# Patient Record
Sex: FEMALE | Race: WHITE | NOT HISPANIC OR LATINO | Employment: OTHER | URBAN - NONMETROPOLITAN AREA
[De-identification: names, ages, dates, MRNs, and addresses within clinical notes are randomized per-mention and may not be internally consistent; named-entity substitution may affect disease eponyms.]

---

## 2017-02-07 ENCOUNTER — TRANSCRIBE ORDERS (OUTPATIENT)
Dept: NEPHROLOGY | Facility: HOSPITAL | Age: 62
End: 2017-02-07

## 2017-02-07 ENCOUNTER — LAB (OUTPATIENT)
Dept: LAB | Facility: HOSPITAL | Age: 62
End: 2017-02-07
Attending: INTERNAL MEDICINE

## 2017-02-07 DIAGNOSIS — N18.30 CHRONIC KIDNEY DISEASE, STAGE 3 (MODERATE): Primary | ICD-10-CM

## 2017-02-07 DIAGNOSIS — N18.30 CHRONIC KIDNEY DISEASE, STAGE 3 (MODERATE): ICD-10-CM

## 2017-02-07 LAB
25(OH)D3 SERPL-MCNC: 35.6 NG/ML
ALBUMIN SERPL-MCNC: 4.5 G/DL (ref 3.5–5)
ANION GAP SERPL CALCULATED.3IONS-SCNC: 16 MMOL/L
BASOPHILS # BLD AUTO: 0.09 10*3/MM3 (ref 0–0.2)
BASOPHILS NFR BLD AUTO: 0.8 % (ref 0–2.5)
BUN BLD-MCNC: 28 MG/DL (ref 7–20)
BUN/CREAT SERPL: 17.5 (ref 7.1–23.5)
CALCIUM SPEC-SCNC: 9.7 MG/DL (ref 8.4–10.2)
CHLORIDE SERPL-SCNC: 101 MMOL/L (ref 98–107)
CO2 SERPL-SCNC: 30 MMOL/L (ref 26–30)
CREAT BLD-MCNC: 1.6 MG/DL (ref 0.6–1.3)
CREAT UR-MCNC: 160.8 MG/DL
DEPRECATED RDW RBC AUTO: 43.4 FL (ref 37–54)
EOSINOPHIL # BLD AUTO: 0.22 10*3/MM3 (ref 0–0.7)
EOSINOPHIL NFR BLD AUTO: 1.9 % (ref 0–7)
ERYTHROCYTE [DISTWIDTH] IN BLOOD BY AUTOMATED COUNT: 12.4 % (ref 11.5–14.5)
GFR SERPL CREATININE-BSD FRML MDRD: 33 ML/MIN/1.73
GLUCOSE BLD-MCNC: 178 MG/DL (ref 74–98)
HCT VFR BLD AUTO: 38.5 % (ref 37–47)
HGB BLD-MCNC: 12.1 G/DL (ref 12–16)
IMM GRANULOCYTES # BLD: 0.03 10*3/MM3 (ref 0–0.06)
IMM GRANULOCYTES NFR BLD: 0.3 % (ref 0–0.6)
LYMPHOCYTES # BLD AUTO: 5.04 10*3/MM3 (ref 0.6–3.4)
LYMPHOCYTES NFR BLD AUTO: 44.2 % (ref 10–50)
MCH RBC QN AUTO: 30.2 PG (ref 27–31)
MCHC RBC AUTO-ENTMCNC: 31.4 G/DL (ref 30–37)
MCV RBC AUTO: 96 FL (ref 81–99)
MONOCYTES # BLD AUTO: 0.59 10*3/MM3 (ref 0–0.9)
MONOCYTES NFR BLD AUTO: 5.2 % (ref 0–12)
NEUTROPHILS # BLD AUTO: 5.44 10*3/MM3 (ref 2–6.9)
NEUTROPHILS NFR BLD AUTO: 47.6 % (ref 37–80)
NRBC BLD MANUAL-RTO: 0 /100 WBC (ref 0–0)
PHOSPHATE SERPL-MCNC: 3.2 MG/DL (ref 2.5–4.5)
PLATELET # BLD AUTO: 215 10*3/MM3 (ref 130–400)
PMV BLD AUTO: 10.8 FL (ref 6–12)
POTASSIUM BLD-SCNC: 5 MMOL/L (ref 3.5–5.1)
RBC # BLD AUTO: 4.01 10*6/MM3 (ref 4.2–5.4)
SODIUM BLD-SCNC: 142 MMOL/L (ref 137–145)
URATE SERPL-MCNC: 7.3 MG/DL (ref 2.5–8.5)
WBC NRBC COR # BLD: 11.41 10*3/MM3 (ref 4.8–10.8)

## 2017-02-07 PROCEDURE — 83970 ASSAY OF PARATHORMONE: CPT

## 2017-02-07 PROCEDURE — 84156 ASSAY OF PROTEIN URINE: CPT

## 2017-02-07 PROCEDURE — 36415 COLL VENOUS BLD VENIPUNCTURE: CPT

## 2017-02-07 PROCEDURE — 82570 ASSAY OF URINE CREATININE: CPT

## 2017-02-07 PROCEDURE — 82306 VITAMIN D 25 HYDROXY: CPT

## 2017-02-07 PROCEDURE — 84550 ASSAY OF BLOOD/URIC ACID: CPT

## 2017-02-07 PROCEDURE — 80069 RENAL FUNCTION PANEL: CPT

## 2017-02-07 PROCEDURE — 85025 COMPLETE CBC W/AUTO DIFF WBC: CPT

## 2017-02-09 LAB
PROT UR-MCNC: 19.6 MG/DL
PTH-INTACT SERPL-MCNC: 88 PG/ML (ref 15–65)

## 2019-06-17 RX ORDER — OXYMETAZOLINE HYDROCHLORIDE 0.05 G/100ML
2 SPRAY NASAL 2 TIMES DAILY PRN
COMMUNITY
End: 2023-02-18 | Stop reason: HOSPADM

## 2019-06-17 RX ORDER — GLIPIZIDE 10 MG/1
20 TABLET ORAL EVERY MORNING
COMMUNITY

## 2019-06-17 RX ORDER — ASPIRIN 325 MG
325 TABLET, DELAYED RELEASE (ENTERIC COATED) ORAL DAILY
COMMUNITY

## 2019-06-17 RX ORDER — CHOLECALCIFEROL (VITAMIN D3) 25 MCG
1000 CAPSULE ORAL DAILY
COMMUNITY

## 2019-06-17 RX ORDER — PANTOPRAZOLE SODIUM 40 MG/1
40 TABLET, DELAYED RELEASE ORAL DAILY
COMMUNITY
End: 2023-02-18 | Stop reason: HOSPADM

## 2019-06-17 RX ORDER — LOSARTAN POTASSIUM 50 MG/1
50 TABLET ORAL DAILY
COMMUNITY
End: 2019-12-29

## 2019-06-17 RX ORDER — LORATADINE 10 MG/1
10 CAPSULE, LIQUID FILLED ORAL DAILY
COMMUNITY
End: 2023-02-18 | Stop reason: HOSPADM

## 2019-06-17 RX ORDER — METOPROLOL TARTRATE 50 MG/1
50 TABLET, FILM COATED ORAL DAILY
COMMUNITY
End: 2023-02-18 | Stop reason: HOSPADM

## 2019-06-17 RX ORDER — GABAPENTIN 600 MG/1
600 TABLET ORAL 3 TIMES DAILY
COMMUNITY

## 2019-06-17 RX ORDER — METFORMIN HYDROCHLORIDE 750 MG/1
750 TABLET, EXTENDED RELEASE ORAL
COMMUNITY

## 2019-06-17 RX ORDER — ATORVASTATIN CALCIUM 80 MG/1
80 TABLET, FILM COATED ORAL DAILY
COMMUNITY
End: 2023-02-18 | Stop reason: HOSPADM

## 2019-06-21 ENCOUNTER — HOSPITAL ENCOUNTER (OUTPATIENT)
Facility: HOSPITAL | Age: 64
Setting detail: HOSPITAL OUTPATIENT SURGERY
Discharge: HOME | End: 2019-06-21
Attending: OPHTHALMOLOGY | Admitting: OPHTHALMOLOGY

## 2019-06-21 ENCOUNTER — ANESTHESIA EVENT (OUTPATIENT)
Dept: PERIOP | Facility: HOSPITAL | Age: 64
End: 2019-06-21

## 2019-06-21 ENCOUNTER — ANESTHESIA (OUTPATIENT)
Dept: PERIOP | Facility: HOSPITAL | Age: 64
End: 2019-06-21

## 2019-06-21 VITALS
WEIGHT: 160 LBS | DIASTOLIC BLOOD PRESSURE: 88 MMHG | SYSTOLIC BLOOD PRESSURE: 163 MMHG | HEIGHT: 60 IN | TEMPERATURE: 97.3 F | BODY MASS INDEX: 31.41 KG/M2 | OXYGEN SATURATION: 99 % | HEART RATE: 75 BPM | RESPIRATION RATE: 16 BRPM

## 2019-06-21 PROBLEM — H25.11 AGE-RELATED NUCLEAR CATARACT OF RIGHT EYE: Status: ACTIVE | Noted: 2019-06-21

## 2019-06-21 LAB — GLUCOSE BLDC GLUCOMTR-MCNC: 126 MG/DL (ref 70–130)

## 2019-06-21 PROCEDURE — 25010000002 PROPOFOL 10 MG/ML EMULSION: Performed by: NURSE ANESTHETIST, CERTIFIED REGISTERED

## 2019-06-21 PROCEDURE — V2632 POST CHMBR INTRAOCULAR LENS: HCPCS | Performed by: OPHTHALMOLOGY

## 2019-06-21 PROCEDURE — 82962 GLUCOSE BLOOD TEST: CPT

## 2019-06-21 DEVICE — LENS ACRYSOF IQ SA60WF W/ULTRASERT 6X13MM ACU0T0 20: Type: IMPLANTABLE DEVICE | Site: POSTERIOR CHAMBER | Status: FUNCTIONAL

## 2019-06-21 RX ORDER — PREDNISOLONE ACETATE 10 MG/ML
1 SUSPENSION/ DROPS OPHTHALMIC SEE ADMIN INSTRUCTIONS
Status: DISCONTINUED | OUTPATIENT
Start: 2019-06-21 | End: 2019-06-21 | Stop reason: HOSPADM

## 2019-06-21 RX ORDER — BALANCED SALT SOLUTION 6.4; .75; .48; .3; 3.9; 1.7 MG/ML; MG/ML; MG/ML; MG/ML; MG/ML; MG/ML
SOLUTION OPHTHALMIC AS NEEDED
Status: DISCONTINUED | OUTPATIENT
Start: 2019-06-21 | End: 2019-06-21 | Stop reason: HOSPADM

## 2019-06-21 RX ORDER — PHENYLEPHRINE HYDROCHLORIDE 100 MG/ML
1 SOLUTION/ DROPS OPHTHALMIC
Status: COMPLETED | OUTPATIENT
Start: 2019-06-21 | End: 2019-06-21

## 2019-06-21 RX ORDER — PROPOFOL 10 MG/ML
VIAL (ML) INTRAVENOUS AS NEEDED
Status: DISCONTINUED | OUTPATIENT
Start: 2019-06-21 | End: 2019-06-21 | Stop reason: SURG

## 2019-06-21 RX ORDER — TETRACAINE HYDROCHLORIDE 5 MG/ML
SOLUTION OPHTHALMIC AS NEEDED
Status: DISCONTINUED | OUTPATIENT
Start: 2019-06-21 | End: 2019-06-21 | Stop reason: HOSPADM

## 2019-06-21 RX ORDER — SODIUM CHLORIDE 0.9 % (FLUSH) 0.9 %
1-10 SYRINGE (ML) INJECTION AS NEEDED
Status: DISCONTINUED | OUTPATIENT
Start: 2019-06-21 | End: 2019-06-21 | Stop reason: HOSPADM

## 2019-06-21 RX ORDER — TETRACAINE HYDROCHLORIDE 5 MG/ML
1 SOLUTION OPHTHALMIC SEE ADMIN INSTRUCTIONS
Status: COMPLETED | OUTPATIENT
Start: 2019-06-21 | End: 2019-06-21

## 2019-06-21 RX ORDER — LIDOCAINE HYDROCHLORIDE 20 MG/ML
INJECTION, SOLUTION INFILTRATION; PERINEURAL AS NEEDED
Status: DISCONTINUED | OUTPATIENT
Start: 2019-06-21 | End: 2019-06-21 | Stop reason: SURG

## 2019-06-21 RX ORDER — PREDNISOLONE ACETATE 10 MG/ML
SUSPENSION/ DROPS OPHTHALMIC AS NEEDED
Status: DISCONTINUED | OUTPATIENT
Start: 2019-06-21 | End: 2019-06-21 | Stop reason: HOSPADM

## 2019-06-21 RX ORDER — ACETAZOLAMIDE 500 MG/1
500 CAPSULE, EXTENDED RELEASE ORAL ONCE
Status: COMPLETED | OUTPATIENT
Start: 2019-06-21 | End: 2019-06-21

## 2019-06-21 RX ORDER — ONDANSETRON 2 MG/ML
4 INJECTION INTRAMUSCULAR; INTRAVENOUS ONCE AS NEEDED
Status: CANCELLED | OUTPATIENT
Start: 2019-06-21 | End: 2019-06-21

## 2019-06-21 RX ORDER — CYCLOPENTOLATE HYDROCHLORIDE 20 MG/ML
1 SOLUTION/ DROPS OPHTHALMIC
Status: COMPLETED | OUTPATIENT
Start: 2019-06-21 | End: 2019-06-21

## 2019-06-21 RX ORDER — SODIUM CHLORIDE 0.9 % (FLUSH) 0.9 %
3 SYRINGE (ML) INJECTION EVERY 12 HOURS SCHEDULED
Status: DISCONTINUED | OUTPATIENT
Start: 2019-06-21 | End: 2019-06-21 | Stop reason: HOSPADM

## 2019-06-21 RX ORDER — SODIUM CHLORIDE, SODIUM LACTATE, POTASSIUM CHLORIDE, CALCIUM CHLORIDE 600; 310; 30; 20 MG/100ML; MG/100ML; MG/100ML; MG/100ML
1000 INJECTION, SOLUTION INTRAVENOUS CONTINUOUS
Status: DISCONTINUED | OUTPATIENT
Start: 2019-06-21 | End: 2019-06-21 | Stop reason: HOSPADM

## 2019-06-21 RX ORDER — SODIUM CHLORIDE 0.9 % (FLUSH) 0.9 %
3 SYRINGE (ML) INJECTION AS NEEDED
Status: DISCONTINUED | OUTPATIENT
Start: 2019-06-21 | End: 2019-06-21 | Stop reason: HOSPADM

## 2019-06-21 RX ORDER — PREDNISOLONE ACETATE 10 MG/ML
SUSPENSION/ DROPS OPHTHALMIC
Qty: 2 ML | Refills: 0
Start: 2019-06-21 | End: 2023-02-18 | Stop reason: HOSPADM

## 2019-06-21 RX ORDER — LIDOCAINE HYDROCHLORIDE 40 MG/ML
INJECTION, SOLUTION RETROBULBAR; TOPICAL AS NEEDED
Status: DISCONTINUED | OUTPATIENT
Start: 2019-06-21 | End: 2019-06-21 | Stop reason: HOSPADM

## 2019-06-21 RX ORDER — KETAMINE HYDROCHLORIDE 50 MG/ML
INJECTION, SOLUTION, CONCENTRATE INTRAMUSCULAR; INTRAVENOUS AS NEEDED
Status: DISCONTINUED | OUTPATIENT
Start: 2019-06-21 | End: 2019-06-21 | Stop reason: SURG

## 2019-06-21 RX ORDER — NEOMYCIN SULFATE, POLYMYXIN B SULFATE, AND DEXAMETHASONE 3.5; 10000; 1 MG/G; [USP'U]/G; MG/G
OINTMENT OPHTHALMIC AS NEEDED
Status: DISCONTINUED | OUTPATIENT
Start: 2019-06-21 | End: 2019-06-21 | Stop reason: HOSPADM

## 2019-06-21 RX ADMIN — CYCLOPENTOLATE HYDROCHLORIDE 1 DROP: 20 SOLUTION/ DROPS OPHTHALMIC at 10:33

## 2019-06-21 RX ADMIN — ACETAZOLAMIDE 500 MG: 500 CAPSULE, EXTENDED RELEASE ORAL at 11:46

## 2019-06-21 RX ADMIN — CYCLOPENTOLATE HYDROCHLORIDE 1 DROP: 20 SOLUTION/ DROPS OPHTHALMIC at 10:43

## 2019-06-21 RX ADMIN — PHENYLEPHRINE HYDROCHLORIDE 1 DROP: 100 SOLUTION/ DROPS OPHTHALMIC at 10:38

## 2019-06-21 RX ADMIN — KETAMINE HYDROCHLORIDE 15 MG: 50 INJECTION, SOLUTION INTRAMUSCULAR; INTRAVENOUS at 11:10

## 2019-06-21 RX ADMIN — CYCLOPENTOLATE HYDROCHLORIDE 1 DROP: 20 SOLUTION/ DROPS OPHTHALMIC at 10:38

## 2019-06-21 RX ADMIN — PHENYLEPHRINE HYDROCHLORIDE 1 DROP: 100 SOLUTION/ DROPS OPHTHALMIC at 10:43

## 2019-06-21 RX ADMIN — PHENYLEPHRINE HYDROCHLORIDE 1 DROP: 100 SOLUTION/ DROPS OPHTHALMIC at 10:33

## 2019-06-21 RX ADMIN — SODIUM CHLORIDE, POTASSIUM CHLORIDE, SODIUM LACTATE AND CALCIUM CHLORIDE 1000 ML: 600; 310; 30; 20 INJECTION, SOLUTION INTRAVENOUS at 10:35

## 2019-06-21 RX ADMIN — TETRACAINE HYDROCHLORIDE 1 DROP: 5 SOLUTION OPHTHALMIC at 10:32

## 2019-06-21 RX ADMIN — LIDOCAINE HYDROCHLORIDE 60 MG: 20 INJECTION, SOLUTION INFILTRATION; PERINEURAL at 11:10

## 2019-06-21 RX ADMIN — PROPOFOL 70 MG: 10 INJECTION, EMULSION INTRAVENOUS at 11:19

## 2019-06-21 RX ADMIN — TETRACAINE HYDROCHLORIDE 1 DROP: 5 SOLUTION OPHTHALMIC at 10:31

## 2019-06-21 NOTE — H&P
"      [facility]         History and Physical    Patient Name: Viola Barlow  MRN: [id number]  : 1955  Gender: female     HPI: Patient complaint of PPLOV Right eye diagnosed with cataract. Patient requests PHACO PCIOL for Increase of VA/ADL.    History:    Past Medical History:   Diagnosis Date   • Arthritis    • Cataract    • Depression    • Diabetes mellitus (CMS/HCC)    • Dysphagia     Patient reported that intermittently food feels like it gets lodged    • Elevated cholesterol    • Full dentures     Instructed no adhesives the DOS   • GERD (gastroesophageal reflux disease)    • Headache    • History of nuclear stress test     Patient reported this was done with Dr. Douglas around  and that she was started on Metoprolol after testing.    • Hypertension    • Kidney disease     Patient reported \"I have chronic kidney disease and they say my kidney function is at 33%\" and that she has routine care with Dr. Calzada    • Migraines    • PVD (peripheral vascular disease) (CMS/[facility])    • Seasonal allergies    • Spinal headache    • Wears glasses        Past Surgical History:   Procedure Laterality Date   • ABDOMINAL SURGERY  1984    Patient reported after complete hysterectomy, she had another procedure to remove tumors in the abdominal area   • APPENDECTOMY      Reported removed when hysterectomy was done   • COLONOSCOPY     • ELBOW EPICONDYLECTOMY Left 1990   • ENDOSCOPY     • HYSTERECTOMY      Partial   • HYSTERECTOMY      Complete    • MOUTH SURGERY      Full mouth extraction   • VASCULAR SURGERY Bilateral     For PAD - Reported the right leg was done first around  and the left was done around        Social History     Socioeconomic History   • Marital status: Single     Spouse name: Not on file   • Number of children: Not on file   • Years of education: Not on file   • Highest education level: Not on file   Tobacco Use   • Smoking status: Former Smoker     " Packs/day: 2.00     Years: 32.00     Pack years: 64.00     Types: Cigarettes     Last attempt to quit: 2008     Years since quittin.4   • Smokeless tobacco: Never Used   Substance and Sexual Activity   • Alcohol use: No     Frequency: Never   • Drug use: No   • Sexual activity: Defer       History reviewed. No pertinent family history.    Prior to Admission Medications:  Medications Prior to Admission   Medication Sig Dispense Refill Last Dose   • aspirin  MG tablet Take 325 mg by mouth Daily.   2019 at 0800   • atorvastatin (LIPITOR) 80 MG tablet Take 80 mg by mouth Daily.   2019 at 0900   • Cholecalciferol (VITAMIN D-3) 1000 units capsule Take 1,000 Units by mouth Daily.   2019 at 0900   • gabapentin (NEURONTIN) 600 MG tablet Take 600 mg by mouth 3 (Three) Times a Day.   2019 at 220   • glipiZIDE (GLUCOTROL) 10 MG tablet Take 20 mg by mouth Every Morning.   2019 at 0900   • insulin NPH (humuLIN N,novoLIN N) 100 UNIT/ML injection Inject 60 Units under the skin into the appropriate area as directed 2 (Two) Times a Day Before Meals.   2019 at 2100   • Loratadine 10 MG capsule Take 10 mg by mouth Daily.   2019 at 0900   • losartan (COZAAR) 50 MG tablet Take 50 mg by mouth Daily.   2019 at 0900   • metFORMIN ER (GLUCOPHAGE-XR) 750 MG 24 hr tablet Take 750 mg by mouth Daily With Breakfast.   2019 at 0900   • metoprolol tartrate (LOPRESSOR) 50 MG tablet Take 50 mg by mouth Daily.   2019 at 0900   • oxymetazoline (AFRIN) 0.05 % nasal spray 2 sprays into the nostril(s) as directed by provider 2 (Two) Times a Day As Needed for Congestion.   2019 at 0900   • pantoprazole (PROTONIX) 40 MG EC tablet Take 40 mg by mouth Daily.   2019 at 0900   • sertraline (ZOLOFT) 50 MG tablet Take 50 mg by mouth Daily.   2019 at 0900       Allergies:  No Known Allergies     Vitals: Temp:  [97.2 °F (36.2 °C)] 97.2 °F (36.2 °C)  Heart Rate:  [75] 75  Resp:  [16] 16  BP:  (153)/(73) 153/73    Review of Systems:   Within Normal Limits Abnormal   HEENT [x]    []     Cardiovascular [x]   []     Gastrointestinal [x]   []     Gentiourinary [x]   []     Neurologic [x]   []     Pulmonary [x]   []       Physical Exam:   Within Normal Limits Abnormal   HEENT [x]    []     Heart [x]   []     Lungs [x]   []     Abdomen [x]   []     Extremities [x]   []       Impression: Right nuclear sclerotic cataract.     Plan: CATARACT PHACO EXTRACTION WITH INTRAOCULAR LENS IMPLANT RIGHT (Right)     Tee Enrique MD  6/21/2019

## 2019-06-21 NOTE — ANESTHESIA POSTPROCEDURE EVALUATION
Patient: Viola Barlow    Procedure Summary     Date:  06/21/19 Room / Location:  [facility] OR 1 /  [facility]    Anesthesia Start:  1107 Anesthesia Stop:  1120    Procedure:  CATARACT PHACO EXTRACTION WITH INTRAOCULAR LENS IMPLANT RIGHT (Right Eye) Diagnosis:       Age-related nuclear cataract of right eye      (Age-related nuclear cataract of right eye [H25.11])    Surgeon:  Tee Enrique MD Provider:  Tee Du CRNA    Anesthesia Type:  MAC ASA Status:  3          Anesthesia Type: MAC  Last vitals  BP   163/88 (06/21/19 1152)   Temp   97.3 °F (36.3 °C) (06/21/19 1152)   Pulse   75 (06/21/19 1152)   Resp   16 (06/21/19 1152)     SpO2   99 % (06/21/19 1152)     Post Anesthesia Care and Evaluation    Patient location during evaluation: PHASE II  Patient participation: complete - patient participated  Level of consciousness: awake  Pain score: 1  Pain management: adequate  Airway patency: patent  Anesthetic complications: No anesthetic complications  PONV Status: controlled  Cardiovascular status: acceptable and stable  Respiratory status: acceptable  Hydration status: acceptable

## 2019-06-21 NOTE — ANESTHESIA PREPROCEDURE EVALUATION
Anesthesia Evaluation     Patient summary reviewed and Nursing notes reviewed   history of anesthetic complications: PONV  NPO Solid Status: > 8 hours  NPO Liquid Status: > 8 hours           Airway   Mallampati: II  TM distance: >3 FB  Neck ROM: full  no difficulty expected  Dental - normal exam     Pulmonary - normal exam   (+) a smoker Former,   Cardiovascular - normal exam    PT is on anticoagulation therapy  Rhythm: regular  Rate: normal    (+) hypertension 2 medications or greater, PVD, hyperlipidemia,       Neuro/Psych  (+) headaches, psychiatric history Anxiety and Depression,     GI/Hepatic/Renal/Endo    (+)  GERD,  diabetes mellitus using insulin,     Musculoskeletal     Abdominal    Substance History - negative use     OB/GYN negative ob/gyn ROS         Other   (+) arthritis                     Anesthesia Plan    ASA 3     MAC   (Pt told that intravenous sedation will be used as the primary anesthetic along with local anesthesia if necessary. Every effort will be made to make sure the patient is comfortable.     The patient was told they may or may not have recall for the procedure. It was further explained that if the MAC was not adequate that a general anesthetic with either an LMA or endotracheal tube would be required.     Will proceed with the plan of care.)  intravenous induction   Anesthetic plan, all risks, benefits, and alternatives have been provided, discussed and informed consent has been obtained with: patient.

## 2019-06-21 NOTE — OP NOTE
OPERATIVE NOTE    Date of Procedure: 6/21/2019  Patient Name: Viola Barlow  Patient MRN: [id number]  YOB: 1955     Preoperative Diagnosis: Right nuclear sclerotic cataract.     Postoperative Diagnosis: Right nuclear sclerotic cataract.     Procedure Performed: Phacoemulsification with implantation of a  foldable posterior chamber intraocular lens, Right eye.     Surgeon: Tee Enrique MD     Anesthesia:  Monitored Anesthesia Care (MAC)      Brief History and Indication: The patient presents with a history of past progressive loss of vision.  Patient was diagnosed with cataract and requests removal for increased ability to read and see.     Operation Description: The patient was taken to the OR and prepped and draped in the usual sterile ophthalmic fashion. A lid speculum was placed in the eye.  A #75 blade was then used to make a stab incision two o’clock hours from the intended temporal clear cornea groove. The anterior chamber was then inflated with a Viscoelastic. A metal microkeratome blade was then used to enter the anterior chamber at the temporal clear cornea site. A three level tunnel incision was made. A curvilinear capsulorrhexis was then performed with a bent cystotome needle and capsulorrhexis forceps.  BSS on a 30 gauge bent cannula was used to hydro-dissect, and hydro-delineate the lens. Good fluid waves and hydro-delineation were noted. Phacoemulsification was then used to remove nuclear material without complications. The residual cortical and lenticular material was then removed with irrigation and aspiration. Viscoelastics were then used to inflate the bag in a soft shell technique. A PCIOL was injected into the bag. Post-implantation, there were no rents or tears in the bag and the lens was noted to be stable and centered. The residual Viscoelastic was then removed with irrigation and aspiration.  The wound was checked and found to be without leaks. Therefore a Polydex  ointment and one drop of a Prednisilone eye drop was placed in the eye.     Implant Information:   Implant Name Type Inv. Item Serial No.  Lot No. LRB No. Used   LENS ACRYSOF IQ SA60WF W/ULTRASERT 6X13MM ACU0T0 20 - [id number] - [id number] Implant LENS ACRYSOF IQ SA60WF W/ULTRASERT 6X13MM ACU0T0 20 [id number] TERESSA  Right 1       Complications: None    Discharge and Condition  The patient was transported to same day surgery in excellent condition and scheduled for follow-up tomorrow morning. The patient was given instructions on use of eye drops for the operative eye and was specifically instructed to call Dr. Enrique at his office or home for any nausea, vomiting, headache, decreased visual acuity, or pain, or if the patient had any general concerns.    Tee Enrique MD  6/21/2019

## 2019-06-21 NOTE — DISCHARGE INSTRUCTIONS
Please follow all post op instructions and follow up appointment time from your physician's office included in your discharge packet.  .   To assist you in voiding:  Drink plenty of fluids  Listen to running water while attempting to void.    If you are unable to urinate and you have an uncomfortable urge to void or it has been   6 hours since you were discharged, return to the Emergency Room    No pushing, pulling, tugging,  heavy lifting, or strenuous activity.  No major decision making, driving, or drinking alcoholic beverages for 24 hours. ( due to the medications you have  received)  Always use good hand hygiene/washing techniques.  NO driving while taking pain medications.    Post Operative Cataract Instructions     Right Eye    1.  Wear eye shield at bedtime for 3 nights.  You may wear glasses or sunglasses during the day.  You must keep eye protected at all times.  2.  Try not to sleep on the side in which the eye was operated (1-2 weeks).  3.  No heavy lifting (at least 3 days).  No bending below the waist.  Keep your head above your heart.  4.  Try not to cough or sneeze excessively.  5.  Bring eye drops and instructions with you to post-op appointment.  6.  If eyes become stuck together after surgery you may soak with warm cloth.  7. Start Prednisilone (Pred-Forte) today as soon as you get home.   *Apply 1 drop to operative eye four times a day for 7 days and then twice daily until all medication is gone.    Complications from cataract surgery usually occur within the first couple of weeks.  Complications could include excessive redness, pain, decreased vision, or changes in vision.  If problems are treated early, there is a better chance of resolution.  Please contact Dr. Enrique at one of the numbers listed below if you are experiencing any problems.  If a holiday, evenings, or weekends, do not hesitate to call if you are having a problem.      Dr. Alexis Oliveira  [place]    [phone]   [phone] CELL  [phone] CELL    [phone] CELL             [phone] CELL        If you are unable to reach any of the above doctors, please call [facility] at [phone]    IMPORTANT INFORMATION  If you have any questions or need any refills on your eye drops, please call the office at [phone].

## 2019-07-05 ENCOUNTER — ANESTHESIA (OUTPATIENT)
Dept: PERIOP | Facility: HOSPITAL | Age: 64
End: 2019-07-05

## 2019-07-05 ENCOUNTER — ANESTHESIA EVENT (OUTPATIENT)
Dept: PERIOP | Facility: HOSPITAL | Age: 64
End: 2019-07-05

## 2019-07-05 ENCOUNTER — HOSPITAL ENCOUNTER (OUTPATIENT)
Facility: HOSPITAL | Age: 64
Setting detail: HOSPITAL OUTPATIENT SURGERY
Discharge: HOME | End: 2019-07-05
Attending: OPHTHALMOLOGY | Admitting: OPHTHALMOLOGY

## 2019-07-05 VITALS
HEART RATE: 74 BPM | HEIGHT: 60 IN | WEIGHT: 160 LBS | RESPIRATION RATE: 16 BRPM | TEMPERATURE: 97 F | SYSTOLIC BLOOD PRESSURE: 117 MMHG | DIASTOLIC BLOOD PRESSURE: 76 MMHG | OXYGEN SATURATION: 96 % | BODY MASS INDEX: 31.41 KG/M2

## 2019-07-05 LAB
GLUCOSE BLDC GLUCOMTR-MCNC: 103 MG/DL (ref 70–130)
GLUCOSE BLDC GLUCOMTR-MCNC: 58 MG/DL (ref 70–130)
GLUCOSE BLDC GLUCOMTR-MCNC: 70 MG/DL (ref 70–130)

## 2019-07-05 PROCEDURE — 25010000002 MIDAZOLAM PER 1 MG: Performed by: NURSE ANESTHETIST, CERTIFIED REGISTERED

## 2019-07-05 PROCEDURE — 82962 GLUCOSE BLOOD TEST: CPT

## 2019-07-05 PROCEDURE — 25010000002 PROPOFOL 10 MG/ML EMULSION: Performed by: NURSE ANESTHETIST, CERTIFIED REGISTERED

## 2019-07-05 PROCEDURE — V2632 POST CHMBR INTRAOCULAR LENS: HCPCS | Performed by: OPHTHALMOLOGY

## 2019-07-05 DEVICE — LENS ACRYSOF IQ SA60WF W/ULTRASERT 6X13MM ACU0T0 20.5: Type: IMPLANTABLE DEVICE | Site: POSTERIOR CHAMBER | Status: FUNCTIONAL

## 2019-07-05 RX ORDER — SODIUM CHLORIDE, SODIUM LACTATE, POTASSIUM CHLORIDE, CALCIUM CHLORIDE 600; 310; 30; 20 MG/100ML; MG/100ML; MG/100ML; MG/100ML
1000 INJECTION, SOLUTION INTRAVENOUS CONTINUOUS
Status: DISCONTINUED | OUTPATIENT
Start: 2019-07-05 | End: 2019-07-05

## 2019-07-05 RX ORDER — DEXTROSE, SODIUM CHLORIDE, SODIUM LACTATE, POTASSIUM CHLORIDE, AND CALCIUM CHLORIDE 5; .6; .31; .03; .02 G/100ML; G/100ML; G/100ML; G/100ML; G/100ML
20 INJECTION, SOLUTION INTRAVENOUS CONTINUOUS
Status: DISCONTINUED | OUTPATIENT
Start: 2019-07-05 | End: 2019-07-05 | Stop reason: HOSPADM

## 2019-07-05 RX ORDER — KETAMINE HYDROCHLORIDE 50 MG/ML
INJECTION, SOLUTION, CONCENTRATE INTRAMUSCULAR; INTRAVENOUS AS NEEDED
Status: DISCONTINUED | OUTPATIENT
Start: 2019-07-05 | End: 2019-07-05 | Stop reason: SURG

## 2019-07-05 RX ORDER — TETRACAINE HYDROCHLORIDE 5 MG/ML
1 SOLUTION OPHTHALMIC SEE ADMIN INSTRUCTIONS
Status: COMPLETED | OUTPATIENT
Start: 2019-07-05 | End: 2019-07-05

## 2019-07-05 RX ORDER — PHENYLEPHRINE HYDROCHLORIDE 100 MG/ML
1 SOLUTION/ DROPS OPHTHALMIC
Status: COMPLETED | OUTPATIENT
Start: 2019-07-05 | End: 2019-07-05

## 2019-07-05 RX ORDER — TETRACAINE HYDROCHLORIDE 5 MG/ML
SOLUTION OPHTHALMIC AS NEEDED
Status: DISCONTINUED | OUTPATIENT
Start: 2019-07-05 | End: 2019-07-05 | Stop reason: HOSPADM

## 2019-07-05 RX ORDER — BALANCED SALT SOLUTION 6.4; .75; .48; .3; 3.9; 1.7 MG/ML; MG/ML; MG/ML; MG/ML; MG/ML; MG/ML
SOLUTION OPHTHALMIC AS NEEDED
Status: DISCONTINUED | OUTPATIENT
Start: 2019-07-05 | End: 2019-07-05 | Stop reason: HOSPADM

## 2019-07-05 RX ORDER — SODIUM CHLORIDE 0.9 % (FLUSH) 0.9 %
1-10 SYRINGE (ML) INJECTION AS NEEDED
Status: DISCONTINUED | OUTPATIENT
Start: 2019-07-05 | End: 2019-07-05 | Stop reason: HOSPADM

## 2019-07-05 RX ORDER — PROPOFOL 10 MG/ML
VIAL (ML) INTRAVENOUS AS NEEDED
Status: DISCONTINUED | OUTPATIENT
Start: 2019-07-05 | End: 2019-07-05 | Stop reason: SURG

## 2019-07-05 RX ORDER — PREDNISOLONE ACETATE 10 MG/ML
1 SUSPENSION/ DROPS OPHTHALMIC SEE ADMIN INSTRUCTIONS
Status: DISCONTINUED | OUTPATIENT
Start: 2019-07-05 | End: 2019-07-05 | Stop reason: HOSPADM

## 2019-07-05 RX ORDER — CYCLOPENTOLATE HYDROCHLORIDE 20 MG/ML
1 SOLUTION/ DROPS OPHTHALMIC
Status: COMPLETED | OUTPATIENT
Start: 2019-07-05 | End: 2019-07-05

## 2019-07-05 RX ORDER — NEOMYCIN SULFATE, POLYMYXIN B SULFATE, AND DEXAMETHASONE 3.5; 10000; 1 MG/G; [USP'U]/G; MG/G
OINTMENT OPHTHALMIC AS NEEDED
Status: DISCONTINUED | OUTPATIENT
Start: 2019-07-05 | End: 2019-07-05 | Stop reason: HOSPADM

## 2019-07-05 RX ORDER — LIDOCAINE HYDROCHLORIDE 20 MG/ML
INJECTION, SOLUTION INTRAVENOUS AS NEEDED
Status: DISCONTINUED | OUTPATIENT
Start: 2019-07-05 | End: 2019-07-05 | Stop reason: SURG

## 2019-07-05 RX ORDER — PREDNISOLONE ACETATE 10 MG/ML
SUSPENSION/ DROPS OPHTHALMIC AS NEEDED
Status: DISCONTINUED | OUTPATIENT
Start: 2019-07-05 | End: 2019-07-05 | Stop reason: HOSPADM

## 2019-07-05 RX ORDER — MIDAZOLAM HYDROCHLORIDE 1 MG/ML
INJECTION INTRAMUSCULAR; INTRAVENOUS AS NEEDED
Status: DISCONTINUED | OUTPATIENT
Start: 2019-07-05 | End: 2019-07-05 | Stop reason: SURG

## 2019-07-05 RX ORDER — LIDOCAINE HYDROCHLORIDE 40 MG/ML
INJECTION, SOLUTION RETROBULBAR; TOPICAL AS NEEDED
Status: DISCONTINUED | OUTPATIENT
Start: 2019-07-05 | End: 2019-07-05 | Stop reason: HOSPADM

## 2019-07-05 RX ORDER — SODIUM CHLORIDE 0.9 % (FLUSH) 0.9 %
3 SYRINGE (ML) INJECTION EVERY 12 HOURS SCHEDULED
Status: DISCONTINUED | OUTPATIENT
Start: 2019-07-05 | End: 2019-07-05 | Stop reason: HOSPADM

## 2019-07-05 RX ORDER — ACETAZOLAMIDE 500 MG/1
500 CAPSULE, EXTENDED RELEASE ORAL ONCE
Status: COMPLETED | OUTPATIENT
Start: 2019-07-05 | End: 2019-07-05

## 2019-07-05 RX ADMIN — SODIUM CHLORIDE, SODIUM LACTATE, POTASSIUM CHLORIDE, CALCIUM CHLORIDE AND DEXTROSE MONOHYDRATE 20 ML/HR: 5; 600; 310; 30; 20 INJECTION, SOLUTION INTRAVENOUS at 08:26

## 2019-07-05 RX ADMIN — MIDAZOLAM HYDROCHLORIDE 2 MG: 1 INJECTION, SOLUTION INTRAMUSCULAR; INTRAVENOUS at 09:25

## 2019-07-05 RX ADMIN — PHENYLEPHRINE HYDROCHLORIDE 1 DROP: 100 SOLUTION/ DROPS OPHTHALMIC at 08:22

## 2019-07-05 RX ADMIN — CYCLOPENTOLATE HYDROCHLORIDE 1 DROP: 20 SOLUTION/ DROPS OPHTHALMIC at 08:27

## 2019-07-05 RX ADMIN — PROPOFOL 20 MG: 10 INJECTION, EMULSION INTRAVENOUS at 09:54

## 2019-07-05 RX ADMIN — TETRACAINE HYDROCHLORIDE 1 DROP: 5 SOLUTION OPHTHALMIC at 08:16

## 2019-07-05 RX ADMIN — KETAMINE HYDROCHLORIDE 10 MG: 50 INJECTION, SOLUTION INTRAMUSCULAR; INTRAVENOUS at 09:53

## 2019-07-05 RX ADMIN — PHENYLEPHRINE HYDROCHLORIDE 1 DROP: 100 SOLUTION/ DROPS OPHTHALMIC at 08:17

## 2019-07-05 RX ADMIN — CYCLOPENTOLATE HYDROCHLORIDE 1 DROP: 20 SOLUTION/ DROPS OPHTHALMIC at 08:22

## 2019-07-05 RX ADMIN — TETRACAINE HYDROCHLORIDE 1 DROP: 5 SOLUTION OPHTHALMIC at 08:15

## 2019-07-05 RX ADMIN — ACETAZOLAMIDE 500 MG: 500 CAPSULE, EXTENDED RELEASE ORAL at 10:12

## 2019-07-05 RX ADMIN — KETAMINE HYDROCHLORIDE 10 MG: 50 INJECTION, SOLUTION INTRAMUSCULAR; INTRAVENOUS at 09:29

## 2019-07-05 RX ADMIN — LIDOCAINE HYDROCHLORIDE 20 MG: 20 INJECTION, SOLUTION INTRAVENOUS at 09:34

## 2019-07-05 RX ADMIN — CYCLOPENTOLATE HYDROCHLORIDE 1 DROP: 20 SOLUTION/ DROPS OPHTHALMIC at 08:17

## 2019-07-05 RX ADMIN — PHENYLEPHRINE HYDROCHLORIDE 1 DROP: 100 SOLUTION/ DROPS OPHTHALMIC at 08:27

## 2019-07-05 NOTE — OP NOTE
OPERATIVE NOTE    Date of Procedure: 7/5/2019  Patient Name: Viola Barlow  Patient MRN: [id number]  YOB: 1955     Preoperative Diagnosis: Left nuclear sclerotic cataract.     Postoperative Diagnosis: Left nuclear sclerotic cataract.     Procedure Performed: Phacoemulsification with implantation of a  foldable posterior chamber intraocular lens, Left eye.     Surgeon: Tee Enrique MD     Anesthesia:  Monitored Anesthesia Care (MAC)      Brief History and Indication: The patient presents with a history of past progressive loss of vision.  Patient was diagnosed with cataract and requests removal for increased ability to read and see.     Operation Description: The patient was taken to the OR and prepped and draped in the usual sterile ophthalmic fashion. A lid speculum was placed in the eye.  A #75 blade was then used to make a stab incision two o’clock hours from the intended temporal clear cornea groove. The anterior chamber was then inflated with a Viscoelastic. A metal microkeratome blade was then used to enter the anterior chamber at the temporal clear cornea site. A three level tunnel incision was made. A curvilinear capsulorrhexis was then performed with a bent cystotome needle and capsulorrhexis forceps.  BSS on a 30 gauge bent cannula was used to hydro-dissect, and hydro-delineate the lens. Good fluid waves and hydro-delineation were noted. Phacoemulsification was then used to remove nuclear material without complications. The residual cortical and lenticular material was then removed with irrigation and aspiration. Viscoelastics were then used to inflate the bag in a soft shell technique. A PCIOL was injected into the bag. Post-implantation, there were no rents or tears in the bag and the lens was noted to be stable and centered. The residual Viscoelastic was then removed with irrigation and aspiration.  The wound was checked and found to be without leaks. Therefore a Polydex  ointment and one drop of a Prednisilone eye drop was placed in the eye.     Implant Information:   Implant Name Type Inv. Item Serial No.  Lot No. LRB No. Used   LENS ACRYSOF IQ SA60WF W/ULTRASERT 6X13MM ACU0T0 20.5 - [id number] 065 - [id number] Implant LENS ACRYSOF IQ SA60WF W/ULTRASERT 6X13MM ACU0T0 20.5 [id number] TERESSA N/A Left 1       Complications: None    Discharge and Condition  The patient was transported to same day surgery in excellent condition and scheduled for follow-up tomorrow morning. The patient was given instructions on use of eye drops for the operative eye and was specifically instructed to call Dr. Enrique at his office or home for any nausea, vomiting, headache, decreased visual acuity, or pain, or if the patient had any general concerns.    Tee Enrique MD  7/5/2019

## 2019-07-05 NOTE — ANESTHESIA POSTPROCEDURE EVALUATION
Patient: Viola Barlow    Procedure Summary     Date:  07/05/19 Room / Location:  [facility] OR  / [facility] OR    Anesthesia Start:  0925 Anesthesia Stop:      Procedure:  CATARACT PHACO EXTRACTION WITH INTRAOCULAR LENS IMPLANT LEFT (Left Eye) Diagnosis:       Age-related nuclear cataract of left eye      (Age-related nuclear cataract of left eye [H25.12])    Surgeon:  Tee Enrique MD Provider:  Federico Rascon CRNA    Anesthesia Type:  MAC ASA Status:  3          Anesthesia Type: MAC  Last vitals  BP   125/78 (07/05/19 0816)   Temp   97.7 °F (36.5 °C) (07/05/19 0816)   Pulse   75 (07/05/19 0816)   Resp   18 (07/05/19 0816)     SpO2   94 % (07/05/19 0816)     Post Anesthesia Care and Evaluation    Patient location during evaluation: PHASE II  Patient participation: complete - patient participated  Level of consciousness: awake  Pain score: 0  Pain management: adequate  Airway patency: patent  Anesthetic complications: No anesthetic complications  PONV Status: none  Cardiovascular status: acceptable  Respiratory status: acceptable  Hydration status: acceptable    Comments: vsss resp spont, reflexes intact, responsive, report given to pacu nurse

## 2019-07-05 NOTE — NURSING NOTE
Notified Tee HOSKINS r/t pt BG 58, pt asymptomatic and did take her am meds. Ordered for D5LR fluids and recheck

## 2019-07-05 NOTE — H&P
"      [facility]         History and Physical    Patient Name: Viola Barlow  MRN: [id number]  : 1955  Gender: female     HPI: Patient complaint of PPLOV Left eye diagnosed with cataract. Patient requests PHACO PCIOL for Increase of VA/ADL.    History:    Past Medical History:   Diagnosis Date   • Arthritis    • Cataract    • Depression    • Diabetes mellitus (CMS/HCC)    • Dysphagia     Patient reported that intermittently food feels like it gets lodged    • Elevated cholesterol    • Full dentures     Instructed no adhesives the DOS   • GERD (gastroesophageal reflux disease)    • Headache    • History of nuclear stress test     Patient reported this was done with Dr. Douglas around  and that she was started on Metoprolol after testing.    • Hypertension    • Kidney disease     Patient reported \"I have chronic kidney disease and they say my kidney function is at 33%\" and that she has routine care with Dr. Calzada    • Migraines    • PVD (peripheral vascular disease) (CMS/HCC)    • Seasonal allergies    • Spinal headache    • Wears glasses        Past Surgical History:   Procedure Laterality Date   • ABDOMINAL SURGERY  1984    Patient reported after complete hysterectomy, she had another procedure to remove tumors in the abdominal area   • APPENDECTOMY      Reported removed when hysterectomy was done   • CATARACT EXTRACTION W/ INTRAOCULAR LENS IMPLANT Right 2019    Procedure: CATARACT PHACO EXTRACTION WITH INTRAOCULAR LENS IMPLANT RIGHT;  Surgeon: Tee Enrique MD;  Location: [facility];  Service: Ophthalmology   • COLONOSCOPY     • ELBOW EPICONDYLECTOMY Left 1990   • ENDOSCOPY     • HYSTERECTOMY      Partial   • HYSTERECTOMY      Complete    • MOUTH SURGERY      Full mouth extraction   • VASCULAR SURGERY Bilateral     For PAD - Reported the right leg was done first around  and the left was done around        Social History     Socioeconomic " History   • Marital status: Single     Spouse name: Not on file   • Number of children: Not on file   • Years of education: Not on file   • Highest education level: Not on file   Tobacco Use   • Smoking status: Former Smoker     Packs/day: 2.00     Years: 32.00     Pack years: 64.00     Types: Cigarettes     Last attempt to quit:      Years since quittin.5   • Smokeless tobacco: Never Used   Substance and Sexual Activity   • Alcohol use: No     Frequency: Never   • Drug use: No   • Sexual activity: Defer       History reviewed. No pertinent family history.    Prior to Admission Medications:  No medications prior to admission.       Allergies:  No Known Allergies     Vitals: Temp:  [97 °F (36.1 °C)-97.7 °F (36.5 °C)] 97 °F (36.1 °C)  Heart Rate:  [74-75] 74  Resp:  [16-18] 16  BP: (116-125)/(68-78) 117/76    Review of Systems:   Within Normal Limits Abnormal   HEENT [x]    []     Cardiovascular [x]   []     Gastrointestinal [x]   []     Gentiourinary [x]   []     Neurologic [x]   []     Pulmonary [x]   []       Physical Exam:   Within Normal Limits Abnormal   HEENT [x]    []     Heart [x]   []     Lungs [x]   []     Abdomen [x]   []     Extremities [x]   []       Impression: Left nuclear sclerotic cataract.     Plan: CATARACT PHACO EXTRACTION WITH INTRAOCULAR LENS IMPLANT LEFT (Left)     Tee Enrique MD  2019

## 2019-07-05 NOTE — OP NOTE
OPERATIVE NOTE    Date of Procedure: 7/5/2019  Patient Name: Viola Barlow  Patient MRN: [id number]  YOB: 1955     Preoperative Diagnosis: Left nuclear sclerotic cataract.     Postoperative Diagnosis: Left nuclear sclerotic cataract.     Procedure Performed: Phacoemulsification with implantation of a  foldable posterior chamber intraocular lens, Left eye.     Surgeon: Tee Enrique MD     Anesthesia:  Monitored Anesthesia Care (MAC)      Brief History and Indication: The patient presents with a history of past progressive loss of vision.  Patient was diagnosed with cataract and requests removal for increased ability to read and see.     Operation Description: The patient was taken to the OR and prepped and draped in the usual sterile ophthalmic fashion. A lid speculum was placed in the eye.  A #75 blade was then used to make a stab incision two o’clock hours from the intended temporal clear cornea groove. The anterior chamber was then inflated with a Viscoelastic. A metal microkeratome blade was then used to enter the anterior chamber at the temporal clear cornea site. A three level tunnel incision was made. A curvilinear capsulorrhexis was then performed with a bent cystotome needle and capsulorrhexis forceps.  BSS on a 30 gauge bent cannula was used to hydro-dissect, and hydro-delineate the lens. Good fluid waves and hydro-delineation were noted. Phacoemulsification was then used to remove nuclear material without complications. The residual cortical and lenticular material was then removed with irrigation and aspiration. Viscoelastics were then used to inflate the bag in a soft shell technique. A PCIOL was injected into the bag. Post-implantation, there were no rents or tears in the bag and the lens was noted to be stable and centered. The residual Viscoelastic was then removed with irrigation and aspiration.  The wound was checked and found to be without leaks. Therefore a Polydex  ointment and one drop of a Prednisilone eye drop was placed in the eye.     Implant Information:   Implant Name Type Inv. Item Serial No.  Lot No. LRB No. Used   LENS ACRYSOF IQ SA60WF W/ULTRASERT 6X13MM ACU0T0 20.5 - [id number] - [id number] Implant LENS ACRYSOF IQ SA60WF W/ULTRASERT 6X13MM ACU0T0 20.5 [id number] TERESSA N/A Left 1       Complications: None    Discharge and Condition  The patient was transported to same day surgery in excellent condition and scheduled for follow-up tomorrow morning. The patient was given instructions on use of eye drops for the operative eye and was specifically instructed to call Dr. Enrique at his office or home for any nausea, vomiting, headache, decreased visual acuity, or pain, or if the patient had any general concerns.    Tee Enrique MD  7/5/2019

## 2019-07-05 NOTE — ANESTHESIA PREPROCEDURE EVALUATION
Anesthesia Evaluation     Patient summary reviewed and Nursing notes reviewed   history of anesthetic complications: PONV  NPO Solid Status: > 8 hours  NPO Liquid Status: > 8 hours           Airway   Mallampati: II  TM distance: >3 FB  Neck ROM: full  no difficulty expected  Dental - normal exam     Pulmonary - normal exam   (+) a smoker Former,   Cardiovascular - normal exam    PT is on anticoagulation therapy  Rhythm: regular  Rate: normal    (+) hypertension 2 medications or greater, PVD, hyperlipidemia,       Neuro/Psych  (+) headaches, psychiatric history Anxiety and Depression,     GI/Hepatic/Renal/Endo    (+) obesity,  GERD,  diabetes mellitus using insulin,     Musculoskeletal     Abdominal    Substance History - negative use     OB/GYN negative ob/gyn ROS         Other   (+) arthritis     ROS/Med Hx Other: Daily ASA therapy.                  Anesthesia Plan    ASA 3     MAC   (Pt told that intravenous sedation will be used as the primary anesthetic along with local anesthesia if necessary. Every effort will be made to make sure the patient is comfortable.     The patient was told they may or may not have recall for the procedure. It was further explained that if the MAC was not adequate that a general anesthetic with either an LMA or endotracheal tube would be required.     Will proceed with the plan of care.)  intravenous induction   Anesthetic plan, all risks, benefits, and alternatives have been provided, discussed and informed consent has been obtained with: patient.

## 2019-07-05 NOTE — DISCHARGE INSTRUCTIONS
Post Operative Cataract Instructions     Left Eye    1.  Wear eye shield at bedtime for 3 nights.  You may wear glasses or sunglasses during the day.  You must keep eye protected at all times.  2.  Try not to sleep on the side in which the eye was operated (1-2 weeks).  3.  No heavy lifting (at least 3 days).  No bending below the waist.  Keep your head above your heart.  4.  Try not to cough or sneeze excessively.  5.  Bring eye drops and instructions with you to post-op appointment.  6.  If eyes become stuck together after surgery you may soak with warm cloth.  7. Start Prednisilone (Pred-Forte) today as soon as you get home.   *Apply 1 drop to operative eye four times a day for 7 days and then twice daily until all medication is gone.    Complications from cataract surgery usually occur within the first couple of weeks.  Complications could include excessive redness, pain, decreased vision, or changes in vision.  If problems are treated early, there is a better chance of resolution.  Please contact Dr. Enrique at one of the numbers listed below if you are experiencing any problems.  If a holiday, evenings, or weekends, do not hesitate to call if you are having a problem.      Dr. Alexis Enrique    [phone] CELL  [phone] CELL    [phone] CELL             [phone] CELL        If you are unable to reach any of the above doctors, please call [facility] at [phone]    IMPORTANT INFORMATION  If you have any questions or need any refills on your eye drops, please call the office at [phone].Please follow all post op instructions and follow up appointment time from your physician's office included in your discharge packet.  .   No pushing, pulling, tugging,  heavy lifting, or strenuous activity.  No major decision making, driving, or drinking alcoholic beverages for 24 hours. ( due to the medications you have  received)  Always use good hand  hygiene/washing techniques.  NO driving while taking pain medications.    To assist you in voiding:  Drink plenty of fluids  Listen to running water while attempting to void.    If you are unable to urinate and you have an uncomfortable urge to void or it has been   6 hours since you were discharged, return to the Emergency Room

## 2019-09-08 ENCOUNTER — HOSPITAL ENCOUNTER (EMERGENCY)
Facility: HOSPITAL | Age: 64
Discharge: HOME | End: 2019-09-08
Attending: STUDENT IN AN ORGANIZED HEALTH CARE EDUCATION/TRAINING PROGRAM | Admitting: STUDENT IN AN ORGANIZED HEALTH CARE EDUCATION/TRAINING PROGRAM

## 2019-09-08 ENCOUNTER — APPOINTMENT (OUTPATIENT)
Dept: GENERAL RADIOLOGY | Facility: HOSPITAL | Age: 64
End: 2019-09-08

## 2019-09-08 VITALS
SYSTOLIC BLOOD PRESSURE: 97 MMHG | HEIGHT: 60 IN | BODY MASS INDEX: 31.61 KG/M2 | RESPIRATION RATE: 18 BRPM | DIASTOLIC BLOOD PRESSURE: 59 MMHG | OXYGEN SATURATION: 93 % | WEIGHT: 161 LBS | TEMPERATURE: 99.2 F | HEART RATE: 91 BPM

## 2019-09-08 DIAGNOSIS — S93.602A FOOT SPRAIN, LEFT, INITIAL ENCOUNTER: Primary | ICD-10-CM

## 2019-09-08 PROCEDURE — 73630 X-RAY EXAM OF FOOT: CPT

## 2019-09-08 PROCEDURE — 99283 EMERGENCY DEPT VISIT LOW MDM: CPT

## 2019-09-08 RX ORDER — HYDROCODONE BITARTRATE AND ACETAMINOPHEN 5; 325 MG/1; MG/1
1 TABLET ORAL ONCE
Status: COMPLETED | OUTPATIENT
Start: 2019-09-08 | End: 2019-09-08

## 2019-09-08 RX ADMIN — HYDROCODONE BITARTRATE AND ACETAMINOPHEN 1 TABLET: 5; 325 TABLET ORAL at 14:39

## 2019-09-08 NOTE — ED PROVIDER NOTES
"Subjective   64-year-old female that presents to the emergency department with concern for left foot injury.  She reports she is a diabetic and sometimes she has these out of body experiences where she is having sleeping does ridiculous things.  She states that she stepped wrong or twisted her foot somehow but is not really sure.  States pain is severe, constant, aching and worse with walking.  She does have bruising to the dorsum of the left foot            Review of Systems   All other systems reviewed and are negative.      Past Medical History:   Diagnosis Date   • Arthritis    • Cataract    • Depression    • Diabetes mellitus (CMS/HCC)    • Dysphagia     Patient reported that intermittently food feels like it gets lodged    • Elevated cholesterol    • Full dentures     Instructed no adhesives the DOS   • GERD (gastroesophageal reflux disease)    • Headache    • History of nuclear stress test     Patient reported this was done with Dr. Douglas around 2017 and that she was started on Metoprolol after testing.    • Hypertension    • Kidney disease     Patient reported \"I have chronic kidney disease and they say my kidney function is at 33%\" and that she has routine care with Dr. Calzada    • Migraines    • PVD (peripheral vascular disease) (CMS/HCC)    • Seasonal allergies    • Spinal headache    • Wears glasses        No Known Allergies    Past Surgical History:   Procedure Laterality Date   • ABDOMINAL SURGERY  08/1984    Patient reported after complete hysterectomy, she had another procedure to remove tumors in the abdominal area   • APPENDECTOMY      Reported removed when hysterectomy was done   • CATARACT EXTRACTION W/ INTRAOCULAR LENS IMPLANT Right 6/21/2019    Procedure: CATARACT PHACO EXTRACTION WITH INTRAOCULAR LENS IMPLANT RIGHT;  Surgeon: Tee Enrique MD;  Location: [facility];  Service: Ophthalmology   • CATARACT EXTRACTION W/ INTRAOCULAR LENS IMPLANT Left 7/5/2019    Procedure: CATARACT PHACO " EXTRACTION WITH INTRAOCULAR LENS IMPLANT LEFT;  Surgeon: Tee Enrique MD;  Location: [facility];  Service: Ophthalmology   • COLONOSCOPY     • ELBOW EPICONDYLECTOMY Left 1990   • ENDOSCOPY     • HYSTERECTOMY      Partial   • HYSTERECTOMY      Complete    • MOUTH SURGERY      Full mouth extraction   • VASCULAR SURGERY Bilateral     For PAD - Reported the right leg was done first around  and the left was done around        History reviewed. No pertinent family history.    Social History     Socioeconomic History   • Marital status: Single     Spouse name: Not on file   • Number of children: Not on file   • Years of education: Not on file   • Highest education level: Not on file   Tobacco Use   • Smoking status: Former Smoker     Packs/day: 2.00     Years: 32.00     Pack years: 64.00     Types: Cigarettes     Last attempt to quit:      Years since quittin.6   • Smokeless tobacco: Never Used   Substance and Sexual Activity   • Alcohol use: No     Frequency: Never   • Drug use: No   • Sexual activity: Defer           Objective   Physical Exam        Nursing note and vitals reviewed.      GEN: No acute distress  Head: Normocephalic, atraumatic  Eyes: Pupils equal round reactive to light  ENT: Posterior pharynx normal in appearance, oral mucosa is moist  Chest: Nontender to palpation  Cardiovascular: Regular rate  Lungs: Clear to auscultation bilaterally  Abdomen: Soft, nontender, nondistended, no peritoneal signs  Extremities: See graphical documentation  Neuro: GCS 15  Psych: Mood and affect are appropriate        Procedures           ED Course                  MDM  Number of Diagnoses or Management Options  Foot sprain, left, initial encounter:   Diagnosis management comments:    XR Foot 3+ View Left (Final result)   Result time 19 15:05:12   Final result by Baldo Williamson DO (19 15:05:12)             Impression:     No displaced fracture.               This report was  finalized on 9/8/2019 3:05 PM by Baldo Williamson DO.        Narrative:     PROCEDURE: XR FOOT 3+ VW LEFT-     History: trauma     COMPARISON: None.     FINDINGS:  A 3 view exam demonstrates no displaced fracture or  dislocation. The joint spaces are preserved. There is extensive soft  tissue swelling over the forefoot. If warranted consider further  evaluation with MRI.          Treated with Norco p.o. here.  Will not prescribe narcotics for home use.  Given Ace wrap and crutches.       Amount and/or Complexity of Data Reviewed  Tests in the radiology section of CPT®: reviewed  Decide to obtain previous medical records or to obtain history from someone other than the patient: yes  Obtain history from someone other than the patient: yes  Review and summarize past medical records: yes  Independent visualization of images, tracings, or specimens: yes        Final diagnoses:   Foot sprain, left, initial encounter              Aleksandar Carpenter MD  09/08/19 1533

## 2019-11-26 ENCOUNTER — APPOINTMENT (OUTPATIENT)
Dept: CT IMAGING | Facility: HOSPITAL | Age: 64
End: 2019-11-26

## 2019-11-26 ENCOUNTER — HOSPITAL ENCOUNTER (EMERGENCY)
Facility: HOSPITAL | Age: 64
Discharge: HOME | End: 2019-11-26
Attending: EMERGENCY MEDICINE | Admitting: EMERGENCY MEDICINE

## 2019-11-26 ENCOUNTER — APPOINTMENT (OUTPATIENT)
Dept: GENERAL RADIOLOGY | Facility: HOSPITAL | Age: 64
End: 2019-11-26

## 2019-11-26 VITALS
HEART RATE: 77 BPM | WEIGHT: 161 LBS | TEMPERATURE: 97.9 F | OXYGEN SATURATION: 100 % | BODY MASS INDEX: 31.61 KG/M2 | SYSTOLIC BLOOD PRESSURE: 98 MMHG | HEIGHT: 60 IN | RESPIRATION RATE: 16 BRPM | DIASTOLIC BLOOD PRESSURE: 67 MMHG

## 2019-11-26 DIAGNOSIS — N39.0 URINARY TRACT INFECTION WITHOUT HEMATURIA, SITE UNSPECIFIED: ICD-10-CM

## 2019-11-26 DIAGNOSIS — R55 NEAR SYNCOPE: Primary | ICD-10-CM

## 2019-11-26 LAB
ALBUMIN SERPL-MCNC: 4.1 G/DL (ref 3.5–5.2)
ALBUMIN/GLOB SERPL: 1.2 G/DL
ALP SERPL-CCNC: 101 U/L (ref 39–117)
ALT SERPL W P-5'-P-CCNC: 13 U/L (ref 1–33)
ANION GAP SERPL CALCULATED.3IONS-SCNC: 12.4 MMOL/L (ref 5–15)
AST SERPL-CCNC: 19 U/L (ref 1–32)
BACTERIA UR QL AUTO: ABNORMAL /HPF
BASOPHILS # BLD AUTO: 0.04 10*3/MM3 (ref 0–0.2)
BASOPHILS NFR BLD AUTO: 0.5 % (ref 0–1.5)
BILIRUB SERPL-MCNC: 0.2 MG/DL (ref 0.2–1.2)
BILIRUB UR QL STRIP: NEGATIVE
BUN BLD-MCNC: 37 MG/DL (ref 8–23)
BUN/CREAT SERPL: 22.7 (ref 7–25)
CALCIUM SPEC-SCNC: 9.8 MG/DL (ref 8.6–10.5)
CHLORIDE SERPL-SCNC: 98 MMOL/L (ref 98–107)
CLARITY UR: CLEAR
CO2 SERPL-SCNC: 26.6 MMOL/L (ref 22–29)
COLOR UR: YELLOW
CREAT BLD-MCNC: 1.63 MG/DL (ref 0.57–1)
DEPRECATED RDW RBC AUTO: 48.2 FL (ref 37–54)
EOSINOPHIL # BLD AUTO: 0.17 10*3/MM3 (ref 0–0.4)
EOSINOPHIL NFR BLD AUTO: 1.9 % (ref 0.3–6.2)
ERYTHROCYTE [DISTWIDTH] IN BLOOD BY AUTOMATED COUNT: 13.3 % (ref 12.3–15.4)
GFR SERPL CREATININE-BSD FRML MDRD: 32 ML/MIN/1.73
GLOBULIN UR ELPH-MCNC: 3.4 GM/DL
GLUCOSE BLD-MCNC: 257 MG/DL (ref 65–99)
GLUCOSE BLDC GLUCOMTR-MCNC: 260 MG/DL (ref 70–130)
GLUCOSE UR STRIP-MCNC: NEGATIVE MG/DL
HCT VFR BLD AUTO: 41.2 % (ref 34–46.6)
HGB BLD-MCNC: 12.4 G/DL (ref 12–15.9)
HGB UR QL STRIP.AUTO: NEGATIVE
HOLD SPECIMEN: NORMAL
HOLD SPECIMEN: NORMAL
HYALINE CASTS UR QL AUTO: ABNORMAL /LPF
IMM GRANULOCYTES # BLD AUTO: 0.02 10*3/MM3 (ref 0–0.05)
IMM GRANULOCYTES NFR BLD AUTO: 0.2 % (ref 0–0.5)
KETONES UR QL STRIP: NEGATIVE
LEUKOCYTE ESTERASE UR QL STRIP.AUTO: ABNORMAL
LYMPHOCYTES # BLD AUTO: 3.07 10*3/MM3 (ref 0.7–3.1)
LYMPHOCYTES NFR BLD AUTO: 34.6 % (ref 19.6–45.3)
MAGNESIUM SERPL-MCNC: 1.9 MG/DL (ref 1.6–2.4)
MCH RBC QN AUTO: 29.6 PG (ref 26.6–33)
MCHC RBC AUTO-ENTMCNC: 30.1 G/DL (ref 31.5–35.7)
MCV RBC AUTO: 98.3 FL (ref 79–97)
MONOCYTES # BLD AUTO: 0.71 10*3/MM3 (ref 0.1–0.9)
MONOCYTES NFR BLD AUTO: 8 % (ref 5–12)
NEUTROPHILS # BLD AUTO: 4.87 10*3/MM3 (ref 1.7–7)
NEUTROPHILS NFR BLD AUTO: 54.8 % (ref 42.7–76)
NITRITE UR QL STRIP: NEGATIVE
NRBC BLD AUTO-RTO: 0 /100 WBC (ref 0–0.2)
NT-PROBNP SERPL-MCNC: 106.7 PG/ML (ref 5–900)
PH UR STRIP.AUTO: 5.5 [PH] (ref 5–8)
PLATELET # BLD AUTO: 181 10*3/MM3 (ref 140–450)
PMV BLD AUTO: 10.5 FL (ref 6–12)
POTASSIUM BLD-SCNC: 5.2 MMOL/L (ref 3.5–5.2)
PROT SERPL-MCNC: 7.5 G/DL (ref 6–8.5)
PROT UR QL STRIP: NEGATIVE
RBC # BLD AUTO: 4.19 10*6/MM3 (ref 3.77–5.28)
RBC # UR: ABNORMAL /HPF
REF LAB TEST METHOD: ABNORMAL
SODIUM BLD-SCNC: 137 MMOL/L (ref 136–145)
SP GR UR STRIP: 1.02 (ref 1–1.03)
SQUAMOUS #/AREA URNS HPF: ABNORMAL /HPF
TROPONIN T SERPL-MCNC: <0.01 NG/ML (ref 0–0.03)
UROBILINOGEN UR QL STRIP: ABNORMAL
WBC NRBC COR # BLD: 8.88 10*3/MM3 (ref 3.4–10.8)
WBC UR QL AUTO: ABNORMAL /HPF
WHOLE BLOOD HOLD SPECIMEN: NORMAL
WHOLE BLOOD HOLD SPECIMEN: NORMAL

## 2019-11-26 PROCEDURE — 96375 TX/PRO/DX INJ NEW DRUG ADDON: CPT

## 2019-11-26 PROCEDURE — 83880 ASSAY OF NATRIURETIC PEPTIDE: CPT | Performed by: EMERGENCY MEDICINE

## 2019-11-26 PROCEDURE — 71250 CT THORAX DX C-: CPT

## 2019-11-26 PROCEDURE — 71045 X-RAY EXAM CHEST 1 VIEW: CPT

## 2019-11-26 PROCEDURE — 80053 COMPREHEN METABOLIC PANEL: CPT | Performed by: EMERGENCY MEDICINE

## 2019-11-26 PROCEDURE — 96365 THER/PROPH/DIAG IV INF INIT: CPT

## 2019-11-26 PROCEDURE — 82962 GLUCOSE BLOOD TEST: CPT

## 2019-11-26 PROCEDURE — 93005 ELECTROCARDIOGRAM TRACING: CPT | Performed by: EMERGENCY MEDICINE

## 2019-11-26 PROCEDURE — 84484 ASSAY OF TROPONIN QUANT: CPT | Performed by: EMERGENCY MEDICINE

## 2019-11-26 PROCEDURE — 81001 URINALYSIS AUTO W/SCOPE: CPT | Performed by: EMERGENCY MEDICINE

## 2019-11-26 PROCEDURE — 83735 ASSAY OF MAGNESIUM: CPT | Performed by: EMERGENCY MEDICINE

## 2019-11-26 PROCEDURE — 85025 COMPLETE CBC W/AUTO DIFF WBC: CPT | Performed by: EMERGENCY MEDICINE

## 2019-11-26 PROCEDURE — 25010000002 CEFTRIAXONE SODIUM-DEXTROSE 1-3.74 GM-%(50ML) RECONSTITUTED SOLUTION: Performed by: EMERGENCY MEDICINE

## 2019-11-26 PROCEDURE — 25010000002 ONDANSETRON PER 1 MG: Performed by: EMERGENCY MEDICINE

## 2019-11-26 PROCEDURE — 99284 EMERGENCY DEPT VISIT MOD MDM: CPT

## 2019-11-26 PROCEDURE — 70450 CT HEAD/BRAIN W/O DYE: CPT

## 2019-11-26 RX ORDER — CEPHALEXIN 500 MG/1
500 CAPSULE ORAL 2 TIMES DAILY
Qty: 14 CAPSULE | Refills: 0 | Status: SHIPPED | OUTPATIENT
Start: 2019-11-26 | End: 2019-12-03

## 2019-11-26 RX ORDER — CEFTRIAXONE 1 G/50ML
1 INJECTION, SOLUTION INTRAVENOUS ONCE
Status: COMPLETED | OUTPATIENT
Start: 2019-11-26 | End: 2019-11-26

## 2019-11-26 RX ORDER — SODIUM CHLORIDE 0.9 % (FLUSH) 0.9 %
10 SYRINGE (ML) INJECTION AS NEEDED
Status: DISCONTINUED | OUTPATIENT
Start: 2019-11-26 | End: 2019-11-26 | Stop reason: HOSPADM

## 2019-11-26 RX ORDER — ONDANSETRON 2 MG/ML
4 INJECTION INTRAMUSCULAR; INTRAVENOUS ONCE
Status: COMPLETED | OUTPATIENT
Start: 2019-11-26 | End: 2019-11-26

## 2019-11-26 RX ORDER — MECLIZINE HYDROCHLORIDE 25 MG/1
25 TABLET ORAL ONCE
Status: COMPLETED | OUTPATIENT
Start: 2019-11-26 | End: 2019-11-26

## 2019-11-26 RX ADMIN — SODIUM CHLORIDE 1000 ML: 9 INJECTION, SOLUTION INTRAVENOUS at 17:45

## 2019-11-26 RX ADMIN — MECLIZINE HYDROCHLORIDE 25 MG: 25 TABLET ORAL at 17:43

## 2019-11-26 RX ADMIN — SODIUM CHLORIDE 1000 ML: 9 INJECTION, SOLUTION INTRAVENOUS at 19:10

## 2019-11-26 RX ADMIN — CEFTRIAXONE 1 G: 1 INJECTION, SOLUTION INTRAVENOUS at 19:35

## 2019-11-26 RX ADMIN — ONDANSETRON 4 MG: 2 INJECTION INTRAMUSCULAR; INTRAVENOUS at 17:43

## 2019-12-29 ENCOUNTER — HOSPITAL ENCOUNTER (EMERGENCY)
Facility: HOSPITAL | Age: 64
Discharge: HOME | End: 2019-12-29
Attending: EMERGENCY MEDICINE | Admitting: EMERGENCY MEDICINE

## 2019-12-29 ENCOUNTER — APPOINTMENT (OUTPATIENT)
Dept: CT IMAGING | Facility: HOSPITAL | Age: 64
End: 2019-12-29

## 2019-12-29 ENCOUNTER — APPOINTMENT (OUTPATIENT)
Dept: GENERAL RADIOLOGY | Facility: HOSPITAL | Age: 64
End: 2019-12-29

## 2019-12-29 VITALS
HEIGHT: 60 IN | OXYGEN SATURATION: 98 % | RESPIRATION RATE: 17 BRPM | HEART RATE: 75 BPM | TEMPERATURE: 98 F | BODY MASS INDEX: 31.41 KG/M2 | SYSTOLIC BLOOD PRESSURE: 129 MMHG | DIASTOLIC BLOOD PRESSURE: 69 MMHG | WEIGHT: 160 LBS

## 2019-12-29 DIAGNOSIS — R53.1 WEAKNESS: ICD-10-CM

## 2019-12-29 DIAGNOSIS — R55 NEAR SYNCOPE: Primary | ICD-10-CM

## 2019-12-29 DIAGNOSIS — E87.5 HYPERKALEMIA: ICD-10-CM

## 2019-12-29 LAB
ALBUMIN SERPL-MCNC: 4.1 G/DL (ref 3.5–5.2)
ALBUMIN/GLOB SERPL: 1.1 G/DL
ALP SERPL-CCNC: 82 U/L (ref 39–117)
ALT SERPL W P-5'-P-CCNC: 17 U/L (ref 1–33)
ANION GAP SERPL CALCULATED.3IONS-SCNC: 12.7 MMOL/L (ref 5–15)
AST SERPL-CCNC: 28 U/L (ref 1–32)
BACTERIA UR QL AUTO: ABNORMAL /HPF
BASOPHILS # BLD AUTO: 0.07 10*3/MM3 (ref 0–0.2)
BASOPHILS NFR BLD AUTO: 0.9 % (ref 0–1.5)
BILIRUB SERPL-MCNC: 0.2 MG/DL (ref 0.2–1.2)
BILIRUB UR QL STRIP: NEGATIVE
BUN BLD-MCNC: 43 MG/DL (ref 8–23)
BUN/CREAT SERPL: 28.9 (ref 7–25)
CALCIUM SPEC-SCNC: 9.9 MG/DL (ref 8.6–10.5)
CHLORIDE SERPL-SCNC: 99 MMOL/L (ref 98–107)
CLARITY UR: CLEAR
CO2 SERPL-SCNC: 26.3 MMOL/L (ref 22–29)
COLOR UR: YELLOW
CREAT BLD-MCNC: 1.49 MG/DL (ref 0.57–1)
DEPRECATED RDW RBC AUTO: 48.4 FL (ref 37–54)
EOSINOPHIL # BLD AUTO: 0.26 10*3/MM3 (ref 0–0.4)
EOSINOPHIL NFR BLD AUTO: 3.2 % (ref 0.3–6.2)
ERYTHROCYTE [DISTWIDTH] IN BLOOD BY AUTOMATED COUNT: 13.5 % (ref 12.3–15.4)
FLUAV AG NPH QL: NEGATIVE
FLUBV AG NPH QL IA: NEGATIVE
GFR SERPL CREATININE-BSD FRML MDRD: 35 ML/MIN/1.73
GLOBULIN UR ELPH-MCNC: 3.8 GM/DL
GLUCOSE BLD-MCNC: 107 MG/DL (ref 65–99)
GLUCOSE UR STRIP-MCNC: NEGATIVE MG/DL
HCT VFR BLD AUTO: 42.2 % (ref 34–46.6)
HGB BLD-MCNC: 13.1 G/DL (ref 12–15.9)
HGB UR QL STRIP.AUTO: NEGATIVE
HYALINE CASTS UR QL AUTO: ABNORMAL /LPF
IMM GRANULOCYTES # BLD AUTO: 0.03 10*3/MM3 (ref 0–0.05)
IMM GRANULOCYTES NFR BLD AUTO: 0.4 % (ref 0–0.5)
KETONES UR QL STRIP: NEGATIVE
LEUKOCYTE ESTERASE UR QL STRIP.AUTO: ABNORMAL
LYMPHOCYTES # BLD AUTO: 3.17 10*3/MM3 (ref 0.7–3.1)
LYMPHOCYTES NFR BLD AUTO: 39.4 % (ref 19.6–45.3)
MAGNESIUM SERPL-MCNC: 2 MG/DL (ref 1.6–2.4)
MCH RBC QN AUTO: 30.4 PG (ref 26.6–33)
MCHC RBC AUTO-ENTMCNC: 31 G/DL (ref 31.5–35.7)
MCV RBC AUTO: 97.9 FL (ref 79–97)
MONOCYTES # BLD AUTO: 0.6 10*3/MM3 (ref 0.1–0.9)
MONOCYTES NFR BLD AUTO: 7.5 % (ref 5–12)
NEUTROPHILS # BLD AUTO: 3.91 10*3/MM3 (ref 1.7–7)
NEUTROPHILS NFR BLD AUTO: 48.6 % (ref 42.7–76)
NITRITE UR QL STRIP: NEGATIVE
NRBC BLD AUTO-RTO: 0 /100 WBC (ref 0–0.2)
NT-PROBNP SERPL-MCNC: 71 PG/ML (ref 5–900)
PH UR STRIP.AUTO: 7.5 [PH] (ref 5–8)
PLATELET # BLD AUTO: 210 10*3/MM3 (ref 140–450)
PMV BLD AUTO: 10.5 FL (ref 6–12)
POTASSIUM BLD-SCNC: 5.8 MMOL/L (ref 3.5–5.2)
POTASSIUM BLD-SCNC: 6.9 MMOL/L (ref 3.5–5.2)
PROT SERPL-MCNC: 7.9 G/DL (ref 6–8.5)
PROT UR QL STRIP: NEGATIVE
RBC # BLD AUTO: 4.31 10*6/MM3 (ref 3.77–5.28)
RBC # UR: ABNORMAL /HPF
REF LAB TEST METHOD: ABNORMAL
SODIUM BLD-SCNC: 138 MMOL/L (ref 136–145)
SP GR UR STRIP: 1.02 (ref 1–1.03)
SQUAMOUS #/AREA URNS HPF: ABNORMAL /HPF
TROPONIN T SERPL-MCNC: <0.01 NG/ML (ref 0–0.03)
TROPONIN T SERPL-MCNC: <0.01 NG/ML (ref 0–0.03)
UROBILINOGEN UR QL STRIP: ABNORMAL
WBC NRBC COR # BLD: 8.04 10*3/MM3 (ref 3.4–10.8)
WBC UR QL AUTO: ABNORMAL /HPF

## 2019-12-29 PROCEDURE — 83880 ASSAY OF NATRIURETIC PEPTIDE: CPT | Performed by: NURSE PRACTITIONER

## 2019-12-29 PROCEDURE — 81001 URINALYSIS AUTO W/SCOPE: CPT | Performed by: NURSE PRACTITIONER

## 2019-12-29 PROCEDURE — 87804 INFLUENZA ASSAY W/OPTIC: CPT | Performed by: NURSE PRACTITIONER

## 2019-12-29 PROCEDURE — 99285 EMERGENCY DEPT VISIT HI MDM: CPT

## 2019-12-29 PROCEDURE — 83735 ASSAY OF MAGNESIUM: CPT | Performed by: NURSE PRACTITIONER

## 2019-12-29 PROCEDURE — 70450 CT HEAD/BRAIN W/O DYE: CPT

## 2019-12-29 PROCEDURE — 84484 ASSAY OF TROPONIN QUANT: CPT | Performed by: NURSE PRACTITIONER

## 2019-12-29 PROCEDURE — 85025 COMPLETE CBC W/AUTO DIFF WBC: CPT | Performed by: NURSE PRACTITIONER

## 2019-12-29 PROCEDURE — 80053 COMPREHEN METABOLIC PANEL: CPT | Performed by: NURSE PRACTITIONER

## 2019-12-29 PROCEDURE — 93005 ELECTROCARDIOGRAM TRACING: CPT | Performed by: NURSE PRACTITIONER

## 2019-12-29 PROCEDURE — 71046 X-RAY EXAM CHEST 2 VIEWS: CPT

## 2019-12-29 PROCEDURE — 84132 ASSAY OF SERUM POTASSIUM: CPT | Performed by: NURSE PRACTITIONER

## 2019-12-29 RX ORDER — SODIUM CHLORIDE 0.9 % (FLUSH) 0.9 %
10 SYRINGE (ML) INJECTION AS NEEDED
Status: DISCONTINUED | OUTPATIENT
Start: 2019-12-29 | End: 2019-12-29 | Stop reason: HOSPADM

## 2019-12-29 RX ORDER — AMLODIPINE BESYLATE 5 MG/1
5 TABLET ORAL DAILY
Qty: 30 TABLET | Refills: 0 | Status: SHIPPED | OUTPATIENT
Start: 2019-12-29

## 2019-12-29 RX ORDER — SODIUM POLYSTYRENE SULFONATE 15 G/60ML
7.5 SUSPENSION ORAL; RECTAL ONCE
Status: COMPLETED | OUTPATIENT
Start: 2019-12-29 | End: 2019-12-29

## 2019-12-29 RX ADMIN — SODIUM POLYSTYRENE SULFONATE 7.5 G: 15 SUSPENSION ORAL; RECTAL at 14:40

## 2020-06-30 ENCOUNTER — TRANSCRIBE ORDERS (OUTPATIENT)
Dept: ULTRASOUND IMAGING | Facility: HOSPITAL | Age: 65
End: 2020-06-30

## 2020-06-30 DIAGNOSIS — N18.30 CHRONIC KIDNEY DISEASE, STAGE III (MODERATE) (HCC): Primary | ICD-10-CM

## 2020-07-13 ENCOUNTER — HOSPITAL ENCOUNTER (OUTPATIENT)
Dept: ULTRASOUND IMAGING | Facility: HOSPITAL | Age: 65
Discharge: HOME | End: 2020-07-13
Admitting: INTERNAL MEDICINE

## 2020-07-13 DIAGNOSIS — N18.30 CHRONIC KIDNEY DISEASE, STAGE III (MODERATE) (HCC): ICD-10-CM

## 2020-07-13 PROCEDURE — 76775 US EXAM ABDO BACK WALL LIM: CPT

## 2021-04-16 ENCOUNTER — TRANSCRIBE ORDERS (OUTPATIENT)
Dept: LAB | Facility: HOSPITAL | Age: 66
End: 2021-04-16

## 2021-04-16 ENCOUNTER — LAB (OUTPATIENT)
Dept: LAB | Facility: HOSPITAL | Age: 66
End: 2021-04-16

## 2021-04-16 DIAGNOSIS — I13.10 MALIGNANT HYPERTENSIVE HEART AND CHRONIC KIDNEY DISEASE STAGE III (HCC): Primary | ICD-10-CM

## 2021-04-16 DIAGNOSIS — N18.30 MALIGNANT HYPERTENSIVE HEART AND CHRONIC KIDNEY DISEASE STAGE III (HCC): ICD-10-CM

## 2021-04-16 DIAGNOSIS — N18.30 MALIGNANT HYPERTENSIVE HEART AND CHRONIC KIDNEY DISEASE STAGE III (HCC): Primary | ICD-10-CM

## 2021-04-16 DIAGNOSIS — I13.10 MALIGNANT HYPERTENSIVE HEART AND CHRONIC KIDNEY DISEASE STAGE III (HCC): ICD-10-CM

## 2021-04-16 LAB
ALBUMIN SERPL-MCNC: 4.2 G/DL (ref 3.5–5.2)
ANION GAP SERPL CALCULATED.3IONS-SCNC: 7.7 MMOL/L (ref 5–15)
BUN SERPL-MCNC: 24 MG/DL (ref 8–23)
BUN/CREAT SERPL: 15.2 (ref 7–25)
CALCIUM SPEC-SCNC: 9.8 MG/DL (ref 8.6–10.5)
CHLORIDE SERPL-SCNC: 106 MMOL/L (ref 98–107)
CO2 SERPL-SCNC: 29.3 MMOL/L (ref 22–29)
CREAT SERPL-MCNC: 1.58 MG/DL (ref 0.57–1)
DEPRECATED RDW RBC AUTO: 44.7 FL (ref 37–54)
ERYTHROCYTE [DISTWIDTH] IN BLOOD BY AUTOMATED COUNT: 12.6 % (ref 12.3–15.4)
GFR SERPL CREATININE-BSD FRML MDRD: 33 ML/MIN/1.73
GLUCOSE SERPL-MCNC: 71 MG/DL (ref 65–99)
HCT VFR BLD AUTO: 39.1 % (ref 34–46.6)
HGB BLD-MCNC: 12.4 G/DL (ref 12–15.9)
MCH RBC QN AUTO: 30.8 PG (ref 26.6–33)
MCHC RBC AUTO-ENTMCNC: 31.7 G/DL (ref 31.5–35.7)
MCV RBC AUTO: 97 FL (ref 79–97)
PHOSPHATE SERPL-MCNC: 2.9 MG/DL (ref 2.5–4.5)
PLATELET # BLD AUTO: 180 10*3/MM3 (ref 140–450)
PMV BLD AUTO: 10.9 FL (ref 6–12)
POTASSIUM SERPL-SCNC: 4.8 MMOL/L (ref 3.5–5.2)
RBC # BLD AUTO: 4.03 10*6/MM3 (ref 3.77–5.28)
SODIUM SERPL-SCNC: 143 MMOL/L (ref 136–145)
URATE SERPL-MCNC: 8.7 MG/DL (ref 2.4–5.7)
WBC # BLD AUTO: 9.46 10*3/MM3 (ref 3.4–10.8)

## 2021-04-16 PROCEDURE — 84550 ASSAY OF BLOOD/URIC ACID: CPT

## 2021-04-16 PROCEDURE — 36415 COLL VENOUS BLD VENIPUNCTURE: CPT

## 2021-04-16 PROCEDURE — 83970 ASSAY OF PARATHORMONE: CPT

## 2021-04-16 PROCEDURE — 80069 RENAL FUNCTION PANEL: CPT

## 2021-04-16 PROCEDURE — 85027 COMPLETE CBC AUTOMATED: CPT

## 2021-04-17 LAB — PTH-INTACT SERPL-MCNC: 48.5 PG/ML (ref 15–65)

## 2021-06-26 ENCOUNTER — APPOINTMENT (OUTPATIENT)
Dept: CT IMAGING | Facility: HOSPITAL | Age: 66
End: 2021-06-26

## 2021-06-26 ENCOUNTER — APPOINTMENT (OUTPATIENT)
Dept: GENERAL RADIOLOGY | Facility: HOSPITAL | Age: 66
End: 2021-06-26

## 2021-06-26 ENCOUNTER — HOSPITAL ENCOUNTER (EMERGENCY)
Facility: HOSPITAL | Age: 66
Discharge: HOME | End: 2021-06-26
Attending: EMERGENCY MEDICINE | Admitting: EMERGENCY MEDICINE

## 2021-06-26 VITALS
DIASTOLIC BLOOD PRESSURE: 62 MMHG | BODY MASS INDEX: 32.39 KG/M2 | HEIGHT: 60 IN | WEIGHT: 165 LBS | HEART RATE: 86 BPM | SYSTOLIC BLOOD PRESSURE: 94 MMHG | RESPIRATION RATE: 18 BRPM | OXYGEN SATURATION: 94 % | TEMPERATURE: 98.6 F

## 2021-06-26 DIAGNOSIS — R11.2 NON-INTRACTABLE VOMITING WITH NAUSEA, UNSPECIFIED VOMITING TYPE: Primary | ICD-10-CM

## 2021-06-26 LAB
ALBUMIN SERPL-MCNC: 4 G/DL (ref 3.5–5.2)
ALBUMIN/GLOB SERPL: 1.2 G/DL
ALP SERPL-CCNC: 83 U/L (ref 39–117)
ALT SERPL W P-5'-P-CCNC: 21 U/L (ref 1–33)
ANION GAP SERPL CALCULATED.3IONS-SCNC: 12.2 MMOL/L (ref 5–15)
AST SERPL-CCNC: 24 U/L (ref 1–32)
ATMOSPHERIC PRESS: 736 MMHG
BACTERIA UR QL AUTO: ABNORMAL /HPF
BASE EXCESS BLDV CALC-SCNC: -0.4 MMOL/L (ref 0–2)
BASOPHILS # BLD AUTO: 0.05 10*3/MM3 (ref 0–0.2)
BASOPHILS NFR BLD AUTO: 0.3 % (ref 0–1.5)
BDY SITE: ABNORMAL
BILIRUB SERPL-MCNC: 0.5 MG/DL (ref 0–1.2)
BILIRUB UR QL STRIP: NEGATIVE
BUN SERPL-MCNC: 47 MG/DL (ref 8–23)
BUN/CREAT SERPL: 26.4 (ref 7–25)
CALCIUM SPEC-SCNC: 9.1 MG/DL (ref 8.6–10.5)
CHLORIDE SERPL-SCNC: 106 MMOL/L (ref 98–107)
CK SERPL-CCNC: 189 U/L (ref 20–180)
CLARITY UR: CLEAR
CO2 SERPL-SCNC: 21.8 MMOL/L (ref 22–29)
COHGB MFR BLD: 1 % (ref 0–5)
COLOR UR: YELLOW
CREAT SERPL-MCNC: 1.78 MG/DL (ref 0.57–1)
DEPRECATED RDW RBC AUTO: 46.4 FL (ref 37–54)
EOSINOPHIL # BLD AUTO: 0.05 10*3/MM3 (ref 0–0.4)
EOSINOPHIL NFR BLD AUTO: 0.3 % (ref 0.3–6.2)
ERYTHROCYTE [DISTWIDTH] IN BLOOD BY AUTOMATED COUNT: 12.9 % (ref 12.3–15.4)
GFR SERPL CREATININE-BSD FRML MDRD: 29 ML/MIN/1.73
GLOBULIN UR ELPH-MCNC: 3.4 GM/DL
GLUCOSE SERPL-MCNC: 125 MG/DL (ref 65–99)
GLUCOSE UR STRIP-MCNC: NEGATIVE MG/DL
HCO3 BLDV-SCNC: 25.4 MMOL/L (ref 22–28)
HCT VFR BLD AUTO: 48 % (ref 34–46.6)
HGB BLD-MCNC: 15.4 G/DL (ref 12–15.9)
HGB UR QL STRIP.AUTO: NEGATIVE
HYALINE CASTS UR QL AUTO: ABNORMAL /LPF
IMM GRANULOCYTES # BLD AUTO: 0.06 10*3/MM3 (ref 0–0.05)
IMM GRANULOCYTES NFR BLD AUTO: 0.4 % (ref 0–0.5)
KETONES UR QL STRIP: NEGATIVE
LEUKOCYTE ESTERASE UR QL STRIP.AUTO: ABNORMAL
LIPASE SERPL-CCNC: 51 U/L (ref 13–60)
LYMPHOCYTES # BLD AUTO: 3.45 10*3/MM3 (ref 0.7–3.1)
LYMPHOCYTES NFR BLD AUTO: 22.3 % (ref 19.6–45.3)
Lab: ABNORMAL
MAGNESIUM SERPL-MCNC: 1.6 MG/DL (ref 1.6–2.4)
MCH RBC QN AUTO: 31.3 PG (ref 26.6–33)
MCHC RBC AUTO-ENTMCNC: 32.1 G/DL (ref 31.5–35.7)
MCV RBC AUTO: 97.6 FL (ref 79–97)
METHGB BLD QL: 1 % (ref 0–3)
MODALITY: ABNORMAL
MONOCYTES # BLD AUTO: 0.74 10*3/MM3 (ref 0.1–0.9)
MONOCYTES NFR BLD AUTO: 4.8 % (ref 5–12)
MUCOUS THREADS URNS QL MICRO: ABNORMAL /HPF
NEUTROPHILS NFR BLD AUTO: 11.13 10*3/MM3 (ref 1.7–7)
NEUTROPHILS NFR BLD AUTO: 71.9 % (ref 42.7–76)
NITRITE UR QL STRIP: NEGATIVE
NOTE: ABNORMAL
NRBC BLD AUTO-RTO: 0 /100 WBC (ref 0–0.2)
NT-PROBNP SERPL-MCNC: 104.2 PG/ML (ref 0–900)
OXYHGB MFR BLDV: 41.9 % (ref 40–70)
PCO2 BLDV: 45 MM HG (ref 40–50)
PH BLDV: 7.36 PH UNITS (ref 7.32–7.42)
PH UR STRIP.AUTO: <=5 [PH] (ref 5–8)
PLATELET # BLD AUTO: 256 10*3/MM3 (ref 140–450)
PMV BLD AUTO: 10 FL (ref 6–12)
PO2 BLDV: 24.8 MM HG (ref 30–50)
POTASSIUM SERPL-SCNC: 4.9 MMOL/L (ref 3.5–5.2)
PROT SERPL-MCNC: 7.4 G/DL (ref 6–8.5)
PROT UR QL STRIP: ABNORMAL
RBC # BLD AUTO: 4.92 10*6/MM3 (ref 3.77–5.28)
RBC # UR: ABNORMAL /HPF
REF LAB TEST METHOD: ABNORMAL
SAO2 % BLDCOV: 42.8 % (ref 45–75)
SODIUM SERPL-SCNC: 140 MMOL/L (ref 136–145)
SP GR UR STRIP: 1.02 (ref 1–1.03)
SQUAMOUS #/AREA URNS HPF: ABNORMAL /HPF
TROPONIN T SERPL-MCNC: <0.01 NG/ML (ref 0–0.03)
UROBILINOGEN UR QL STRIP: ABNORMAL
VENTILATOR MODE: ABNORMAL
WBC # BLD AUTO: 15.48 10*3/MM3 (ref 3.4–10.8)
WBC UR QL AUTO: ABNORMAL /HPF

## 2021-06-26 PROCEDURE — 36415 COLL VENOUS BLD VENIPUNCTURE: CPT

## 2021-06-26 PROCEDURE — 82550 ASSAY OF CK (CPK): CPT | Performed by: NURSE PRACTITIONER

## 2021-06-26 PROCEDURE — 96374 THER/PROPH/DIAG INJ IV PUSH: CPT

## 2021-06-26 PROCEDURE — 85025 COMPLETE CBC W/AUTO DIFF WBC: CPT | Performed by: NURSE PRACTITIONER

## 2021-06-26 PROCEDURE — 25010000002 ONDANSETRON PER 1 MG: Performed by: NURSE PRACTITIONER

## 2021-06-26 PROCEDURE — 99283 EMERGENCY DEPT VISIT LOW MDM: CPT

## 2021-06-26 PROCEDURE — 93005 ELECTROCARDIOGRAM TRACING: CPT | Performed by: NURSE PRACTITIONER

## 2021-06-26 PROCEDURE — 82820 HEMOGLOBIN-OXYGEN AFFINITY: CPT

## 2021-06-26 PROCEDURE — 83735 ASSAY OF MAGNESIUM: CPT | Performed by: NURSE PRACTITIONER

## 2021-06-26 PROCEDURE — 82805 BLOOD GASES W/O2 SATURATION: CPT

## 2021-06-26 PROCEDURE — 84484 ASSAY OF TROPONIN QUANT: CPT | Performed by: NURSE PRACTITIONER

## 2021-06-26 PROCEDURE — 80053 COMPREHEN METABOLIC PANEL: CPT | Performed by: NURSE PRACTITIONER

## 2021-06-26 PROCEDURE — 83690 ASSAY OF LIPASE: CPT | Performed by: NURSE PRACTITIONER

## 2021-06-26 PROCEDURE — 81001 URINALYSIS AUTO W/SCOPE: CPT | Performed by: NURSE PRACTITIONER

## 2021-06-26 PROCEDURE — 74176 CT ABD & PELVIS W/O CONTRAST: CPT

## 2021-06-26 PROCEDURE — 71045 X-RAY EXAM CHEST 1 VIEW: CPT

## 2021-06-26 PROCEDURE — 83880 ASSAY OF NATRIURETIC PEPTIDE: CPT | Performed by: NURSE PRACTITIONER

## 2021-06-26 RX ORDER — SODIUM CHLORIDE 0.9 % (FLUSH) 0.9 %
10 SYRINGE (ML) INJECTION AS NEEDED
Status: DISCONTINUED | OUTPATIENT
Start: 2021-06-26 | End: 2021-06-26 | Stop reason: HOSPADM

## 2021-06-26 RX ORDER — ONDANSETRON 2 MG/ML
4 INJECTION INTRAMUSCULAR; INTRAVENOUS ONCE
Status: COMPLETED | OUTPATIENT
Start: 2021-06-26 | End: 2021-06-26

## 2021-06-26 RX ORDER — ONDANSETRON 4 MG/1
4 TABLET, ORALLY DISINTEGRATING ORAL EVERY 6 HOURS PRN
Qty: 20 TABLET | Refills: 0 | OUTPATIENT
Start: 2021-06-26 | End: 2023-02-07

## 2021-06-26 RX ADMIN — SODIUM CHLORIDE 1000 ML: 9 INJECTION, SOLUTION INTRAVENOUS at 17:46

## 2021-06-26 RX ADMIN — ONDANSETRON 4 MG: 2 INJECTION INTRAMUSCULAR; INTRAVENOUS at 17:48

## 2021-08-16 ENCOUNTER — TRANSCRIBE ORDERS (OUTPATIENT)
Dept: LAB | Facility: HOSPITAL | Age: 66
End: 2021-08-16

## 2021-08-16 ENCOUNTER — LAB (OUTPATIENT)
Dept: LAB | Facility: HOSPITAL | Age: 66
End: 2021-08-16

## 2021-08-16 DIAGNOSIS — N18.30 CKD STAGE 3 DUE TO TYPE 1 DIABETES MELLITUS (HCC): Primary | ICD-10-CM

## 2021-08-16 DIAGNOSIS — N18.30 CKD STAGE 3 DUE TO TYPE 1 DIABETES MELLITUS (HCC): ICD-10-CM

## 2021-08-16 DIAGNOSIS — E10.22 CKD STAGE 3 DUE TO TYPE 1 DIABETES MELLITUS (HCC): ICD-10-CM

## 2021-08-16 DIAGNOSIS — E10.22 CKD STAGE 3 DUE TO TYPE 1 DIABETES MELLITUS (HCC): Primary | ICD-10-CM

## 2021-08-16 LAB
CREAT UR-MCNC: 86.7 MG/DL
PROT UR-MCNC: 12 MG/DL
PROT/CREAT UR: 138.4 MG/G CREA (ref 0–200)
URATE SERPL-MCNC: 8.2 MG/DL (ref 2.4–5.7)

## 2021-08-16 PROCEDURE — 36415 COLL VENOUS BLD VENIPUNCTURE: CPT

## 2021-08-16 PROCEDURE — 84550 ASSAY OF BLOOD/URIC ACID: CPT

## 2021-08-16 PROCEDURE — 84156 ASSAY OF PROTEIN URINE: CPT

## 2021-08-16 PROCEDURE — 82570 ASSAY OF URINE CREATININE: CPT

## 2021-08-16 PROCEDURE — 80069 RENAL FUNCTION PANEL: CPT

## 2021-08-17 LAB
ALBUMIN SERPL-MCNC: 4.1 G/DL (ref 3.5–5.2)
ANION GAP SERPL CALCULATED.3IONS-SCNC: 10.2 MMOL/L (ref 5–15)
BUN SERPL-MCNC: 22 MG/DL (ref 8–23)
BUN/CREAT SERPL: 15.9 (ref 7–25)
CALCIUM SPEC-SCNC: 9.7 MG/DL (ref 8.6–10.5)
CHLORIDE SERPL-SCNC: 104 MMOL/L (ref 98–107)
CO2 SERPL-SCNC: 28.8 MMOL/L (ref 22–29)
CREAT SERPL-MCNC: 1.38 MG/DL (ref 0.57–1)
GFR SERPL CREATININE-BSD FRML MDRD: 39 ML/MIN/1.73
GLUCOSE SERPL-MCNC: 34 MG/DL (ref 65–99)
PHOSPHATE SERPL-MCNC: 3.4 MG/DL (ref 2.5–4.5)
POTASSIUM SERPL-SCNC: 4.4 MMOL/L (ref 3.5–5.2)
SODIUM SERPL-SCNC: 143 MMOL/L (ref 136–145)

## 2021-11-04 ENCOUNTER — APPOINTMENT (OUTPATIENT)
Dept: GENERAL RADIOLOGY | Facility: HOSPITAL | Age: 66
End: 2021-11-04

## 2021-11-04 ENCOUNTER — HOSPITAL ENCOUNTER (INPATIENT)
Facility: HOSPITAL | Age: 66
LOS: 5 days | Discharge: HOME | End: 2021-11-09
Attending: EMERGENCY MEDICINE | Admitting: EMERGENCY MEDICINE

## 2021-11-04 ENCOUNTER — APPOINTMENT (OUTPATIENT)
Dept: CT IMAGING | Facility: HOSPITAL | Age: 66
End: 2021-11-04

## 2021-11-04 DIAGNOSIS — N28.9 RENAL INSUFFICIENCY: ICD-10-CM

## 2021-11-04 DIAGNOSIS — N18.31 STAGE 3A CHRONIC KIDNEY DISEASE (HCC): ICD-10-CM

## 2021-11-04 DIAGNOSIS — J96.01 ACUTE RESPIRATORY FAILURE WITH HYPOXIA AND HYPERCAPNIA (HCC): ICD-10-CM

## 2021-11-04 DIAGNOSIS — E11.21 TYPE 2 DIABETES MELLITUS WITH NEPHROPATHY (HCC): ICD-10-CM

## 2021-11-04 DIAGNOSIS — G93.41 ACUTE METABOLIC ENCEPHALOPATHY: ICD-10-CM

## 2021-11-04 DIAGNOSIS — R06.89 HYPERCAPNIA: ICD-10-CM

## 2021-11-04 DIAGNOSIS — J96.02 ACUTE RESPIRATORY FAILURE WITH HYPOXIA AND HYPERCAPNIA (HCC): ICD-10-CM

## 2021-11-04 DIAGNOSIS — T68.XXXA HYPOTHERMIA, INITIAL ENCOUNTER: ICD-10-CM

## 2021-11-04 DIAGNOSIS — T30.0 BURN: ICD-10-CM

## 2021-11-04 DIAGNOSIS — E16.2 HYPOGLYCEMIA: Primary | ICD-10-CM

## 2021-11-04 LAB
A-A DO2: 116.2 MMHG
ALBUMIN SERPL-MCNC: 5.1 G/DL (ref 3.5–5.2)
ALBUMIN/GLOB SERPL: 1.4 G/DL
ALP SERPL-CCNC: 101 U/L (ref 39–117)
ALT SERPL W P-5'-P-CCNC: 21 U/L (ref 1–33)
ANION GAP SERPL CALCULATED.3IONS-SCNC: 13.1 MMOL/L (ref 5–15)
ARTERIAL PATENCY WRIST A: POSITIVE
AST SERPL-CCNC: 26 U/L (ref 1–32)
ATMOSPHERIC PRESS: 741 MMHG
BASE EXCESS BLDA CALC-SCNC: -4.3 MMOL/L (ref 0–2)
BASOPHILS # BLD AUTO: 0.08 10*3/MM3 (ref 0–0.2)
BASOPHILS NFR BLD AUTO: 0.6 % (ref 0–1.5)
BDY SITE: ABNORMAL
BILIRUB SERPL-MCNC: 0.4 MG/DL (ref 0–1.2)
BILIRUB UR QL STRIP: NEGATIVE
BUN SERPL-MCNC: 46 MG/DL (ref 8–23)
BUN/CREAT SERPL: 22.4 (ref 7–25)
CALCIUM SPEC-SCNC: 10.5 MG/DL (ref 8.6–10.5)
CHLORIDE SERPL-SCNC: 101 MMOL/L (ref 98–107)
CK SERPL-CCNC: 167 U/L (ref 20–180)
CLARITY UR: CLEAR
CO2 SERPL-SCNC: 25.9 MMOL/L (ref 22–29)
COHGB MFR BLD: <0.7 % (ref 0–2)
COLOR UR: YELLOW
CREAT SERPL-MCNC: 2.05 MG/DL (ref 0.57–1)
D-LACTATE SERPL-SCNC: 2.1 MMOL/L (ref 0.5–2)
D-LACTATE SERPL-SCNC: 2.1 MMOL/L (ref 0.5–2)
DEPRECATED RDW RBC AUTO: 45.8 FL (ref 37–54)
EOSINOPHIL # BLD AUTO: 0.06 10*3/MM3 (ref 0–0.4)
EOSINOPHIL NFR BLD AUTO: 0.4 % (ref 0.3–6.2)
ERYTHROCYTE [DISTWIDTH] IN BLOOD BY AUTOMATED COUNT: 12.4 % (ref 12.3–15.4)
GFR SERPL CREATININE-BSD FRML MDRD: 24 ML/MIN/1.73
GLOBULIN UR ELPH-MCNC: 3.7 GM/DL
GLUCOSE BLDC GLUCOMTR-MCNC: 121 MG/DL (ref 70–130)
GLUCOSE BLDC GLUCOMTR-MCNC: 136 MG/DL (ref 70–130)
GLUCOSE BLDC GLUCOMTR-MCNC: 174 MG/DL (ref 70–130)
GLUCOSE BLDC GLUCOMTR-MCNC: 196 MG/DL (ref 70–130)
GLUCOSE BLDC GLUCOMTR-MCNC: 217 MG/DL (ref 70–130)
GLUCOSE BLDC GLUCOMTR-MCNC: 236 MG/DL (ref 70–130)
GLUCOSE SERPL-MCNC: 22 MG/DL (ref 65–99)
GLUCOSE UR STRIP-MCNC: NEGATIVE MG/DL
HBA1C MFR BLD: 5.3 % (ref 4.8–5.6)
HCO3 BLDA-SCNC: 26.6 MMOL/L (ref 22–28)
HCT VFR BLD AUTO: 46.3 % (ref 34–46.6)
HCT VFR BLD CALC: 44.5 %
HGB BLD-MCNC: 14.4 G/DL (ref 12–15.9)
HGB UR QL STRIP.AUTO: NEGATIVE
HOLD SPECIMEN: NORMAL
HOLD SPECIMEN: NORMAL
IMM GRANULOCYTES # BLD AUTO: 0.05 10*3/MM3 (ref 0–0.05)
IMM GRANULOCYTES NFR BLD AUTO: 0.4 % (ref 0–0.5)
INHALED O2 CONCENTRATION: 80 %
KETONES UR QL STRIP: NEGATIVE
LEUKOCYTE ESTERASE UR QL STRIP.AUTO: NEGATIVE
LYMPHOCYTES # BLD AUTO: 3.28 10*3/MM3 (ref 0.7–3.1)
LYMPHOCYTES NFR BLD AUTO: 24 % (ref 19.6–45.3)
Lab: ABNORMAL
MCH RBC QN AUTO: 30.9 PG (ref 26.6–33)
MCHC RBC AUTO-ENTMCNC: 31.1 G/DL (ref 31.5–35.7)
MCV RBC AUTO: 99.4 FL (ref 79–97)
METHGB BLD QL: 0.6 % (ref 0–1.5)
MODALITY: ABNORMAL
MONOCYTES # BLD AUTO: 1.07 10*3/MM3 (ref 0.1–0.9)
MONOCYTES NFR BLD AUTO: 7.8 % (ref 5–12)
MYOGLOBIN SERPL-MCNC: 177 NG/ML (ref 25–58)
NEUTROPHILS NFR BLD AUTO: 66.8 % (ref 42.7–76)
NEUTROPHILS NFR BLD AUTO: 9.13 10*3/MM3 (ref 1.7–7)
NITRITE UR QL STRIP: NEGATIVE
NOTE: ABNORMAL
NRBC BLD AUTO-RTO: 0 /100 WBC (ref 0–0.2)
OXYHGB MFR BLDV: 98.1 % (ref 94–99)
PCO2 BLDA: 77.1 MM HG (ref 35–45)
PCO2 TEMP ADJ BLD: ABNORMAL MM[HG]
PH BLDA: 7.15 PH UNITS (ref 7.35–7.45)
PH UR STRIP.AUTO: <=5 [PH] (ref 5–8)
PH, TEMP CORRECTED: ABNORMAL
PLATELET # BLD AUTO: 256 10*3/MM3 (ref 140–450)
PMV BLD AUTO: 11.1 FL (ref 6–12)
PO2 BLDA: 359 MM HG (ref 75–100)
PO2 TEMP ADJ BLD: ABNORMAL MM[HG]
POTASSIUM SERPL-SCNC: 5 MMOL/L (ref 3.5–5.2)
PROCALCITONIN SERPL-MCNC: 0.1 NG/ML (ref 0–0.25)
PROT SERPL-MCNC: 8.8 G/DL (ref 6–8.5)
PROT UR QL STRIP: NEGATIVE
RBC # BLD AUTO: 4.66 10*6/MM3 (ref 3.77–5.28)
RBC MORPH BLD: NORMAL
SAO2 % BLDCOA: 98.5 % (ref 94–100)
SMALL PLATELETS BLD QL SMEAR: ADEQUATE
SODIUM SERPL-SCNC: 140 MMOL/L (ref 136–145)
SP GR UR STRIP: 1.02 (ref 1–1.03)
TROPONIN T SERPL-MCNC: <0.01 NG/ML (ref 0–0.03)
TSH SERPL DL<=0.05 MIU/L-ACNC: 1.79 UIU/ML (ref 0.27–4.2)
UROBILINOGEN UR QL STRIP: NORMAL
VENTILATOR MODE: ABNORMAL
WBC # BLD AUTO: 13.67 10*3/MM3 (ref 3.4–10.8)
WBC MORPH BLD: NORMAL
WHOLE BLOOD HOLD SPECIMEN: NORMAL
WHOLE BLOOD HOLD SPECIMEN: NORMAL

## 2021-11-04 PROCEDURE — 70450 CT HEAD/BRAIN W/O DYE: CPT

## 2021-11-04 PROCEDURE — 81003 URINALYSIS AUTO W/O SCOPE: CPT | Performed by: EMERGENCY MEDICINE

## 2021-11-04 PROCEDURE — 99291 CRITICAL CARE FIRST HOUR: CPT

## 2021-11-04 PROCEDURE — 71045 X-RAY EXAM CHEST 1 VIEW: CPT

## 2021-11-04 PROCEDURE — 25010000002 GLUCAGON (HUMAN RECOMBINANT) 1 MG RECONSTITUTED SOLUTION: Performed by: EMERGENCY MEDICINE

## 2021-11-04 PROCEDURE — 83050 HGB METHEMOGLOBIN QUAN: CPT

## 2021-11-04 PROCEDURE — 80050 GENERAL HEALTH PANEL: CPT | Performed by: EMERGENCY MEDICINE

## 2021-11-04 PROCEDURE — 83036 HEMOGLOBIN GLYCOSYLATED A1C: CPT | Performed by: EMERGENCY MEDICINE

## 2021-11-04 PROCEDURE — 93005 ELECTROCARDIOGRAM TRACING: CPT | Performed by: EMERGENCY MEDICINE

## 2021-11-04 PROCEDURE — 94660 CPAP INITIATION&MGMT: CPT

## 2021-11-04 PROCEDURE — 25010000002 ENOXAPARIN PER 10 MG: Performed by: EMERGENCY MEDICINE

## 2021-11-04 PROCEDURE — 94799 UNLISTED PULMONARY SVC/PX: CPT

## 2021-11-04 PROCEDURE — 85007 BL SMEAR W/DIFF WBC COUNT: CPT | Performed by: EMERGENCY MEDICINE

## 2021-11-04 PROCEDURE — 83605 ASSAY OF LACTIC ACID: CPT | Performed by: EMERGENCY MEDICINE

## 2021-11-04 PROCEDURE — 99222 1ST HOSP IP/OBS MODERATE 55: CPT | Performed by: EMERGENCY MEDICINE

## 2021-11-04 PROCEDURE — 83874 ASSAY OF MYOGLOBIN: CPT | Performed by: EMERGENCY MEDICINE

## 2021-11-04 PROCEDURE — 87040 BLOOD CULTURE FOR BACTERIA: CPT | Performed by: EMERGENCY MEDICINE

## 2021-11-04 PROCEDURE — 82962 GLUCOSE BLOOD TEST: CPT

## 2021-11-04 PROCEDURE — 82375 ASSAY CARBOXYHB QUANT: CPT

## 2021-11-04 PROCEDURE — 84484 ASSAY OF TROPONIN QUANT: CPT | Performed by: EMERGENCY MEDICINE

## 2021-11-04 PROCEDURE — 82550 ASSAY OF CK (CPK): CPT | Performed by: EMERGENCY MEDICINE

## 2021-11-04 PROCEDURE — 82805 BLOOD GASES W/O2 SATURATION: CPT

## 2021-11-04 PROCEDURE — 84145 PROCALCITONIN (PCT): CPT | Performed by: EMERGENCY MEDICINE

## 2021-11-04 PROCEDURE — 36600 WITHDRAWAL OF ARTERIAL BLOOD: CPT

## 2021-11-04 PROCEDURE — 25010000002 ONDANSETRON PER 1 MG: Performed by: EMERGENCY MEDICINE

## 2021-11-04 RX ORDER — DEXTROSE MONOHYDRATE 25 G/50ML
50 INJECTION, SOLUTION INTRAVENOUS
Status: DISCONTINUED | OUTPATIENT
Start: 2021-11-04 | End: 2021-11-05

## 2021-11-04 RX ORDER — PANTOPRAZOLE SODIUM 40 MG/1
40 TABLET, DELAYED RELEASE ORAL DAILY
COMMUNITY

## 2021-11-04 RX ORDER — METFORMIN HYDROCHLORIDE 750 MG/1
750 TABLET, EXTENDED RELEASE ORAL
COMMUNITY
End: 2023-02-18 | Stop reason: HOSPADM

## 2021-11-04 RX ORDER — SODIUM CHLORIDE 0.9 % (FLUSH) 0.9 %
10 SYRINGE (ML) INJECTION AS NEEDED
Status: DISCONTINUED | OUTPATIENT
Start: 2021-11-04 | End: 2021-11-09 | Stop reason: HOSPADM

## 2021-11-04 RX ORDER — DEXTROSE AND SODIUM CHLORIDE 5; .45 G/100ML; G/100ML
125 INJECTION, SOLUTION INTRAVENOUS CONTINUOUS
Status: DISCONTINUED | OUTPATIENT
Start: 2021-11-04 | End: 2021-11-04

## 2021-11-04 RX ORDER — LEVOTHYROXINE SODIUM 0.05 MG/1
50 TABLET ORAL DAILY
COMMUNITY

## 2021-11-04 RX ORDER — CEFTRIAXONE 1 G/50ML
1 INJECTION, SOLUTION INTRAVENOUS EVERY 24 HOURS
Status: DISCONTINUED | OUTPATIENT
Start: 2021-11-05 | End: 2021-11-05

## 2021-11-04 RX ORDER — DEXTROSE MONOHYDRATE 100 MG/ML
50 INJECTION, SOLUTION INTRAVENOUS CONTINUOUS
Status: DISCONTINUED | OUTPATIENT
Start: 2021-11-04 | End: 2021-11-05

## 2021-11-04 RX ORDER — ACETAMINOPHEN 325 MG/1
650 TABLET ORAL EVERY 4 HOURS PRN
Status: DISCONTINUED | OUTPATIENT
Start: 2021-11-04 | End: 2021-11-09 | Stop reason: HOSPADM

## 2021-11-04 RX ORDER — DEXTROSE MONOHYDRATE 25 G/50ML
25 INJECTION, SOLUTION INTRAVENOUS
Status: DISCONTINUED | OUTPATIENT
Start: 2021-11-04 | End: 2021-11-05

## 2021-11-04 RX ORDER — BACITRACIN ZINC 500 [USP'U]/G
1 OINTMENT TOPICAL ONCE
Status: COMPLETED | OUTPATIENT
Start: 2021-11-04 | End: 2021-11-04

## 2021-11-04 RX ORDER — ONDANSETRON 2 MG/ML
4 INJECTION INTRAMUSCULAR; INTRAVENOUS ONCE
Status: COMPLETED | OUTPATIENT
Start: 2021-11-04 | End: 2021-11-04

## 2021-11-04 RX ORDER — CLOPIDOGREL BISULFATE 75 MG/1
75 TABLET ORAL DAILY
COMMUNITY

## 2021-11-04 RX ORDER — SODIUM CHLORIDE 0.9 % (FLUSH) 0.9 %
10 SYRINGE (ML) INJECTION EVERY 12 HOURS SCHEDULED
Status: DISCONTINUED | OUTPATIENT
Start: 2021-11-04 | End: 2021-11-09 | Stop reason: HOSPADM

## 2021-11-04 RX ORDER — ATORVASTATIN CALCIUM 40 MG/1
40 TABLET, FILM COATED ORAL DAILY
COMMUNITY

## 2021-11-04 RX ORDER — ACETAMINOPHEN 650 MG/1
650 SUPPOSITORY RECTAL EVERY 4 HOURS PRN
Status: DISCONTINUED | OUTPATIENT
Start: 2021-11-04 | End: 2021-11-09 | Stop reason: HOSPADM

## 2021-11-04 RX ORDER — NICOTINE POLACRILEX 4 MG
1 LOZENGE BUCCAL
Status: DISCONTINUED | OUTPATIENT
Start: 2021-11-04 | End: 2021-11-05

## 2021-11-04 RX ORDER — LOSARTAN POTASSIUM 50 MG/1
50 TABLET ORAL DAILY
COMMUNITY
End: 2021-11-09 | Stop reason: HOSPADM

## 2021-11-04 RX ORDER — ONDANSETRON 2 MG/ML
4 INJECTION INTRAMUSCULAR; INTRAVENOUS EVERY 6 HOURS PRN
Status: DISCONTINUED | OUTPATIENT
Start: 2021-11-04 | End: 2021-11-09 | Stop reason: HOSPADM

## 2021-11-04 RX ORDER — MONTELUKAST SODIUM 10 MG/1
10 TABLET ORAL NIGHTLY
COMMUNITY

## 2021-11-04 RX ORDER — GABAPENTIN 600 MG/1
600 TABLET ORAL 3 TIMES DAILY
COMMUNITY

## 2021-11-04 RX ORDER — ACETAMINOPHEN 160 MG/5ML
650 SOLUTION ORAL EVERY 4 HOURS PRN
Status: DISCONTINUED | OUTPATIENT
Start: 2021-11-04 | End: 2021-11-09 | Stop reason: HOSPADM

## 2021-11-04 RX ADMIN — DEXTROSE MONOHYDRATE 50 ML/HR: 100 INJECTION, SOLUTION INTRAVENOUS at 22:04

## 2021-11-04 RX ADMIN — SODIUM CHLORIDE, PRESERVATIVE FREE 10 ML: 5 INJECTION INTRAVENOUS at 22:06

## 2021-11-04 RX ADMIN — ENOXAPARIN SODIUM 30 MG: 30 INJECTION SUBCUTANEOUS at 22:05

## 2021-11-04 RX ADMIN — DEXTROSE AND SODIUM CHLORIDE 125 ML/HR: 5; 450 INJECTION, SOLUTION INTRAVENOUS at 17:13

## 2021-11-04 RX ADMIN — BACITRACIN 1 APPLICATION: 500 OINTMENT TOPICAL at 17:26

## 2021-11-04 RX ADMIN — SODIUM CHLORIDE, POTASSIUM CHLORIDE, SODIUM LACTATE AND CALCIUM CHLORIDE 1000 ML: 600; 310; 30; 20 INJECTION, SOLUTION INTRAVENOUS at 23:29

## 2021-11-04 RX ADMIN — GLUCAGON HYDROCHLORIDE 1 MG: KIT at 17:24

## 2021-11-04 RX ADMIN — ONDANSETRON 4 MG: 2 INJECTION INTRAMUSCULAR; INTRAVENOUS at 17:52

## 2021-11-04 RX ADMIN — DEXTROSE MONOHYDRATE 50 ML: 25 INJECTION, SOLUTION INTRAVENOUS at 17:00
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
## 2021-11-05 ENCOUNTER — APPOINTMENT (OUTPATIENT)
Dept: CARDIOLOGY | Facility: HOSPITAL | Age: 66
End: 2021-11-05

## 2021-11-05 PROBLEM — N18.30 CHRONIC KIDNEY DISEASE, STAGE III (MODERATE): Status: ACTIVE | Noted: 2021-11-05

## 2021-11-05 PROBLEM — G93.41 ACUTE METABOLIC ENCEPHALOPATHY: Status: ACTIVE | Noted: 2021-11-05

## 2021-11-05 PROBLEM — J96.01 ACUTE RESPIRATORY FAILURE WITH HYPOXIA AND HYPERCAPNIA (HCC): Status: ACTIVE | Noted: 2021-11-05

## 2021-11-05 PROBLEM — I10 ESSENTIAL HYPERTENSION: Status: ACTIVE | Noted: 2021-11-05

## 2021-11-05 PROBLEM — E11.21 TYPE 2 DIABETES MELLITUS WITH NEPHROPATHY (HCC): Status: ACTIVE | Noted: 2021-11-05

## 2021-11-05 PROBLEM — G47.33 OBSTRUCTIVE SLEEP APNEA: Status: ACTIVE | Noted: 2021-11-05

## 2021-11-05 PROBLEM — J96.02 ACUTE RESPIRATORY FAILURE WITH HYPOXIA AND HYPERCAPNIA (HCC): Status: ACTIVE | Noted: 2021-11-05

## 2021-11-05 LAB
ANION GAP SERPL CALCULATED.3IONS-SCNC: 9.2 MMOL/L (ref 5–15)
BACTERIA UR QL AUTO: ABNORMAL /HPF
BASOPHILS # BLD AUTO: 0.08 10*3/MM3 (ref 0–0.2)
BASOPHILS NFR BLD AUTO: 0.6 % (ref 0–1.5)
BH CV ECHO MEAS - AO MAX PG (FULL): 2.9 MMHG
BH CV ECHO MEAS - AO MAX PG: 7 MMHG
BH CV ECHO MEAS - AO MEAN PG (FULL): 2 MMHG
BH CV ECHO MEAS - AO MEAN PG: 4 MMHG
BH CV ECHO MEAS - AO ROOT AREA (BSA CORRECTED): 1.3
BH CV ECHO MEAS - AO ROOT AREA: 4.1 CM^2
BH CV ECHO MEAS - AO ROOT DIAM: 2.3 CM
BH CV ECHO MEAS - AO V2 MAX: 135 CM/SEC
BH CV ECHO MEAS - AO V2 MEAN: 90.6 CM/SEC
BH CV ECHO MEAS - AO V2 VTI: 24.5 CM
BH CV ECHO MEAS - AVA(I,A): 1.8 CM^2
BH CV ECHO MEAS - AVA(I,D): 1.8 CM^2
BH CV ECHO MEAS - AVA(V,A): 1.7 CM^2
BH CV ECHO MEAS - AVA(V,D): 1.7 CM^2
BH CV ECHO MEAS - BSA(HAYCOCK): 1.8 M^2
BH CV ECHO MEAS - BSA: 1.7 M^2
BH CV ECHO MEAS - BZI_BMI: 32.4 KILOGRAMS/M^2
BH CV ECHO MEAS - BZI_METRIC_HEIGHT: 152.4 CM
BH CV ECHO MEAS - BZI_METRIC_WEIGHT: 75.3 KG
BH CV ECHO MEAS - EDV(CUBED): 39.7 ML
BH CV ECHO MEAS - EDV(MOD-SP2): 45.7 ML
BH CV ECHO MEAS - EDV(MOD-SP4): 45.6 ML
BH CV ECHO MEAS - EDV(TEICH): 47.8 ML
BH CV ECHO MEAS - EF(CUBED): 65.6 %
BH CV ECHO MEAS - EF(MOD-SP2): 65.6 %
BH CV ECHO MEAS - EF(MOD-SP4): 58.1 %
BH CV ECHO MEAS - EF(TEICH): 58.2 %
BH CV ECHO MEAS - ESV(CUBED): 13.7 ML
BH CV ECHO MEAS - ESV(MOD-SP2): 15.7 ML
BH CV ECHO MEAS - ESV(MOD-SP4): 19.1 ML
BH CV ECHO MEAS - ESV(TEICH): 20 ML
BH CV ECHO MEAS - FS: 29.9 %
BH CV ECHO MEAS - IVS/LVPW: 1.4
BH CV ECHO MEAS - IVSD: 1.6 CM
BH CV ECHO MEAS - LA DIMENSION: 3 CM
BH CV ECHO MEAS - LA/AO: 1.3
BH CV ECHO MEAS - LAD MAJOR: 4.2 CM
BH CV ECHO MEAS - LAT PEAK E' VEL: 6.1 CM/SEC
BH CV ECHO MEAS - LATERAL E/E' RATIO: 11.9
BH CV ECHO MEAS - LV DIASTOLIC VOL/BSA (35-75): 26.4 ML/M^2
BH CV ECHO MEAS - LV MASS(C)D: 154.6 GRAMS
BH CV ECHO MEAS - LV MASS(C)DI: 89.7 GRAMS/M^2
BH CV ECHO MEAS - LV MAX PG: 4.1 MMHG
BH CV ECHO MEAS - LV MEAN PG: 2 MMHG
BH CV ECHO MEAS - LV SYSTOLIC VOL/BSA (12-30): 11.1 ML/M^2
BH CV ECHO MEAS - LV V1 MAX: 101 CM/SEC
BH CV ECHO MEAS - LV V1 MEAN: 59.3 CM/SEC
BH CV ECHO MEAS - LV V1 VTI: 19.5 CM
BH CV ECHO MEAS - LVIDD: 3.4 CM
BH CV ECHO MEAS - LVIDS: 2.4 CM
BH CV ECHO MEAS - LVLD AP2: 5.9 CM
BH CV ECHO MEAS - LVLD AP4: 7.5 CM
BH CV ECHO MEAS - LVLS AP2: 4.9 CM
BH CV ECHO MEAS - LVLS AP4: 6.3 CM
BH CV ECHO MEAS - LVOT AREA (M): 2.3 CM^2
BH CV ECHO MEAS - LVOT AREA: 2.3 CM^2
BH CV ECHO MEAS - LVOT DIAM: 1.7 CM
BH CV ECHO MEAS - LVPWD: 1.1 CM
BH CV ECHO MEAS - MED PEAK E' VEL: 5.7 CM/SEC
BH CV ECHO MEAS - MEDIAL E/E' RATIO: 12.8
BH CV ECHO MEAS - MV A MAX VEL: 104 CM/SEC
BH CV ECHO MEAS - MV DEC TIME: 0.23 SEC
BH CV ECHO MEAS - MV E MAX VEL: 72.6 CM/SEC
BH CV ECHO MEAS - MV E/A: 0.7
BH CV ECHO MEAS - MV MAX PG: 4.2 MMHG
BH CV ECHO MEAS - MV MEAN PG: 2 MMHG
BH CV ECHO MEAS - MV V2 MAX: 102 CM/SEC
BH CV ECHO MEAS - MV V2 MEAN: 65.3 CM/SEC
BH CV ECHO MEAS - MV V2 VTI: 23.1 CM
BH CV ECHO MEAS - MVA(VTI): 1.9 CM^2
BH CV ECHO MEAS - PA ACC TIME: 0.09 SEC
BH CV ECHO MEAS - PA MAX PG (FULL): 2.4 MMHG
BH CV ECHO MEAS - PA MAX PG: 5.1 MMHG
BH CV ECHO MEAS - PA MEAN PG (FULL): 1 MMHG
BH CV ECHO MEAS - PA MEAN PG: 3 MMHG
BH CV ECHO MEAS - PA PR(ACCEL): 40.8 MMHG
BH CV ECHO MEAS - PA V2 MAX: 113 CM/SEC
BH CV ECHO MEAS - PA V2 MEAN: 76 CM/SEC
BH CV ECHO MEAS - PA V2 VTI: 21.6 CM
BH CV ECHO MEAS - RAP SYSTOLE: 3 MMHG
BH CV ECHO MEAS - RV MAX PG: 2.7 MMHG
BH CV ECHO MEAS - RV MEAN PG: 2 MMHG
BH CV ECHO MEAS - RV V1 MAX: 82.8 CM/SEC
BH CV ECHO MEAS - RV V1 MEAN: 58.5 CM/SEC
BH CV ECHO MEAS - RV V1 VTI: 17.4 CM
BH CV ECHO MEAS - SI(AO): 58 ML/M^2
BH CV ECHO MEAS - SI(CUBED): 15.1 ML/M^2
BH CV ECHO MEAS - SI(LVOT): 26 ML/M^2
BH CV ECHO MEAS - SI(MOD-SP2): 17.4 ML/M^2
BH CV ECHO MEAS - SI(MOD-SP4): 15.4 ML/M^2
BH CV ECHO MEAS - SI(TEICH): 16.1 ML/M^2
BH CV ECHO MEAS - SV(AO): 100 ML
BH CV ECHO MEAS - SV(CUBED): 26 ML
BH CV ECHO MEAS - SV(LVOT): 44.8 ML
BH CV ECHO MEAS - SV(MOD-SP2): 30 ML
BH CV ECHO MEAS - SV(MOD-SP4): 26.5 ML
BH CV ECHO MEAS - SV(TEICH): 27.8 ML
BH CV ECHO MEAS - TAPSE (>1.6): 1.4 CM
BH CV ECHO MEASUREMENTS AVERAGE E/E' RATIO: 12.31
BH CV XLRA - TDI S': 9.5 CM/SEC
BILIRUB UR QL STRIP: NEGATIVE
BUN SERPL-MCNC: 39 MG/DL (ref 8–23)
BUN/CREAT SERPL: 21.7 (ref 7–25)
CALCIUM SPEC-SCNC: 9.3 MG/DL (ref 8.6–10.5)
CHLORIDE SERPL-SCNC: 100 MMOL/L (ref 98–107)
CLARITY UR: CLEAR
CO2 SERPL-SCNC: 24.8 MMOL/L (ref 22–29)
COLOR UR: YELLOW
CREAT SERPL-MCNC: 1.8 MG/DL (ref 0.57–1)
DEPRECATED RDW RBC AUTO: 44.4 FL (ref 37–54)
EOSINOPHIL # BLD AUTO: 0.1 10*3/MM3 (ref 0–0.4)
EOSINOPHIL NFR BLD AUTO: 0.7 % (ref 0.3–6.2)
ERYTHROCYTE [DISTWIDTH] IN BLOOD BY AUTOMATED COUNT: 12.4 % (ref 12.3–15.4)
GFR SERPL CREATININE-BSD FRML MDRD: 28 ML/MIN/1.73
GLUCOSE BLDC GLUCOMTR-MCNC: 127 MG/DL (ref 70–130)
GLUCOSE BLDC GLUCOMTR-MCNC: 305 MG/DL (ref 70–130)
GLUCOSE SERPL-MCNC: 175 MG/DL (ref 65–99)
GLUCOSE UR STRIP-MCNC: NEGATIVE MG/DL
HCT VFR BLD AUTO: 40.4 % (ref 34–46.6)
HGB BLD-MCNC: 12.6 G/DL (ref 12–15.9)
HGB UR QL STRIP.AUTO: ABNORMAL
HYALINE CASTS UR QL AUTO: ABNORMAL /LPF
IMM GRANULOCYTES # BLD AUTO: 0.05 10*3/MM3 (ref 0–0.05)
IMM GRANULOCYTES NFR BLD AUTO: 0.4 % (ref 0–0.5)
KETONES UR QL STRIP: NEGATIVE
LEUKOCYTE ESTERASE UR QL STRIP.AUTO: NEGATIVE
LYMPHOCYTES # BLD AUTO: 5.18 10*3/MM3 (ref 0.7–3.1)
LYMPHOCYTES NFR BLD AUTO: 36.7 % (ref 19.6–45.3)
MAXIMAL PREDICTED HEART RATE: 156 BPM
MCH RBC QN AUTO: 30.7 PG (ref 26.6–33)
MCHC RBC AUTO-ENTMCNC: 31.2 G/DL (ref 31.5–35.7)
MCV RBC AUTO: 98.5 FL (ref 79–97)
MONOCYTES # BLD AUTO: 1 10*3/MM3 (ref 0.1–0.9)
MONOCYTES NFR BLD AUTO: 7.1 % (ref 5–12)
MRSA DNA SPEC QL NAA+PROBE: ABNORMAL
MUCOUS THREADS URNS QL MICRO: ABNORMAL /HPF
NEUTROPHILS NFR BLD AUTO: 54.5 % (ref 42.7–76)
NEUTROPHILS NFR BLD AUTO: 7.71 10*3/MM3 (ref 1.7–7)
NITRITE UR QL STRIP: NEGATIVE
NRBC BLD AUTO-RTO: 0 /100 WBC (ref 0–0.2)
PH UR STRIP.AUTO: <=5 [PH] (ref 5–8)
PLATELET # BLD AUTO: 253 10*3/MM3 (ref 140–450)
PMV BLD AUTO: 10.4 FL (ref 6–12)
POTASSIUM SERPL-SCNC: 5.3 MMOL/L (ref 3.5–5.2)
PROT UR QL STRIP: NEGATIVE
RBC # BLD AUTO: 4.1 10*6/MM3 (ref 3.77–5.28)
RBC # UR: ABNORMAL /HPF
REF LAB TEST METHOD: ABNORMAL
SARS-COV-2 RNA PNL SPEC NAA+PROBE: NOT DETECTED
SODIUM SERPL-SCNC: 134 MMOL/L (ref 136–145)
SP GR UR STRIP: 1.02 (ref 1–1.03)
SQUAMOUS #/AREA URNS HPF: ABNORMAL /HPF
STRESS TARGET HR: 133 BPM
UROBILINOGEN UR QL STRIP: ABNORMAL
VANCOMYCIN SERPL-MCNC: 33.7 MCG/ML (ref 5–40)
WBC # BLD AUTO: 14.12 10*3/MM3 (ref 3.4–10.8)
WBC UR QL AUTO: ABNORMAL /HPF

## 2021-11-05 PROCEDURE — 63710000001 INSULIN DETEMIR PER 5 UNITS: Performed by: INTERNAL MEDICINE

## 2021-11-05 PROCEDURE — 63710000001 INSULIN ASPART PER 5 UNITS: Performed by: INTERNAL MEDICINE

## 2021-11-05 PROCEDURE — 93306 TTE W/DOPPLER COMPLETE: CPT | Performed by: INTERNAL MEDICINE

## 2021-11-05 PROCEDURE — 97161 PT EVAL LOW COMPLEX 20 MIN: CPT

## 2021-11-05 PROCEDURE — 25010000002 ENOXAPARIN PER 10 MG: Performed by: EMERGENCY MEDICINE

## 2021-11-05 PROCEDURE — 85025 COMPLETE CBC W/AUTO DIFF WBC: CPT | Performed by: EMERGENCY MEDICINE

## 2021-11-05 PROCEDURE — 25010000002 CEFEPIME-DEXTROSE 2-5 GM-%(50ML) RECONSTITUTED SOLUTION: Performed by: EMERGENCY MEDICINE

## 2021-11-05 PROCEDURE — 93306 TTE W/DOPPLER COMPLETE: CPT

## 2021-11-05 PROCEDURE — 87641 MR-STAPH DNA AMP PROBE: CPT | Performed by: INTERNAL MEDICINE

## 2021-11-05 PROCEDURE — 87635 SARS-COV-2 COVID-19 AMP PRB: CPT | Performed by: EMERGENCY MEDICINE

## 2021-11-05 PROCEDURE — 25010000002 ONDANSETRON PER 1 MG: Performed by: EMERGENCY MEDICINE

## 2021-11-05 PROCEDURE — 82962 GLUCOSE BLOOD TEST: CPT

## 2021-11-05 PROCEDURE — 99232 SBSQ HOSP IP/OBS MODERATE 35: CPT | Performed by: INTERNAL MEDICINE

## 2021-11-05 PROCEDURE — 25010000002 CEFTRIAXONE SODIUM-DEXTROSE 1-3.74 GM-%(50ML) RECONSTITUTED SOLUTION: Performed by: EMERGENCY MEDICINE

## 2021-11-05 PROCEDURE — 81001 URINALYSIS AUTO W/SCOPE: CPT | Performed by: EMERGENCY MEDICINE

## 2021-11-05 PROCEDURE — 80202 ASSAY OF VANCOMYCIN: CPT | Performed by: EMERGENCY MEDICINE

## 2021-11-05 PROCEDURE — 80048 BASIC METABOLIC PNL TOTAL CA: CPT | Performed by: EMERGENCY MEDICINE

## 2021-11-05 PROCEDURE — 25010000002 VANCOMYCIN 5 G RECONSTITUTED SOLUTION 5,000 MG VIAL: Performed by: EMERGENCY MEDICINE

## 2021-11-05 RX ORDER — CEFEPIME HYDROCHLORIDE 2 G/50ML
2 INJECTION, SOLUTION INTRAVENOUS ONCE
Status: COMPLETED | OUTPATIENT
Start: 2021-11-05 | End: 2021-11-05

## 2021-11-05 RX ORDER — CLOPIDOGREL BISULFATE 75 MG/1
75 TABLET ORAL DAILY
Status: DISCONTINUED | OUTPATIENT
Start: 2021-11-05 | End: 2021-11-09 | Stop reason: HOSPADM

## 2021-11-05 RX ORDER — CEFEPIME HYDROCHLORIDE 1 G/50ML
1 INJECTION, SOLUTION INTRAVENOUS EVERY 12 HOURS
Status: DISCONTINUED | OUTPATIENT
Start: 2021-11-05 | End: 2021-11-05

## 2021-11-05 RX ORDER — NICOTINE POLACRILEX 4 MG
1 LOZENGE BUCCAL
Status: DISCONTINUED | OUTPATIENT
Start: 2021-11-05 | End: 2021-11-09 | Stop reason: HOSPADM

## 2021-11-05 RX ORDER — PANTOPRAZOLE SODIUM 40 MG/1
40 TABLET, DELAYED RELEASE ORAL DAILY
Status: DISCONTINUED | OUTPATIENT
Start: 2021-11-05 | End: 2021-11-09 | Stop reason: HOSPADM

## 2021-11-05 RX ORDER — GABAPENTIN 300 MG/1
600 CAPSULE ORAL EVERY 8 HOURS SCHEDULED
Status: DISCONTINUED | OUTPATIENT
Start: 2021-11-05 | End: 2021-11-09 | Stop reason: HOSPADM

## 2021-11-05 RX ORDER — LEVOTHYROXINE SODIUM 0.05 MG/1
50 TABLET ORAL DAILY
Status: DISCONTINUED | OUTPATIENT
Start: 2021-11-05 | End: 2021-11-09 | Stop reason: HOSPADM

## 2021-11-05 RX ORDER — NOREPINEPHRINE BIT/0.9 % NACL 8 MG/250ML
.02-.3 INFUSION BOTTLE (ML) INTRAVENOUS
Status: DISCONTINUED | OUTPATIENT
Start: 2021-11-05 | End: 2021-11-07

## 2021-11-05 RX ORDER — ATORVASTATIN CALCIUM 40 MG/1
40 TABLET, FILM COATED ORAL DAILY
Status: DISCONTINUED | OUTPATIENT
Start: 2021-11-05 | End: 2021-11-09 | Stop reason: HOSPADM

## 2021-11-05 RX ORDER — CEFEPIME HYDROCHLORIDE 2 G/50ML
2 INJECTION, SOLUTION INTRAVENOUS EVERY 24 HOURS
Status: DISCONTINUED | OUTPATIENT
Start: 2021-11-06 | End: 2021-11-08

## 2021-11-05 RX ORDER — DEXTROSE MONOHYDRATE 25 G/50ML
25 INJECTION, SOLUTION INTRAVENOUS
Status: DISCONTINUED | OUTPATIENT
Start: 2021-11-05 | End: 2021-11-09 | Stop reason: HOSPADM

## 2021-11-05 RX ORDER — DULAGLUTIDE 1.5 MG/.5ML
1 INJECTION, SOLUTION SUBCUTANEOUS WEEKLY
COMMUNITY

## 2021-11-05 RX ORDER — METOPROLOL SUCCINATE 50 MG/1
50 TABLET, EXTENDED RELEASE ORAL DAILY
COMMUNITY

## 2021-11-05 RX ORDER — L.ACID,PARA/B.BIFIDUM/S.THERM 8B CELL
1 CAPSULE ORAL 2 TIMES DAILY
Status: DISCONTINUED | OUTPATIENT
Start: 2021-11-05 | End: 2021-11-09 | Stop reason: HOSPADM

## 2021-11-05 RX ADMIN — LEVOTHYROXINE SODIUM 50 MCG: 50 TABLET ORAL at 09:33

## 2021-11-05 RX ADMIN — Medication 0.02 MCG/KG/MIN: at 01:38

## 2021-11-05 RX ADMIN — CEFEPIME HYDROCHLORIDE 2 G: 2 INJECTION, SOLUTION INTRAVENOUS at 01:25

## 2021-11-05 RX ADMIN — PANTOPRAZOLE SODIUM 40 MG: 40 TABLET, DELAYED RELEASE ORAL at 09:33

## 2021-11-05 RX ADMIN — SODIUM CHLORIDE, PRESERVATIVE FREE 10 ML: 5 INJECTION INTRAVENOUS at 09:34

## 2021-11-05 RX ADMIN — ONDANSETRON 4 MG: 2 INJECTION INTRAMUSCULAR; INTRAVENOUS at 14:10

## 2021-11-05 RX ADMIN — CEFTRIAXONE 1 G: 1 INJECTION, SOLUTION INTRAVENOUS at 01:16

## 2021-11-05 RX ADMIN — MUPIROCIN 1 APPLICATION: 20 OINTMENT TOPICAL at 21:01

## 2021-11-05 RX ADMIN — INSULIN ASPART 10 UNITS: 100 INJECTION, SOLUTION INTRAVENOUS; SUBCUTANEOUS at 17:01

## 2021-11-05 RX ADMIN — SODIUM CHLORIDE, PRESERVATIVE FREE 10 ML: 5 INJECTION INTRAVENOUS at 21:03

## 2021-11-05 RX ADMIN — ATORVASTATIN CALCIUM 40 MG: 40 TABLET, FILM COATED ORAL at 09:34

## 2021-11-05 RX ADMIN — ENOXAPARIN SODIUM 30 MG: 30 INJECTION SUBCUTANEOUS at 21:01

## 2021-11-05 RX ADMIN — VANCOMYCIN HYDROCHLORIDE 2000 MG: 5 INJECTION, POWDER, LYOPHILIZED, FOR SOLUTION INTRAVENOUS at 01:31

## 2021-11-05 RX ADMIN — INSULIN DETEMIR 10 UNITS: 100 INJECTION, SOLUTION SUBCUTANEOUS at 17:01

## 2021-11-05 RX ADMIN — CLOPIDOGREL BISULFATE 75 MG: 75 TABLET ORAL at 09:33

## 2021-11-05 RX ADMIN — GABAPENTIN 600 MG: 300 CAPSULE ORAL at 14:10

## 2021-11-05 RX ADMIN — Medication 1 CAPSULE: at 09:33

## 2021-11-05 RX ADMIN — GABAPENTIN 600 MG: 300 CAPSULE ORAL at 21:01

## 2021-11-05 RX ADMIN — Medication 1 CAPSULE: at 21:01

## 2021-11-05 NOTE — PLAN OF CARE
Goal Outcome Evaluation:  Plan of Care Reviewed With: patient        Progress: no change  Outcome Summary: Pt agreeable to PT evaluation. Pt was able to transfer to EOB MI. Pt's BP in sitting was 100/84. Pt then assisted to BSC. BP did drop to 90/65. Pt then transferred back to bed and ambulated 4ft with no AD and CGA. Pt's BP was 108/83 back in supine. Pt would benefit from skilled PTx during this inpatient stay to address deficits in strength gait and balance.

## 2021-11-05 NOTE — PLAN OF CARE
Problem: Adult Inpatient Plan of Care  Goal: Plan of Care Review  Outcome: Ongoing, Progressing  Flowsheets  Taken 11/5/2021 1717 by Kasey Coe RN  Progress: improving  Taken 11/5/2021 1623 by Donny Freeman PT  Plan of Care Reviewed With: patient  Goal: Patient-Specific Goal (Individualized)  Outcome: Ongoing, Progressing  Goal: Absence of Hospital-Acquired Illness or Injury  Outcome: Ongoing, Progressing  Intervention: Identify and Manage Fall Risk  Recent Flowsheet Documentation  Taken 11/5/2021 1700 by Kasey Coe RN  Safety Promotion/Fall Prevention:   safety round/check completed   room organization consistent   toileting scheduled  Taken 11/5/2021 1600 by Kasey Coe RN  Safety Promotion/Fall Prevention:   toileting scheduled   safety round/check completed   room organization consistent   nonskid shoes/slippers when out of bed   lighting adjusted   fall prevention program maintained   clutter free environment maintained   assistive device/personal items within reach  Taken 11/5/2021 1500 by Kasey Coe RN  Safety Promotion/Fall Prevention:   toileting scheduled   safety round/check completed   room organization consistent   nonskid shoes/slippers when out of bed   muscle strengthening facilitated   lighting adjusted   fall prevention program maintained   clutter free environment maintained   assistive device/personal items within reach  Taken 11/5/2021 1400 by Kasey Coe RN  Safety Promotion/Fall Prevention:   toileting scheduled   safety round/check completed   room organization consistent   lighting adjusted   fall prevention program maintained   assistive device/personal items within reach  Taken 11/5/2021 1300 by Kasey Coe RN  Safety Promotion/Fall Prevention:   clutter free environment maintained   fall prevention program maintained   lighting adjusted   muscle strengthening facilitated   nonskid shoes/slippers when out of bed   room organization consistent   safety  round/check completed   toileting scheduled  Taken 11/5/2021 1200 by Kasey Coe RN  Safety Promotion/Fall Prevention:   toileting scheduled   safety round/check completed   activity supervised  Taken 11/5/2021 1000 by Kasey Coe RN  Safety Promotion/Fall Prevention:   toileting scheduled   safety round/check completed   room organization consistent   nonskid shoes/slippers when out of bed   mobility aid in reach   fall prevention program maintained   clutter free environment maintained   assistive device/personal items within reach  Taken 11/5/2021 0900 by Kasey Coe RN  Safety Promotion/Fall Prevention:   safety round/check completed   room organization consistent  Taken 11/5/2021 0800 by Kasey Coe RN  Safety Promotion/Fall Prevention:   toileting scheduled   safety round/check completed   room organization consistent   nonskid shoes/slippers when out of bed   muscle strengthening facilitated   lighting adjusted   fall prevention program maintained   clutter free environment maintained   assistive device/personal items within reach  Taken 11/5/2021 0700 by Kasey Coe RN  Safety Promotion/Fall Prevention:   toileting scheduled   safety round/check completed  Intervention: Prevent Skin Injury  Recent Flowsheet Documentation  Taken 11/5/2021 1300 by Kasey Coe RN  Body Position: position changed independently  Taken 11/5/2021 1200 by Kasey Coe RN  Body Position: position changed independently  Taken 11/5/2021 0800 by Kasey Coe RN  Body Position: position changed independently  Intervention: Prevent and Manage VTE (venous thromboembolism) Risk  Recent Flowsheet Documentation  Taken 11/5/2021 0800 by Kasey Coe RN  VTE Prevention/Management: dorsiflexion/plantar flexion performed  Intervention: Prevent Infection  Recent Flowsheet Documentation  Taken 11/5/2021 1700 by Kasey Coe RN  Infection Prevention:   environmental surveillance performed   equipment  surfaces disinfected   hand hygiene promoted   personal protective equipment utilized   rest/sleep promoted   single patient room provided  Taken 11/5/2021 1600 by Kasey Coe RN  Infection Prevention:   environmental surveillance performed   equipment surfaces disinfected   hand hygiene promoted   personal protective equipment utilized   rest/sleep promoted   single patient room provided  Taken 11/5/2021 1500 by Kasey Coe RN  Infection Prevention:   environmental surveillance performed   equipment surfaces disinfected   hand hygiene promoted   personal protective equipment utilized   rest/sleep promoted   single patient room provided  Taken 11/5/2021 1400 by Kasey Coe RN  Infection Prevention:   environmental surveillance performed   equipment surfaces disinfected   hand hygiene promoted   personal protective equipment utilized   rest/sleep promoted   single patient room provided   visitors restricted/screened  Taken 11/5/2021 1300 by Kasey Coe RN  Infection Prevention:   environmental surveillance performed   equipment surfaces disinfected   hand hygiene promoted   personal protective equipment utilized   rest/sleep promoted   single patient room provided  Taken 11/5/2021 1200 by Kasey Coe RN  Infection Prevention:   environmental surveillance performed   equipment surfaces disinfected   hand hygiene promoted   personal protective equipment utilized   rest/sleep promoted   single patient room provided  Taken 11/5/2021 1000 by Kasey Coe RN  Infection Prevention:   equipment surfaces disinfected   hand hygiene promoted   personal protective equipment utilized   rest/sleep promoted   single patient room provided  Taken 11/5/2021 0900 by Kasey Coe RN  Infection Prevention:   environmental surveillance performed   hand hygiene promoted   personal protective equipment utilized   rest/sleep promoted  Taken 11/5/2021 0800 by Kasey Coe RN  Infection Prevention:    environmental surveillance performed   equipment surfaces disinfected   hand hygiene promoted   personal protective equipment utilized   rest/sleep promoted   single patient room provided  Goal: Optimal Comfort and Wellbeing  Outcome: Ongoing, Progressing  Intervention: Provide Person-Centered Care  Recent Flowsheet Documentation  Taken 11/5/2021 0800 by Kasey Coe RN  Trust Relationship/Rapport:   care explained   choices provided   emotional support provided   empathic listening provided   questions answered   questions encouraged   reassurance provided   thoughts/feelings acknowledged  Goal: Readiness for Transition of Care  Outcome: Ongoing, Progressing     Problem: Fall Injury Risk  Goal: Absence of Fall and Fall-Related Injury  Outcome: Ongoing, Progressing  Intervention: Identify and Manage Contributors to Fall Injury Risk  Recent Flowsheet Documentation  Taken 11/5/2021 1600 by Kasey Coe RN  Medication Review/Management: medications reviewed  Taken 11/5/2021 1500 by Kasey Coe RN  Medication Review/Management: medications reviewed  Taken 11/5/2021 1400 by Kasey Coe RN  Medication Review/Management: medications reviewed  Taken 11/5/2021 1200 by Kasey Coe RN  Medication Review/Management: medications reviewed  Taken 11/5/2021 1000 by Kasey Coe RN  Medication Review/Management: medications reviewed  Taken 11/5/2021 0900 by Kasey Coe RN  Medication Review/Management: medications reviewed  Taken 11/5/2021 0800 by Kasey Coe RN  Medication Review/Management: medications reviewed  Taken 11/5/2021 0700 by Kasey Coe RN  Medication Review/Management: medications reviewed  Intervention: Promote Injury-Free Environment  Recent Flowsheet Documentation  Taken 11/5/2021 1700 by Kasey Coe RN  Safety Promotion/Fall Prevention:   safety round/check completed   room organization consistent   toileting scheduled  Taken 11/5/2021 1600 by Kasey Coe  RN  Safety Promotion/Fall Prevention:   toileting scheduled   safety round/check completed   room organization consistent   nonskid shoes/slippers when out of bed   lighting adjusted   fall prevention program maintained   clutter free environment maintained   assistive device/personal items within reach  Taken 11/5/2021 1500 by Kasey Coe RN  Safety Promotion/Fall Prevention:   toileting scheduled   safety round/check completed   room organization consistent   nonskid shoes/slippers when out of bed   muscle strengthening facilitated   lighting adjusted   fall prevention program maintained   clutter free environment maintained   assistive device/personal items within reach  Taken 11/5/2021 1400 by Kasey Coe RN  Safety Promotion/Fall Prevention:   toileting scheduled   safety round/check completed   room organization consistent   lighting adjusted   fall prevention program maintained   assistive device/personal items within reach  Taken 11/5/2021 1300 by Kasey Coe RN  Safety Promotion/Fall Prevention:   clutter free environment maintained   fall prevention program maintained   lighting adjusted   muscle strengthening facilitated   nonskid shoes/slippers when out of bed   room organization consistent   safety round/check completed   toileting scheduled  Taken 11/5/2021 1200 by Kasey Coe RN  Safety Promotion/Fall Prevention:   toileting scheduled   safety round/check completed   activity supervised  Taken 11/5/2021 1000 by Kasey Coe RN  Safety Promotion/Fall Prevention:   toileting scheduled   safety round/check completed   room organization consistent   nonskid shoes/slippers when out of bed   mobility aid in reach   fall prevention program maintained   clutter free environment maintained   assistive device/personal items within reach  Taken 11/5/2021 0900 by Kasey Coe RN  Safety Promotion/Fall Prevention:   safety round/check completed   room organization consistent  Taken  11/5/2021 0800 by Kasey Coe RN  Safety Promotion/Fall Prevention:   toileting scheduled   safety round/check completed   room organization consistent   nonskid shoes/slippers when out of bed   muscle strengthening facilitated   lighting adjusted   fall prevention program maintained   clutter free environment maintained   assistive device/personal items within reach  Taken 11/5/2021 0700 by Kasey Coe RN  Safety Promotion/Fall Prevention:   toileting scheduled   safety round/check completed     Problem: Skin Injury Risk Increased  Goal: Skin Health and Integrity  Outcome: Ongoing, Progressing  Intervention: Optimize Skin Protection  Recent Flowsheet Documentation  Taken 11/5/2021 1300 by Kasey Coe RN  Head of Bed (HOB): HOB flat  Taken 11/5/2021 1200 by Kasey Coe RN  Head of Bed (HOB): HOB at 30 degrees  Taken 11/5/2021 0800 by Kasey Coe RN  Pressure Reduction Techniques:   frequent weight shift encouraged   heels elevated off bed  Head of Bed (HOB): HOB at 30 degrees  Pressure Reduction Devices: pressure-redistributing mattress utilized     Problem: Cardiac Output Decreased  Goal: Effective Cardiac Output  Outcome: Ongoing, Progressing  Intervention: Optimize Cardiac Output  Recent Flowsheet Documentation  Taken 11/5/2021 1300 by Kasey Coe RN  Head of Bed (HOB): HOB flat  Taken 11/5/2021 1200 by Kasey Coe RN  Head of Bed (HOB): HOB at 30 degrees  Taken 11/5/2021 0800 by Kasey Coe RN  Head of Bed (HOB): HOB at 30 degrees  Environmental Support:   calm environment promoted   personal routine supported   rest periods encouraged  VTE Prevention/Management: dorsiflexion/plantar flexion performed     Problem: Diabetes Comorbidity  Goal: Blood Glucose Level Within Desired Range  Outcome: Ongoing, Progressing     Problem: Wound  Goal: Optimal Wound Healing  Outcome: Ongoing, Progressing   Goal Outcome Evaluation:           Progress: improving
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
## 2021-11-05 NOTE — NURSING NOTE
Called Dr Peoples and spoke with him regarding pt's blood pressure at 74/40,  Pt alert and oriented x 4 at this point and tolerating oxygen at 2 lpm/nc.  Feeding pt a snack at this point.  New orders given at this time.
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
## 2021-11-05 NOTE — PROGRESS NOTES
Pt developed hypotension soon after arrival to the floor, unclear etx. Echo, empiric antibiotics, IVF, norepinephrine and transfer to ICU ordered. Pt re-evaluated as well. No symptoms or complaints, conversing well.

## 2021-11-05 NOTE — CASE MANAGEMENT/SOCIAL WORK
Discharge Planning Assessment  [facility]     Patient Name: Viola Barlow  MRN: [id number]  Today's Date: 11/5/2021    Admit Date: 11/4/2021     Discharge Needs Assessment     Row Name 11/05/21 1511       Living Environment    Lives With child(melissa), adult    Unique Family Situation Pt currently living with daughter.    Current Living Arrangements home/apartment/condo    Primary Care Provided by self; child(melissa)    Provides Primary Care For no one, unable/limited ability to care for self    Family Caregiver if Needed child(melissa), adult    Family Caregiver Names Lisa Barlow (daughter)    Quality of Family Relationships supportive; helpful    Able to Return to Prior Arrangements yes       Transition Planning    Patient/Family Anticipates Transition to home with family    Patient/Family Anticipated Services at Transition none  Pt declined rehab    Transportation Anticipated family or friend will provide       Discharge Needs Assessment    Readmission Within the Last 30 Days no previous admission in last 30 days    Equipment Currently Used at Home walker, rolling; shower chair  Pt has a shower chair at [facility] in [place] that she needs to .    Concerns to be Addressed no discharge needs identified    Equipment Needed After Discharge none    Discharge Coordination/Progress Pt plans to discharge home with her daughter. Pt declined home health.              Discharge Plan    Row Name 11/05/21 1529   Plan   Plan Comments SW met with pt at bedside to complete discharge planning. Confirmed permanent address as listed in chart. Pt is currently staying with her daughter at [address] [place] [address] in [place], [place]. Pt states that her daughter helps provide care to her. Pt has a walker at home. Pt reports having a shower chair at [facility] that needs to be picked up. Pt denied wanting home health. No report of needed equipment. Pt's daughter to transport at discharge. Confirmed PCP as listed in chart. Pt  states that her sister Awilda is a support but didn't know her phone number right off to add as an emergency contact. No other CM needs identified at this time. CM to continue to follow and assist as needed.        Continued Care and Services - Admitted Since 11/4/2021    Coordination has not been started for this encounter.          Demographic Summary     Row Name 11/05/21 1510       General Information    Admission Type inpatient    Arrived From emergency department    Referral Source admission list    Reason for Consult discharge planning    Preferred Language English     Used During This Interaction no               Functional Status     Row Name 11/05/21 1510       Functional Status, IADL    Medications assistive equipment and person    Meal Preparation assistive equipment and person    Housekeeping assistive equipment and person    Laundry assistive equipment and person    Shopping assistive equipment and person               Psychosocial     Row Name 11/05/21 1511       Coping/Stress    Patient Personal Strengths strong support system; positive attitude    Sources of Support adult child(melissa); sibling(s)       Developmental Stage (Eriksson's)    Developmental Stage Stage 7 (35-65 years/Middle Adulthood) Generativity vs. Stagnation               Abuse/Neglect    No documentation.                Legal    No documentation.                Substance Abuse    No documentation.                Patient Forms    No documentation.                   PAVEL Ybarra

## 2021-11-05 NOTE — PHARMACY RECOMMENDATION
"Pharmacy Consult-Vancomycin Dosing    Pharmacy was consulted to dose vancomycin for  Viola Barlow, a 66 y.o. female  152.4 cm (60\") 75.3 kg (166 lb 0.1 oz)    Indication: sepsis  Goal AUC: 400-600 mg/L*hr.    Labs  Results from last 7 days   Lab Units 11/05/21 0613 11/04/21  1707   WBC 10*3/mm3 14.12* 13.67*   CREATININE mg/dL 1.80* 2.05*      Estimated Creatinine Clearance: 28.6 mL/min (A) (by C-G formula based on SCr of 1.8 mg/dL (H)).  Temp Readings from Last 1 Encounters:   11/05/21 98.4 °F (36.9 °C) (Oral)       Results from last 7 days   Lab Units 11/05/21 0613   VANCOMYCIN RM mcg/mL 33.70       Other Antimicrobials    Cefepime 2 g IV q24h     InsightRX AUC Calculation    Predicted Steady State AUC on Current Dose: (n/a dosing by levels)    Assessment/Plan    Will adjust to dosing by levels. Level this morning is 33, will not schedule a dose today.  Assess clearance by vancomycin level on 11/6 @ 0600.  Pharmacy will continue to monitor renal function, cultures and sensitivities, and clinical status to adjust regimen as necessary.      Thank you,  Jacy Contreras, PharmD, BCPS  11/05/21 09:23 EDT    "
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
## 2021-11-05 NOTE — PLAN OF CARE
Goal Outcome Evaluation:           Progress: improving  Outcome Summary: Pt A&O since arrival tonight.  Levophed initiated for hypotension.  No complaints of pain. AM labs ordered.

## 2021-11-06 LAB
ALBUMIN SERPL-MCNC: 3.5 G/DL (ref 3.5–5.2)
ANION GAP SERPL CALCULATED.3IONS-SCNC: 11.8 MMOL/L (ref 5–15)
ANION GAP SERPL CALCULATED.3IONS-SCNC: 8.2 MMOL/L (ref 5–15)
BUN SERPL-MCNC: 34 MG/DL (ref 8–23)
BUN SERPL-MCNC: 34 MG/DL (ref 8–23)
BUN/CREAT SERPL: 19.7 (ref 7–25)
BUN/CREAT SERPL: 21.4 (ref 7–25)
CALCIUM SPEC-SCNC: 9.7 MG/DL (ref 8.6–10.5)
CALCIUM SPEC-SCNC: 9.7 MG/DL (ref 8.6–10.5)
CHLORIDE SERPL-SCNC: 94 MMOL/L (ref 98–107)
CHLORIDE SERPL-SCNC: 98 MMOL/L (ref 98–107)
CO2 SERPL-SCNC: 23.2 MMOL/L (ref 22–29)
CO2 SERPL-SCNC: 27.8 MMOL/L (ref 22–29)
CREAT SERPL-MCNC: 1.59 MG/DL (ref 0.57–1)
CREAT SERPL-MCNC: 1.73 MG/DL (ref 0.57–1)
DEPRECATED RDW RBC AUTO: 43.1 FL (ref 37–54)
ERYTHROCYTE [DISTWIDTH] IN BLOOD BY AUTOMATED COUNT: 12 % (ref 12.3–15.4)
GFR SERPL CREATININE-BSD FRML MDRD: 30 ML/MIN/1.73
GFR SERPL CREATININE-BSD FRML MDRD: 33 ML/MIN/1.73
GLUCOSE BLDC GLUCOMTR-MCNC: 242 MG/DL (ref 70–130)
GLUCOSE SERPL-MCNC: 364 MG/DL (ref 65–99)
GLUCOSE SERPL-MCNC: 544 MG/DL (ref 65–99)
HCT VFR BLD AUTO: 38.5 % (ref 34–46.6)
HGB BLD-MCNC: 12.3 G/DL (ref 12–15.9)
MCH RBC QN AUTO: 31.1 PG (ref 26.6–33)
MCHC RBC AUTO-ENTMCNC: 31.9 G/DL (ref 31.5–35.7)
MCV RBC AUTO: 97.2 FL (ref 79–97)
PHOSPHATE SERPL-MCNC: 2.7 MG/DL (ref 2.5–4.5)
PLATELET # BLD AUTO: 215 10*3/MM3 (ref 140–450)
PMV BLD AUTO: 10.6 FL (ref 6–12)
POTASSIUM SERPL-SCNC: 5.7 MMOL/L (ref 3.5–5.2)
POTASSIUM SERPL-SCNC: 6.4 MMOL/L (ref 3.5–5.2)
RBC # BLD AUTO: 3.96 10*6/MM3 (ref 3.77–5.28)
SODIUM SERPL-SCNC: 129 MMOL/L (ref 136–145)
SODIUM SERPL-SCNC: 134 MMOL/L (ref 136–145)
VANCOMYCIN SERPL-MCNC: 15.6 MCG/ML (ref 5–40)
WBC # BLD AUTO: 8.81 10*3/MM3 (ref 3.4–10.8)

## 2021-11-06 PROCEDURE — 97116 GAIT TRAINING THERAPY: CPT

## 2021-11-06 PROCEDURE — 80048 BASIC METABOLIC PNL TOTAL CA: CPT | Performed by: INTERNAL MEDICINE

## 2021-11-06 PROCEDURE — 63710000001 INSULIN ASPART PER 5 UNITS: Performed by: INTERNAL MEDICINE

## 2021-11-06 PROCEDURE — 94660 CPAP INITIATION&MGMT: CPT

## 2021-11-06 PROCEDURE — 25010000002 FUROSEMIDE PER 20 MG: Performed by: EMERGENCY MEDICINE

## 2021-11-06 PROCEDURE — 97110 THERAPEUTIC EXERCISES: CPT

## 2021-11-06 PROCEDURE — 63710000001 INSULIN REGULAR HUMAN PER 5 UNITS: Performed by: EMERGENCY MEDICINE

## 2021-11-06 PROCEDURE — 97530 THERAPEUTIC ACTIVITIES: CPT

## 2021-11-06 PROCEDURE — 25010000002 VANCOMYCIN 1 G RECONSTITUTED SOLUTION 1 EACH VIAL: Performed by: INTERNAL MEDICINE

## 2021-11-06 PROCEDURE — 25010000002 ENOXAPARIN PER 10 MG: Performed by: EMERGENCY MEDICINE

## 2021-11-06 PROCEDURE — 85027 COMPLETE CBC AUTOMATED: CPT | Performed by: INTERNAL MEDICINE

## 2021-11-06 PROCEDURE — 94799 UNLISTED PULMONARY SVC/PX: CPT

## 2021-11-06 PROCEDURE — 25010000002 CEFEPIME-DEXTROSE 2-5 GM-%(50ML) RECONSTITUTED SOLUTION: Performed by: EMERGENCY MEDICINE

## 2021-11-06 PROCEDURE — 99232 SBSQ HOSP IP/OBS MODERATE 35: CPT | Performed by: INTERNAL MEDICINE

## 2021-11-06 PROCEDURE — 80069 RENAL FUNCTION PANEL: CPT | Performed by: EMERGENCY MEDICINE

## 2021-11-06 PROCEDURE — 82962 GLUCOSE BLOOD TEST: CPT

## 2021-11-06 PROCEDURE — 63710000001 INSULIN DETEMIR PER 5 UNITS: Performed by: INTERNAL MEDICINE

## 2021-11-06 PROCEDURE — 80202 ASSAY OF VANCOMYCIN: CPT | Performed by: INTERNAL MEDICINE

## 2021-11-06 RX ORDER — FUROSEMIDE 10 MG/ML
20 INJECTION INTRAMUSCULAR; INTRAVENOUS ONCE
Status: COMPLETED | OUTPATIENT
Start: 2021-11-06 | End: 2021-11-06

## 2021-11-06 RX ORDER — FUROSEMIDE 10 MG/ML
40 INJECTION INTRAMUSCULAR; INTRAVENOUS ONCE
Status: COMPLETED | OUTPATIENT
Start: 2021-11-06 | End: 2021-11-06

## 2021-11-06 RX ADMIN — SODIUM CHLORIDE, PRESERVATIVE FREE 10 ML: 5 INJECTION INTRAVENOUS at 21:36

## 2021-11-06 RX ADMIN — MUPIROCIN 1 APPLICATION: 20 OINTMENT TOPICAL at 09:59

## 2021-11-06 RX ADMIN — FUROSEMIDE 20 MG: 10 INJECTION, SOLUTION INTRAMUSCULAR; INTRAVENOUS at 08:46

## 2021-11-06 RX ADMIN — LEVOTHYROXINE SODIUM 50 MCG: 50 TABLET ORAL at 08:46

## 2021-11-06 RX ADMIN — INSULIN ASPART 5 UNITS: 100 INJECTION, SOLUTION INTRAVENOUS; SUBCUTANEOUS at 17:01

## 2021-11-06 RX ADMIN — DEXTROSE MONOHYDRATE 25 G: 25 INJECTION, SOLUTION INTRAVENOUS at 05:59

## 2021-11-06 RX ADMIN — INSULIN DETEMIR 25 UNITS: 100 INJECTION, SOLUTION SUBCUTANEOUS at 08:48

## 2021-11-06 RX ADMIN — VANCOMYCIN HYDROCHLORIDE 1000 MG: 1 INJECTION, POWDER, LYOPHILIZED, FOR SOLUTION INTRAVENOUS at 10:00

## 2021-11-06 RX ADMIN — Medication 1 CAPSULE: at 08:46

## 2021-11-06 RX ADMIN — GABAPENTIN 600 MG: 300 CAPSULE ORAL at 06:00

## 2021-11-06 RX ADMIN — HUMAN INSULIN 5 UNITS: 100 INJECTION, SOLUTION SUBCUTANEOUS at 05:59

## 2021-11-06 RX ADMIN — INSULIN ASPART 14 UNITS: 100 INJECTION, SOLUTION INTRAVENOUS; SUBCUTANEOUS at 08:48

## 2021-11-06 RX ADMIN — MUPIROCIN 1 APPLICATION: 20 OINTMENT TOPICAL at 21:36

## 2021-11-06 RX ADMIN — SODIUM CHLORIDE, PRESERVATIVE FREE 10 ML: 5 INJECTION INTRAVENOUS at 10:00

## 2021-11-06 RX ADMIN — FUROSEMIDE 40 MG: 10 INJECTION INTRAMUSCULAR; INTRAVENOUS at 05:59

## 2021-11-06 RX ADMIN — CLOPIDOGREL BISULFATE 75 MG: 75 TABLET ORAL at 08:46

## 2021-11-06 RX ADMIN — PANTOPRAZOLE SODIUM 40 MG: 40 TABLET, DELAYED RELEASE ORAL at 08:46

## 2021-11-06 RX ADMIN — INSULIN ASPART 20 UNITS: 100 INJECTION, SOLUTION INTRAVENOUS; SUBCUTANEOUS at 11:31

## 2021-11-06 RX ADMIN — ENOXAPARIN SODIUM 30 MG: 30 INJECTION SUBCUTANEOUS at 21:35

## 2021-11-06 RX ADMIN — CEFEPIME HYDROCHLORIDE 2 G: 2 INJECTION, SOLUTION INTRAVENOUS at 01:50

## 2021-11-06 RX ADMIN — GABAPENTIN 600 MG: 300 CAPSULE ORAL at 14:36

## 2021-11-06 RX ADMIN — ATORVASTATIN CALCIUM 40 MG: 40 TABLET, FILM COATED ORAL at 08:46

## 2021-11-06 RX ADMIN — SODIUM CHLORIDE, POTASSIUM CHLORIDE, SODIUM LACTATE AND CALCIUM CHLORIDE 500 ML: 600; 310; 30; 20 INJECTION, SOLUTION INTRAVENOUS at 08:47

## 2021-11-06 RX ADMIN — GABAPENTIN 600 MG: 300 CAPSULE ORAL at 21:35

## 2021-11-06 RX ADMIN — Medication 1 CAPSULE: at 21:35

## 2021-11-06 RX ADMIN — INSULIN DETEMIR 25 UNITS: 100 INJECTION, SOLUTION SUBCUTANEOUS at 21:35
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
## 2021-11-06 NOTE — PLAN OF CARE
Goal Outcome Evaluation:  Plan of Care Reviewed With: patient        Progress: improving  Outcome Summary: Pt performed bed mob with mod I performed amb around bed with holding onto foot board with pt amb 30 feet with SBA/CGA and occ VC for safety during turns. Con't with PT POC and progress as tolerated

## 2021-11-06 NOTE — PLAN OF CARE
Problem: Adult Inpatient Plan of Care  Goal: Plan of Care Review  Outcome: Ongoing, Progressing  Flowsheets  Taken 11/6/2021 1725 by Kasey oCe RN  Progress: improving  Taken 11/6/2021 1332 by Elis Presley PTA  Plan of Care Reviewed With: patient  Goal: Patient-Specific Goal (Individualized)  Outcome: Ongoing, Progressing  Goal: Absence of Hospital-Acquired Illness or Injury  Outcome: Ongoing, Progressing  Intervention: Identify and Manage Fall Risk  Recent Flowsheet Documentation  Taken 11/6/2021 1600 by Kasey Coe RN  Safety Promotion/Fall Prevention:   activity supervised   clutter free environment maintained   fall prevention program maintained   lighting adjusted   muscle strengthening facilitated   nonskid shoes/slippers when out of bed   room organization consistent   safety round/check completed   toileting scheduled  Taken 11/6/2021 1200 by Kasey Coe RN  Safety Promotion/Fall Prevention:   safety round/check completed   toileting scheduled   room organization consistent   nonskid shoes/slippers when out of bed   muscle strengthening facilitated   fall prevention program maintained   clutter free environment maintained   assistive device/personal items within reach  Taken 11/6/2021 0800 by Kasey Coe RN  Safety Promotion/Fall Prevention:   toileting scheduled   safety round/check completed   room organization consistent   nonskid shoes/slippers when out of bed   fall prevention program maintained   clutter free environment maintained   assistive device/personal items within reach  Intervention: Prevent Skin Injury  Recent Flowsheet Documentation  Taken 11/6/2021 1600 by Kasey Coe RN  Body Position: position changed independently  Taken 11/6/2021 0800 by Kasey Coe RN  Body Position: position changed independently  Intervention: Prevent and Manage VTE (venous thromboembolism) Risk  Recent Flowsheet Documentation  Taken 11/6/2021 0800 by Kasey Coe RN  VTE  Prevention/Management: dorsiflexion/plantar flexion performed  Intervention: Prevent Infection  Recent Flowsheet Documentation  Taken 11/6/2021 1600 by Kasey Coe RN  Infection Prevention:   environmental surveillance performed   equipment surfaces disinfected   hand hygiene promoted   personal protective equipment utilized   rest/sleep promoted   single patient room provided  Taken 11/6/2021 1200 by Kasey Coe RN  Infection Prevention:   environmental surveillance performed   equipment surfaces disinfected   hand hygiene promoted   personal protective equipment utilized   rest/sleep promoted   single patient room provided  Taken 11/6/2021 1000 by Kasey Coe RN  Infection Prevention:   single patient room provided   rest/sleep promoted   personal protective equipment utilized   hand hygiene promoted   equipment surfaces disinfected   environmental surveillance performed  Taken 11/6/2021 0800 by Kasey Coe RN  Infection Prevention:   environmental surveillance performed   equipment surfaces disinfected   hand hygiene promoted   personal protective equipment utilized   rest/sleep promoted   single patient room provided   visitors restricted/screened  Goal: Optimal Comfort and Wellbeing  Outcome: Ongoing, Progressing  Intervention: Provide Person-Centered Care  Recent Flowsheet Documentation  Taken 11/6/2021 0800 by Kasey Coe RN  Trust Relationship/Rapport:   care explained   choices provided   emotional support provided   empathic listening provided   questions answered   questions encouraged   reassurance provided   thoughts/feelings acknowledged  Goal: Readiness for Transition of Care  Outcome: Ongoing, Progressing     Problem: Fall Injury Risk  Goal: Absence of Fall and Fall-Related Injury  Outcome: Ongoing, Progressing  Intervention: Identify and Manage Contributors to Fall Injury Risk  Recent Flowsheet Documentation  Taken 11/6/2021 1600 by Kasey Coe RN  Medication  Review/Management: medications reviewed  Taken 11/6/2021 1200 by Kasey Coe RN  Medication Review/Management: medications reviewed  Taken 11/6/2021 1000 by Kasey Coe RN  Medication Review/Management: medications reviewed  Taken 11/6/2021 0800 by Kasey Coe RN  Medication Review/Management: medications reviewed  Intervention: Promote Injury-Free Environment  Recent Flowsheet Documentation  Taken 11/6/2021 1600 by Kasey Coe RN  Safety Promotion/Fall Prevention:   activity supervised   clutter free environment maintained   fall prevention program maintained   lighting adjusted   muscle strengthening facilitated   nonskid shoes/slippers when out of bed   room organization consistent   safety round/check completed   toileting scheduled  Taken 11/6/2021 1200 by Kasey Coe RN  Safety Promotion/Fall Prevention:   safety round/check completed   toileting scheduled   room organization consistent   nonskid shoes/slippers when out of bed   muscle strengthening facilitated   fall prevention program maintained   clutter free environment maintained   assistive device/personal items within reach  Taken 11/6/2021 0800 by Kasey Coe RN  Safety Promotion/Fall Prevention:   toileting scheduled   safety round/check completed   room organization consistent   nonskid shoes/slippers when out of bed   fall prevention program maintained   clutter free environment maintained   assistive device/personal items within reach     Problem: Skin Injury Risk Increased  Goal: Skin Health and Integrity  Outcome: Ongoing, Progressing  Intervention: Optimize Skin Protection  Recent Flowsheet Documentation  Taken 11/6/2021 0800 by Kasey Coe RN  Head of Bed (HOB): HOB at 20 degrees     Problem: Cardiac Output Decreased  Goal: Effective Cardiac Output  Outcome: Ongoing, Progressing  Intervention: Optimize Cardiac Output  Recent Flowsheet Documentation  Taken 11/6/2021 0800 by Kasey Coe RN  Head of Bed  (HOB): HOB at 20 degrees  VTE Prevention/Management: dorsiflexion/plantar flexion performed     Problem: Diabetes Comorbidity  Goal: Blood Glucose Level Within Desired Range  Outcome: Ongoing, Progressing     Problem: Wound  Goal: Optimal Wound Healing  Outcome: Ongoing, Progressing   Goal Outcome Evaluation:           Progress: improving

## 2021-11-06 NOTE — PROGRESS NOTES
"Pharmacy Consult-Vancomycin Dosing  Viola Barlow is a  66 y.o. female receiving vancomycin therapy.     Indication: Sepsis  Consulting Provider: Cece    Goal Trough: 15-20 mcg/mL    Current Antimicrobial Therapy  Anti-Infectives (From admission, onward)      Ordered     Dose/Rate Route Frequency Start Stop    11/05/21 0922  Vancomycin Pharmacy Intermittent Dosing        Ordering Provider: Gerald Zhao MD     Does not apply Every 72 Hours 11/09/21 0900 11/12/21 0859    11/06/21 0812  vancomycin 1000 mg in sodium chloride 0.9% 250 mL IVPB        Ordering Provider: Gerald Zhao MD    1,000 mg  over 60 Minutes Intravenous Once 11/06/21 0900      11/05/21 0947  cefepime (MAXIPIME) IVPB 2 g/50ml D5W (premix)        Ordering Provider: Rama Peoples DO    2 g  over 4 Hours Intravenous Every 24 Hours 11/06/21 0200 11/11/21 0159    11/05/21 0117  cefepime (MAXIPIME) IVPB 2 g/50ml D5W (premix)        Ordering Provider: Rama Peoples DO    2 g Intravenous Once 11/05/21 0215 11/05/21 0125    11/04/21 2344  vancomycin 2000 mg in sodium chloride 0.9% 500 mL IVPB        Ordering Provider: Rama Peoples DO    2,000 mg Intravenous Once 11/05/21 0100 11/05/21 0131    11/04/21 2328  Pharmacy to dose vancomycin        Ordering Provider: Rama Peoples DO     Does not apply Continuous PRN 11/04/21 2327 11/09/21 2326            Allergies  Allergies as of 11/04/2021    (No Known Allergies)       Labs    Results from last 7 days   Lab Units 11/06/21  0412 11/05/21  0613 11/04/21  1707   BUN mg/dL 34* 39* 46*   CREATININE mg/dL 1.59* 1.80* 2.05*       Results from last 7 days   Lab Units 11/06/21  0412 11/05/21  0613 11/04/21  1707   WBC 10*3/mm3 8.81 14.12* 13.67*         Ht - 152.4 cm (60\")  Wt - 73.9 kg (162 lb 14.4 oz)    Estimated Creatinine Clearance: 32.1 mL/min (A) (by C-G formula based on SCr of 1.59 mg/dL (H)).    Intake & Output (last 3 days)         11/03 0701 11/04 0700 11/04 0701 11/05 0700 11/05 0701 11/06 0700 11/06 " 0701  11/07 0700    P.O.  696 720 460    I.V. (mL/kg)  1840 (24.4) 431.7 (5.8)     IV Piggyback  610      Total Intake(mL/kg)  3146 (41.8) 1151.7 (15.6) 460 (6.2)    Urine (mL/kg/hr)  1235 3075 (1.7)     Stool  0 0     Total Output  1235 3075     Net  +1911 -1923.3 +460            Stool Unmeasured Occurrence  1 x 2 x             Microbiology and Radiology  Microbiology Results (last 10 days)       Procedure Component Value - Date/Time    MRSA Screen, PCR (Inpatient) - Swab, Nares [[id number]]  (Abnormal) Collected: 11/05/21 1217    Lab Status: Final result Specimen: Swab from Nares Updated: 11/05/21 1937     MRSA PCR MRSA Detected    COVID PRE-OP / PRE-PROCEDURE SCREENING ORDER (NO ISOLATION) - Swab, Nasal Cavity [[id number]]  (Normal) Collected: 11/05/21 0029    Lab Status: Final result Specimen: Swab from Nasal Cavity Updated: 11/05/21 0118    Narrative:      The following orders were created for panel order COVID PRE-OP / PRE-PROCEDURE SCREENING ORDER (NO ISOLATION) - Swab, Nasal Cavity.  Procedure                               Abnormality         Status                     ---------                               -----------         ------                     COVID-19,[facility] IN-SUE...[[id number]]  Normal              Final result                 Please view results for these tests on the individual orders.    COVID-19,[facility] IN-HOUSE,Nasal Swab No Transport Media 3-4 HR TAT - Swab, Nasal Cavity [[id number]]  (Normal) Collected: 11/05/21 0029    Lab Status: Final result Specimen: Swab from Nasal Cavity Updated: 11/05/21 0118     COVID19 Not Detected    Narrative:      Fact sheet for providers: https://www.fda.gov/media/926659/download     Fact sheet for patients: https://www.fda.gov/media/134441/download    Test performed by PCR.    Consider negative results in combination with clinical observations, patient history, and epidemiological information.    Blood Culture - Blood, Hand, Right [[id number]]  (Normal)  Collected: 11/04/21 1817    Lab Status: Preliminary result Specimen: Blood from Hand, Right Updated: 11/05/21 1831     Blood Culture No growth at 24 hours    Blood Culture - Blood, Wrist, Left [[id number]]  (Normal) Collected: 11/04/21 1817    Lab Status: Preliminary result Specimen: Blood from Wrist, Left Updated: 11/05/21 1831     Blood Culture No growth at 24 hours            Vancomycin Levels:    Results from last 7 days   Lab Units 11/06/21  0412 11/05/21  0613   VANCOMYCIN RM mcg/mL 15.60 33.70       Assessment/Plan    Pharmacy to dose vancomycin for sepsis. Goal trough 15-20 mcg/mL.  Patient currently receiving vancomycin dosed per level. Will order vancomycin 1000 mg for one dose.  Assess clearance by vancomycin random level on 11/7 @ 0600.  Pharmacy will continue to monitor renal function, cultures and sensitivities, and clinical status to adjust regimen as necessary.    Thank you,  Isabella Cerrato, PharmD  11/06/21 08:13 EDT

## 2021-11-07 LAB
ANION GAP SERPL CALCULATED.3IONS-SCNC: 10.6 MMOL/L (ref 5–15)
BUN SERPL-MCNC: 42 MG/DL (ref 8–23)
BUN/CREAT SERPL: 23.3 (ref 7–25)
CALCIUM SPEC-SCNC: 9.8 MG/DL (ref 8.6–10.5)
CHLORIDE SERPL-SCNC: 98 MMOL/L (ref 98–107)
CO2 SERPL-SCNC: 24.4 MMOL/L (ref 22–29)
CREAT SERPL-MCNC: 1.8 MG/DL (ref 0.57–1)
D-LACTATE SERPL-SCNC: 1 MMOL/L (ref 0.5–2)
GFR SERPL CREATININE-BSD FRML MDRD: 28 ML/MIN/1.73
GLUCOSE BLDC GLUCOMTR-MCNC: 240 MG/DL (ref 70–130)
GLUCOSE BLDC GLUCOMTR-MCNC: 364 MG/DL (ref 70–130)
GLUCOSE SERPL-MCNC: 355 MG/DL (ref 65–99)
POTASSIUM SERPL-SCNC: 5.6 MMOL/L (ref 3.5–5.2)
PROCALCITONIN SERPL-MCNC: 0.21 NG/ML (ref 0–0.25)
SODIUM SERPL-SCNC: 133 MMOL/L (ref 136–145)
VANCOMYCIN SERPL-MCNC: 20.2 MCG/ML (ref 5–40)

## 2021-11-07 PROCEDURE — 25010000002 ENOXAPARIN PER 10 MG: Performed by: INTERNAL MEDICINE

## 2021-11-07 PROCEDURE — 80048 BASIC METABOLIC PNL TOTAL CA: CPT | Performed by: INTERNAL MEDICINE

## 2021-11-07 PROCEDURE — 63710000001 INSULIN DETEMIR PER 5 UNITS: Performed by: INTERNAL MEDICINE

## 2021-11-07 PROCEDURE — 63710000001 INSULIN ASPART PER 5 UNITS: Performed by: INTERNAL MEDICINE

## 2021-11-07 PROCEDURE — 80202 ASSAY OF VANCOMYCIN: CPT | Performed by: INTERNAL MEDICINE

## 2021-11-07 PROCEDURE — 25010000002 CEFEPIME-DEXTROSE 2-5 GM-%(50ML) RECONSTITUTED SOLUTION: Performed by: EMERGENCY MEDICINE

## 2021-11-07 PROCEDURE — 84145 PROCALCITONIN (PCT): CPT | Performed by: INTERNAL MEDICINE

## 2021-11-07 PROCEDURE — 83605 ASSAY OF LACTIC ACID: CPT | Performed by: INTERNAL MEDICINE

## 2021-11-07 PROCEDURE — 97530 THERAPEUTIC ACTIVITIES: CPT

## 2021-11-07 PROCEDURE — 97116 GAIT TRAINING THERAPY: CPT

## 2021-11-07 PROCEDURE — 82962 GLUCOSE BLOOD TEST: CPT

## 2021-11-07 PROCEDURE — 99232 SBSQ HOSP IP/OBS MODERATE 35: CPT | Performed by: INTERNAL MEDICINE

## 2021-11-07 RX ORDER — MIDODRINE HYDROCHLORIDE 5 MG/1
2.5 TABLET ORAL
Status: DISCONTINUED | OUTPATIENT
Start: 2021-11-07 | End: 2021-11-09 | Stop reason: HOSPADM

## 2021-11-07 RX ORDER — SODIUM POLYSTYRENE SULFONATE 15 G/60ML
15 SUSPENSION ORAL; RECTAL ONCE
Status: COMPLETED | OUTPATIENT
Start: 2021-11-07 | End: 2021-11-07

## 2021-11-07 RX ADMIN — Medication 1 CAPSULE: at 08:40

## 2021-11-07 RX ADMIN — GABAPENTIN 600 MG: 300 CAPSULE ORAL at 07:00

## 2021-11-07 RX ADMIN — INSULIN DETEMIR 25 UNITS: 100 INJECTION, SOLUTION SUBCUTANEOUS at 08:41

## 2021-11-07 RX ADMIN — SODIUM CHLORIDE, PRESERVATIVE FREE 10 ML: 5 INJECTION INTRAVENOUS at 08:40

## 2021-11-07 RX ADMIN — SODIUM CHLORIDE, PRESERVATIVE FREE 10 ML: 5 INJECTION INTRAVENOUS at 21:25

## 2021-11-07 RX ADMIN — ENOXAPARIN SODIUM 30 MG: 30 INJECTION SUBCUTANEOUS at 21:25

## 2021-11-07 RX ADMIN — CEFEPIME HYDROCHLORIDE 2 G: 2 INJECTION, SOLUTION INTRAVENOUS at 01:54

## 2021-11-07 RX ADMIN — CEFEPIME HYDROCHLORIDE 2 G: 2 INJECTION, SOLUTION INTRAVENOUS at 01:56

## 2021-11-07 RX ADMIN — MUPIROCIN 1 APPLICATION: 20 OINTMENT TOPICAL at 21:26

## 2021-11-07 RX ADMIN — INSULIN ASPART 10 UNITS: 100 INJECTION, SOLUTION INTRAVENOUS; SUBCUTANEOUS at 07:00

## 2021-11-07 RX ADMIN — INSULIN ASPART 14 UNITS: 100 INJECTION, SOLUTION INTRAVENOUS; SUBCUTANEOUS at 11:14

## 2021-11-07 RX ADMIN — GABAPENTIN 600 MG: 300 CAPSULE ORAL at 16:09

## 2021-11-07 RX ADMIN — INSULIN ASPART 5 UNITS: 100 INJECTION, SOLUTION INTRAVENOUS; SUBCUTANEOUS at 17:19

## 2021-11-07 RX ADMIN — MUPIROCIN 1 APPLICATION: 20 OINTMENT TOPICAL at 08:40

## 2021-11-07 RX ADMIN — PANTOPRAZOLE SODIUM 40 MG: 40 TABLET, DELAYED RELEASE ORAL at 08:40

## 2021-11-07 RX ADMIN — SODIUM POLYSTYRENE SULFONATE 15 G: 15 SUSPENSION ORAL; RECTAL at 11:15

## 2021-11-07 RX ADMIN — MIDODRINE HYDROCHLORIDE 2.5 MG: 5 TABLET ORAL at 17:20

## 2021-11-07 RX ADMIN — Medication 1 CAPSULE: at 21:24

## 2021-11-07 RX ADMIN — ATORVASTATIN CALCIUM 40 MG: 40 TABLET, FILM COATED ORAL at 08:41

## 2021-11-07 RX ADMIN — CLOPIDOGREL BISULFATE 75 MG: 75 TABLET ORAL at 08:41

## 2021-11-07 RX ADMIN — LEVOTHYROXINE SODIUM 50 MCG: 50 TABLET ORAL at 08:41

## 2021-11-07 RX ADMIN — MIDODRINE HYDROCHLORIDE 2.5 MG: 5 TABLET ORAL at 11:15

## 2021-11-07 RX ADMIN — INSULIN DETEMIR 40 UNITS: 100 INJECTION, SOLUTION SUBCUTANEOUS at 21:26

## 2021-11-07 RX ADMIN — GABAPENTIN 600 MG: 300 CAPSULE ORAL at 21:24
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
## 2021-11-07 NOTE — PLAN OF CARE
Goal Outcome Evaluation:  Plan of Care Reviewed With: patient        Progress: improving  Outcome Summary: Pt performed bed mob mod I sit <->stand with SBA and occ VC for hand placement, increased distance amb with RW approx 90 feet without LOB noted with increased time. Con't with PT POC and progress as tolerated

## 2021-11-07 NOTE — PLAN OF CARE
Problem: Fall Injury Risk  Goal: Absence of Fall and Fall-Related Injury  Intervention: Promote Injury-Free Environment  Recent Flowsheet Documentation  Taken 11/7/2021 1800 by Rebekah Morales RN  Safety Promotion/Fall Prevention:   nonskid shoes/slippers when out of bed   mobility aid in reach   lighting adjusted   activity supervised  Taken 11/7/2021 1621 by Rebekah Morales RN  Safety Promotion/Fall Prevention:   activity supervised   room organization consistent   lighting adjusted   gait belt  Taken 11/7/2021 1400 by Rebekah Morales RN  Safety Promotion/Fall Prevention: activity supervised  Taken 11/7/2021 1209 by Rebekah Morales RN  Safety Promotion/Fall Prevention: activity supervised  Taken 11/7/2021 1000 by Rebekah Morales RN  Safety Promotion/Fall Prevention:   activity supervised   assistive device/personal items within reach   clutter free environment maintained   nonskid shoes/slippers when out of bed   room organization consistent  Taken 11/7/2021 0747 by Rebekah Morales RN  Safety Promotion/Fall Prevention: activity supervised   Goal Outcome Evaluation:              Outcome Summary: no events happened today. pt has better glucose numbers. Vitals stable, pt has orders to transfer off the floor.
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
## 2021-11-08 LAB
ANION GAP SERPL CALCULATED.3IONS-SCNC: 9.4 MMOL/L (ref 5–15)
BUN SERPL-MCNC: 43 MG/DL (ref 8–23)
BUN/CREAT SERPL: 26.5 (ref 7–25)
CALCIUM SPEC-SCNC: 9.7 MG/DL (ref 8.6–10.5)
CHLORIDE SERPL-SCNC: 97 MMOL/L (ref 98–107)
CO2 SERPL-SCNC: 24.6 MMOL/L (ref 22–29)
CREAT SERPL-MCNC: 1.62 MG/DL (ref 0.57–1)
DEPRECATED RDW RBC AUTO: 43 FL (ref 37–54)
ERYTHROCYTE [DISTWIDTH] IN BLOOD BY AUTOMATED COUNT: 12.2 % (ref 12.3–15.4)
GFR SERPL CREATININE-BSD FRML MDRD: 32 ML/MIN/1.73
GLUCOSE BLDC GLUCOMTR-MCNC: 270 MG/DL (ref 70–130)
GLUCOSE BLDC GLUCOMTR-MCNC: 288 MG/DL (ref 70–130)
GLUCOSE BLDC GLUCOMTR-MCNC: 296 MG/DL (ref 70–130)
GLUCOSE BLDC GLUCOMTR-MCNC: 345 MG/DL (ref 70–130)
GLUCOSE SERPL-MCNC: 350 MG/DL (ref 65–99)
HCT VFR BLD AUTO: 36.2 % (ref 34–46.6)
HGB BLD-MCNC: 11.7 G/DL (ref 12–15.9)
MCH RBC QN AUTO: 31.1 PG (ref 26.6–33)
MCHC RBC AUTO-ENTMCNC: 32.3 G/DL (ref 31.5–35.7)
MCV RBC AUTO: 96.3 FL (ref 79–97)
PLATELET # BLD AUTO: 192 10*3/MM3 (ref 140–450)
PMV BLD AUTO: 10.7 FL (ref 6–12)
POTASSIUM SERPL-SCNC: 4.4 MMOL/L (ref 3.5–5.2)
RBC # BLD AUTO: 3.76 10*6/MM3 (ref 3.77–5.28)
SODIUM SERPL-SCNC: 131 MMOL/L (ref 136–145)
WBC # BLD AUTO: 10.54 10*3/MM3 (ref 3.4–10.8)

## 2021-11-08 PROCEDURE — 25010000002 CEFEPIME-DEXTROSE 2-5 GM-%(50ML) RECONSTITUTED SOLUTION: Performed by: INTERNAL MEDICINE

## 2021-11-08 PROCEDURE — 85027 COMPLETE CBC AUTOMATED: CPT | Performed by: INTERNAL MEDICINE

## 2021-11-08 PROCEDURE — 63710000001 INSULIN ASPART PER 5 UNITS: Performed by: INTERNAL MEDICINE

## 2021-11-08 PROCEDURE — 97116 GAIT TRAINING THERAPY: CPT

## 2021-11-08 PROCEDURE — 63710000001 INSULIN DETEMIR PER 5 UNITS: Performed by: INTERNAL MEDICINE

## 2021-11-08 PROCEDURE — 80048 BASIC METABOLIC PNL TOTAL CA: CPT | Performed by: INTERNAL MEDICINE

## 2021-11-08 PROCEDURE — 97530 THERAPEUTIC ACTIVITIES: CPT

## 2021-11-08 PROCEDURE — 82962 GLUCOSE BLOOD TEST: CPT

## 2021-11-08 PROCEDURE — 97110 THERAPEUTIC EXERCISES: CPT

## 2021-11-08 PROCEDURE — 25010000002 ENOXAPARIN PER 10 MG: Performed by: INTERNAL MEDICINE

## 2021-11-08 PROCEDURE — 99232 SBSQ HOSP IP/OBS MODERATE 35: CPT | Performed by: INTERNAL MEDICINE

## 2021-11-08 RX ORDER — METOPROLOL SUCCINATE 25 MG/1
25 TABLET, EXTENDED RELEASE ORAL
Status: DISCONTINUED | OUTPATIENT
Start: 2021-11-08 | End: 2021-11-09 | Stop reason: HOSPADM

## 2021-11-08 RX ADMIN — INSULIN DETEMIR 50 UNITS: 100 INJECTION, SOLUTION SUBCUTANEOUS at 21:10

## 2021-11-08 RX ADMIN — PANTOPRAZOLE SODIUM 40 MG: 40 TABLET, DELAYED RELEASE ORAL at 09:48

## 2021-11-08 RX ADMIN — CLOPIDOGREL BISULFATE 75 MG: 75 TABLET ORAL at 09:48

## 2021-11-08 RX ADMIN — MIDODRINE HYDROCHLORIDE 2.5 MG: 5 TABLET ORAL at 06:30

## 2021-11-08 RX ADMIN — ENOXAPARIN SODIUM 30 MG: 30 INJECTION SUBCUTANEOUS at 21:05

## 2021-11-08 RX ADMIN — Medication 1 CAPSULE: at 21:07

## 2021-11-08 RX ADMIN — Medication 1 CAPSULE: at 09:48

## 2021-11-08 RX ADMIN — GABAPENTIN 600 MG: 300 CAPSULE ORAL at 06:29

## 2021-11-08 RX ADMIN — INSULIN ASPART 10 UNITS: 100 INJECTION, SOLUTION INTRAVENOUS; SUBCUTANEOUS at 06:30

## 2021-11-08 RX ADMIN — INSULIN ASPART 8 UNITS: 100 INJECTION, SOLUTION INTRAVENOUS; SUBCUTANEOUS at 12:54

## 2021-11-08 RX ADMIN — SODIUM CHLORIDE, PRESERVATIVE FREE 10 ML: 5 INJECTION INTRAVENOUS at 21:08

## 2021-11-08 RX ADMIN — MUPIROCIN 1 APPLICATION: 20 OINTMENT TOPICAL at 09:55

## 2021-11-08 RX ADMIN — SODIUM CHLORIDE, PRESERVATIVE FREE 10 ML: 5 INJECTION INTRAVENOUS at 09:55

## 2021-11-08 RX ADMIN — MIDODRINE HYDROCHLORIDE 2.5 MG: 5 TABLET ORAL at 12:55

## 2021-11-08 RX ADMIN — INSULIN ASPART 8 UNITS: 100 INJECTION, SOLUTION INTRAVENOUS; SUBCUTANEOUS at 18:14

## 2021-11-08 RX ADMIN — ATORVASTATIN CALCIUM 40 MG: 40 TABLET, FILM COATED ORAL at 09:48

## 2021-11-08 RX ADMIN — METOPROLOL SUCCINATE 25 MG: 25 TABLET, EXTENDED RELEASE ORAL at 09:48

## 2021-11-08 RX ADMIN — GABAPENTIN 600 MG: 300 CAPSULE ORAL at 15:21

## 2021-11-08 RX ADMIN — GABAPENTIN 600 MG: 300 CAPSULE ORAL at 21:07

## 2021-11-08 RX ADMIN — MUPIROCIN 1 APPLICATION: 20 OINTMENT TOPICAL at 21:10

## 2021-11-08 RX ADMIN — INSULIN DETEMIR 50 UNITS: 100 INJECTION, SOLUTION SUBCUTANEOUS at 09:55

## 2021-11-08 RX ADMIN — CEFEPIME HYDROCHLORIDE 2 G: 2 INJECTION, SOLUTION INTRAVENOUS at 01:39

## 2021-11-08 RX ADMIN — LEVOTHYROXINE SODIUM 50 MCG: 50 TABLET ORAL at 09:48

## 2021-11-08 NOTE — PLAN OF CARE
Goal Outcome Evaluation:      No issues overnight. Patient to be transferred to the floor @ shift change

## 2021-11-08 NOTE — CASE MANAGEMENT/SOCIAL WORK
Continued Stay Note  NAEL Espinoza     Patient Name: Viola Barlow  MRN: [id number]  Today's Date: 11/8/2021    Admit Date: 11/4/2021     Discharge Plan     Row Name 11/08/21 1358       Plan    Plan Dscharge plan, follow up. Patient has been living with her daughter. CM assisting as appropriate.               Discharge Codes    No documentation.                     Deborah Titus LCSW
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
## 2021-11-08 NOTE — PLAN OF CARE
Goal Outcome Evaluation:  Plan of Care Reviewed With: patient        Progress: improving  Outcome Summary: Pt performed B LE ex in sitting. Pt increased distance amb with  feet with S/modified I.  Pt independent with bed mobility. Con't with PT POC and progress as tolerated

## 2021-11-09 ENCOUNTER — READMISSION MANAGEMENT (OUTPATIENT)
Dept: CALL CENTER | Facility: HOSPITAL | Age: 66
End: 2021-11-09

## 2021-11-09 VITALS
OXYGEN SATURATION: 98 % | HEIGHT: 60 IN | DIASTOLIC BLOOD PRESSURE: 57 MMHG | BODY MASS INDEX: 31.8 KG/M2 | HEART RATE: 90 BPM | WEIGHT: 162 LBS | SYSTOLIC BLOOD PRESSURE: 126 MMHG | TEMPERATURE: 98.7 F | RESPIRATION RATE: 16 BRPM

## 2021-11-09 PROBLEM — G93.41 ACUTE METABOLIC ENCEPHALOPATHY: Status: RESOLVED | Noted: 2021-11-05 | Resolved: 2021-11-09

## 2021-11-09 PROBLEM — E16.2 HYPOGLYCEMIA: Status: RESOLVED | Noted: 2021-11-04 | Resolved: 2021-11-09

## 2021-11-09 LAB
ANION GAP SERPL CALCULATED.3IONS-SCNC: 14 MMOL/L (ref 5–15)
BACTERIA SPEC AEROBE CULT: NORMAL
BACTERIA SPEC AEROBE CULT: NORMAL
BUN SERPL-MCNC: 34 MG/DL (ref 8–23)
BUN/CREAT SERPL: 24.1 (ref 7–25)
CALCIUM SPEC-SCNC: 9.6 MG/DL (ref 8.6–10.5)
CHLORIDE SERPL-SCNC: 96 MMOL/L (ref 98–107)
CO2 SERPL-SCNC: 24 MMOL/L (ref 22–29)
CREAT SERPL-MCNC: 1.41 MG/DL (ref 0.57–1)
GFR SERPL CREATININE-BSD FRML MDRD: 38 ML/MIN/1.73
GLUCOSE BLDC GLUCOMTR-MCNC: 328 MG/DL (ref 70–130)
GLUCOSE SERPL-MCNC: 362 MG/DL (ref 65–99)
POTASSIUM SERPL-SCNC: 4.5 MMOL/L (ref 3.5–5.2)
SODIUM SERPL-SCNC: 134 MMOL/L (ref 136–145)

## 2021-11-09 PROCEDURE — 82962 GLUCOSE BLOOD TEST: CPT

## 2021-11-09 PROCEDURE — 63710000001 INSULIN ASPART PER 5 UNITS: Performed by: INTERNAL MEDICINE

## 2021-11-09 PROCEDURE — 80048 BASIC METABOLIC PNL TOTAL CA: CPT | Performed by: INTERNAL MEDICINE

## 2021-11-09 PROCEDURE — 99239 HOSP IP/OBS DSCHRG MGMT >30: CPT | Performed by: INTERNAL MEDICINE

## 2021-11-09 PROCEDURE — 63710000001 INSULIN DETEMIR PER 5 UNITS: Performed by: INTERNAL MEDICINE

## 2021-11-09 RX ADMIN — Medication 1 CAPSULE: at 08:58

## 2021-11-09 RX ADMIN — MUPIROCIN 1 APPLICATION: 20 OINTMENT TOPICAL at 09:04

## 2021-11-09 RX ADMIN — CLOPIDOGREL BISULFATE 75 MG: 75 TABLET ORAL at 09:00

## 2021-11-09 RX ADMIN — METOPROLOL SUCCINATE 25 MG: 25 TABLET, EXTENDED RELEASE ORAL at 09:00

## 2021-11-09 RX ADMIN — ATORVASTATIN CALCIUM 40 MG: 40 TABLET, FILM COATED ORAL at 09:01

## 2021-11-09 RX ADMIN — PANTOPRAZOLE SODIUM 40 MG: 40 TABLET, DELAYED RELEASE ORAL at 09:01

## 2021-11-09 RX ADMIN — INSULIN ASPART 10 UNITS: 100 INJECTION, SOLUTION INTRAVENOUS; SUBCUTANEOUS at 06:40

## 2021-11-09 RX ADMIN — MIDODRINE HYDROCHLORIDE 2.5 MG: 5 TABLET ORAL at 06:37

## 2021-11-09 RX ADMIN — INSULIN DETEMIR 50 UNITS: 100 INJECTION, SOLUTION SUBCUTANEOUS at 09:38

## 2021-11-09 RX ADMIN — LEVOTHYROXINE SODIUM 50 MCG: 50 TABLET ORAL at 08:59

## 2021-11-09 RX ADMIN — GABAPENTIN 600 MG: 300 CAPSULE ORAL at 06:37

## 2021-11-09 NOTE — PLAN OF CARE
Goal Outcome Evaluation:  Plan of Care Reviewed With: patient        Progress: improving  Outcome Summary: pleasant patient with no complaint of pain at this time-medications given per Dr. Zhao' orders-monitor labs and continue to monitor patient-monitor blood sugar with coverage per protocol-discharge home with possible home health in a few days

## 2021-11-09 NOTE — CASE MANAGEMENT/SOCIAL WORK
Case Management Discharge Note          Home Health was ordered.  Patient has refused home health services.  Dr. Zhao informed.        Selected Continued Care - Admitted Since 11/4/2021      Transportation Services  Private: Car    Final Discharge Disposition Code: 01 - home or self-care

## 2021-11-09 NOTE — PLAN OF CARE
Goal Outcome Evaluation:  Plan of Care Reviewed With: patient        Progress: improving  Outcome Summary: intervention as appropriate

## 2021-11-09 NOTE — PLAN OF CARE
Goal Outcome Evaluation:              Outcome Summary: Pt resting well this shift.  FSBS with insulin coverage per orders.  Telemetry monitoring continued with SR noted.  Labs monitored daily and PRN.  Pt anticipating being able to return home in 2-3 days.
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
## 2021-11-10 ENCOUNTER — READMISSION MANAGEMENT (OUTPATIENT)
Dept: CALL CENTER | Facility: HOSPITAL | Age: 66
End: 2021-11-10

## 2021-11-10 NOTE — OUTREACH NOTE
Medical Week 1 Survey      Responses   [facility] patient discharged from? [facility]   Does the patient have one of the following disease processes/diagnoses(primary or secondary)? Other   Week 1 attempt successful? No   Unsuccessful attempts Attempt 1          Shana Marshall RN

## 2021-11-10 NOTE — OUTREACH NOTE
Prep Survey      Responses   [facility] from? [facility]   Is LACE score < 7 ? No   Emergency Room discharge w/ pulse ox? No   Eligibility Readm Mgmt   Discharge diagnosis Severe hypoglycemia associated with diabetes mellitus type 2, POA, resolved.Acute metabolic encephalopathy   Does the patient have one of the following disease processes/diagnoses(primary or secondary)? Other   Does the patient have Home health ordered? No   Is there a DME ordered? No   Prep survey completed? Yes          Mely Gomez RN

## 2021-11-11 ENCOUNTER — READMISSION MANAGEMENT (OUTPATIENT)
Dept: CALL CENTER | Facility: HOSPITAL | Age: 66
End: 2021-11-11

## 2021-11-11 NOTE — OUTREACH NOTE
Medical Week 1 Survey      Responses   [facility] patient discharged from? [facility]   Does the patient have one of the following disease processes/diagnoses(primary or secondary)? Other   Week 1 attempt successful? --  [Pt number does not work. Daughter states she turns off her phone alot]   Call start time 1530   Call end time 1536   Discharge diagnosis Severe hypoglycemia associated with diabetes mellitus type 2, POA, resolved.Acute metabolic encephalopathy   Is patient permission given to speak with other caregiver? Yes   List who call center can speak with Lisa   Person spoke with today (if not patient) and relationship Lisa--daughter   Meds reviewed with patient/caregiver? Yes   Is the patient having any side effects they believe may be caused by any medication additions or changes? No   Does the patient have all medications ordered at discharge? Yes   Is the patient taking all medications as directed (includes completed medication regime)? Yes   Does the patient have a primary care provider?  Yes   Does the patient have an appointment with their PCP within 7 days of discharge? Yes   Has the patient kept scheduled appointments due by today? N/A   Has home health visited the patient within 72 hours of discharge? N/A   Home health comments Pt refused HH   Psychosocial issues? No   Did the patient receive a copy of their discharge instructions? Yes   Nursing interventions Reviewed instructions with patient   What is the patient's perception of their health status since discharge? Improving   Is the patient/caregiver able to teach back signs and symptoms related to disease process for when to call PCP? Yes   Is the patient/caregiver able to teach back signs and symptoms related to disease process for when to call 911? Yes   Is the patient/caregiver able to teach back the hierarchy of who to call/visit for symptoms/problems? PCP, Specialist, Home health nurse, Urgent Care, ED, 911 Yes   Additional  teach back comments Enc close f/u with BS and PCP. Daughter states BS running 200's and she is not sure if pt is foloowing diabetic diet. Enc compliance with meds and diet and f/u appts.    Week 1 call completed? Yes   Wrap up additional comments Daughter states she is not sure pt follwoing 1.5L fluid restriction but she will have her ask her PCP about this.           Anali George RN

## 2021-11-12 ENCOUNTER — HOME HEALTH ADMISSION (OUTPATIENT)
Dept: HOME HEALTH SERVICES | Facility: HOME HEALTHCARE | Age: 66
End: 2021-11-12

## 2021-11-17 ENCOUNTER — READMISSION MANAGEMENT (OUTPATIENT)
Dept: CALL CENTER | Facility: HOSPITAL | Age: 66
End: 2021-11-17

## 2021-11-17 NOTE — OUTREACH NOTE
"Medical Week 2 Survey      Responses   [facility] patient discharged from? [facility]   Does the patient have one of the following disease processes/diagnoses(primary or secondary)? Other   Week 2 attempt successful? Yes   Call start time 1004   Discharge diagnosis Severe hypoglycemia associated with diabetes mellitus type 2, POA, resolved.Acute metabolic encephalopathy   Call end time 1006   Person spoke with today (if not patient) and relationship Lisa--daughter   Meds reviewed with patient/caregiver? Yes   Prescription comments insulin has been decreased   Is the patient taking all medications as directed (includes completed medication regime)? Yes   Comments regarding PCP 11/16/21   Has the patient kept scheduled appointments due by today? Yes   What is the patient's perception of their health status since discharge? Improving   If the patient is a current smoker, are they able to teach back resources for cessation? Not a smoker  [Former smoker]   Week 2 Call Completed? Yes   Is the patient interested in additional calls from an ambulatory ?  NOTE:  applies to high risk patients requiring additional follow-up. No   Revoked No further contact(revokes)-requires comment   Graduated/Revoked comments Daughter reports, \"honestly as long as she maintains her medications she's good\" when asked about a f/u call next week          Janessa Zuleta RN  "

## 2022-11-18 ENCOUNTER — APPOINTMENT (OUTPATIENT)
Dept: GENERAL RADIOLOGY | Facility: HOSPITAL | Age: 67
End: 2022-11-18

## 2022-11-18 ENCOUNTER — HOSPITAL ENCOUNTER (EMERGENCY)
Facility: HOSPITAL | Age: 67
Discharge: HOME | End: 2022-11-18
Attending: EMERGENCY MEDICINE | Admitting: EMERGENCY MEDICINE

## 2022-11-18 ENCOUNTER — APPOINTMENT (OUTPATIENT)
Dept: CT IMAGING | Facility: HOSPITAL | Age: 67
End: 2022-11-18

## 2022-11-18 VITALS
TEMPERATURE: 98.8 F | BODY MASS INDEX: 32 KG/M2 | WEIGHT: 163 LBS | SYSTOLIC BLOOD PRESSURE: 131 MMHG | OXYGEN SATURATION: 98 % | HEIGHT: 60 IN | HEART RATE: 81 BPM | DIASTOLIC BLOOD PRESSURE: 82 MMHG | RESPIRATION RATE: 20 BRPM

## 2022-11-18 DIAGNOSIS — N30.90 CYSTITIS: ICD-10-CM
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
## 2022-11-18 DIAGNOSIS — J10.1 INFLUENZA A: ICD-10-CM

## 2022-11-18 DIAGNOSIS — Z86.39 H/O HYPOGLYCEMIA: Primary | ICD-10-CM

## 2022-11-18 LAB
A-A DO2: 17.8 MMHG
ALBUMIN SERPL-MCNC: 3.9 G/DL (ref 3.5–5.2)
ALBUMIN/GLOB SERPL: 1 G/DL
ALP SERPL-CCNC: 89 U/L (ref 39–117)
ALT SERPL W P-5'-P-CCNC: 17 U/L (ref 1–33)
AMPHET+METHAMPHET UR QL: NEGATIVE
AMPHETAMINES UR QL: NEGATIVE
ANION GAP SERPL CALCULATED.3IONS-SCNC: 12.2 MMOL/L (ref 5–15)
ARTERIAL PATENCY WRIST A: POSITIVE
AST SERPL-CCNC: 18 U/L (ref 1–32)
ATMOSPHERIC PRESS: 740 MMHG
BACTERIA UR QL AUTO: ABNORMAL /HPF
BARBITURATES UR QL SCN: NEGATIVE
BASE EXCESS BLDA CALC-SCNC: 0.4 MMOL/L (ref 0–2)
BASOPHILS # BLD AUTO: 0.03 10*3/MM3 (ref 0–0.2)
BASOPHILS NFR BLD AUTO: 0.4 % (ref 0–1.5)
BDY SITE: ABNORMAL
BENZODIAZ UR QL SCN: NEGATIVE
BILIRUB SERPL-MCNC: 0.2 MG/DL (ref 0–1.2)
BILIRUB UR QL STRIP: NEGATIVE
BUN SERPL-MCNC: 34 MG/DL (ref 8–23)
BUN/CREAT SERPL: 24.6 (ref 7–25)
BUPRENORPHINE SERPL-MCNC: NEGATIVE NG/ML
CALCIUM SPEC-SCNC: 9.4 MG/DL (ref 8.6–10.5)
CANNABINOIDS SERPL QL: NEGATIVE
CHLORIDE SERPL-SCNC: 101 MMOL/L (ref 98–107)
CK SERPL-CCNC: 59 U/L (ref 20–180)
CLARITY UR: CLEAR
CO2 SERPL-SCNC: 22.8 MMOL/L (ref 22–29)
COCAINE UR QL: NEGATIVE
COHGB MFR BLD: 0.7 % (ref 0–2)
COLOR UR: YELLOW
CREAT SERPL-MCNC: 1.38 MG/DL (ref 0.57–1)
D-LACTATE SERPL-SCNC: 2.1 MMOL/L (ref 0.5–2)
DEPRECATED RDW RBC AUTO: 46.5 FL (ref 37–54)
EGFRCR SERPLBLD CKD-EPI 2021: 42.6 ML/MIN/1.73
EOSINOPHIL # BLD AUTO: 0.01 10*3/MM3 (ref 0–0.4)
EOSINOPHIL NFR BLD AUTO: 0.1 % (ref 0.3–6.2)
ERYTHROCYTE [DISTWIDTH] IN BLOOD BY AUTOMATED COUNT: 13.2 % (ref 12.3–15.4)
FLUAV RNA RESP QL NAA+PROBE: DETECTED
FLUBV RNA RESP QL NAA+PROBE: NOT DETECTED
GLOBULIN UR ELPH-MCNC: 4 GM/DL
GLUCOSE BLDC GLUCOMTR-MCNC: 101 MG/DL (ref 70–130)
GLUCOSE BLDC GLUCOMTR-MCNC: 75 MG/DL (ref 70–130)
GLUCOSE BLDC GLUCOMTR-MCNC: 84 MG/DL (ref 70–130)
GLUCOSE SERPL-MCNC: 181 MG/DL (ref 65–99)
GLUCOSE UR STRIP-MCNC: NEGATIVE MG/DL
HCO3 BLDA-SCNC: 26 MMOL/L (ref 22–28)
HCT VFR BLD AUTO: 40.8 % (ref 34–46.6)
HCT VFR BLD CALC: 39.6 %
HGB BLD-MCNC: 13 G/DL (ref 12–15.9)
HGB UR QL STRIP.AUTO: ABNORMAL
HOLD SPECIMEN: NORMAL
HOLD SPECIMEN: NORMAL
HYALINE CASTS UR QL AUTO: ABNORMAL /LPF
IMM GRANULOCYTES # BLD AUTO: 0.03 10*3/MM3 (ref 0–0.05)
IMM GRANULOCYTES NFR BLD AUTO: 0.4 % (ref 0–0.5)
KETONES UR QL STRIP: NEGATIVE
LEUKOCYTE ESTERASE UR QL STRIP.AUTO: ABNORMAL
LYMPHOCYTES # BLD AUTO: 1.27 10*3/MM3 (ref 0.7–3.1)
LYMPHOCYTES NFR BLD AUTO: 15.9 % (ref 19.6–45.3)
Lab: ABNORMAL
MAGNESIUM SERPL-MCNC: 1.7 MG/DL (ref 1.6–2.4)
MCH RBC QN AUTO: 30.2 PG (ref 26.6–33)
MCHC RBC AUTO-ENTMCNC: 31.9 G/DL (ref 31.5–35.7)
MCV RBC AUTO: 94.9 FL (ref 79–97)
METHADONE UR QL SCN: NEGATIVE
METHGB BLD QL: 0.5 % (ref 0–1.5)
MODALITY: ABNORMAL
MONOCYTES # BLD AUTO: 0.3 10*3/MM3 (ref 0.1–0.9)
MONOCYTES NFR BLD AUTO: 3.8 % (ref 5–12)
NEUTROPHILS NFR BLD AUTO: 6.35 10*3/MM3 (ref 1.7–7)
NEUTROPHILS NFR BLD AUTO: 79.4 % (ref 42.7–76)
NITRITE UR QL STRIP: POSITIVE
NOTE: ABNORMAL
NRBC BLD AUTO-RTO: 0 /100 WBC (ref 0–0.2)
NT-PROBNP SERPL-MCNC: 106.8 PG/ML (ref 0–900)
OPIATES UR QL: NEGATIVE
OXYCODONE UR QL SCN: NEGATIVE
OXYHGB MFR BLDV: 94.8 % (ref 94–99)
PCO2 BLDA: 45.1 MM HG (ref 35–45)
PCO2 TEMP ADJ BLD: ABNORMAL MM[HG]
PCP UR QL SCN: NEGATIVE
PH BLDA: 7.37 PH UNITS (ref 7.35–7.45)
PH UR STRIP.AUTO: 5.5 [PH] (ref 5–8)
PH, TEMP CORRECTED: ABNORMAL
PLATELET # BLD AUTO: 254 10*3/MM3 (ref 140–450)
PMV BLD AUTO: 10.2 FL (ref 6–12)
PO2 BLDA: 76.8 MM HG (ref 75–100)
PO2 TEMP ADJ BLD: ABNORMAL MM[HG]
POTASSIUM SERPL-SCNC: 4.7 MMOL/L (ref 3.5–5.2)
PROCALCITONIN SERPL-MCNC: 0.06 NG/ML (ref 0–0.25)
PROPOXYPH UR QL: NEGATIVE
PROT SERPL-MCNC: 7.9 G/DL (ref 6–8.5)
PROT UR QL STRIP: ABNORMAL
RBC # BLD AUTO: 4.3 10*6/MM3 (ref 3.77–5.28)
RBC # UR STRIP: ABNORMAL /HPF
REF LAB TEST METHOD: ABNORMAL
SAO2 % BLDCOA: 96 % (ref 94–100)
SARS-COV-2 RNA RESP QL NAA+PROBE: NOT DETECTED
SODIUM SERPL-SCNC: 136 MMOL/L (ref 136–145)
SP GR UR STRIP: 1.01 (ref 1–1.03)
SQUAMOUS #/AREA URNS HPF: ABNORMAL /HPF
TRICYCLICS UR QL SCN: NEGATIVE
TROPONIN T SERPL-MCNC: <0.01 NG/ML (ref 0–0.03)
TSH SERPL DL<=0.05 MIU/L-ACNC: 0.7 UIU/ML (ref 0.27–4.2)
UROBILINOGEN UR QL STRIP: ABNORMAL
VENTILATOR MODE: ABNORMAL
WBC # UR STRIP: ABNORMAL /HPF
WBC NRBC COR # BLD: 7.99 10*3/MM3 (ref 3.4–10.8)
WHOLE BLOOD HOLD COAG: NORMAL
WHOLE BLOOD HOLD SPECIMEN: NORMAL

## 2022-11-18 PROCEDURE — 87186 SC STD MICRODIL/AGAR DIL: CPT | Performed by: PHYSICIAN ASSISTANT

## 2022-11-18 PROCEDURE — 80053 COMPREHEN METABOLIC PANEL: CPT | Performed by: PHYSICIAN ASSISTANT

## 2022-11-18 PROCEDURE — 72125 CT NECK SPINE W/O DYE: CPT

## 2022-11-18 PROCEDURE — 84443 ASSAY THYROID STIM HORMONE: CPT | Performed by: PHYSICIAN ASSISTANT

## 2022-11-18 PROCEDURE — 85025 COMPLETE CBC W/AUTO DIFF WBC: CPT | Performed by: PHYSICIAN ASSISTANT

## 2022-11-18 PROCEDURE — 81001 URINALYSIS AUTO W/SCOPE: CPT | Performed by: PHYSICIAN ASSISTANT

## 2022-11-18 PROCEDURE — 87086 URINE CULTURE/COLONY COUNT: CPT | Performed by: PHYSICIAN ASSISTANT

## 2022-11-18 PROCEDURE — 83880 ASSAY OF NATRIURETIC PEPTIDE: CPT | Performed by: PHYSICIAN ASSISTANT

## 2022-11-18 PROCEDURE — 87088 URINE BACTERIA CULTURE: CPT | Performed by: PHYSICIAN ASSISTANT

## 2022-11-18 PROCEDURE — 25010000002 CEFTRIAXONE SODIUM-DEXTROSE 1-3.74 GM-%(50ML) RECONSTITUTED SOLUTION: Performed by: PHYSICIAN ASSISTANT

## 2022-11-18 PROCEDURE — 93005 ELECTROCARDIOGRAM TRACING: CPT

## 2022-11-18 PROCEDURE — 96365 THER/PROPH/DIAG IV INF INIT: CPT

## 2022-11-18 PROCEDURE — 36600 WITHDRAWAL OF ARTERIAL BLOOD: CPT

## 2022-11-18 PROCEDURE — 70450 CT HEAD/BRAIN W/O DYE: CPT

## 2022-11-18 PROCEDURE — 80306 DRUG TEST PRSMV INSTRMNT: CPT | Performed by: PHYSICIAN ASSISTANT

## 2022-11-18 PROCEDURE — 82550 ASSAY OF CK (CPK): CPT | Performed by: PHYSICIAN ASSISTANT

## 2022-11-18 PROCEDURE — 84484 ASSAY OF TROPONIN QUANT: CPT | Performed by: PHYSICIAN ASSISTANT

## 2022-11-18 PROCEDURE — P9612 CATHETERIZE FOR URINE SPEC: HCPCS

## 2022-11-18 PROCEDURE — 82805 BLOOD GASES W/O2 SATURATION: CPT

## 2022-11-18 PROCEDURE — 93005 ELECTROCARDIOGRAM TRACING: CPT | Performed by: EMERGENCY MEDICINE

## 2022-11-18 PROCEDURE — 82375 ASSAY CARBOXYHB QUANT: CPT

## 2022-11-18 PROCEDURE — 72131 CT LUMBAR SPINE W/O DYE: CPT

## 2022-11-18 PROCEDURE — 71045 X-RAY EXAM CHEST 1 VIEW: CPT

## 2022-11-18 PROCEDURE — 36415 COLL VENOUS BLD VENIPUNCTURE: CPT

## 2022-11-18 PROCEDURE — 82962 GLUCOSE BLOOD TEST: CPT

## 2022-11-18 PROCEDURE — 87636 SARSCOV2 & INF A&B AMP PRB: CPT | Performed by: PHYSICIAN ASSISTANT

## 2022-11-18 PROCEDURE — 87040 BLOOD CULTURE FOR BACTERIA: CPT | Performed by: PHYSICIAN ASSISTANT

## 2022-11-18 PROCEDURE — 99284 EMERGENCY DEPT VISIT MOD MDM: CPT

## 2022-11-18 PROCEDURE — 83605 ASSAY OF LACTIC ACID: CPT | Performed by: PHYSICIAN ASSISTANT

## 2022-11-18 PROCEDURE — 83050 HGB METHEMOGLOBIN QUAN: CPT

## 2022-11-18 PROCEDURE — 83735 ASSAY OF MAGNESIUM: CPT | Performed by: PHYSICIAN ASSISTANT

## 2022-11-18 PROCEDURE — 84145 PROCALCITONIN (PCT): CPT | Performed by: PHYSICIAN ASSISTANT

## 2022-11-18 PROCEDURE — 72128 CT CHEST SPINE W/O DYE: CPT

## 2022-11-18 RX ORDER — CEFDINIR 300 MG/1
300 CAPSULE ORAL 2 TIMES DAILY
Qty: 14 CAPSULE | Refills: 0 | Status: SHIPPED | OUTPATIENT
Start: 2022-11-18 | End: 2022-11-25

## 2022-11-18 RX ORDER — CEFTRIAXONE 1 G/50ML
1 INJECTION, SOLUTION INTRAVENOUS ONCE
Status: COMPLETED | OUTPATIENT
Start: 2022-11-18 | End: 2022-11-18

## 2022-11-18 RX ORDER — SODIUM CHLORIDE 0.9 % (FLUSH) 0.9 %
10 SYRINGE (ML) INJECTION AS NEEDED
Status: DISCONTINUED | OUTPATIENT
Start: 2022-11-18 | End: 2022-11-18 | Stop reason: HOSPADM

## 2022-11-18 RX ADMIN — CEFTRIAXONE 1 G: 1 INJECTION, SOLUTION INTRAVENOUS at 17:48

## 2022-11-18 RX ADMIN — SODIUM CHLORIDE 500 ML: 9 INJECTION, SOLUTION INTRAVENOUS at 17:34

## 2022-11-18 RX ADMIN — SODIUM CHLORIDE 1000 ML: 9 INJECTION, SOLUTION INTRAVENOUS at 17:34

## 2022-11-18 RX ADMIN — SODIUM CHLORIDE 500 ML: 9 INJECTION, SOLUTION INTRAVENOUS at 16:51

## 2022-11-18 NOTE — ED PROVIDER NOTES
"Subjective   History of Present Illness  Patient is a 67-year-old female history of cataracts, diabetes, GERD, arthritis, hypertension, kidney disease, peripheral vascular disease and migraines presenting to the ER for evaluation of hypoglycemia.  Reportedly patient has been lying around on the couch \"unresponsive\" for about an hour so her daughter called EMS.  On assessment, her blood glucose was 35, they administered D10 and on arrival to the ER her glucose was 99.  Reportedly patient had 7 units of insulin this morning.  She states she is felt weak and bad for about 2 weeks and has been staying with her daughter due to this.  She tells me that she fell yesterday and hit the back of her head, thinks she lost consciousness.  She denies any known recent fever, chills, cough, chest pain, shortness of breath, abdominal pain, or any other symptoms.        Review of Systems   Unable to perform ROS: Mental status change       Past Medical History:   Diagnosis Date   • Arthritis    • Cataract    • Depression    • Diabetes mellitus (HCC)    • Dysphagia     Patient reported that intermittently food feels like it gets lodged    • Elevated cholesterol    • Full dentures     Instructed no adhesives the DOS   • GERD (gastroesophageal reflux disease)    • Headache    • History of nuclear stress test     Patient reported this was done with Dr. Douglas around 2017 and that she was started on Metoprolol after testing.    • Hypertension    • Kidney disease     Patient reported \"I have chronic kidney disease and they say my kidney function is at 33%\" and that she has routine care with Dr. Calzada    • Migraines    • PVD (peripheral vascular disease) ([facility])    • Seasonal allergies    • Spinal headache    • Wears glasses        No Known Allergies    Past Surgical History:   Procedure Laterality Date   • ABDOMINAL SURGERY  08/1984    Patient reported after complete hysterectomy, she had another procedure to remove tumors in the abdominal " "area   • APPENDECTOMY      Reported removed when hysterectomy was done   • CATARACT EXTRACTION W/ INTRAOCULAR LENS IMPLANT Right 2019    Procedure: CATARACT PHACO EXTRACTION WITH INTRAOCULAR LENS IMPLANT RIGHT;  Surgeon: Tee Enrique MD;  Location: [facility] OR;  Service: Ophthalmology   • CATARACT EXTRACTION W/ INTRAOCULAR LENS IMPLANT Left 2019    Procedure: CATARACT PHACO EXTRACTION WITH INTRAOCULAR LENS IMPLANT LEFT;  Surgeon: Tee Enrique MD;  Location: [facility] OR;  Service: Ophthalmology   • COLONOSCOPY     • ELBOW EPICONDYLECTOMY Left 1990   • ENDOSCOPY     • HYSTERECTOMY      Partial   • HYSTERECTOMY      Complete    • MOUTH SURGERY      Full mouth extraction   • VASCULAR SURGERY Bilateral     For PAD - Reported the right leg was done first around  and the left was done around        History reviewed. No pertinent family history.    Social History     Socioeconomic History   • Marital status: Single   Tobacco Use   • Smoking status: Former     Packs/day: 1.50     Years: 38.00     Pack years: 57.00     Types: Cigarettes     Quit date:      Years since quittin.8   • Smokeless tobacco: Never   • Tobacco comments:     quit 13 yrs ago   Vaping Use   • Vaping Use: Never used   Substance and Sexual Activity   • Alcohol use: No   • Drug use: No   • Sexual activity: Defer           Objective   Physical Exam  Vitals and nursing note reviewed.     /82   Pulse 81   Temp 98.8 °F (37.1 °C)   Resp 20   Ht 152.4 cm (60\")   Wt 73.9 kg (163 lb)   LMP  (LMP Unknown)   SpO2 98%   BMI 31.83 kg/m²     GEN: Patient is lying supine in stretcher.  She is awake, she keeps her eyes closed for most of interview but does open when asked.  She appears elderly  Head: Normocephalic, atraumatic.  Patient is some tenderness of the posterior skull with no obvious deformity palpated  Neck: Mild midline cervical spine tenderness, no deformity or step-off appreciated  Eyes: Pupils " equal round reactive to light, PERRL  ENT: Posterior pharynx normal in appearance, oral mucosa is moist, tongue midline, no obvious facial asymmetry noted  Chest: Nontender to palpation  Cardiovascular: Regular rate and rhythm  Lungs: Breathing even and nonlabored, clear to auscultation bilaterally  Abdomen: Soft, nontender, nondistended, no peritoneal signs, no focal guarding  Extremities: No edema, normal appearance, distal pulses intact  Neuro: GCS 14.  Patient is oriented to person place and time  Psych: Mood and affect are appropriate    Procedures           ED Course  ED Course as of 11/18/22 1940 Fri Nov 18, 2022   1644 WBC: 7.99 [LA]   1644 Hemoglobin: 13.0 [LA]   1644 Platelets: 254 [LA]   1654 EKG interpreted by me.  Sinus rhythm.  Rate of 78.  No ST segment or T wave changes.  Abnormal EKG. [CG]   1700 Site: Left Radial [LA]   1700 Elmer's Test: Positive [LA]   1700 pH, Arterial: 7.370 [LA]   1700 pCO2, Arterial(!): 45.1 [LA]   1700 pO2, Arterial: 76.8 [LA]   1700 HCO3, Arterial: 26.0 [LA]   1700 Base Excess: 0.4 [LA]   1700 O2 Saturation, Arterial: 96.0 [LA]   1700 Hematocrit, Blood Gas: 39.6 [LA]   1700 Oxyhemoglobin: 94.8 [LA]   1700 Methemoglobin: 0.50 [LA]   1700 Carboxyhemoglobin: 0.7 [LA]   1700 A-a DO2: 17.8 [LA]   1700 Barometric Pressure for Blood Gas: 740 [LA]   1700 Modality: N/A [LA]   1700 Ventilator Mode: NA [LA]   1705 RBC, UA: None Seen [LA]   1705 WBC, UA(!): 6-12 [LA]   1705 Bacteria, UA(!): 3+ [LA]   1705 Squamous Epithelial Cells, UA: 0-2 [LA]   1705 Hyaline Casts, UA: None Seen [LA]   1705 Methodology:: Manual Light Microscopy [LA]   1705 Color, UA: Yellow [LA]   1705 Appearance, UA: Clear [LA]   1705 pH, UA: 5.5 [LA]   1705 Specific Gravity, UA: 1.015 [LA]   1705 Glucose: Negative [LA]   1705 Ketones, UA: Negative [LA]   1705 Bilirubin, UA: Negative [LA]   1705 Blood, UA(!): Trace [LA]   1705 Protein, UA(!): 30 mg/dL (1+) [LA]   1705 Leukocytes, UA(!): Trace [LA]   1705 Nitrite,  UA(!): Positive [LA]   1705 Urobilinogen, UA: 0.2 E.U./dL [LA]   1710 EMS gave 500 cc in route.  I stated 500 here on arrival with hypothermia.  We will give an additional 1250 for total of 2200 which would be sepsis fluid bolus. [LA]   1716 COVID19: Not Detected [LA]   1716 Influenza A PCR(!): Detected [LA]   1716 Influenza B PCR: Not Detected [LA]   1716 TSH Baseline: 0.700 [LA]   1716 proBNP: 106.8 [LA]   1726 Glucose(!): 181 [LA]   1726 BUN(!): 34 [LA]   1726 Creatinine(!): 1.38 [LA]   1726 Sodium: 136 [LA]   1726 Potassium: 4.7 [LA]   1726 Chloride: 101 [LA]   1726 CO2: 22.8 [LA]   1726 Calcium: 9.4 [LA]   1726 Total Protein: 7.9 [LA]   1726 Albumin: 3.90 [LA]   1726 ALT (SGPT): 17 [LA]   1726 AST (SGOT): 18 [LA]   1726 Alkaline Phosphatase: 89 [LA]   1726 Total Bilirubin: 0.2 [LA]   1726 Globulin: 4.0 [LA]   1726 A/G Ratio: 1.0 [LA]   1726 BUN/Creatinine Ratio: 24.6 [LA]   1726 Anion Gap: 12.2 [LA]   1726 eGFR(!): 42.6 [LA]   1726 Creatine Kinase: 59 [LA]   1726 Magnesium: 1.7 [LA]   1726 Troponin T: <0.010 [LA]   1727 Lactate(!!): 2.1 [LA]   1804 Updated patient and family. She is more awake and alert sitting up in the stretcher.  She states she feels very cold.  She states that she thinks she fell 4 times yesterday.  She is also complaining of pain in her lower back.  We will add CT thoracic and lumbar scans [LA]   1834 Glucose: 75 [LA]   1852 FINAL REPORT     TECHNIQUE:  Multiple axial CT images were performed from the foramen magnum  to the vertex. This study was performed with techniques to keep  radiation doses as low as reasonably achievable (ALARA).  Individualized dose reduction techniques using automated  exposure control or adjustment of mA and/or kV according to the  patient's size were employed.     CLINICAL HISTORY:  Fall, head trauma     FINDINGS:  No acute intracranial hemorrhage or large acute cortical  infarct.  The brain volume is normal for patient's age.  Ventricles are normal in size and  configuration.  No midline  shift.  The basal cisterns are patent.  No skull fracture.  The  visualized paranasal sinuses and mastoid air cells are clear.     IMPRESSION:  No acute intracranial hemorrhage or large acute cortical infarct.     Authenticated and Electronically Signed by Reno Epstein MD on  11/18/2022 06:50:49 PM [LA]   1853 98.6 temperature on repeat per RN. [LA]   1855 FINAL REPORT     TECHNIQUE:  Axial CT images were obtained from the skull base to the  thoracic inlet.  Coronal and sagittal reformatted images were  generated from the axial data set.  This study was performed  with techniques to keep radiation doses as low as reasonably  achievable (ALARA). Individualized dose reduction techniques  using automated exposure control or adjustment of mA and/or kV  according to the patient's size were employed.     CLINICAL HISTORY:  Fall, head trauma, high risk for C-spine injury     FINDINGS:  There is no acute fracture or malalignment.  The facets are  normally aligned.  Multilevel degenerative changes are present.  No acute paraspinal abnormality.  Limited images of the lung  apices are unremarkable.     IMPRESSION:  No acute fracture or malalignment of the cervical spine.     Authenticated and Electronically Signed by Reno Epstein MD on  11/18/2022 06:53:33 PM [LA]   1853 FINAL REPORT     TECHNIQUE:  Axial CT images were obtained through the thoracic spine.  Sagittal and coronal reformatted images were generated from the  axial data set and provided for interpretation. This study was  performed with techniques to keep radiation doses as low as  reasonably achievable (ALARA). Individualized dose reduction  techniques using automated exposure control or adjustment of mA  and/or kV according to the patient's size were employed.     CLINICAL HISTORY:  Fall, midline back pain     FINDINGS:  No acute fracture or malalignment of the thoracic spine.  The  thoracic kyphosis is preserved.  The vertebral body  heights are  maintained.  The facets are appropriately aligned. Mild  multilevel degenerative changes are present.  No acute  paraspinal abnormality.     IMPRESSION:  No acute fracture or malalignment of the thoracic spine.     Authenticated and Electronically Signed by Reno Epstein MD on  11/18/2022 06:56:00 PM [LA]   1900 FINAL REPORT     TECHNIQUE:  Axial CT images were obtained through the lumbar spine.  Sagittal and coronal reformatted images were generated from the  axial data set and provided for interpretation. This study was  performed with techniques to keep radiation doses as low as  reasonably achievable (ALARA). Individualized dose reduction  techniques using automated exposure control or adjustment of mA  and/or kV according to the patient's size were employed.     CLINICAL HISTORY:  Fall, midline back pain     FINDINGS:  No acute fracture or malalignment of the lumbar spine.  The  lumbar lordosis is preserved.  The vertebral body heights are  maintained.  The facets are appropriately aligned.  Multilevel  degenerative changes are present.  No acute paraspinal  abnormalities.     IMPRESSION:  No acute fracture or malalignment of the lumbar spine.     Authenticated and Electronically Signed by Reno Epstein MD on  11/18/2022 06:58:17 PM [LA]   1925 Discussed with Dr. Fraire.  We have monitored patient for over 3 hours during the ER with no episodes of hypoglycemia.  She has been able to eat and keep down foods.  She feels much better, she is awake, alert, cracking jokes.  She states she has not been on glipizide for years.  Discussed observation overnight versus discharge home and patient feels comfortable going home and monitoring her glucose.  We will cover with antibiotics for her UTI, unsure when her Influenza A symptoms started, states she has been feeling bad for over 2 weeks so she is likely outside the window for Tamiflu. [LA]      ED Course User Index  [CG] Colten Bullock,   [LA] Diony  Nadia Morales PA-C                                           [facility]  Number of Diagnoses or Management Options  Cystitis  Fall, initial encounter  H/O hypoglycemia  Influenza A  Diagnosis management comments: On arrival, patient is hypothermic at 93.4 °F rectally.  Her blood pressure is 131/106, no tachycardia.  Differential could include sepsis, head injury or intracranial abnormality, pneumonia, urinary tract infection, electrolyte abnormalities, dehydration, and other concerns.  Patient's blood glucose was 99 on arrival.  We will continue to check this very frequently.  We will also obtain basic labs, troponin, proBNP, urinalysis, magnesium, TSH, CK, ABG, CT head and cervical spine, chest x-ray, EKG.  Bear hugger is in place.  Will give small bolus of IV fluids.    Patient's labs are all stable.  There is no significant anemia or leukocytosis.  There is no elevation of troponin, proBNP.  EKG was interpreted by the attending.  Reviewed chest x-ray with attending, no focal cardiopu 6-12 white blood cells and 3+ bacteria.  This was sent for culture.  She was also positive for influenza A.  CT of the head, cervical, thoracic and lumbar spine were read by the radiologist without acute findings.  Patient was monitored here in the ER for over 3 hours, her blood glucose remained stable.  She was focal cardiopulmonary abnormality noted.  Magnesium, TSH, CK, ABG were all stable.  Patient did appear to have a urinary tract infection with positive nitrites, able to eat.  She was awake and alert, cracking jokes.  She was oriented x3.  Vital signs were stable, temperature normalized.  She had no signs of sepsis on work-up. Discussed with Dr. Fraire.  Believe she is likely outside of the window for Tamiflu given her symptoms have been ongoing for over 2 weeks.  We will treat urine with an antibiotic, send urine for culture.  Discussed keeping patient overnight in observation versus sending home.  She does not take glipizide anymore and  has not done so for years.  She feels much better and feels safe going home, daughter is in this as well.  We discussed very strict return precautions and follow-up with her primary care provider.  She verbalized understanding and was in agreement with this plan of care.       Amount and/or Complexity of Data Reviewed  Clinical lab tests: reviewed and ordered  Tests in the radiology section of CPT®: reviewed and ordered  Tests in the medicine section of CPT®: reviewed and ordered  Discussion of test results with the performing providers: yes  Decide to obtain previous medical records or to obtain history from someone other than the patient: yes  Review and summarize past medical records: yes  Discuss the patient with other providers: yes    Risk of Complications, Morbidity, and/or Mortality  Presenting problems: moderate  Diagnostic procedures: moderate  Management options: low    Patient Progress  Patient progress: improved      Final diagnoses:   H/O hypoglycemia   Influenza A   Cystitis   Fall, initial encounter       ED Disposition  ED Disposition     ED Disposition   Discharge    Condition   Stable    Comment   --             Live Donnelly MD  [address] [place] [address] [phone]    Schedule an appointment as soon as possible for a visit            Medication List      New Prescriptions    cefdinir 300 MG capsule  Commonly known as: OMNICEF  Take 1 capsule by mouth 2 (Two) Times a Day for 7 days.           Where to Get Your Medications      These medications were sent to [facility] - [place], [place] - [address] [facility] - [phone]  - [phone]   [address] [address]    Phone: [phone]   · cefdinir 300 MG capsule          Nadia Vora PA-C  11/18/22 1940

## 2022-11-19 NOTE — DISCHARGE INSTRUCTIONS
It appears you have had influenza A.  You also have a urinary tract infection.  You can take the antibiotic as directed to help treat the infection.  Continue to take your home medications as directed.  Make sure you are eating when taking your insulin and medications for diabetes.  You will need to continue to closely monitor your glucose levels so they do not drop too low.  You can take over-the-counter cough and cold medicines as directed to help with symptoms.  Try to follow-up with your primary care provider in the next few days to reevaluate symptoms and ensure you are improving.  Return to the ER for any other change, worsening of symptoms, or any additional concerns including but not limited to chest pain, dizziness, syncope, hypoglycemia, vomiting, fever greater than 100.4.

## 2022-11-20 LAB — BACTERIA SPEC AEROBE CULT: ABNORMAL

## 2022-11-23 LAB
BACTERIA SPEC AEROBE CULT: NORMAL
BACTERIA SPEC AEROBE CULT: NORMAL

## 2023-01-23 ENCOUNTER — APPOINTMENT (OUTPATIENT)
Dept: GENERAL RADIOLOGY | Facility: HOSPITAL | Age: 68
End: 2023-01-23
Payer: MEDICARE

## 2023-01-23 ENCOUNTER — HOSPITAL ENCOUNTER (EMERGENCY)
Facility: HOSPITAL | Age: 68
Discharge: HOME | End: 2023-01-23
Attending: EMERGENCY MEDICINE | Admitting: EMERGENCY MEDICINE
Payer: MEDICARE

## 2023-01-23 VITALS
TEMPERATURE: 97.5 F | DIASTOLIC BLOOD PRESSURE: 87 MMHG | BODY MASS INDEX: 31.41 KG/M2 | OXYGEN SATURATION: 100 % | HEIGHT: 60 IN | RESPIRATION RATE: 20 BRPM | WEIGHT: 160 LBS | HEART RATE: 85 BPM | SYSTOLIC BLOOD PRESSURE: 173 MMHG

## 2023-01-23 DIAGNOSIS — N30.00 ACUTE CYSTITIS WITHOUT HEMATURIA: ICD-10-CM

## 2023-01-23 DIAGNOSIS — Z86.39 HISTORY OF HYPOGLYCEMIA: Primary | ICD-10-CM

## 2023-01-23 LAB
ALBUMIN SERPL-MCNC: 4.3 G/DL (ref 3.5–5.2)
ALBUMIN/GLOB SERPL: 1.3 G/DL
ALP SERPL-CCNC: 98 U/L (ref 39–117)
ALT SERPL W P-5'-P-CCNC: 14 U/L (ref 1–33)
ANION GAP SERPL CALCULATED.3IONS-SCNC: 9.8 MMOL/L (ref 5–15)
AST SERPL-CCNC: 19 U/L (ref 1–32)
BACTERIA UR QL AUTO: ABNORMAL /HPF
BASOPHILS # BLD AUTO: 0.06 10*3/MM3 (ref 0–0.2)
BASOPHILS NFR BLD AUTO: 0.4 % (ref 0–1.5)
BILIRUB SERPL-MCNC: 0.2 MG/DL (ref 0–1.2)
BILIRUB UR QL STRIP: NEGATIVE
BUN SERPL-MCNC: 30 MG/DL (ref 8–23)
BUN/CREAT SERPL: 19.7 (ref 7–25)
CALCIUM SPEC-SCNC: 9.9 MG/DL (ref 8.6–10.5)
CHLORIDE SERPL-SCNC: 98 MMOL/L (ref 98–107)
CLARITY UR: ABNORMAL
CO2 SERPL-SCNC: 26.2 MMOL/L (ref 22–29)
COLOR UR: YELLOW
CREAT SERPL-MCNC: 1.52 MG/DL (ref 0.57–1)
D-LACTATE SERPL-SCNC: 2.2 MMOL/L (ref 0.5–2)
DEPRECATED RDW RBC AUTO: 47.5 FL (ref 37–54)
EGFRCR SERPLBLD CKD-EPI 2021: 37.9 ML/MIN/1.73
EOSINOPHIL # BLD AUTO: 0.05 10*3/MM3 (ref 0–0.4)
EOSINOPHIL NFR BLD AUTO: 0.3 % (ref 0.3–6.2)
ERYTHROCYTE [DISTWIDTH] IN BLOOD BY AUTOMATED COUNT: 13.1 % (ref 12.3–15.4)
GLOBULIN UR ELPH-MCNC: 3.4 GM/DL
GLUCOSE BLDC GLUCOMTR-MCNC: 142 MG/DL (ref 70–130)
GLUCOSE BLDC GLUCOMTR-MCNC: 209 MG/DL (ref 70–130)
GLUCOSE SERPL-MCNC: 195 MG/DL (ref 65–99)
GLUCOSE UR STRIP-MCNC: NEGATIVE MG/DL
HCT VFR BLD AUTO: 43.2 % (ref 34–46.6)
HGB BLD-MCNC: 13.4 G/DL (ref 12–15.9)
HGB UR QL STRIP.AUTO: NEGATIVE
HYALINE CASTS UR QL AUTO: ABNORMAL /LPF
IMM GRANULOCYTES # BLD AUTO: 0.06 10*3/MM3 (ref 0–0.05)
IMM GRANULOCYTES NFR BLD AUTO: 0.4 % (ref 0–0.5)
KETONES UR QL STRIP: NEGATIVE
LEUKOCYTE ESTERASE UR QL STRIP.AUTO: ABNORMAL
LIPASE SERPL-CCNC: 62 U/L (ref 13–60)
LYMPHOCYTES # BLD AUTO: 1.82 10*3/MM3 (ref 0.7–3.1)
LYMPHOCYTES NFR BLD AUTO: 11.8 % (ref 19.6–45.3)
MCH RBC QN AUTO: 30.5 PG (ref 26.6–33)
MCHC RBC AUTO-ENTMCNC: 31 G/DL (ref 31.5–35.7)
MCV RBC AUTO: 98.4 FL (ref 79–97)
MONOCYTES # BLD AUTO: 0.71 10*3/MM3 (ref 0.1–0.9)
MONOCYTES NFR BLD AUTO: 4.6 % (ref 5–12)
NEUTROPHILS NFR BLD AUTO: 12.77 10*3/MM3 (ref 1.7–7)
NEUTROPHILS NFR BLD AUTO: 82.5 % (ref 42.7–76)
NITRITE UR QL STRIP: POSITIVE
NRBC BLD AUTO-RTO: 0 /100 WBC (ref 0–0.2)
PH UR STRIP.AUTO: 6.5 [PH] (ref 5–8)
PLATELET # BLD AUTO: 170 10*3/MM3 (ref 140–450)
PMV BLD AUTO: 10.8 FL (ref 6–12)
POTASSIUM SERPL-SCNC: 4.9 MMOL/L (ref 3.5–5.2)
PROT SERPL-MCNC: 7.7 G/DL (ref 6–8.5)
PROT UR QL STRIP: ABNORMAL
RBC # BLD AUTO: 4.39 10*6/MM3 (ref 3.77–5.28)
RBC # UR STRIP: ABNORMAL /HPF
RBC MORPH BLD: NORMAL
REF LAB TEST METHOD: ABNORMAL
SMALL PLATELETS BLD QL SMEAR: ADEQUATE
SODIUM SERPL-SCNC: 134 MMOL/L (ref 136–145)
SP GR UR STRIP: 1.01 (ref 1–1.03)
SQUAMOUS #/AREA URNS HPF: ABNORMAL /HPF
UROBILINOGEN UR QL STRIP: ABNORMAL
WBC # UR STRIP: ABNORMAL /HPF
WBC MORPH BLD: NORMAL
WBC NRBC COR # BLD: 15.47 10*3/MM3 (ref 3.4–10.8)

## 2023-01-23 PROCEDURE — 83605 ASSAY OF LACTIC ACID: CPT | Performed by: PHYSICIAN ASSISTANT

## 2023-01-23 PROCEDURE — 99284 EMERGENCY DEPT VISIT MOD MDM: CPT

## 2023-01-23 PROCEDURE — 87088 URINE BACTERIA CULTURE: CPT | Performed by: PHYSICIAN ASSISTANT

## 2023-01-23 PROCEDURE — 96365 THER/PROPH/DIAG IV INF INIT: CPT

## 2023-01-23 PROCEDURE — P9612 CATHETERIZE FOR URINE SPEC: HCPCS

## 2023-01-23 PROCEDURE — 80053 COMPREHEN METABOLIC PANEL: CPT | Performed by: PHYSICIAN ASSISTANT

## 2023-01-23 PROCEDURE — 82962 GLUCOSE BLOOD TEST: CPT

## 2023-01-23 PROCEDURE — 71045 X-RAY EXAM CHEST 1 VIEW: CPT

## 2023-01-23 PROCEDURE — 36415 COLL VENOUS BLD VENIPUNCTURE: CPT

## 2023-01-23 PROCEDURE — 25010000002 CEFTRIAXONE SODIUM-DEXTROSE 1-3.74 GM-%(50ML) RECONSTITUTED SOLUTION: Performed by: PHYSICIAN ASSISTANT

## 2023-01-23 PROCEDURE — 87186 SC STD MICRODIL/AGAR DIL: CPT | Performed by: PHYSICIAN ASSISTANT

## 2023-01-23 PROCEDURE — 87086 URINE CULTURE/COLONY COUNT: CPT | Performed by: PHYSICIAN ASSISTANT

## 2023-01-23 PROCEDURE — 83690 ASSAY OF LIPASE: CPT | Performed by: PHYSICIAN ASSISTANT

## 2023-01-23 PROCEDURE — 85007 BL SMEAR W/DIFF WBC COUNT: CPT | Performed by: PHYSICIAN ASSISTANT

## 2023-01-23 PROCEDURE — 85025 COMPLETE CBC W/AUTO DIFF WBC: CPT | Performed by: PHYSICIAN ASSISTANT

## 2023-01-23 PROCEDURE — 81001 URINALYSIS AUTO W/SCOPE: CPT | Performed by: PHYSICIAN ASSISTANT

## 2023-01-23 PROCEDURE — 87040 BLOOD CULTURE FOR BACTERIA: CPT | Performed by: PHYSICIAN ASSISTANT

## 2023-01-23 RX ORDER — CEFTRIAXONE 1 G/50ML
1 INJECTION, SOLUTION INTRAVENOUS ONCE
Status: COMPLETED | OUTPATIENT
Start: 2023-01-23 | End: 2023-01-23

## 2023-01-23 RX ORDER — SODIUM CHLORIDE 0.9 % (FLUSH) 0.9 %
10 SYRINGE (ML) INJECTION AS NEEDED
Status: DISCONTINUED | OUTPATIENT
Start: 2023-01-23 | End: 2023-01-23 | Stop reason: HOSPADM

## 2023-01-23 RX ORDER — CEPHALEXIN 500 MG/1
500 CAPSULE ORAL 2 TIMES DAILY
Qty: 14 CAPSULE | Refills: 0 | Status: SHIPPED | OUTPATIENT
Start: 2023-01-23 | End: 2023-01-30

## 2023-01-23 RX ADMIN — SODIUM CHLORIDE 500 ML: 9 INJECTION, SOLUTION INTRAVENOUS at 19:59

## 2023-01-23 RX ADMIN — CEFTRIAXONE 1 G: 1 INJECTION, SOLUTION INTRAVENOUS at 19:59

## 2023-01-23 NOTE — ED PROVIDER NOTES
Subjective  History of Present Illness:    Chief Complaint: Hypoglycemia  History of Present Illness: Patient is a 67-year-old female with history of arthritis, diabetes, dysphagia, GERD, headaches, kidney disease, hypertension, migraines and peripheral vascular disease presenting to the ER for evaluation of hypoglycemia.  Per EMS, they received a phone call from patient's underage grandson stating patient was minimally responsive on her couch at home.  On their arrival they obtained a blood glucose that was 42.  They gave her glucagon IM and oral glucose with peanut butter and orange juice.  Her glucose did improve to 93.  She states that this is happened more frequently recently and her doctor is decreasing her insulin dose.  She states now she only takes insulin, Trulicity once weekly and metformin.  She does not currently take the glipizide.  She is unsure whether she had eaten anything earlier in the day.  She states she has a headache but this does occur after her sugar drops.  She denies any fall or head trauma.  She denies any other symptoms including fever, chills, chest pain, cough, shortness of breath, dizziness, abdominal pain, nausea, emesis, dysuria, or any other symptoms.  Onset: Today  Duration: Persistent  Exacerbating / Alleviating factors: None  Associated symptoms: None      Nurses Notes reviewed and agree, including vitals, allergies, social history and prior medical history.     REVIEW OF SYSTEMS: All systems reviewed and not pertinent unless noted.  Review of Systems      Positive for: Fatigue, headache    Negative for: Fever, chills, dizziness, chest pain, shortness of breath, emesis, diarrhea, dysuria, hematuria    Past Medical History:   Diagnosis Date   • Arthritis    • Cataract    • Depression    • Diabetes mellitus (HCC)    • Dysphagia     Patient reported that intermittently food feels like it gets lodged    • Elevated cholesterol    • Full dentures     Instructed no adhesives the DOS  "  • GERD (gastroesophageal reflux disease)    • Headache    • History of nuclear stress test     Patient reported this was done with Dr. Douglas around 2017 and that she was started on Metoprolol after testing.    • Hypertension    • Kidney disease     Patient reported \"I have chronic kidney disease and they say my kidney function is at 33%\" and that she has routine care with Dr. Calzada    • Migraines    • PVD (peripheral vascular disease) ([facility])    • Seasonal allergies    • Spinal headache    • Wears glasses        Allergies:    Patient has no known allergies.      Past Surgical History:   Procedure Laterality Date   • ABDOMINAL SURGERY  08/1984    Patient reported after complete hysterectomy, she had another procedure to remove tumors in the abdominal area   • APPENDECTOMY      Reported removed when hysterectomy was done   • CATARACT EXTRACTION W/ INTRAOCULAR LENS IMPLANT Right 6/21/2019    Procedure: CATARACT PHACO EXTRACTION WITH INTRAOCULAR LENS IMPLANT RIGHT;  Surgeon: Tee Enrique MD;  Location: [facility];  Service: Ophthalmology   • CATARACT EXTRACTION W/ INTRAOCULAR LENS IMPLANT Left 7/5/2019    Procedure: CATARACT PHACO EXTRACTION WITH INTRAOCULAR LENS IMPLANT LEFT;  Surgeon: Tee Enrique MD;  Location: [facility] OR;  Service: Ophthalmology   • COLONOSCOPY     • ELBOW EPICONDYLECTOMY Left 09/1990   • ENDOSCOPY     • HYSTERECTOMY  1983    Partial   • HYSTERECTOMY  1984    Complete    • MOUTH SURGERY      Full mouth extraction   • VASCULAR SURGERY Bilateral     For PAD - Reported the right leg was done first around 2014 and the left was done around 2015         Social History     Socioeconomic History   • Marital status: Single   Tobacco Use   • Smoking status: Former     Packs/day: 1.50     Years: 38.00     Pack years: 57.00     Types: Cigarettes     Quit date: 2008     Years since quitting: 15.0   • Smokeless tobacco: Never   • Tobacco comments:     quit 13 yrs ago   Vaping Use   • Vaping Use: " "Never used   Substance and Sexual Activity   • Alcohol use: No   • Drug use: No   • Sexual activity: Defer         History reviewed. No pertinent family history.    Objective  Physical Exam:  BP (!) 185/95   Pulse 77   Temp 97.5 °F (36.4 °C) (Oral)   Resp 17   Ht 152.4 cm (60\")   Wt 72.6 kg (160 lb)   LMP  (LMP Unknown)   SpO2 99%   BMI 31.25 kg/m²      Physical Exam    CONSTITUTIONAL: Well developed, no acute distress, nontoxic in appearance.  She is answering questions.  VITAL SIGNS: per nursing, reviewed and noted  SKIN: exposed skin with no rashes, ulcerations or petechiae  EYES: Grossly EOMI, no icterus  ENT: Normal voice.  Patient maintained wearing a mask throughout patient encounter due to coronavirus pandemic  RESPIRATORY: Breathing even and nonlabored, no increased work of breathing. No retractions.   CARDIOVASCULAR:  regular rate and rhythm, no murmurs.    GI: Abdomen soft, nontender, normal bowel sounds.   MUSCULOSKELETAL:  No tenderness. Full ROM. Strength and tone grossly normal.   NEUROLOGIC: Alert, oriented x 3. No gross deficits. GCS 15.   PSYCH: appropriate affect.      Procedures    ED Course:        ED Course as of 01/23/23 2005 Mon Jan 23, 2023   1735 Glucose(!): 142 [LA]   1846 Glucose(!): 195 [LA]   1846 BUN(!): 30 [LA]   1846 Creatinine(!): 1.52  Stable in comparison to previous.  Her last creatinine was on 11/18/2022 at 1.38.  She has had creatinines as high as 1.8 in the past as well.  [LA]   1846 Sodium(!): 134 [LA]   1846 Potassium: 4.9 [LA]   1846 Chloride: 98 [LA]   1846 CO2: 26.2 [LA]   1846 Calcium: 9.9 [LA]   1846 Total Protein: 7.7 [LA]   1846 Albumin: 4.3 [LA]   1846 ALT (SGPT): 14 [LA]   1846 AST (SGOT): 19 [LA]   1846 Alkaline Phosphatase: 98 [LA]   1846 Total Bilirubin: 0.2 [LA]   1846 Globulin: 3.4 [LA]   1846 A/G Ratio: 1.3 [LA]   1846 BUN/Creatinine Ratio: 19.7 [LA]   1846 Anion Gap: 9.8 [LA]   1846 eGFR(!): 37.9 [LA]   1846 Glucose(!): 209 [LA]   1859 Patient has a " leukocytosis on work-up.  We will add lipase, urinalysis, lactic acid, procalcitonin, chest x-ray.  We will continue to monitor glucose and give 500 cc fluid bolus. [LA]   1901 Lipase(!): 62 [LA]   1926 Color, UA: Yellow [LA]   1926 Appearance, UA(!): Cloudy [LA]   1926 pH, UA: 6.5 [LA]   1926 Specific Gravity, UA: 1.014 [LA]   1926 Glucose: Negative [LA]   1926 Ketones, UA: Negative [LA]   1926 Bilirubin, UA: Negative [LA]   1926 Blood, UA: Negative [LA]   1926 Protein, UA(!): Trace [LA]   1926 Leukocytes, UA(!): Moderate (2+) [LA]   1926 Nitrite, UA(!): Positive [LA]   1926 Urobilinogen, UA: 0.2 E.U./dL [LA]   1926 Lactate(!!): 2.2 [LA]   1927 RBC, UA(!): 0-2 [LA]   1927 WBC, UA(!): 13-20 [LA]   1927 Bacteria, UA(!): 3+ [LA]   1927 Squamous Epithelial Cells, UA: None Seen [LA]   1927 Hyaline Casts, UA: None Seen [LA]   1927 Methodology:: Manual Light Microscopy [LA]   1939 Patient is much more talkative and alert on my reassessment.  She is alert and oriented to person place and time.  She states she feels much better.  She denies any abdominal pain or recent fevers.  I did discuss admission to the hospital for further treatment and evaluation of her urinary tract infection but she prefers to go home.  She states she lives close to family members who help take care of her as well.  She states she currently takes 50 units of insulin in the morning and 70 units of insulin at night and has a follow-up with her primary care provider in the next few weeks.  I informed her that she needs to not take the insulin tonight and make sure she is checking her blood sugar before she takes the insulin at home. [LA]   1951 Previous urine culture from last visit grew out E. coli which was susceptible to cephalosporins. [LA]      ED Course User Index  [LA] Nadia Vora PA-C       Lab Results (last 24 hours)     Procedure Component Value Units Date/Time    POC Glucose Once [[id number]]  (Abnormal) Collected: 01/23/23 0944     Specimen: Blood Updated: 01/23/23 1733     Glucose 142 mg/dL      Comment: Serial Number: [id number]:  [id number]       CBC & Differential [[id number]]  (Abnormal) Collected: 01/23/23 1821    Specimen: Blood Updated: 01/23/23 1853    Narrative:      The following orders were created for panel order CBC & Differential.  Procedure                               Abnormality         Status                     ---------                               -----------         ------                     CBC Auto Differential[[id number]]        Abnormal            Final result               Scan Slide[[id number]]                                       Final result                 Please view results for these tests on the individual orders.    Comprehensive Metabolic Panel [[id number]]  (Abnormal) Collected: 01/23/23 1821    Specimen: Blood Updated: 01/23/23 1842     Glucose 195 mg/dL      Comment: Glucose >180, Hemoglobin A1C recommended.        BUN 30 mg/dL      Creatinine 1.52 mg/dL      Sodium 134 mmol/L      Potassium 4.9 mmol/L      Chloride 98 mmol/L      CO2 26.2 mmol/L      Calcium 9.9 mg/dL      Total Protein 7.7 g/dL      Albumin 4.3 g/dL      ALT (SGPT) 14 U/L      AST (SGOT) 19 U/L      Alkaline Phosphatase 98 U/L      Total Bilirubin 0.2 mg/dL      Globulin 3.4 gm/dL      A/G Ratio 1.3 g/dL      BUN/Creatinine Ratio 19.7     Anion Gap 9.8 mmol/L      eGFR 37.9 mL/min/1.73     Narrative:      GFR Normal >60  Chronic Kidney Disease <60  Kidney Failure <15      CBC Auto Differential [[id number]]  (Abnormal) Collected: 01/23/23 1821    Specimen: Blood Updated: 01/23/23 1853     WBC 15.47 10*3/mm3      RBC 4.39 10*6/mm3      Hemoglobin 13.4 g/dL      Hematocrit 43.2 %      MCV 98.4 fL      MCH 30.5 pg      MCHC 31.0 g/dL      RDW 13.1 %      RDW-SD 47.5 fl      MPV 10.8 fL      Platelets 170 10*3/mm3      Neutrophil % 82.5 %      Lymphocyte % 11.8 %      Monocyte % 4.6 %      Eosinophil % 0.3 %      Basophil % 0.4 %       Immature Grans % 0.4 %      Neutrophils, Absolute 12.77 10*3/mm3      Lymphocytes, Absolute 1.82 10*3/mm3      Monocytes, Absolute 0.71 10*3/mm3      Eosinophils, Absolute 0.05 10*3/mm3      Basophils, Absolute 0.06 10*3/mm3      Immature Grans, Absolute 0.06 10*3/mm3      nRBC 0.0 /100 WBC     Scan Slide [[id number]] Collected: 01/23/23 1821    Specimen: Blood Updated: 01/23/23 1853     RBC Morphology Normal     WBC Morphology Normal     Platelet Estimate Adequate    Lipase [[id number]]  (Abnormal) Collected: 01/23/23 1821    Specimen: Blood Updated: 01/23/23 1911     Lipase 62 U/L     Lactic Acid, Plasma [[id number]]  (Abnormal) Collected: 01/23/23 1821    Specimen: Blood Updated: 01/23/23 1924     Lactate 2.2 mmol/L     Blood Culture - Blood, Arm, Right [[id number]] Collected: 01/23/23 1821    Specimen: Blood from Arm, Right Updated: 01/23/23 1958    POC Glucose Once [[id number]]  (Abnormal) Collected: 01/23/23 1842    Specimen: Blood Updated: 01/23/23 1844     Glucose 209 mg/dL      Comment: Serial Number: [id number]:  [id number]       Urinalysis With Culture If Indicated - Urine, Catheter [[id number]]  (Abnormal) Collected: 01/23/23 1906    Specimen: Urine, Catheter Updated: 01/23/23 1924     Color, UA Yellow     Appearance, UA Cloudy     pH, UA 6.5     Specific Gravity, UA 1.014     Glucose, UA Negative     Ketones, UA Negative     Bilirubin, UA Negative     Blood, UA Negative     Protein, UA Trace     Leuk Esterase, UA Moderate (2+)     Nitrite, UA Positive     Urobilinogen, UA 0.2 E.U./dL    Narrative:      In absence of clinical symptoms, the presence of pyuria, bacteria, and/or nitrites on the urinalysis result does not correlate with infection.    Urinalysis, Microscopic Only - Urine, Catheter [[id number]]  (Abnormal) Collected: 01/23/23 1906    Specimen: Urine, Catheter Updated: 01/23/23 1927     RBC, UA 0-2 /HPF      WBC, UA 13-20 /HPF      Bacteria, UA 3+ /HPF      Squamous Epithelial Cells, UA None Seen  /HPF      Hyaline Casts, UA None Seen /LPF      Methodology Manual Light Microscopy    Urine Culture - Urine, Urine, Catheter [[id number]] Collected: 01/23/23 1906    Specimen: Urine, Catheter Updated: 01/23/23 1927    Procalcitonin [[id number]] Collected: 01/23/23 1955    Specimen: Blood Updated: 01/23/23 2001    Blood Culture - Blood, Arm, Left [[id number]] Collected: 01/23/23 1955    Specimen: Blood from Arm, Left Updated: 01/23/23 2001           No radiology results from the last 24 hrs       MDM    Initial impression of presenting illness: Patient is a 67-year-old female history of diabetes presenting to the ER for evaluation of hypoglycemia.  On arrival, patient is stable.  She does have an elevated blood pressure but is afebrile, no acute distress.  She is answering questions appropriately.  Glucose on arrival to     DDX: includes but is not limited to: Hypoglycemia, electrolyte abnormalities.  Patient has headache that she states happens when her blood sugar drops but denies any other symptoms concerning for ACS, cardiac dysrhythmia, intra-abdominal infection, intracranial abnormality    Patient arrives in stable condition with vitals interpreted by myself.     Pertinent features from physical exam: Elevated blood pressure, unremarkable exam    Initial diagnostic plan: We will plan basic labs and continue to check blood glucose frequently.  Discussed with Dr. Bullock who was in agreement.    Results from initial plan were reviewed and interpreted by me revealing a leukocytosis of 15.  I did add procalcitonin and lactic acid given this as well as blood cultures.  Her creatinine appears stable in comparison to previous at 1.52, BUN of 30.  Glucose 195 on blood work.  Lipase was mildly elevated at 62.  She had an obvious urinary tract infection which we will treat with antibiotics.  Lactic acid was elevated at 2.2.  Patient felt better now that she is not hypoglycemic.  She denies any recent fevers,  abdominal pain or any other symptoms.  Chest x-ray reviewed with attending, no acute cardiopulmonary abnormality noted.  Ceftriaxone given for urinary tract infection as well as a 500 cc fluid bolus. Patient's last urine culture grew out E. coli that was susceptible to cephalosporins. Urine sent for culture.    Diagnostic information from other sources: Chart review    Interventions / Re-evaluation: On reassessment, patient is stable.  She is talkative, awake and alert.  She is oriented to person place and time.  She states she feels much better.  Glucose has remained elevated here on assessment without hypoglycemia.    Results/clinical rationale were discussed with patient.  She felt much better, is talkative, alert and oriented.  She has not had any hypoglycemia here during her stay in the ED.  We discussed admission given her UTI but patient prefers to go home at this time which I do believe is reasonable.  She does have a leukocytosis but her blood pressure has not been hypotensive, she has been afebrile.  She does have capacity to make her own medical decisions at this time.  I did discuss that she is going to need to very closely monitor her glucose and talk with her primary care provider about possibly lowering her insulin doses.  I told her she does not need to take her insulin tonight and needs to make sure she checks her glucose in the morning before administering the medication.  We discussed very strict return precautions to the ER as well as follow-up.  She verbalized understanding and was in agreement with this plan of care.    Consultations/Discussion of results with other physicians: Dr. Fraire.    Disposition plan: Discharge  -----    Final diagnoses:   History of hypoglycemia   Acute cystitis without hematuria        Kenny Fraire MD  01/23/23 [id number]

## 2023-01-24 NOTE — DISCHARGE INSTRUCTIONS
Do not take your home insulin dose tonight.  Continue to monitor glucose throughout the night and make sure you are snacking or eating meals to keep her blood glucose elevated.  You do have a urinary tract infection we will treat with antibiotic.  Make sure you take all medication as directed until course is complete unless directed by another provider.  Your urine was sent for culture so if antibiotics need to be changed you will be contacted.  Follow-up with your primary care provider in the next 1 to 2 days.  Contact their office first thing tomorrow morning to inform them of your low blood sugars to see if you can get into see them any sooner.  They may need to readjust these or lower your doses especially if you are having multiple episodes of low blood sugar.  Return to the ER for any change, worsening of symptoms, or any additional concerns including but not limited to worsening fatigue, fever greater than 100.4, intractable vomiting, continued episodes of hypoglycemia.

## 2023-01-25 LAB — BACTERIA SPEC AEROBE CULT: ABNORMAL

## 2023-01-28 LAB
BACTERIA SPEC AEROBE CULT: NORMAL
BACTERIA SPEC AEROBE CULT: NORMAL

## 2023-02-06 ENCOUNTER — APPOINTMENT (OUTPATIENT)
Dept: GENERAL RADIOLOGY | Facility: HOSPITAL | Age: 68
End: 2023-02-06
Payer: MEDICARE

## 2023-02-06 ENCOUNTER — HOSPITAL ENCOUNTER (EMERGENCY)
Facility: HOSPITAL | Age: 68
Discharge: HOME | End: 2023-02-07
Attending: STUDENT IN AN ORGANIZED HEALTH CARE EDUCATION/TRAINING PROGRAM | Admitting: STUDENT IN AN ORGANIZED HEALTH CARE EDUCATION/TRAINING PROGRAM
Payer: MEDICARE

## 2023-02-06 DIAGNOSIS — E16.2 HYPOGLYCEMIA: Primary | ICD-10-CM

## 2023-02-06 LAB
ALBUMIN SERPL-MCNC: 4.1 G/DL (ref 3.5–5.2)
ALBUMIN/GLOB SERPL: 1.4 G/DL
ALP SERPL-CCNC: 91 U/L (ref 39–117)
ALT SERPL W P-5'-P-CCNC: 13 U/L (ref 1–33)
ANION GAP SERPL CALCULATED.3IONS-SCNC: 11.4 MMOL/L (ref 5–15)
AST SERPL-CCNC: 21 U/L (ref 1–32)
BASOPHILS # BLD AUTO: 0.04 10*3/MM3 (ref 0–0.2)
BASOPHILS NFR BLD AUTO: 0.4 % (ref 0–1.5)
BILIRUB SERPL-MCNC: 0.3 MG/DL (ref 0–1.2)
BUN SERPL-MCNC: 31 MG/DL (ref 8–23)
BUN/CREAT SERPL: 17.3 (ref 7–25)
CALCIUM SPEC-SCNC: 9.5 MG/DL (ref 8.6–10.5)
CHLORIDE SERPL-SCNC: 103 MMOL/L (ref 98–107)
CO2 SERPL-SCNC: 22.6 MMOL/L (ref 22–29)
CREAT SERPL-MCNC: 1.79 MG/DL (ref 0.57–1)
DEPRECATED RDW RBC AUTO: 47.7 FL (ref 37–54)
EGFRCR SERPLBLD CKD-EPI 2021: 31.2 ML/MIN/1.73
EOSINOPHIL # BLD AUTO: 0.11 10*3/MM3 (ref 0–0.4)
EOSINOPHIL NFR BLD AUTO: 1.1 % (ref 0.3–6.2)
ERYTHROCYTE [DISTWIDTH] IN BLOOD BY AUTOMATED COUNT: 13.3 % (ref 12.3–15.4)
FLUAV RNA RESP QL NAA+PROBE: NOT DETECTED
FLUBV RNA RESP QL NAA+PROBE: NOT DETECTED
GLOBULIN UR ELPH-MCNC: 2.9 GM/DL
GLUCOSE BLDC GLUCOMTR-MCNC: 226 MG/DL (ref 70–130)
GLUCOSE SERPL-MCNC: 233 MG/DL (ref 65–99)
HCT VFR BLD AUTO: 38.3 % (ref 34–46.6)
HGB BLD-MCNC: 11.9 G/DL (ref 12–15.9)
HOLD SPECIMEN: NORMAL
HOLD SPECIMEN: NORMAL
IMM GRANULOCYTES # BLD AUTO: 0.02 10*3/MM3 (ref 0–0.05)
IMM GRANULOCYTES NFR BLD AUTO: 0.2 % (ref 0–0.5)
LYMPHOCYTES # BLD AUTO: 2.18 10*3/MM3 (ref 0.7–3.1)
LYMPHOCYTES NFR BLD AUTO: 21.7 % (ref 19.6–45.3)
MCH RBC QN AUTO: 29.9 PG (ref 26.6–33)
MCHC RBC AUTO-ENTMCNC: 31.1 G/DL (ref 31.5–35.7)
MCV RBC AUTO: 96.2 FL (ref 79–97)
MONOCYTES # BLD AUTO: 0.71 10*3/MM3 (ref 0.1–0.9)
MONOCYTES NFR BLD AUTO: 7.1 % (ref 5–12)
NEUTROPHILS NFR BLD AUTO: 69.5 % (ref 42.7–76)
NEUTROPHILS NFR BLD AUTO: 7 10*3/MM3 (ref 1.7–7)
NRBC BLD AUTO-RTO: 0 /100 WBC (ref 0–0.2)
PLATELET # BLD AUTO: 154 10*3/MM3 (ref 140–450)
PMV BLD AUTO: 10.2 FL (ref 6–12)
POTASSIUM SERPL-SCNC: 4.3 MMOL/L (ref 3.5–5.2)
PROT SERPL-MCNC: 7 G/DL (ref 6–8.5)
RBC # BLD AUTO: 3.98 10*6/MM3 (ref 3.77–5.28)
SARS-COV-2 RNA RESP QL NAA+PROBE: NOT DETECTED
SODIUM SERPL-SCNC: 137 MMOL/L (ref 136–145)
WBC NRBC COR # BLD: 10.06 10*3/MM3 (ref 3.4–10.8)
WHOLE BLOOD HOLD COAG: NORMAL
WHOLE BLOOD HOLD SPECIMEN: NORMAL

## 2023-02-06 PROCEDURE — 87636 SARSCOV2 & INF A&B AMP PRB: CPT | Performed by: STUDENT IN AN ORGANIZED HEALTH CARE EDUCATION/TRAINING PROGRAM

## 2023-02-06 PROCEDURE — 80053 COMPREHEN METABOLIC PANEL: CPT | Performed by: STUDENT IN AN ORGANIZED HEALTH CARE EDUCATION/TRAINING PROGRAM

## 2023-02-06 PROCEDURE — 71045 X-RAY EXAM CHEST 1 VIEW: CPT

## 2023-02-06 PROCEDURE — 96374 THER/PROPH/DIAG INJ IV PUSH: CPT

## 2023-02-06 PROCEDURE — 85025 COMPLETE CBC W/AUTO DIFF WBC: CPT | Performed by: STUDENT IN AN ORGANIZED HEALTH CARE EDUCATION/TRAINING PROGRAM

## 2023-02-06 PROCEDURE — 93005 ELECTROCARDIOGRAM TRACING: CPT | Performed by: STUDENT IN AN ORGANIZED HEALTH CARE EDUCATION/TRAINING PROGRAM

## 2023-02-06 PROCEDURE — 99284 EMERGENCY DEPT VISIT MOD MDM: CPT

## 2023-02-06 PROCEDURE — 82962 GLUCOSE BLOOD TEST: CPT

## 2023-02-06 PROCEDURE — 25010000002 ONDANSETRON PER 1 MG: Performed by: STUDENT IN AN ORGANIZED HEALTH CARE EDUCATION/TRAINING PROGRAM

## 2023-02-06 PROCEDURE — C9803 HOPD COVID-19 SPEC COLLECT: HCPCS | Performed by: STUDENT IN AN ORGANIZED HEALTH CARE EDUCATION/TRAINING PROGRAM

## 2023-02-06 RX ORDER — ONDANSETRON 2 MG/ML
4 INJECTION INTRAMUSCULAR; INTRAVENOUS ONCE
Status: COMPLETED | OUTPATIENT
Start: 2023-02-06 | End: 2023-02-06

## 2023-02-06 RX ORDER — SODIUM CHLORIDE 0.9 % (FLUSH) 0.9 %
10 SYRINGE (ML) INJECTION AS NEEDED
Status: DISCONTINUED | OUTPATIENT
Start: 2023-02-06 | End: 2023-02-07 | Stop reason: HOSPADM

## 2023-02-06 RX ADMIN — SODIUM CHLORIDE, POTASSIUM CHLORIDE, SODIUM LACTATE AND CALCIUM CHLORIDE 1000 ML: 600; 310; 30; 20 INJECTION, SOLUTION INTRAVENOUS at 23:46

## 2023-02-06 RX ADMIN — ONDANSETRON 4 MG: 2 INJECTION INTRAMUSCULAR; INTRAVENOUS at 22:55

## 2023-02-07 VITALS
RESPIRATION RATE: 18 BRPM | DIASTOLIC BLOOD PRESSURE: 68 MMHG | TEMPERATURE: 98.3 F | BODY MASS INDEX: 31.41 KG/M2 | OXYGEN SATURATION: 95 % | WEIGHT: 160 LBS | HEIGHT: 60 IN | SYSTOLIC BLOOD PRESSURE: 149 MMHG | HEART RATE: 85 BPM

## 2023-02-07 LAB — GLUCOSE BLDC GLUCOMTR-MCNC: 268 MG/DL (ref 70–130)

## 2023-02-07 PROCEDURE — 82962 GLUCOSE BLOOD TEST: CPT

## 2023-02-07 RX ORDER — ONDANSETRON 4 MG/1
4 TABLET, ORALLY DISINTEGRATING ORAL EVERY 8 HOURS PRN
Qty: 20 TABLET | Refills: 0 | Status: SHIPPED | OUTPATIENT
Start: 2023-02-07

## 2023-02-07 NOTE — DISCHARGE INSTRUCTIONS
You were evaluated after being found to have low glucose.  We gave you Zofran you were able to eat in the emergency department.  Your lab work is otherwise unremarkable.  You are now stable for discharge.  It is important that you eat to prevent your glucose from falling.  Also recommend following up with your primary care doctor to discuss your diabetes regimen and if changes may be needed if you continue to have recurrent episodes of low blood glucose.  You are now stable for discharge.

## 2023-02-07 NOTE — ED PROVIDER NOTES
Subjective  History of Present Illness:    Patient is a 67-year-old female with history of diabetes mellitus, takes long-acting insulin, who presents today with altered mental status.  EMS was summoned to an altered mental status, found the patient down, initially gave Narcan, then gave glucose with immediate resolution and patient returned to baseline.  Patient states she took her insulin today as directed but is eating nothing.  States that she feels queasy but has not vomited.  Says that she has had loose stools.  Denies any cough, congestion, rhinorrhea.  No chest pain or shortness of breath.  Denies any abnormal vaginal bleeding or discharge.  No blood in her stool.  Denies any unilateral leg swelling or leg pain.      Nurses Notes reviewed and agree, including vitals, allergies, social history and prior medical history.     REVIEW OF SYSTEMS: All systems reviewed and not pertinent unless noted.  Review of Systems   Constitutional: Positive for activity change and appetite change. Negative for chills, fatigue and fever.   HENT: Negative for congestion, postnasal drip, rhinorrhea, sinus pressure and sinus pain.    Eyes: Negative for discharge and itching.   Respiratory: Negative for cough and shortness of breath.    Cardiovascular: Negative for chest pain and leg swelling.   Gastrointestinal: Positive for nausea. Negative for abdominal distention, abdominal pain and vomiting.   Endocrine: Negative for cold intolerance and heat intolerance.   Genitourinary: Negative for decreased urine volume, difficulty urinating, flank pain, frequency, urgency, vaginal bleeding, vaginal discharge and vaginal pain.   Musculoskeletal: Negative for gait problem, neck pain and neck stiffness.   Skin: Negative for color change.   Allergic/Immunologic: Negative for environmental allergies.   Neurological: Positive for syncope. Negative for seizures, facial asymmetry and speech difficulty.   Psychiatric/Behavioral: Positive for  "confusion. Negative for self-injury and suicidal ideas.       Past Medical History:   Diagnosis Date   • Arthritis    • Cataract    • Depression    • Diabetes mellitus (HCC)    • Dysphagia     Patient reported that intermittently food feels like it gets lodged    • Elevated cholesterol    • Full dentures     Instructed no adhesives the DOS   • GERD (gastroesophageal reflux disease)    • Headache    • History of nuclear stress test     Patient reported this was done with Dr. Douglas around 2017 and that she was started on Metoprolol after testing.    • Hypertension    • Kidney disease     Patient reported \"I have chronic kidney disease and they say my kidney function is at 33%\" and that she has routine care with Dr. Calzada    • Migraines    • PVD (peripheral vascular disease) ([facility])    • Seasonal allergies    • Spinal headache    • Wears glasses        Allergies:    Patient has no known allergies.      Past Surgical History:   Procedure Laterality Date   • ABDOMINAL SURGERY  08/1984    Patient reported after complete hysterectomy, she had another procedure to remove tumors in the abdominal area   • APPENDECTOMY      Reported removed when hysterectomy was done   • CATARACT EXTRACTION W/ INTRAOCULAR LENS IMPLANT Right 6/21/2019    Procedure: CATARACT PHACO EXTRACTION WITH INTRAOCULAR LENS IMPLANT RIGHT;  Surgeon: Tee Enrique MD;  Location: [facility] OR;  Service: Ophthalmology   • CATARACT EXTRACTION W/ INTRAOCULAR LENS IMPLANT Left 7/5/2019    Procedure: CATARACT PHACO EXTRACTION WITH INTRAOCULAR LENS IMPLANT LEFT;  Surgeon: Tee Enrique MD;  Location: [facility] OR;  Service: Ophthalmology   • COLONOSCOPY     • ELBOW EPICONDYLECTOMY Left 09/1990   • ENDOSCOPY     • HYSTERECTOMY  1983    Partial   • HYSTERECTOMY  1984    Complete    • MOUTH SURGERY      Full mouth extraction   • VASCULAR SURGERY Bilateral     For PAD - Reported the right leg was done first around 2014 and the left was done around 2015 " "        Social History     Socioeconomic History   • Marital status: Single   Tobacco Use   • Smoking status: Former     Packs/day: 1.50     Years: 38.00     Pack years: 57.00     Types: Cigarettes     Quit date: 2008     Years since quitting: 15.1   • Smokeless tobacco: Never   • Tobacco comments:     quit 13 yrs ago   Vaping Use   • Vaping Use: Never used   Substance and Sexual Activity   • Alcohol use: No   • Drug use: No   • Sexual activity: Defer         History reviewed. No pertinent family history.    Objective  Physical Exam:  /68   Pulse 85   Temp 98.3 °F (36.8 °C) (Oral)   Resp 18   Ht 152.4 cm (60\")   Wt 72.6 kg (160 lb)   LMP  (LMP Unknown)   SpO2 95%   BMI 31.25 kg/m²      Physical Exam  Constitutional:       General: She is not in acute distress.     Appearance: Normal appearance. She is obese. She is not ill-appearing.   HENT:      Head: Normocephalic and atraumatic.      Nose: Nose normal. No congestion or rhinorrhea.      Mouth/Throat:      Mouth: Mucous membranes are moist.      Pharynx: Oropharynx is clear. No oropharyngeal exudate or posterior oropharyngeal erythema.   Eyes:      Extraocular Movements: Extraocular movements intact.      Conjunctiva/sclera: Conjunctivae normal.      Pupils: Pupils are equal, round, and reactive to light.   Cardiovascular:      Rate and Rhythm: Normal rate and regular rhythm.      Pulses: Normal pulses.      Heart sounds: Normal heart sounds.   Pulmonary:      Effort: Pulmonary effort is normal. No respiratory distress.      Breath sounds: Normal breath sounds.   Abdominal:      General: Abdomen is flat. Bowel sounds are normal. There is no distension.      Palpations: Abdomen is soft.      Tenderness: There is no abdominal tenderness.   Musculoskeletal:         General: No swelling or tenderness. Normal range of motion.      Cervical back: Normal range of motion and neck supple.   Skin:     General: Skin is warm and dry.      Capillary Refill: " Capillary refill takes less than 2 seconds.   Neurological:      General: No focal deficit present.      Mental Status: She is alert and oriented to person, place, and time. Mental status is at baseline.      Cranial Nerves: No cranial nerve deficit.      Sensory: No sensory deficit.      Motor: No weakness.      Coordination: Coordination normal.   Psychiatric:         Mood and Affect: Mood normal.         Behavior: Behavior normal.         Thought Content: Thought content normal.         Judgment: Judgment normal.         Procedures    ED Course:    ED Course as of 02/07/23 0143   Mon Feb 06, 2023 2348 EKG interpreted by me, normal sinus rhythm with no concerning ST changes rate of 83 [JE]      ED Course User Index  [JE] Clement Leavitt MD       Lab Results (last 24 hours)     Procedure Component Value Units Date/Time    POC Glucose Once [[id number]]  (Abnormal) Collected: 02/06/23 2232    Specimen: Blood Updated: 02/06/23 2234     Glucose 226 mg/dL      Comment: Serial Number: [id number]:  [id number]       CBC & Differential [[id number]]  (Abnormal) Collected: 02/06/23 2237    Specimen: Blood Updated: 02/06/23 2245    Narrative:      The following orders were created for panel order CBC & Differential.  Procedure                               Abnormality         Status                     ---------                               -----------         ------                     CBC Auto Differential[[id number]]        Abnormal            Final result                 Please view results for these tests on the individual orders.    Comprehensive Metabolic Panel [[id number]]  (Abnormal) Collected: 02/06/23 2237    Specimen: Blood Updated: 02/06/23 2300     Glucose 233 mg/dL      Comment: Glucose >180, Hemoglobin A1C recommended.        BUN 31 mg/dL      Creatinine 1.79 mg/dL      Sodium 137 mmol/L      Potassium 4.3 mmol/L      Chloride 103 mmol/L      CO2 22.6 mmol/L      Calcium 9.5 mg/dL      Total Protein 7.0  g/dL      Albumin 4.1 g/dL      ALT (SGPT) 13 U/L      AST (SGOT) 21 U/L      Alkaline Phosphatase 91 U/L      Total Bilirubin 0.3 mg/dL      Globulin 2.9 gm/dL      A/G Ratio 1.4 g/dL      BUN/Creatinine Ratio 17.3     Anion Gap 11.4 mmol/L      eGFR 31.2 mL/min/1.73     Narrative:      GFR Normal >60  Chronic Kidney Disease <60  Kidney Failure <15      CBC Auto Differential [[id number]]  (Abnormal) Collected: 02/06/23 2237    Specimen: Blood Updated: 02/06/23 2245     WBC 10.06 10*3/mm3      RBC 3.98 10*6/mm3      Hemoglobin 11.9 g/dL      Hematocrit 38.3 %      MCV 96.2 fL      MCH 29.9 pg      MCHC 31.1 g/dL      RDW 13.3 %      RDW-SD 47.7 fl      MPV 10.2 fL      Platelets 154 10*3/mm3      Neutrophil % 69.5 %      Lymphocyte % 21.7 %      Monocyte % 7.1 %      Eosinophil % 1.1 %      Basophil % 0.4 %      Immature Grans % 0.2 %      Neutrophils, Absolute 7.00 10*3/mm3      Lymphocytes, Absolute 2.18 10*3/mm3      Monocytes, Absolute 0.71 10*3/mm3      Eosinophils, Absolute 0.11 10*3/mm3      Basophils, Absolute 0.04 10*3/mm3      Immature Grans, Absolute 0.02 10*3/mm3      nRBC 0.0 /100 WBC     COVID-19 and FLU A/B PCR - Swab, Nasopharynx [[id number]]  (Normal) Collected: 02/06/23 2303    Specimen: Swab from Nasopharynx Updated: 02/06/23 2338     COVID19 Not Detected     Influenza A PCR Not Detected     Influenza B PCR Not Detected    Narrative:      Fact sheet for providers: https://www.fda.gov/media/945485/download    Fact sheet for patients: https://www.fda.gov/media/888530/download    Test performed by PCR.           No radiology results from the last 24 hrs       MDM    Initial impression of presenting illness: Hypoglycemia    DDX: includes but is not limited to: Viral gastroenteritis, insulin overdose, sepsis, bacterial pneumonia    Patient arrives stable with vitals interpreted by myself.     Pertinent features from physical exam: Baseline mental status, no tenderness palpation of the belly..    Initial  diagnostic plan: CBC, CMP, chest x-ray, ECG, Zofran, p.o. challenge    Results from initial plan were reviewed and interpreted by me revealing no concerns for acute process, patient's maintain blood glucose for several hours with no recurrence    Diagnostic information from other sources: Reviewed past medical records    Interventions / Re-evaluation: Given p.o. intake in Zofran, on reassessment able to tolerate p.o. with no difficulties.  Eats whole sandwich and maintains glucose for over 2 and half hours    Results/clinical rationale were discussed with patient at bedside    Consultations/Discussion of results with other physicians: Encouraged follow-up with PCP to evaluate diabetes regimen and see if any modifications may be needed given the patient's recurrent hypoglycemia    Disposition plan: Discharge  -----    Final diagnoses:   Hypoglycemia        Clement Leavitt MD  02/07/23 0144

## 2023-02-17 ENCOUNTER — HOSPITAL ENCOUNTER (OUTPATIENT)
Facility: HOSPITAL | Age: 68
Setting detail: OBSERVATION
Discharge: HOME | End: 2023-02-18
Attending: EMERGENCY MEDICINE | Admitting: FAMILY MEDICINE
Payer: MEDICARE

## 2023-02-17 DIAGNOSIS — E87.5 HYPERKALEMIA: Primary | ICD-10-CM

## 2023-02-17 LAB
ALBUMIN SERPL-MCNC: 4.8 G/DL (ref 3.5–5.2)
ALBUMIN/GLOB SERPL: 1.5 G/DL
ALP SERPL-CCNC: 113 U/L (ref 39–117)
ALT SERPL W P-5'-P-CCNC: 18 U/L (ref 1–33)
ANION GAP SERPL CALCULATED.3IONS-SCNC: 12.2 MMOL/L (ref 5–15)
AST SERPL-CCNC: 28 U/L (ref 1–32)
BACTERIA UR QL AUTO: ABNORMAL /HPF
BASOPHILS # BLD AUTO: 0.07 10*3/MM3 (ref 0–0.2)
BASOPHILS NFR BLD AUTO: 0.7 % (ref 0–1.5)
BILIRUB SERPL-MCNC: 0.3 MG/DL (ref 0–1.2)
BILIRUB UR QL STRIP: NEGATIVE
BUN SERPL-MCNC: 41 MG/DL (ref 8–23)
BUN/CREAT SERPL: 19.7 (ref 7–25)
CALCIUM SPEC-SCNC: 10.5 MG/DL (ref 8.6–10.5)
CHLORIDE SERPL-SCNC: 99 MMOL/L (ref 98–107)
CLARITY UR: CLEAR
CO2 SERPL-SCNC: 25.8 MMOL/L (ref 22–29)
COLOR UR: YELLOW
CREAT SERPL-MCNC: 2.08 MG/DL (ref 0.57–1)
DEPRECATED RDW RBC AUTO: 47.8 FL (ref 37–54)
EGFRCR SERPLBLD CKD-EPI 2021: 26 ML/MIN/1.73
EOSINOPHIL # BLD AUTO: 0.14 10*3/MM3 (ref 0–0.4)
EOSINOPHIL NFR BLD AUTO: 1.4 % (ref 0.3–6.2)
ERYTHROCYTE [DISTWIDTH] IN BLOOD BY AUTOMATED COUNT: 13.2 % (ref 12.3–15.4)
GLOBULIN UR ELPH-MCNC: 3.3 GM/DL
GLUCOSE BLDC GLUCOMTR-MCNC: 139 MG/DL (ref 70–130)
GLUCOSE BLDC GLUCOMTR-MCNC: 326 MG/DL (ref 70–130)
GLUCOSE SERPL-MCNC: 78 MG/DL (ref 65–99)
GLUCOSE UR STRIP-MCNC: ABNORMAL MG/DL
HBA1C MFR BLD: 5.9 % (ref 4.8–5.6)
HCT VFR BLD AUTO: 42.8 % (ref 34–46.6)
HGB BLD-MCNC: 13.1 G/DL (ref 12–15.9)
HGB UR QL STRIP.AUTO: NEGATIVE
HOLD SPECIMEN: NORMAL
HOLD SPECIMEN: NORMAL
HYALINE CASTS UR QL AUTO: ABNORMAL /LPF
IMM GRANULOCYTES # BLD AUTO: 0.03 10*3/MM3 (ref 0–0.05)
IMM GRANULOCYTES NFR BLD AUTO: 0.3 % (ref 0–0.5)
KETONES UR QL STRIP: NEGATIVE
LEUKOCYTE ESTERASE UR QL STRIP.AUTO: ABNORMAL
LYMPHOCYTES # BLD AUTO: 3.74 10*3/MM3 (ref 0.7–3.1)
LYMPHOCYTES NFR BLD AUTO: 37.3 % (ref 19.6–45.3)
MCH RBC QN AUTO: 29.9 PG (ref 26.6–33)
MCHC RBC AUTO-ENTMCNC: 30.6 G/DL (ref 31.5–35.7)
MCV RBC AUTO: 97.7 FL (ref 79–97)
MONOCYTES # BLD AUTO: 0.67 10*3/MM3 (ref 0.1–0.9)
MONOCYTES NFR BLD AUTO: 6.7 % (ref 5–12)
NEUTROPHILS NFR BLD AUTO: 5.39 10*3/MM3 (ref 1.7–7)
NEUTROPHILS NFR BLD AUTO: 53.6 % (ref 42.7–76)
NITRITE UR QL STRIP: NEGATIVE
NRBC BLD AUTO-RTO: 0 /100 WBC (ref 0–0.2)
PH UR STRIP.AUTO: 6.5 [PH] (ref 5–8)
PLATELET # BLD AUTO: 165 10*3/MM3 (ref 140–450)
PMV BLD AUTO: 9.9 FL (ref 6–12)
POTASSIUM SERPL-SCNC: 5.2 MMOL/L (ref 3.5–5.2)
POTASSIUM SERPL-SCNC: 6 MMOL/L (ref 3.5–5.2)
POTASSIUM SERPL-SCNC: 6.5 MMOL/L (ref 3.5–5.2)
PROT SERPL-MCNC: 8.1 G/DL (ref 6–8.5)
PROT UR QL STRIP: NEGATIVE
RBC # BLD AUTO: 4.38 10*6/MM3 (ref 3.77–5.28)
RBC # UR STRIP: ABNORMAL /HPF
REF LAB TEST METHOD: ABNORMAL
SODIUM SERPL-SCNC: 137 MMOL/L (ref 136–145)
SP GR UR STRIP: 1.01 (ref 1–1.03)
SQUAMOUS #/AREA URNS HPF: ABNORMAL /HPF
UROBILINOGEN UR QL STRIP: ABNORMAL
WBC # UR STRIP: ABNORMAL /HPF
WBC NRBC COR # BLD: 10.04 10*3/MM3 (ref 3.4–10.8)
WHOLE BLOOD HOLD COAG: NORMAL

## 2023-02-17 PROCEDURE — 80053 COMPREHEN METABOLIC PANEL: CPT | Performed by: PHYSICIAN ASSISTANT

## 2023-02-17 PROCEDURE — 84132 ASSAY OF SERUM POTASSIUM: CPT | Performed by: INTERNAL MEDICINE

## 2023-02-17 PROCEDURE — 96372 THER/PROPH/DIAG INJ SC/IM: CPT

## 2023-02-17 PROCEDURE — G0378 HOSPITAL OBSERVATION PER HR: HCPCS

## 2023-02-17 PROCEDURE — 36415 COLL VENOUS BLD VENIPUNCTURE: CPT

## 2023-02-17 PROCEDURE — 84132 ASSAY OF SERUM POTASSIUM: CPT | Performed by: FAMILY MEDICINE

## 2023-02-17 PROCEDURE — 96376 TX/PRO/DX INJ SAME DRUG ADON: CPT

## 2023-02-17 PROCEDURE — 83036 HEMOGLOBIN GLYCOSYLATED A1C: CPT | Performed by: FAMILY MEDICINE

## 2023-02-17 PROCEDURE — 25010000002 HEPARIN (PORCINE) PER 1000 UNITS: Performed by: FAMILY MEDICINE

## 2023-02-17 PROCEDURE — 85025 COMPLETE CBC W/AUTO DIFF WBC: CPT | Performed by: PHYSICIAN ASSISTANT

## 2023-02-17 PROCEDURE — 81001 URINALYSIS AUTO W/SCOPE: CPT | Performed by: PHYSICIAN ASSISTANT

## 2023-02-17 PROCEDURE — 82962 GLUCOSE BLOOD TEST: CPT

## 2023-02-17 PROCEDURE — 25010000002 CALCIUM GLUCONATE PER 10 ML: Performed by: PHYSICIAN ASSISTANT

## 2023-02-17 PROCEDURE — 96375 TX/PRO/DX INJ NEW DRUG ADDON: CPT

## 2023-02-17 PROCEDURE — 63710000001 INSULIN REGULAR HUMAN PER 5 UNITS: Performed by: PHYSICIAN ASSISTANT

## 2023-02-17 PROCEDURE — 25010000002 FUROSEMIDE PER 20 MG: Performed by: INTERNAL MEDICINE

## 2023-02-17 PROCEDURE — 99223 1ST HOSP IP/OBS HIGH 75: CPT | Performed by: FAMILY MEDICINE

## 2023-02-17 PROCEDURE — 96374 THER/PROPH/DIAG INJ IV PUSH: CPT

## 2023-02-17 PROCEDURE — 99284 EMERGENCY DEPT VISIT MOD MDM: CPT

## 2023-02-17 PROCEDURE — 96361 HYDRATE IV INFUSION ADD-ON: CPT

## 2023-02-17 PROCEDURE — 93005 ELECTROCARDIOGRAM TRACING: CPT | Performed by: PHYSICIAN ASSISTANT

## 2023-02-17 RX ORDER — CHOLECALCIFEROL (VITAMIN D3) 125 MCG
5 CAPSULE ORAL NIGHTLY PRN
Status: DISCONTINUED | OUTPATIENT
Start: 2023-02-17 | End: 2023-02-18 | Stop reason: HOSPADM

## 2023-02-17 RX ORDER — DEXTROSE MONOHYDRATE 25 G/50ML
50 INJECTION, SOLUTION INTRAVENOUS
Status: DISCONTINUED | OUTPATIENT
Start: 2023-02-17 | End: 2023-02-18 | Stop reason: HOSPADM

## 2023-02-17 RX ORDER — PANTOPRAZOLE SODIUM 40 MG/1
40 TABLET, DELAYED RELEASE ORAL DAILY
Status: DISCONTINUED | OUTPATIENT
Start: 2023-02-17 | End: 2023-02-18 | Stop reason: HOSPADM

## 2023-02-17 RX ORDER — SODIUM POLYSTYRENE SULFONATE 15 G/60ML
30 SUSPENSION ORAL; RECTAL ONCE
Status: COMPLETED | OUTPATIENT
Start: 2023-02-17 | End: 2023-02-17

## 2023-02-17 RX ORDER — FUROSEMIDE 10 MG/ML
40 INJECTION INTRAMUSCULAR; INTRAVENOUS ONCE
Status: COMPLETED | OUTPATIENT
Start: 2023-02-17 | End: 2023-02-17

## 2023-02-17 RX ORDER — NICOTINE POLACRILEX 4 MG
15 LOZENGE BUCCAL
Status: DISCONTINUED | OUTPATIENT
Start: 2023-02-17 | End: 2023-02-18 | Stop reason: HOSPADM

## 2023-02-17 RX ORDER — BISACODYL 5 MG/1
5 TABLET, DELAYED RELEASE ORAL DAILY PRN
Status: DISCONTINUED | OUTPATIENT
Start: 2023-02-17 | End: 2023-02-18 | Stop reason: HOSPADM

## 2023-02-17 RX ORDER — ASPIRIN 325 MG
325 TABLET, DELAYED RELEASE (ENTERIC COATED) ORAL DAILY
Status: DISCONTINUED | OUTPATIENT
Start: 2023-02-17 | End: 2023-02-18 | Stop reason: HOSPADM

## 2023-02-17 RX ORDER — POLYETHYLENE GLYCOL 3350 17 G/17G
17 POWDER, FOR SOLUTION ORAL DAILY PRN
Status: DISCONTINUED | OUTPATIENT
Start: 2023-02-17 | End: 2023-02-18 | Stop reason: HOSPADM

## 2023-02-17 RX ORDER — SODIUM CHLORIDE 9 MG/ML
100 INJECTION, SOLUTION INTRAVENOUS CONTINUOUS
Status: DISCONTINUED | OUTPATIENT
Start: 2023-02-17 | End: 2023-02-18 | Stop reason: HOSPADM

## 2023-02-17 RX ORDER — ONDANSETRON 2 MG/ML
4 INJECTION INTRAMUSCULAR; INTRAVENOUS EVERY 6 HOURS PRN
Status: DISCONTINUED | OUTPATIENT
Start: 2023-02-17 | End: 2023-02-18 | Stop reason: HOSPADM

## 2023-02-17 RX ORDER — ACETAMINOPHEN 160 MG/5ML
650 SOLUTION ORAL EVERY 4 HOURS PRN
Status: DISCONTINUED | OUTPATIENT
Start: 2023-02-17 | End: 2023-02-18 | Stop reason: HOSPADM

## 2023-02-17 RX ORDER — ALBUTEROL SULFATE 2.5 MG/3ML
2.5 SOLUTION RESPIRATORY (INHALATION) ONCE
Status: DISCONTINUED | OUTPATIENT
Start: 2023-02-17 | End: 2023-02-18 | Stop reason: HOSPADM

## 2023-02-17 RX ORDER — SODIUM CHLORIDE 0.9 % (FLUSH) 0.9 %
10 SYRINGE (ML) INJECTION AS NEEDED
Status: DISCONTINUED | OUTPATIENT
Start: 2023-02-17 | End: 2023-02-18 | Stop reason: HOSPADM

## 2023-02-17 RX ORDER — LOSARTAN POTASSIUM 25 MG/1
25 TABLET ORAL DAILY
COMMUNITY
End: 2023-02-18 | Stop reason: HOSPADM

## 2023-02-17 RX ORDER — DEXTROSE MONOHYDRATE 25 G/50ML
25 INJECTION, SOLUTION INTRAVENOUS
Status: DISCONTINUED | OUTPATIENT
Start: 2023-02-17 | End: 2023-02-18 | Stop reason: HOSPADM

## 2023-02-17 RX ORDER — INSULIN ASPART 100 [IU]/ML
0-7 INJECTION, SOLUTION INTRAVENOUS; SUBCUTANEOUS
Status: DISCONTINUED | OUTPATIENT
Start: 2023-02-17 | End: 2023-02-18 | Stop reason: HOSPADM

## 2023-02-17 RX ORDER — MONTELUKAST SODIUM 10 MG/1
10 TABLET ORAL NIGHTLY
Status: DISCONTINUED | OUTPATIENT
Start: 2023-02-17 | End: 2023-02-18 | Stop reason: HOSPADM

## 2023-02-17 RX ORDER — CALCIUM GLUCONATE 94 MG/ML
1 INJECTION, SOLUTION INTRAVENOUS ONCE
Status: COMPLETED | OUTPATIENT
Start: 2023-02-17 | End: 2023-02-17

## 2023-02-17 RX ORDER — ACETAMINOPHEN 650 MG/1
650 SUPPOSITORY RECTAL EVERY 4 HOURS PRN
Status: DISCONTINUED | OUTPATIENT
Start: 2023-02-17 | End: 2023-02-18 | Stop reason: HOSPADM

## 2023-02-17 RX ORDER — SODIUM CHLORIDE 0.9 % (FLUSH) 0.9 %
3 SYRINGE (ML) INJECTION EVERY 12 HOURS SCHEDULED
Status: DISCONTINUED | OUTPATIENT
Start: 2023-02-17 | End: 2023-02-18 | Stop reason: HOSPADM

## 2023-02-17 RX ORDER — AMOXICILLIN 250 MG
2 CAPSULE ORAL 2 TIMES DAILY
Status: DISCONTINUED | OUTPATIENT
Start: 2023-02-17 | End: 2023-02-18 | Stop reason: HOSPADM

## 2023-02-17 RX ORDER — ACETAMINOPHEN 325 MG/1
650 TABLET ORAL EVERY 4 HOURS PRN
Status: DISCONTINUED | OUTPATIENT
Start: 2023-02-17 | End: 2023-02-18 | Stop reason: HOSPADM

## 2023-02-17 RX ORDER — SODIUM CHLORIDE 9 MG/ML
40 INJECTION, SOLUTION INTRAVENOUS AS NEEDED
Status: DISCONTINUED | OUTPATIENT
Start: 2023-02-17 | End: 2023-02-18 | Stop reason: HOSPADM

## 2023-02-17 RX ORDER — BISACODYL 10 MG
10 SUPPOSITORY, RECTAL RECTAL DAILY PRN
Status: DISCONTINUED | OUTPATIENT
Start: 2023-02-17 | End: 2023-02-18 | Stop reason: HOSPADM

## 2023-02-17 RX ORDER — LEVOTHYROXINE SODIUM 0.05 MG/1
50 TABLET ORAL DAILY
Status: DISCONTINUED | OUTPATIENT
Start: 2023-02-17 | End: 2023-02-18 | Stop reason: HOSPADM

## 2023-02-17 RX ORDER — CLOPIDOGREL BISULFATE 75 MG/1
75 TABLET ORAL DAILY
Status: DISCONTINUED | OUTPATIENT
Start: 2023-02-17 | End: 2023-02-18 | Stop reason: HOSPADM

## 2023-02-17 RX ORDER — GLIPIZIDE 5 MG/1
20 TABLET ORAL EVERY MORNING
Status: DISCONTINUED | OUTPATIENT
Start: 2023-02-18 | End: 2023-02-18 | Stop reason: HOSPADM

## 2023-02-17 RX ORDER — HEPARIN SODIUM 5000 [USP'U]/ML
5000 INJECTION, SOLUTION INTRAVENOUS; SUBCUTANEOUS EVERY 12 HOURS SCHEDULED
Status: DISCONTINUED | OUTPATIENT
Start: 2023-02-17 | End: 2023-02-18 | Stop reason: HOSPADM

## 2023-02-17 RX ORDER — AMLODIPINE BESYLATE 5 MG/1
5 TABLET ORAL DAILY
Status: DISCONTINUED | OUTPATIENT
Start: 2023-02-17 | End: 2023-02-18 | Stop reason: HOSPADM

## 2023-02-17 RX ORDER — METOPROLOL SUCCINATE 50 MG/1
50 TABLET, EXTENDED RELEASE ORAL DAILY
Status: DISCONTINUED | OUTPATIENT
Start: 2023-02-17 | End: 2023-02-18 | Stop reason: HOSPADM

## 2023-02-17 RX ORDER — SODIUM CHLORIDE 0.9 % (FLUSH) 0.9 %
3-10 SYRINGE (ML) INJECTION AS NEEDED
Status: DISCONTINUED | OUTPATIENT
Start: 2023-02-17 | End: 2023-02-18 | Stop reason: HOSPADM

## 2023-02-17 RX ORDER — SODIUM CHLORIDE 9 MG/ML
50 INJECTION, SOLUTION INTRAVENOUS CONTINUOUS
Status: DISCONTINUED | OUTPATIENT
Start: 2023-02-17 | End: 2023-02-17

## 2023-02-17 RX ORDER — ATORVASTATIN CALCIUM 40 MG/1
40 TABLET, FILM COATED ORAL DAILY
Status: DISCONTINUED | OUTPATIENT
Start: 2023-02-17 | End: 2023-02-18 | Stop reason: HOSPADM

## 2023-02-17 RX ORDER — GABAPENTIN 300 MG/1
300 CAPSULE ORAL EVERY 8 HOURS SCHEDULED
Status: DISCONTINUED | OUTPATIENT
Start: 2023-02-17 | End: 2023-02-18 | Stop reason: HOSPADM

## 2023-02-17 RX ADMIN — SODIUM ZIRCONIUM CYCLOSILICATE 10 G: 10 POWDER, FOR SUSPENSION ORAL at 14:49

## 2023-02-17 RX ADMIN — SODIUM CHLORIDE 100 ML/HR: 9 INJECTION, SOLUTION INTRAVENOUS at 17:56

## 2023-02-17 RX ADMIN — CALCIUM GLUCONATE 1 G: 98 INJECTION, SOLUTION INTRAVENOUS at 14:46

## 2023-02-17 RX ADMIN — GABAPENTIN 300 MG: 300 CAPSULE ORAL at 22:01

## 2023-02-17 RX ADMIN — FUROSEMIDE 40 MG: 10 INJECTION, SOLUTION INTRAMUSCULAR; INTRAVENOUS at 20:58

## 2023-02-17 RX ADMIN — SODIUM ZIRCONIUM CYCLOSILICATE 10 G: 10 POWDER, FOR SUSPENSION ORAL at 20:58

## 2023-02-17 RX ADMIN — HEPARIN SODIUM 5000 UNITS: 5000 INJECTION INTRAVENOUS; SUBCUTANEOUS at 20:57

## 2023-02-17 RX ADMIN — SODIUM POLYSTYRENE SULFONATE 30 G: 15 SUSPENSION ORAL; RECTAL at 17:55

## 2023-02-17 RX ADMIN — HUMAN INSULIN 10 UNITS: 100 INJECTION, SOLUTION SUBCUTANEOUS at 14:48

## 2023-02-17 RX ADMIN — DEXTROSE MONOHYDRATE 50 ML: 25 INJECTION, SOLUTION INTRAVENOUS at 14:46

## 2023-02-17 RX ADMIN — SODIUM CHLORIDE 500 ML: 9 INJECTION, SOLUTION INTRAVENOUS at 14:49

## 2023-02-17 RX ADMIN — DEXTROSE MONOHYDRATE 25 G: 25 INJECTION, SOLUTION INTRAVENOUS at 18:41

## 2023-02-17 NOTE — ED NOTES
Called Yuni, House supervisor at this time requesting a tele bed. She stated she would call back.

## 2023-02-17 NOTE — ED PROVIDER NOTES
"Subjective  History of Present Illness:    Chief Complaint:   Chief Complaint   Patient presents with   • Abnormal Lab      History of Present Illness: Viola Barlow is a 67 y.o. female who presents to the emergency department complaining of abnormal potassium on outpatient testing.  Patient has a history of stage III kidney disease sees Dr. Calzada.  Had routine labs drawn yesterday in anticipation of a scheduled appointment today with Dr. Calzada.  Was called and told that her potassium was 6.2 and to come to the ER.  Patient has no complaints at this time.  No chest pain or shortness of breath.  No abdominal pain.  No urinary symptoms.  Onset: Labs drawn yesterday  Duration: Ongoing  Exacerbating / Alleviating factors: None  Associated symptoms: None      Nurses Notes reviewed and agree, including vitals, allergies, social history and prior medical history.     Review of Systems   Constitutional: Negative.         Hyperkalemia on outpatient testing   HENT: Negative.    Eyes: Negative.    Respiratory: Negative.    Cardiovascular: Negative for chest pain and palpitations.   Gastrointestinal: Negative.    Genitourinary: Negative.    Musculoskeletal: Negative.    Skin: Negative.    Neurological: Negative.    Psychiatric/Behavioral: Negative.        Past Medical History:   Diagnosis Date   • Arthritis    • Cataract    • Depression    • Diabetes mellitus (HCC)    • Dysphagia     Patient reported that intermittently food feels like it gets lodged    • Elevated cholesterol    • Full dentures     Instructed no adhesives the DOS   • GERD (gastroesophageal reflux disease)    • Headache    • History of nuclear stress test     Patient reported this was done with Dr. Douglas around 2017 and that she was started on Metoprolol after testing.    • Hypertension    • Kidney disease     Patient reported \"I have chronic kidney disease and they say my kidney function is at 33%\" and that she has routine care with Dr. Calzada    • " "Migraines    • PVD (peripheral vascular disease) ([facility])    • Seasonal allergies    • Spinal headache    • Wears glasses        Allergies:    Patient has no known allergies.      Past Surgical History:   Procedure Laterality Date   • ABDOMINAL SURGERY  08/1984    Patient reported after complete hysterectomy, she had another procedure to remove tumors in the abdominal area   • APPENDECTOMY      Reported removed when hysterectomy was done   • CATARACT EXTRACTION W/ INTRAOCULAR LENS IMPLANT Right 6/21/2019    Procedure: CATARACT PHACO EXTRACTION WITH INTRAOCULAR LENS IMPLANT RIGHT;  Surgeon: Tee Enrique MD;  Location: [facility] OR;  Service: Ophthalmology   • CATARACT EXTRACTION W/ INTRAOCULAR LENS IMPLANT Left 7/5/2019    Procedure: CATARACT PHACO EXTRACTION WITH INTRAOCULAR LENS IMPLANT LEFT;  Surgeon: Tee Enrique MD;  Location: [facility] OR;  Service: Ophthalmology   • COLONOSCOPY     • ELBOW EPICONDYLECTOMY Left 09/1990   • ENDOSCOPY     • HYSTERECTOMY  1983    Partial   • HYSTERECTOMY  1984    Complete    • MOUTH SURGERY      Full mouth extraction   • VASCULAR SURGERY Bilateral     For PAD - Reported the right leg was done first around 2014 and the left was done around 2015         Social History     Socioeconomic History   • Marital status: Single   Tobacco Use   • Smoking status: Former     Packs/day: 1.50     Years: 38.00     Pack years: 57.00     Types: Cigarettes     Quit date: 2008     Years since quitting: 15.1   • Smokeless tobacco: Never   • Tobacco comments:     quit 13 yrs ago   Vaping Use   • Vaping Use: Never used   Substance and Sexual Activity   • Alcohol use: No   • Drug use: No   • Sexual activity: Defer         History reviewed. No pertinent family history.    Objective  Physical Exam:  /63 (BP Location: Left arm, Patient Position: Lying)   Pulse 81   Temp 97.9 °F (36.6 °C) (Oral)   Resp 18   Ht 152.4 cm (60\")   Wt 71.9 kg (158 lb 8.2 oz)   LMP  (LMP Unknown)   SpO2 97%   " BMI 30.96 kg/m²      Physical Exam  Vitals and nursing note reviewed.   Constitutional:       General: She is not in acute distress.     Appearance: Normal appearance. She is not ill-appearing, toxic-appearing or diaphoretic.   HENT:      Head: Normocephalic and atraumatic.      Nose: Nose normal.   Eyes:      Extraocular Movements: Extraocular movements intact.   Cardiovascular:      Rate and Rhythm: Normal rate and regular rhythm.   Pulmonary:      Effort: Pulmonary effort is normal.   Abdominal:      General: Abdomen is flat.      Palpations: Abdomen is soft.      Tenderness: There is no abdominal tenderness.   Musculoskeletal:         General: Normal range of motion.      Cervical back: Normal range of motion.   Skin:     General: Skin is warm and dry.   Neurological:      General: No focal deficit present.      Mental Status: She is alert and oriented to person, place, and time. Mental status is at baseline.   Psychiatric:         Mood and Affect: Mood normal.         Behavior: Behavior normal.           Procedures    ED Course:    ED Course as of 02/17/23 1922 Fri Feb 17, 2023   1305 EKG interpreted by me: Sinus rhythm, normal rate, no acute ST changes, some nonspecific T waves, this is an atypical EKG [MP]      ED Course User Index  [MP] Kenny Fraire MD       Lab Results (last 24 hours)     Procedure Component Value Units Date/Time    Comprehensive Metabolic Panel [[id number]]  (Abnormal) Collected: 02/17/23 1258    Specimen: Blood Updated: 02/17/23 1350     Glucose 78 mg/dL      BUN 41 mg/dL      Creatinine 2.08 mg/dL      Sodium 137 mmol/L      Potassium 6.5 mmol/L      Comment: Slight hemolysis detected by analyzer. Results may be affected.        Chloride 99 mmol/L      CO2 25.8 mmol/L      Calcium 10.5 mg/dL      Total Protein 8.1 g/dL      Albumin 4.8 g/dL      ALT (SGPT) 18 U/L      AST (SGOT) 28 U/L      Comment: Slight hemolysis detected by analyzer. Results may be affected.         Alkaline Phosphatase 113 U/L      Total Bilirubin 0.3 mg/dL      Globulin 3.3 gm/dL      A/G Ratio 1.5 g/dL      BUN/Creatinine Ratio 19.7     Anion Gap 12.2 mmol/L      eGFR 26.0 mL/min/1.73     Narrative:      GFR Normal >60  Chronic Kidney Disease <60  Kidney Failure <15      Potassium [[id number]]  (Abnormal) Collected: 02/17/23 1448    Specimen: Blood Updated: 02/17/23 1509     Potassium 6.0 mmol/L     CBC & Differential [[id number]]  (Abnormal) Collected: 02/17/23 1448    Specimen: Blood Updated: 02/17/23 1456    Narrative:      The following orders were created for panel order CBC & Differential.  Procedure                               Abnormality         Status                     ---------                               -----------         ------                     CBC Auto Differential[[id number]]        Abnormal            Final result                 Please view results for these tests on the individual orders.    CBC Auto Differential [[id number]]  (Abnormal) Collected: 02/17/23 1448    Specimen: Blood Updated: 02/17/23 1456     WBC 10.04 10*3/mm3      RBC 4.38 10*6/mm3      Hemoglobin 13.1 g/dL      Hematocrit 42.8 %      MCV 97.7 fL      MCH 29.9 pg      MCHC 30.6 g/dL      RDW 13.2 %      RDW-SD 47.8 fl      MPV 9.9 fL      Platelets 165 10*3/mm3      Neutrophil % 53.6 %      Lymphocyte % 37.3 %      Monocyte % 6.7 %      Eosinophil % 1.4 %      Basophil % 0.7 %      Immature Grans % 0.3 %      Neutrophils, Absolute 5.39 10*3/mm3      Lymphocytes, Absolute 3.74 10*3/mm3      Monocytes, Absolute 0.67 10*3/mm3      Eosinophils, Absolute 0.14 10*3/mm3      Basophils, Absolute 0.07 10*3/mm3      Immature Grans, Absolute 0.03 10*3/mm3      nRBC 0.0 /100 WBC     Hemoglobin A1c [[id number]]  (Abnormal) Collected: 02/17/23 1448    Specimen: Blood Updated: 02/17/23 1643     Hemoglobin A1C 5.90 %     Narrative:      Hemoglobin A1C Ranges:    Increased Risk for Diabetes  5.7% to 6.4%  Diabetes                      >= 6.5%  Diabetic Goal                < 7.0%    Urinalysis With Microscopic If Indicated (No Culture) - Urine, Clean Catch [[id number]]  (Abnormal) Collected: 02/17/23 1530    Specimen: Urine, Clean Catch Updated: 02/17/23 1539     Color, UA Yellow     Appearance, UA Clear     pH, UA 6.5     Specific Gravity, UA 1.014     Glucose,  mg/dL (Trace)     Ketones, UA Negative     Bilirubin, UA Negative     Blood, UA Negative     Protein, UA Negative     Leuk Esterase, UA Trace     Nitrite, UA Negative     Urobilinogen, UA 0.2 E.U./dL    Urinalysis, Microscopic Only - Urine, Clean Catch [[id number]]  (Abnormal) Collected: 02/17/23 1530    Specimen: Urine, Clean Catch Updated: 02/17/23 1546     RBC, UA None Seen /HPF      WBC, UA 0-2 /HPF      Bacteria, UA None Seen /HPF      Squamous Epithelial Cells, UA 13-20 /HPF      Hyaline Casts, UA None Seen /LPF      Methodology Manual Light Microscopy    POC Glucose Once [[id number]]  (Abnormal) Collected: 02/17/23 1846    Specimen: Blood Updated: 02/17/23 1851     Glucose 326 mg/dL      Comment: Serial Number: [id number]:  [id number]              No radiology results from the last 24 hrs       MDM    Viola Barlow is a 67 y.o. female who presents to the emergency department for evaluation of hyperkalemia    Differential diagnosis includes dehydration, worsening renal failure among other etiologies.    CMP ordered for further evaluation of the patient's presentation.    Chart review including any outside testing was notable for baseline creatinine of 1.8    Patient was treated with IV D50, IV insulin, calcium gluconate and albuterol    Spoke with Dr. Goldberg, nephrology, he requests the above medications administered in the ED and he will add Lokelma and IV fluids.  Request admission to the hospitalist.    Plan for disposition is admission to the hospital.  Patient/family comfortable with and understanding of the plan.    Dr. Brantley graciously accepts pt for admission,  tele obs.      Final diagnoses:   Hyperkalemia        Hermilo Molina PA-C  02/17/23 1922

## 2023-02-17 NOTE — H&P
[facility] HOSPITALIST   HISTORY AND PHYSICAL      Name:  Viola Barlow   Age:  67 y.o.  Sex:  female  :  1955  MRN:  [id number]   Visit Number:  [id number]  Admission Date:  2023  Date Of Service:  23  Primary Care Physician:  Live Donnelly MD    Chief Complaint:     Abnormal labs    History Of Presenting Illness:      Patient is a 67 years old female with a past medical history of CKD, diabetes mellitus type 2 insulin-dependent, hyperlipidemia, GERD, hypertension and PVD who presented to the ER with a chief complaint of abnormal labs.  Patient with chronic kidney disease stage III follows up with Dr. Calzada, had blood work done as an outpatient yesterday and was called this morning and told that she had a critical potassium of 6.9 and was directed to the ER to be evaluated.  Patient otherwise asymptomatic and denies any recent sickness or illness.  No recent changes on her medicine.  Patient denies any chest pain, shortness of breath, cough, abdominal pain, headaches, fever, chills, nausea, vomiting, diarrhea or urinary symptoms.    On ER evaluation, patient was hemodynamically stable and was afebrile. Her labs were significant for potassium of 6.5, creatinine 2.08 (baseline creatinine of 1.6) BUN 41 otherwise within acceptable range of her CBC and CMP.  Urinalysis showed trace glucose, trace leukocytes, WBC 0-2, bacteria none, nitrates negative.  Nephrology consulted by ER provider recommended no indication for urgent dialysis, recommended also giving Lokelma in addition to calcium gluconate, albuterol and insulin IV which were given in the ER.  Hospitalist consulted for admission, further evaluation and treatment.    Review Of Systems:    All systems were reviewed and negative except as mentioned in history of presenting illness, assessment and plan.    Past Medical History: Patient  has a past medical history of Arthritis, Cataract, Depression, Diabetes mellitus  (HCC), Dysphagia, Elevated cholesterol, Full dentures, GERD (gastroesophageal reflux disease), Headache, History of nuclear stress test, Hypertension, Kidney disease, Migraines, PVD (peripheral vascular disease) (HCC), Seasonal allergies, Spinal headache, and Wears glasses.    Past Surgical History: Patient  has a past surgical history that includes Mouth surgery; Hysterectomy (1983); Hysterectomy (1984); Abdominal surgery (08/1984); Elbow Epicondylectomy (Left, 09/1990); Vascular surgery (Bilateral); Colonoscopy; Esophagogastroduodenoscopy; Appendectomy; Cataract extraction w/ intraocular lens implant (Right, 6/21/2019); and Cataract extraction w/ intraocular lens implant (Left, 7/5/2019).    Social History: Patient  reports that she quit smoking about 15 years ago. Her smoking use included cigarettes. She has a 57.00 pack-year smoking history. She has never used smokeless tobacco. She reports that she does not drink alcohol and does not use drugs.    Family History:  Patient's family history has been reviewed and found to be noncontributory.     Allergies:      Patient has no known allergies.    Home Medications:    Prior to Admission Medications     Prescriptions Last Dose Informant Patient Reported? Taking?    amLODIPine (NORVASC) 5 MG tablet   No No    Take 1 tablet by mouth Daily.    aspirin  MG tablet  Self Yes No    Take 325 mg by mouth Daily.    atorvastatin (LIPITOR) 40 MG tablet  Self Yes No    Take 40 mg by mouth Daily.    atorvastatin (LIPITOR) 80 MG tablet  Self Yes No    Take 80 mg by mouth Daily.    Cholecalciferol (VITAMIN D-3) 1000 units capsule  Self Yes No    Take 1,000 Units by mouth Daily.    clopidogrel (PLAVIX) 75 MG tablet  Self Yes No    Take 75 mg by mouth Daily.    Dulaglutide (Trulicity) 1.5 MG/0.5ML solution pen-injector   Yes No    Inject 1 each under the skin into the appropriate area as directed 1 (One) Time Per Week.    gabapentin (NEURONTIN) 600 MG tablet  Self Yes No    Take  600 mg by mouth 3 (Three) Times a Day.    gabapentin (NEURONTIN) 600 MG tablet  Self Yes No    Take 600 mg by mouth 3 (Three) Times a Day.    glipiZIDE (GLUCOTROL) 10 MG tablet  Self Yes No    Take 20 mg by mouth Every Morning.    insulin lispro protamine-insulin lispro (humaLOG 75-25) (75-25) 100 UNIT/ML suspension injection   No No    Inject 60 Units under the skin into the appropriate area as directed 2 (Two) Times a Day With Meals.    insulin NPH (humuLIN N,novoLIN N) 100 UNIT/ML injection  Self Yes No    Inject 60 Units under the skin into the appropriate area as directed 2 (Two) Times a Day Before Meals.    levothyroxine (SYNTHROID, LEVOTHROID) 50 MCG tablet  Self Yes No    Take 50 mcg by mouth Daily.    Loratadine 10 MG capsule  Self Yes No    Take 10 mg by mouth Daily.    metFORMIN ER (GLUCOPHAGE-XR) 750 MG 24 hr tablet  Self Yes No    Take 750 mg by mouth Daily With Breakfast.    metFORMIN ER (GLUCOPHAGE-XR) 750 MG 24 hr tablet  Self Yes No    Take 750 mg by mouth Daily With Breakfast.    metoprolol succinate XL (TOPROL-XL) 50 MG 24 hr tablet  Self Yes No    Take 50 mg by mouth Daily.    metoprolol tartrate (LOPRESSOR) 50 MG tablet  Self Yes No    Take 50 mg by mouth Daily.    montelukast (SINGULAIR) 10 MG tablet  Self Yes No    Take 10 mg by mouth Every Night.    ondansetron ODT (ZOFRAN-ODT) 4 MG disintegrating tablet   No No    Place 1 tablet on the tongue Every 8 (Eight) Hours As Needed for Nausea or Vomiting.    oxymetazoline (AFRIN) 0.05 % nasal spray  Self Yes No    2 sprays into the nostril(s) as directed by provider 2 (Two) Times a Day As Needed for Congestion.    pantoprazole (PROTONIX) 40 MG EC tablet  Self Yes No    Take 40 mg by mouth Daily.    pantoprazole (PROTONIX) 40 MG EC tablet  Self Yes No    Take 40 mg by mouth Daily.    prednisoLONE acetate (PRED FORTE) 1 % ophthalmic suspension   No No    Follow instructions on the After Visit Summary.    sertraline (ZOLOFT) 50 MG tablet  Self Yes No  "   Take 50 mg by mouth Daily.    vitamin D3 125 MCG (5000 UT) capsule capsule   Yes No    Take 5,000 Units by mouth Daily.        ED Medications:    Medications   sodium chloride 0.9 % flush 10 mL (has no administration in time range)   albuterol (PROVENTIL) nebulizer solution 0.083% 2.5 mg/3mL (has no administration in time range)   dextrose (D50W) (25 g/50 mL) IV injection 50 mL (50 mL Intravenous Given 2/17/23 1446)   sodium zirconium cyclosilicate (LOKELMA) pack 10 g (10 g Oral Given 2/17/23 1449)   sodium chloride 0.9 % bolus 500 mL (500 mL Intravenous New Bag 2/17/23 1449)   calcium gluconate injection 1 g (1 g Intravenous Given 2/17/23 1446)   insulin regular (humuLIN R,novoLIN R) injection 10 Units (10 Units Intravenous Given 2/17/23 1448)     Vital Signs:  Temp:  [98.2 °F (36.8 °C)] 98.2 °F (36.8 °C)  Heart Rate:  [80-85] 84  Resp:  [20] 20  BP: (105-172)/(62-74) 105/74        02/17/23  1213   Weight: 72.6 kg (160 lb)     Body mass index is 31.25 kg/m².    Physical Exam:     Most recent vital Signs: /74   Pulse 84   Temp 98.2 °F (36.8 °C) (Oral)   Resp 20   Ht 152.4 cm (60\")   Wt 72.6 kg (160 lb)   LMP  (LMP Unknown)   SpO2 97%   BMI 31.25 kg/m²     Physical Exam  Vitals and nursing note reviewed.   Constitutional:       General: She is not in acute distress.     Appearance: Normal appearance.   HENT:      Head: Normocephalic and atraumatic.      Right Ear: External ear normal.      Left Ear: External ear normal.      Nose: Nose normal.      Mouth/Throat:      Mouth: Mucous membranes are dry.   Eyes:      Extraocular Movements: Extraocular movements intact.      Conjunctiva/sclera: Conjunctivae normal.      Pupils: Pupils are equal, round, and reactive to light.   Cardiovascular:      Rate and Rhythm: Normal rate and regular rhythm.      Pulses: Normal pulses.      Heart sounds: Normal heart sounds.   Pulmonary:      Effort: Pulmonary effort is normal. No respiratory distress.      Breath " sounds: Normal breath sounds. No wheezing or rhonchi.   Abdominal:      General: Bowel sounds are normal. There is no distension.      Palpations: Abdomen is soft.      Tenderness: There is no abdominal tenderness.   Musculoskeletal:         General: No tenderness. Normal range of motion.      Cervical back: Normal range of motion and neck supple.      Right lower leg: No edema.      Left lower leg: No edema.   Skin:     General: Skin is warm and dry.      Findings: No rash.   Neurological:      General: No focal deficit present.      Mental Status: She is alert and oriented to person, place, and time. Mental status is at baseline.      Motor: No weakness.   Psychiatric:         Mood and Affect: Mood normal.         Behavior: Behavior normal.         Thought Content: Thought content normal.         Laboratory data:    I have reviewed the labs done in the emergency room.    Results from last 7 days   Lab Units 02/17/23  1448 02/17/23  1258   SODIUM mmol/L  --  137   POTASSIUM mmol/L 6.0* 6.5*   CHLORIDE mmol/L  --  99   CO2 mmol/L  --  25.8   BUN mg/dL  --  41*   CREATININE mg/dL  --  2.08*   CALCIUM mg/dL  --  10.5   BILIRUBIN mg/dL  --  0.3   ALK PHOS U/L  --  113   ALT (SGPT) U/L  --  18   AST (SGOT) U/L  --  28   GLUCOSE mg/dL  --  78     Results from last 7 days   Lab Units 02/17/23  1448   WBC 10*3/mm3 10.04   HEMOGLOBIN g/dL 13.1   HEMATOCRIT % 42.8   PLATELETS 10*3/mm3 165                                   Invalid input(s): USDES,  BLOODU, NITRITITE, BACT, EP    Pain Management Panel     Pain Management Panel Latest Ref Rng & Units 11/18/2022 8/16/2021    CREATININE UR mg/dL - 86.7    AMPHETAMINES SCREEN, URINE Negative Negative -    BARBITURATES SCREEN Negative Negative -    BENZODIAZEPINE SCREEN, URINE Negative Negative -    BUPRENORPHINEUR Negative Negative -    COCAINE SCREEN, URINE Negative Negative -    METHADONE SCREEN, URINE Negative Negative -    METHAMPHETAMINEUR Negative Negative -          EKG:       Normal sinus rhythm, heart rate 77, nonspecific ST/T wave changes.    Radiology:    No radiology results for the last 3 days    Assessment:    1. Hyperkalemia, POA  2. JOSE ALBERTO on CKD stage III  3. Diabetes mellitus type 2 insulin-dependent  4. Hyperlipidemia  5. GERD  6. Hypertension  7. PVD      Plan:    Patient is admitted to telemetry for further management and treatment.    Hyperkalemia/JOSE ALBERTO on CKD  In the settings of CKD.   Nephrology consulted, appreciate recommendations.  Patient received calcium gluconate, albuterol and insulin IV   Patient started on Lokelma   Recheck potassium is 6.0  monitor potassium and kidney function.  Avoid nephrotoxic drugs  Patient received bolus of 500 mL of normal saline while in the ER, will continue with gentle maintenance fluids with normal saline at 50 mL/hr.    Diabetes mellitus type 2  Hold oral hypoglycemics   continue low-dose sliding scale insulin  Check hemoglobin A1c    Further orders as indicated per clinical course.    Risk Assessment: Moderate to high  DVT Prophylaxis: Heparin subcu (benefit> risk)  Code Status: Full  Diet: Cardiac/carbohydrate consistent/renal with low potassium    Advance Care Planning   ACP discussion was held with the patient during this visit. Patient does not have an advance directive, information provided.     Chris Brantley MD  02/17/23  15:31 EST    Dictated utilizing Dragon dictation.

## 2023-02-17 NOTE — ED NOTES
Called [facility] per QUITA Akhtar request at this time.  Carmen from  stated that Elsi is out of the office this week, but that she would page Dr. Goldberg and have him call back.

## 2023-02-17 NOTE — PROGRESS NOTES
Patient was to see Vickie Capellan NP in office today but directed to ER as labs yesterday showed K 6.9 and Cr 2.8 (BL 1.6 - 1.7).  Now K 6.5 and Cr little better 2.1.  BG 7 bicarb normal.  Home meds notable for b blocker but no ACE/ARB.  D/W ER, to give temporizing measures and I'll give lokelma x3 doses, NS bolus, and recheck K.  Hospitalist to admit.

## 2023-02-18 ENCOUNTER — READMISSION MANAGEMENT (OUTPATIENT)
Dept: CALL CENTER | Facility: HOSPITAL | Age: 68
End: 2023-02-18
Payer: MEDICARE

## 2023-02-18 VITALS
DIASTOLIC BLOOD PRESSURE: 64 MMHG | OXYGEN SATURATION: 92 % | SYSTOLIC BLOOD PRESSURE: 122 MMHG | RESPIRATION RATE: 16 BRPM | HEART RATE: 94 BPM | BODY MASS INDEX: 31.08 KG/M2 | TEMPERATURE: 98.3 F | WEIGHT: 158.29 LBS | HEIGHT: 60 IN

## 2023-02-18 LAB
ALBUMIN SERPL-MCNC: 4 G/DL (ref 3.5–5.2)
ANION GAP SERPL CALCULATED.3IONS-SCNC: 9.4 MMOL/L (ref 5–15)
BASOPHILS # BLD AUTO: 0.04 10*3/MM3 (ref 0–0.2)
BASOPHILS NFR BLD AUTO: 0.4 % (ref 0–1.5)
BUN SERPL-MCNC: 34 MG/DL (ref 8–23)
BUN/CREAT SERPL: 17.2 (ref 7–25)
CALCIUM SPEC-SCNC: 9.5 MG/DL (ref 8.6–10.5)
CHLORIDE SERPL-SCNC: 101 MMOL/L (ref 98–107)
CO2 SERPL-SCNC: 27.6 MMOL/L (ref 22–29)
CREAT SERPL-MCNC: 1.98 MG/DL (ref 0.57–1)
DEPRECATED RDW RBC AUTO: 47.1 FL (ref 37–54)
EGFRCR SERPLBLD CKD-EPI 2021: 27.6 ML/MIN/1.73
EOSINOPHIL # BLD AUTO: 0.13 10*3/MM3 (ref 0–0.4)
EOSINOPHIL NFR BLD AUTO: 1.3 % (ref 0.3–6.2)
ERYTHROCYTE [DISTWIDTH] IN BLOOD BY AUTOMATED COUNT: 13.2 % (ref 12.3–15.4)
GLUCOSE BLDC GLUCOMTR-MCNC: 129 MG/DL (ref 70–130)
GLUCOSE BLDC GLUCOMTR-MCNC: 194 MG/DL (ref 70–130)
GLUCOSE SERPL-MCNC: 137 MG/DL (ref 65–99)
HCT VFR BLD AUTO: 41.4 % (ref 34–46.6)
HGB BLD-MCNC: 12.9 G/DL (ref 12–15.9)
IMM GRANULOCYTES # BLD AUTO: 0.03 10*3/MM3 (ref 0–0.05)
IMM GRANULOCYTES NFR BLD AUTO: 0.3 % (ref 0–0.5)
LYMPHOCYTES # BLD AUTO: 1.98 10*3/MM3 (ref 0.7–3.1)
LYMPHOCYTES NFR BLD AUTO: 19.4 % (ref 19.6–45.3)
MCH RBC QN AUTO: 29.8 PG (ref 26.6–33)
MCHC RBC AUTO-ENTMCNC: 31.2 G/DL (ref 31.5–35.7)
MCV RBC AUTO: 95.6 FL (ref 79–97)
MONOCYTES # BLD AUTO: 0.31 10*3/MM3 (ref 0.1–0.9)
MONOCYTES NFR BLD AUTO: 3 % (ref 5–12)
NEUTROPHILS NFR BLD AUTO: 7.69 10*3/MM3 (ref 1.7–7)
NEUTROPHILS NFR BLD AUTO: 75.6 % (ref 42.7–76)
NRBC BLD AUTO-RTO: 0 /100 WBC (ref 0–0.2)
PHOSPHATE SERPL-MCNC: 4.4 MG/DL (ref 2.5–4.5)
PLATELET # BLD AUTO: 166 10*3/MM3 (ref 140–450)
PMV BLD AUTO: 9.9 FL (ref 6–12)
POTASSIUM SERPL-SCNC: 4.8 MMOL/L (ref 3.5–5.2)
RBC # BLD AUTO: 4.33 10*6/MM3 (ref 3.77–5.28)
SODIUM SERPL-SCNC: 138 MMOL/L (ref 136–145)
WBC NRBC COR # BLD: 10.18 10*3/MM3 (ref 3.4–10.8)

## 2023-02-18 PROCEDURE — 99238 HOSP IP/OBS DSCHRG MGMT 30/<: CPT | Performed by: FAMILY MEDICINE

## 2023-02-18 PROCEDURE — 85025 COMPLETE CBC W/AUTO DIFF WBC: CPT | Performed by: FAMILY MEDICINE

## 2023-02-18 PROCEDURE — 96375 TX/PRO/DX INJ NEW DRUG ADDON: CPT

## 2023-02-18 PROCEDURE — 25010000002 ONDANSETRON PER 1 MG: Performed by: FAMILY MEDICINE

## 2023-02-18 PROCEDURE — 82962 GLUCOSE BLOOD TEST: CPT

## 2023-02-18 PROCEDURE — G0378 HOSPITAL OBSERVATION PER HR: HCPCS

## 2023-02-18 PROCEDURE — 63710000001 INSULIN ASPART PER 5 UNITS: Performed by: FAMILY MEDICINE

## 2023-02-18 PROCEDURE — 80069 RENAL FUNCTION PANEL: CPT | Performed by: FAMILY MEDICINE

## 2023-02-18 PROCEDURE — 25010000002 HEPARIN (PORCINE) PER 1000 UNITS: Performed by: FAMILY MEDICINE

## 2023-02-18 PROCEDURE — 96372 THER/PROPH/DIAG INJ SC/IM: CPT

## 2023-02-18 RX ADMIN — HEPARIN SODIUM 5000 UNITS: 5000 INJECTION INTRAVENOUS; SUBCUTANEOUS at 10:02

## 2023-02-18 RX ADMIN — ATORVASTATIN CALCIUM 40 MG: 40 TABLET, FILM COATED ORAL at 10:00

## 2023-02-18 RX ADMIN — SERTRALINE 50 MG: 50 TABLET, FILM COATED ORAL at 09:59

## 2023-02-18 RX ADMIN — SODIUM ZIRCONIUM CYCLOSILICATE 10 G: 10 POWDER, FOR SUSPENSION ORAL at 10:02

## 2023-02-18 RX ADMIN — Medication 5000 UNITS: at 09:59

## 2023-02-18 RX ADMIN — LEVOTHYROXINE SODIUM 50 MCG: 50 TABLET ORAL at 07:00

## 2023-02-18 RX ADMIN — METOPROLOL SUCCINATE 50 MG: 50 TABLET, EXTENDED RELEASE ORAL at 10:01

## 2023-02-18 RX ADMIN — Medication 3 ML: at 10:03

## 2023-02-18 RX ADMIN — PANTOPRAZOLE SODIUM 40 MG: 40 TABLET, DELAYED RELEASE ORAL at 07:00

## 2023-02-18 RX ADMIN — ONDANSETRON 4 MG: 2 INJECTION INTRAMUSCULAR; INTRAVENOUS at 10:18

## 2023-02-18 RX ADMIN — GABAPENTIN 300 MG: 300 CAPSULE ORAL at 07:00

## 2023-02-18 RX ADMIN — INSULIN ASPART 2 UNITS: 100 INJECTION, SOLUTION INTRAVENOUS; SUBCUTANEOUS at 11:09

## 2023-02-18 RX ADMIN — CLOPIDOGREL BISULFATE 75 MG: 75 TABLET ORAL at 10:01

## 2023-02-18 NOTE — PLAN OF CARE
Goal Outcome Evaluation:  Plan of Care Reviewed With: patient        Progress: improving  Outcome Evaluation: VSS; K+ 5.2 overnight; continue ivf and monitor labs

## 2023-02-18 NOTE — CASE MANAGEMENT/SOCIAL WORK
Case Management Discharge Note                Selected Continued Care - Admitted Since 2/17/2023     Destination    No services have been selected for the patient.              Durable Medical Equipment    No services have been selected for the patient.              Dialysis/Infusion    No services have been selected for the patient.              Home Medical Care    No services have been selected for the patient.              Therapy    No services have been selected for the patient.              Community Resources    No services have been selected for the patient.              [facility]    No services have been selected for the patient.                  Transportation Services  Private: Car    Final Discharge Disposition Code: 01 - home or self-care

## 2023-02-18 NOTE — CASE MANAGEMENT/SOCIAL WORK
Case Management/Social Work    Patient Name:  Viola Barlow  YOB: 1955  MRN: [id number]  Admit Date:  2/17/2023      Pt discharged before DCP could be completed.       Electronically signed by:  Deborah Giraldo  02/18/23 09:11 EST

## 2023-02-18 NOTE — DISCHARGE INSTRUCTIONS
-Discontinue taking losartan as directed.  -Follow-up with nephrologist in the office as scheduled.  -Follow-up with your PCP in 2 to 3 days for recheck and continued care.

## 2023-02-18 NOTE — DISCHARGE SUMMARY
[facility]   DISCHARGE SUMMARY      Name:  Viola Barlow   Age:  67 y.o.  Sex:  female  :  1955  MRN:  [id number]   Visit Number:  [id number]    Admission Date:  2023  Date of Discharge:  2023  Primary Care Physician:  Live Donnelly MD    Important issues to note:    -Discontinued losartan.  -Follow-up with nephrologist and PCP in 2 to 3 days.    Discharge Diagnoses:     1. Hyperkalemia, POA, resolved  2. JOSE ALBERTO on CKD stage III, improved  3. Diabetes mellitus type 2 insulin-dependent  4. Hyperlipidemia  5. GERD  6. Hypertension  7. PVD    Problem List:     Active Hospital Problems    Diagnosis  POA   • **Hyperkalemia [E87.5]  Yes      Resolved Hospital Problems   No resolved problems to display.     Presenting Problem:    Chief Complaint   Patient presents with   • Abnormal Lab      Consults:     Consulting Physician(s)     Provider Relationship Specialty    Kenny Goldberg MD Consulting Physician Nephrology        Procedures Performed:        History of presenting illness/Hospital Course:    Patient is a 67 years old female with a past medical history of CKD, diabetes mellitus type 2 insulin-dependent, hyperlipidemia, GERD, hypertension and PVD who presented to the ER with a chief complaint of abnormal labs.  Patient with chronic kidney disease stage III follows up with Dr. Calzada, had blood work done as an outpatient yesterday and was called this morning and told that she had a critical potassium of 6.9 and was directed to the ER to be evaluated.  Patient otherwise asymptomatic and denies any recent sickness or illness.  No recent changes on her medicine.  Patient denies any chest pain, shortness of breath, cough, abdominal pain, headaches, fever, chills, nausea, vomiting, diarrhea or urinary symptoms.     On ER evaluation, patient was hemodynamically stable and was afebrile. Her labs were significant for potassium of 6.5, creatinine 2.08 (baseline  creatinine of 1.6) BUN 41 otherwise within acceptable range of her CBC and CMP.  Urinalysis showed trace glucose, trace leukocytes, WBC 0-2, bacteria none, nitrates negative.  Nephrology consulted by ER provider recommended no indication for urgent dialysis, recommended also giving Lokelma in addition to calcium gluconate, albuterol and insulin IV which were given in the ER.  Hospitalist consulted for admission, further evaluation and treatment.    Hyperkalemia/JOSE ALBERTO on CKD  In the settings of CKD.   Nephrology consulted, appreciate recommendations.  Received calcium gluconate, albuterol and insulin IV   Received Lokelma   Potassium level trended down to 5.2-4.8  Avoid nephrotoxic drugs  Patient received bolus of 500 mL of normal saline while in the ER, continued with gentle maintenance fluids with normal saline at 50 mL/hr.     Patient was seen and examined on the day of discharge.  Patient continues to report doing well and continues to be asymptomatic.  Patient denies any acute complaints today.  Patient reports that she is ready to go home.  Discussed case with nephrology, recommending discontinuing losartan and following up in the office.  Patient instructed on management and plan.  Patient verbalized understanding and agreeable to plan.  All questions were answered to her satisfaction.    Vital Signs:    Temp:  [97.8 °F (36.6 °C)-98.2 °F (36.8 °C)] 98.2 °F (36.8 °C)  Heart Rate:  [79-95] 95  Resp:  [16-20] 16  BP: (105-172)/(57-74) 135/63    Physical Exam:    General Appearance:  Alert and cooperative.  No acute distress.   Head:  Atraumatic and normocephalic.   Eyes: Conjunctivae and sclerae normal, no icterus. No pallor.   Ears:  Ears with no abnormalities noted.   Throat: No oral lesions, no thrush, oral mucosa moist.   Neck: Supple, trachea midline, no thyromegaly.   Lungs:   Breath sounds heard bilaterally equally.  No crackles or wheezing. No Pleural rub or bronchial breathing.   Heart:  Normal S1 and S2,  no murmur, no gallop, no rub. No JVD.   Abdomen:   Normal bowel sounds, no masses, no organomegaly. Soft, nontender, nondistended, no rebound tenderness.   Extremities: Supple, no edema, no cyanosis, no clubbing.   Pulses: Pulses palpable bilaterally.   Skin: No bleeding or rash.   Neurologic: Alert and oriented x 3. No facial asymmetry. Moves all four limbs. No tremors.     Pertinent Lab Results:     Results from last 7 days   Lab Units 02/18/23  0656 02/17/23  2103 02/17/23  1448 02/17/23  1258   SODIUM mmol/L 138  --   --  137   POTASSIUM mmol/L 4.8 5.2 6.0* 6.5*   CHLORIDE mmol/L 101  --   --  99   CO2 mmol/L 27.6  --   --  25.8   BUN mg/dL 34*  --   --  41*   CREATININE mg/dL 1.98*  --   --  2.08*   CALCIUM mg/dL 9.5  --   --  10.5   BILIRUBIN mg/dL  --   --   --  0.3   ALK PHOS U/L  --   --   --  113   ALT (SGPT) U/L  --   --   --  18   AST (SGOT) U/L  --   --   --  28   GLUCOSE mg/dL 137*  --   --  78     Results from last 7 days   Lab Units 02/18/23  0656 02/17/23  1448   WBC 10*3/mm3 10.18 10.04   HEMOGLOBIN g/dL 12.9 13.1   HEMATOCRIT % 41.4 42.8   PLATELETS 10*3/mm3 166 165                                   Pertinent Radiology Results:    Imaging Results (All)     None          Echo:    Results for orders placed during the hospital encounter of 11/04/21    Adult Transthoracic Echo Complete W/ Cont if Necessary Per Protocol    Interpretation Summary  1.  Normal left ventricular size and systolic function, LVEF 55-60%.  2.  Mild concentric LVH.  3.  Normal LV diastolic filling pattern.  4.  Normal right ventricular size and systolic function.  5.  Normal left atrial volume index.  6.  No significant valvular abnormalities.    Condition on Discharge:      Stable.    Code status during the hospital stay:    Code Status and Medical Interventions:   Ordered at: 02/17/23 161     Code Status (Patient has no pulse and is not breathing):    CPR (Attempt to Resuscitate)     Medical Interventions (Patient has pulse  or is breathing):    Full Support     Discharge Disposition:    Home or Self Care    Discharge Medications:       Discharge Medications      Changes to Medications      Instructions Start Date   atorvastatin 40 MG tablet  Commonly known as: LIPITOR  What changed: Another medication with the same name was removed. Continue taking this medication, and follow the directions you see here.   40 mg, Oral, Daily      metFORMIN  MG 24 hr tablet  Commonly known as: GLUCOPHAGE-XR  What changed: Another medication with the same name was removed. Continue taking this medication, and follow the directions you see here.   750 mg, Oral, Daily With Breakfast      pantoprazole 40 MG EC tablet  Commonly known as: PROTONIX  What changed: Another medication with the same name was removed. Continue taking this medication, and follow the directions you see here.   40 mg, Oral, Daily         Continue These Medications      Instructions Start Date   clopidogrel 75 MG tablet  Commonly known as: PLAVIX   75 mg, Oral, Daily      gabapentin 600 MG tablet  Commonly known as: NEURONTIN   600 mg, Oral, 3 Times Daily      gabapentin 600 MG tablet  Commonly known as: NEURONTIN   600 mg, Oral, 3 Times Daily      insulin lispro protamine-insulin lispro (75-25) 100 UNIT/ML suspension injection  Commonly known as: humaLOG 75-25   60 Units, Subcutaneous, 2 Times Daily With Meals      levothyroxine 50 MCG tablet  Commonly known as: SYNTHROID, LEVOTHROID   50 mcg, Oral, Daily      metoprolol succinate XL 50 MG 24 hr tablet  Commonly known as: TOPROL-XL   50 mg, Oral, Daily      ondansetron ODT 4 MG disintegrating tablet  Commonly known as: ZOFRAN-ODT   4 mg, Translingual, Every 8 Hours PRN      sertraline 50 MG tablet  Commonly known as: ZOLOFT   50 mg, Oral, Daily      Trulicity 1.5 MG/0.5ML solution pen-injector  Generic drug: Dulaglutide   1 each, Subcutaneous, Weekly      vitamin D3 125 MCG (5000 UT) capsule capsule   5,000 Units, Oral, Daily       Vitamin D-3 25 MCG (1000 UT) capsule   1,000 Units, Oral, Daily         Stop These Medications    insulin  UNIT/ML injection  Commonly known as: humuLIN N,novoLIN N     Loratadine 10 MG capsule     losartan 25 MG tablet  Commonly known as: COZAAR     metoprolol tartrate 50 MG tablet  Commonly known as: LOPRESSOR     oxymetazoline 0.05 % nasal spray  Commonly known as: AFRIN     prednisoLONE acetate 1 % ophthalmic suspension  Commonly known as: Pred Forte        ASK your doctor about these medications      Instructions Start Date   amLODIPine 5 MG tablet  Commonly known as: NORVASC   5 mg, Oral, Daily      aspirin  MG tablet   325 mg, Daily      glipizide 10 MG tablet  Commonly known as: GLUCOTROL   20 mg, Every Morning      montelukast 10 MG tablet  Commonly known as: SINGULAIR   10 mg, Nightly           Discharge Diet:   Carbohydrate consistent, renal, low potassium    Activity at Discharge:   As tolerated    Follow-up Appointments:     Follow-up Information     Live Donnelly MD Follow up in 3 day(s).    Specialty: Family Medicine  Contact information:  [address] [place] [id number] [phone]             Jerman Calzada MD, FASN Follow up in 1 week(s).    Specialty: Nephrology  Contact information:  [address] [place] [id number] [phone]                       No future appointments.  Test Results Pending at Discharge:           Chris Brantley MD  02/18/23  11:08 EST    Time: I spent 28 minutes on this discharge activity which included: face-to-face encounter with the patient, reviewing the data in the system, coordination of the care with the nursing staff as well as consultants, documentation, and entering orders.     Dictated utilizing Dragon dictation.

## 2023-02-18 NOTE — CONSULTS
[facility]      Nephrology Consultation      Referring Provider:   Chris Brantley MD    Reason for Consultation:  Acute Kidney Injury on chronic kidney disease 3b and associated problems.  Hyperkalemia.    Subjective:  Chief complaint   Chief Complaint   Patient presents with   • Abnormal Lab     History of present illness:    Patient was called from my office after it was noted that she has potassium of 6.9.  And worsening renal function.  She is 67-year-old fairly obese female with history of type 2 diabetes, dyslipidemia, hypertension, peripheral vascular disease and gastroesophageal reflux disease.  In the ER she had slight improvement in her serum potassium but was admitted for conservative management.  She did receive Lokelma and Kayexalate after discussion with nephrology services.  This morning she feels much better potassium is back to normal.  During my discussion with the patient she did mention that she has been eating bananas, she has been counseled many times from the office about high potassium diet.  She is also on low-dose losartan for renal protection.  Currently she is lying in the bed awake alert and interactive denies having any chest pain or shortness of breath no nausea vomiting no abdominal pain.  I have reviewed labs/imaging/records from this hospitalization, including ER staff and admitting/attending physicians H/P's and progress notes to establish a comprehensive understanding of this patient's clinical hospital course, as well as to establish plan of care appropriately.   Past Medical History:   Diagnosis Date   • Arthritis    • Cataract    • Depression    • Diabetes mellitus (HCC)    • Dysphagia     Patient reported that intermittently food feels like it gets lodged    • Elevated cholesterol    • Full dentures     Instructed no adhesives the DOS   • GERD (gastroesophageal reflux disease)    • Headache    • History of nuclear stress test     Patient reported this  "was done with Dr. Douglas around 2017 and that she was started on Metoprolol after testing.    • Hypertension    • Kidney disease     Patient reported \"I have chronic kidney disease and they say my kidney function is at 33%\" and that she has routine care with Dr. aClzada    • Migraines    • PVD (peripheral vascular disease) ([facility])    • Seasonal allergies    • Spinal headache    • Wears glasses        Past Surgical History:   Procedure Laterality Date   • ABDOMINAL SURGERY  08/1984    Patient reported after complete hysterectomy, she had another procedure to remove tumors in the abdominal area   • APPENDECTOMY      Reported removed when hysterectomy was done   • CATARACT EXTRACTION W/ INTRAOCULAR LENS IMPLANT Right 6/21/2019    Procedure: CATARACT PHACO EXTRACTION WITH INTRAOCULAR LENS IMPLANT RIGHT;  Surgeon: Tee Enrique MD;  Location: [facility] OR;  Service: Ophthalmology   • CATARACT EXTRACTION W/ INTRAOCULAR LENS IMPLANT Left 7/5/2019    Procedure: CATARACT PHACO EXTRACTION WITH INTRAOCULAR LENS IMPLANT LEFT;  Surgeon: Tee Enrique MD;  Location: [facility] OR;  Service: Ophthalmology   • COLONOSCOPY     • ELBOW EPICONDYLECTOMY Left 09/1990   • ENDOSCOPY     • HYSTERECTOMY  1983    Partial   • HYSTERECTOMY  1984    Complete    • MOUTH SURGERY      Full mouth extraction   • VASCULAR SURGERY Bilateral     For PAD - Reported the right leg was done first around 2014 and the left was done around 2015     History reviewed. No pertinent family history.      Social History     Tobacco Use   • Smoking status: Former     Packs/day: 1.50     Years: 38.00     Pack years: 57.00     Types: Cigarettes     Quit date: 2008     Years since quitting: 15.1   • Smokeless tobacco: Never   • Tobacco comments:     quit 13 yrs ago   Vaping Use   • Vaping Use: Never used   Substance Use Topics   • Alcohol use: No   • Drug use: No     Home medications:   Prior to Admission Medications     Prescriptions Last Dose Informant Patient " Reported? Taking?    amLODIPine (NORVASC) 5 MG tablet Not Taking  No No    Take 1 tablet by mouth Daily.    Patient not taking:  Reported on 2/17/2023    aspirin  MG tablet Not Taking Self Yes No    Take 325 mg by mouth Daily.    Patient not taking:  Reported on 2/17/2023    atorvastatin (LIPITOR) 40 MG tablet 2/17/2023 Self Yes Yes    Take 40 mg by mouth Daily.    atorvastatin (LIPITOR) 80 MG tablet Not Taking Self Yes No    Take 80 mg by mouth Daily.    Patient not taking:  Reported on 2/17/2023    Cholecalciferol (VITAMIN D-3) 1000 units capsule 2/17/2023 Self Yes Yes    Take 1,000 Units by mouth Daily.    clopidogrel (PLAVIX) 75 MG tablet 2/17/2023 Self Yes Yes    Take 75 mg by mouth Daily.    Dulaglutide (Trulicity) 1.5 MG/0.5ML solution pen-injector Past Week  Yes Yes    Inject 1 each under the skin into the appropriate area as directed 1 (One) Time Per Week.    gabapentin (NEURONTIN) 600 MG tablet  Self Yes No    Take 600 mg by mouth 3 (Three) Times a Day.    gabapentin (NEURONTIN) 600 MG tablet 2/17/2023 Self Yes Yes    Take 600 mg by mouth 3 (Three) Times a Day.    glipiZIDE (GLUCOTROL) 10 MG tablet Not Taking Self Yes No    Take 20 mg by mouth Every Morning.    Patient not taking:  Reported on 2/17/2023    insulin lispro protamine-insulin lispro (humaLOG 75-25) (75-25) 100 UNIT/ML suspension injection 2/17/2023  No Yes    Inject 60 Units under the skin into the appropriate area as directed 2 (Two) Times a Day With Meals.    insulin NPH (humuLIN N,novoLIN N) 100 UNIT/ML injection Not Taking Self Yes No    Inject 60 Units under the skin into the appropriate area as directed 2 (Two) Times a Day Before Meals.    Patient not taking:  Reported on 2/17/2023    levothyroxine (SYNTHROID, LEVOTHROID) 50 MCG tablet 2/17/2023 Self Yes Yes    Take 50 mcg by mouth Daily.    Loratadine 10 MG capsule Not Taking Self Yes No    Take 10 mg by mouth Daily.    Patient not taking:  Reported on 2/17/2023    losartan  (COZAAR) 25 MG tablet 2/17/2023  Yes Yes    Take 25 mg by mouth Daily.    metFORMIN ER (GLUCOPHAGE-XR) 750 MG 24 hr tablet 2/17/2023 Self Yes Yes    Take 750 mg by mouth Daily With Breakfast.    metFORMIN ER (GLUCOPHAGE-XR) 750 MG 24 hr tablet  Self Yes No    Take 750 mg by mouth Daily With Breakfast.    metoprolol succinate XL (TOPROL-XL) 50 MG 24 hr tablet 2/17/2023 Self Yes Yes    Take 50 mg by mouth Daily.    metoprolol tartrate (LOPRESSOR) 50 MG tablet Not Taking Self Yes No    Take 50 mg by mouth Daily.    Patient not taking:  Reported on 2/17/2023    montelukast (SINGULAIR) 10 MG tablet Not Taking Self Yes No    Take 10 mg by mouth Every Night.    Patient not taking:  Reported on 2/17/2023    ondansetron ODT (ZOFRAN-ODT) 4 MG disintegrating tablet 2/16/2023  No Yes    Place 1 tablet on the tongue Every 8 (Eight) Hours As Needed for Nausea or Vomiting.    oxymetazoline (AFRIN) 0.05 % nasal spray Not Taking Self Yes No    2 sprays into the nostril(s) as directed by provider 2 (Two) Times a Day As Needed for Congestion.    Patient not taking:  Reported on 2/17/2023    pantoprazole (PROTONIX) 40 MG EC tablet 2/17/2023 Self Yes Yes    Take 40 mg by mouth Daily.    pantoprazole (PROTONIX) 40 MG EC tablet  Self Yes No    Take 40 mg by mouth Daily.    prednisoLONE acetate (PRED FORTE) 1 % ophthalmic suspension Not Taking  No No    Follow instructions on the After Visit Summary.    Patient not taking:  Reported on 2/17/2023    sertraline (ZOLOFT) 50 MG tablet 2/17/2023 Self Yes Yes    Take 50 mg by mouth Daily.    vitamin D3 125 MCG (5000 UT) capsule capsule   Yes No    Take 5,000 Units by mouth Daily.        Emergency department medications:   Medications   sodium chloride 0.9 % flush 10 mL (has no administration in time range)   albuterol (PROVENTIL) nebulizer solution 0.083% 2.5 mg/3mL (2.5 mg Nebulization Not Given 2/17/23 6167)   dextrose (D50W) (25 g/50 mL) IV injection 50 mL (50 mL Intravenous Given 2/17/23  1446)   sodium zirconium cyclosilicate (LOKELMA) pack 10 g (10 g Oral Given 2/17/23 2058)   glipizide (GLUCOTROL) tablet 20 mg (20 mg Oral Not Given 2/18/23 0721)   aspirin EC tablet 325 mg (325 mg Oral Not Given 2/17/23 1710)   sertraline (ZOLOFT) tablet 50 mg (50 mg Oral Not Given 2/17/23 1713)   amLODIPine (NORVASC) tablet 5 mg (5 mg Oral Not Given 2/17/23 1709)   pantoprazole (PROTONIX) EC tablet 40 mg (40 mg Oral Given 2/18/23 0700)   montelukast (SINGULAIR) tablet 10 mg (10 mg Oral Not Given 2/17/23 2129)   atorvastatin (LIPITOR) tablet 40 mg (40 mg Oral Not Given 2/17/23 1711)   clopidogrel (PLAVIX) tablet 75 mg (75 mg Oral Not Given 2/17/23 1712)   levothyroxine (SYNTHROID, LEVOTHROID) tablet 50 mcg (50 mcg Oral Given 2/18/23 0700)   vitamin D3 capsule 5,000 Units (5,000 Units Oral Not Given 2/17/23 1713)   metoprolol succinate XL (TOPROL-XL) 24 hr tablet 50 mg (50 mg Oral Not Given 2/17/23 1712)   gabapentin (NEURONTIN) capsule 300 mg (300 mg Oral Given 2/18/23 0700)   sodium chloride 0.9 % flush 3 mL (3 mL Intravenous Not Given 2/17/23 2130)   sodium chloride 0.9 % flush 3-10 mL (has no administration in time range)   sodium chloride 0.9 % infusion 40 mL (has no administration in time range)   acetaminophen (TYLENOL) tablet 650 mg (has no administration in time range)     Or   acetaminophen (TYLENOL) 160 MG/5ML solution 650 mg (has no administration in time range)     Or   acetaminophen (TYLENOL) suppository 650 mg (has no administration in time range)   sennosides-docusate (PERICOLACE) 8.6-50 MG per tablet 2 tablet (2 tablets Oral Not Given 2/17/23 2130)     And   polyethylene glycol (MIRALAX) packet 17 g (has no administration in time range)     And   bisacodyl (DULCOLAX) EC tablet 5 mg (has no administration in time range)     And   bisacodyl (DULCOLAX) suppository 10 mg (has no administration in time range)   ondansetron (ZOFRAN) injection 4 mg (has no administration in time range)   heparin (porcine)  "5000 UNIT/ML injection 5,000 Units (5,000 Units Subcutaneous Given 2/17/23 2057)   melatonin tablet 5 mg (has no administration in time range)   dextrose (GLUTOSE) oral gel 15 g (has no administration in time range)   dextrose (D50W) (25 g/50 mL) IV injection 25 g (25 g Intravenous Given 2/17/23 1841)   glucagon (human recombinant) (GLUCAGEN DIAGNOSTIC) injection 1 mg (has no administration in time range)   Insulin Aspart (novoLOG) injection 0-7 Units (0 Units Subcutaneous Not Given 2/18/23 0721)   sodium chloride 0.9 % infusion (100 mL/hr Intravenous Currently Infusing 2/17/23 2059)   calcium gluconate injection 1 g (1 g Intravenous Given 2/17/23 1446)   sodium chloride 0.9 % bolus 500 mL (0 mL Intravenous Stopped 2/17/23 1841)   insulin regular (humuLIN R,novoLIN R) injection 10 Units (10 Units Intravenous Given 2/17/23 1448)   sodium polystyrene (KAYEXALATE) 15 GM/60ML suspension 30 g (30 g Oral Given 2/17/23 1755)   furosemide (LASIX) injection 40 mg (40 mg Intravenous Given 2/17/23 2058)       Allergies:  Patient has no known allergies.    Review of Systems  All review of system were reviewed and are negative except as mentioned in the history of present illness and assessment and plan.    Objective:  Vital Signs  /63 (BP Location: Left arm, Patient Position: Lying)   Pulse 95   Temp 98.2 °F (36.8 °C) (Oral)   Resp 16   Ht 152.4 cm (60\")   Wt 71.8 kg (158 lb 4.6 oz)   LMP  (LMP Unknown)   SpO2 92%   BMI 30.91 kg/m²          I/O this shift:  In: 240 [P.O.:240]  Out: -     Intake/Output Summary (Last 24 hours) at 2/18/2023 0938  Last data filed at 2/18/2023 0900  Gross per 24 hour   Intake 480 ml   Output 1300 ml   Net -820 ml       Physical Exam:  General Appearance:   Alert, cooperative, in no acute distress.     Head:   Normocephalic, without obvious abnormality, atraumatic.     Eyes:      Normal, conjunctivae and sclerae, no icterus, no pallor, corneas clear, PERRLA        Throat:   Oral mucosa " dry      Neck:  No adenopathy, supple, trachea midline, no thyromegaly, no carotid bruit, no JVD        Lungs:    Clear to auscultation and fair air movement noted.      Heart::   Regular rhythm and normal rate, normal S1 and S2.       Abdomen:   Obese. Normal bowel sounds, no masses, no organomegaly, soft non-tender, non-distended, no guarding, no rebound tenderness      Genital urinary:   No urinary bladder palpable      Extremities:  Moves all extremities, trace edema, no cyanosis, no redness.     Pulses:  Pulses palpable and equal bilaterally but weak.     Skin:  No bleeding, bruising or rash        Neurologic:  Cranial nerves grossly intact, move all extremities         Results Review:   Results from last 7 days   Lab Units 02/18/23  0656 02/17/23  2103 02/17/23  1448 02/17/23  1258   SODIUM mmol/L 138  --   --  137   POTASSIUM mmol/L 4.8 5.2 6.0* 6.5*   CHLORIDE mmol/L 101  --   --  99   CO2 mmol/L 27.6  --   --  25.8   BUN mg/dL 34*  --   --  41*   CREATININE mg/dL 1.98*  --   --  2.08*   CALCIUM mg/dL 9.5  --   --  10.5   ALBUMIN g/dL 4.0  --   --  4.8   BILIRUBIN mg/dL  --   --   --  0.3   ALK PHOS U/L  --   --   --  113   ALT (SGPT) U/L  --   --   --  18   AST (SGOT) U/L  --   --   --  28   GLUCOSE mg/dL 137*  --   --  78     Estimated Creatinine Clearance: 25 mL/min (A) (by C-G formula based on SCr of 1.98 mg/dL (H)).  Results from last 7 days   Lab Units 02/18/23  0656   PHOSPHORUS mg/dL 4.4         Results from last 7 days   Lab Units 02/18/23  0656 02/17/23  1448   WBC 10*3/mm3 10.18 10.04   HEMOGLOBIN g/dL 12.9 13.1   PLATELETS 10*3/mm3 166 165         Brief Urine Lab Results  (Last result in the past 365 days)      Color   Clarity   Blood   Leuk Est   Nitrite   Protein   CREAT   Urine HCG        02/17/23 1530 Yellow   Clear   Negative   Trace   Negative   Negative               No results found for: UTPCR  Imaging Results (Last 24 Hours)     ** No results found for the last 24 hours. **         albuterol, 2.5 mg, Nebulization, Once  amLODIPine, 5 mg, Oral, Daily  aspirin EC, 325 mg, Oral, Daily  atorvastatin, 40 mg, Oral, Daily  clopidogrel, 75 mg, Oral, Daily  gabapentin, 300 mg, Oral, Q8H  glipizide, 20 mg, Oral, QAM  heparin (porcine), 5,000 Units, Subcutaneous, Q12H  Insulin Aspart, 0-7 Units, Subcutaneous, TID AC  levothyroxine, 50 mcg, Oral, Daily  metoprolol succinate XL, 50 mg, Oral, Daily  montelukast, 10 mg, Oral, Nightly  pantoprazole, 40 mg, Oral, Daily  senna-docusate sodium, 2 tablet, Oral, BID  sertraline, 50 mg, Oral, Daily  sodium chloride, 3 mL, Intravenous, Q12H  sodium zirconium cyclosilicate, 10 g, Oral, TID  vitamin D3, 5,000 Units, Oral, Daily      sodium chloride, 100 mL/hr, Last Rate: 100 mL/hr (02/17/23 2059)        Assessment/Plan:    1. Acute kidney injury: Patient initially presented with worsening renal function, her initial blood test shows BUN of 38 and creatinine of 2.77 with a EGFR of 18 mL/min; potassium was 6.9 and a bicarb of 18.  Her baseline creatinine has been between 1.38-1.52, since admission and has starting to improve.  2. Hyperkalemia: It has resolved at this point will not restart losartan at this point.  3. Chronic kidney disease stage IIIb: Baseline GFR has been greater than 30 usually runs around 37.  4. Hypertension with chronic kidney disease stage III: It is under optimal control we will have to reassess the need for angiotensin receptor blockers.  Patient is very clear about having significant cough with ACE inhibitors.  5. Type 2 diabetes: Stable follow-up with endocrinologist at , can resume home medications.  6. Peripheral vascular disease: Currently on Plavix can be continued.  7. Dyslipidemia: Continue statin.      Risk and complexity: High      Plan:  · She can continue her home medications except losartan.  · Continue with rest of the current treatment plan and surveillance labs.  · Details were discussed with the patient no family in the  room.    · Details were also discussed with the hospitalist service.  We will see her next week for close follow-up.  Okay to discharge from renal standpoint.  · Further recommendations will depend on clinical course of the patient during the current hospitalization.    · I also discussed the details with the nursing staff.  · Rest as ordered.    In closing, I sincerely appreciate opportunity to participate in care of this patient. If I can be of any further assistance with the management of this patient, please don’t hesitate to contact me.    Jerman Calzada MD, FASN    02/18/23  09:38 EST    Dictated using Dragon.

## 2023-02-18 NOTE — PLAN OF CARE
Goal Outcome Evaluation:     VSS. Pt NSR on telemetry. Pt maintaining o2 >90% on RA. FSBS monitored per orders, insulin administered per MAR. Medications administered per MAR. C/o nausea managed with PRN medications.     Pt is being discharged home per MD orders. Education complete, belongings returned.

## 2023-02-19 NOTE — PAYOR COMM NOTE
"TO: [facility] MEDICARE ADVANTAGE  FROM: LUKAS DOWNEYRN  [phone], [phone]  DISCHARGE SUMMARY  MEMBER ID: [id number]    Viola Barlow (67 y.o. Female)     Date of Birth   1955    Social Security Number       Address   [address] [id number]    Home Phone   [phone]    MRN   [id number]       [employer]eterian   Yarsani    Marital Status   Single                            Admission Date   2/17/23    Admission Type   Emergency    Admitting Provider   Chris Brantley MD    Attending Provider       [facility]       Discharge Date   2/18/2023    Discharge Disposition   Home or Self Care    Discharge Destination                               Attending Provider: (none)   Allergies: No Known Allergies    Isolation: None   Infection: MRSA/History Only (11/08/21)   Code Status: Prior    Ht: 152.4 cm (60\")   Wt: 71.8 kg (158 lb 4.6 oz)    Admission Cmt: None   Principal Problem: Hyperkalemia [E87.5]                 Active Insurance as of 2/17/2023     Primary Coverage     Payor Plan Insurance Group Employer/Plan Group    ANTH MEDICARE REPLACEMENT ANTH MEDICARE ADVANTAGE [id number]     Payor Plan Address Payor Plan Phone Number Payor Plan Fax Number Effective Dates    [address] [phone]  8/1/2022 - None Entered    [facility] [id number]       Subscriber Name Subscriber Birth Date Member ID       VIOLA BARLOW 1955 [id number]           Secondary Coverage     Payor Plan Insurance Group Employer/Plan Group    [place]      Payor Plan Address Payor Plan Phone Number Payor Plan Fax Number Effective Dates    [address] [phone]  1/23/2023 - None Entered    [facility] [id number]       Subscriber Name Subscriber Birth Date Member ID       VIOLA BARLOW 1955 [id number]                 Emergency Contacts      (Rel.) Home Phone Work Phone Mobile Phone    Lisa Barlow " (Daughter) [phone] -- --               Discharge Summaryl      Elsy Dudley RN at 02/18/23 1121        Goal Outcome Evaluation:                   Electronically signed by Elsy Dudley RN at 02/18/23 1121     Elsy Dudley RN at 02/18/23 1112        Goal Outcome Evaluation:     VSS. Pt NSR on telemetry. Pt maintaining o2 >90% on RA. FSBS monitored per orders, insulin administered per MAR. Medications administered per MAR. C/o nausea managed with PRN medications.     Pt is being discharged home per MD orders. Education complete, belongings returned.               Electronically signed by Elsy Dudley RN at 02/18/23 1114     Chris Brantley MD at 02/18/23 0905        -Discontinue taking losartan as directed.  -Follow-up with nephrologist in the office as scheduled.  -Follow-up with your PCP in 2 to 3 days for recheck and continued care.    Electronically signed by Chris Brantley MD at 02/18/23 1107     Jerman Calzada MD, FASN at 02/18/23 0938      Consult Orders    1. Inpatient Nephrology Consult [[id number]] ordered by Kenny Goldberg MD at 02/17/23 2044                       [facility]      Nephrology Consultation      Referring Provider:   Chris Brantley MD    Reason for Consultation:  Acute Kidney Injury on chronic kidney disease 3b and associated problems.  Hyperkalemia.    Subjective:  Chief complaint   Chief Complaint   Patient presents with   • Abnormal Lab     History of present illness:    Patient was called from my office after it was noted that she has potassium of 6.9.  And worsening renal function.  She is 67-year-old fairly obese female with history of type 2 diabetes, dyslipidemia, hypertension, peripheral vascular disease and gastroesophageal reflux disease.  In the ER she had slight improvement in her serum potassium but was admitted for conservative management.  She did receive Lokelma and Kayexalate after discussion with nephrology services.  This morning she  "feels much better potassium is back to normal.  During my discussion with the patient she did mention that she has been eating bananas, she has been counseled many times from the office about high potassium diet.  She is also on low-dose losartan for renal protection.  Currently she is lying in the bed awake alert and interactive denies having any chest pain or shortness of breath no nausea vomiting no abdominal pain.  I have reviewed labs/imaging/records from this hospitalization, including ER staff and admitting/attending physicians H/P's and progress notes to establish a comprehensive understanding of this patient's clinical hospital course, as well as to establish plan of care appropriately.   Past Medical History:   Diagnosis Date   • Arthritis    • Cataract    • Depression    • Diabetes mellitus (HCC)    • Dysphagia     Patient reported that intermittently food feels like it gets lodged    • Elevated cholesterol    • Full dentures     Instructed no adhesives the DOS   • GERD (gastroesophageal reflux disease)    • Headache    • History of nuclear stress test     Patient reported this was done with Dr. Douglas around 2017 and that she was started on Metoprolol after testing.    • Hypertension    • Kidney disease     Patient reported \"I have chronic kidney disease and they say my kidney function is at 33%\" and that she has routine care with Dr. Calzada    • Migraines    • PVD (peripheral vascular disease) (formerly [facility])    • Seasonal allergies    • Spinal headache    • Wears glasses        Past Surgical History:   Procedure Laterality Date   • ABDOMINAL SURGERY  08/1984    Patient reported after complete hysterectomy, she had another procedure to remove tumors in the abdominal area   • APPENDECTOMY      Reported removed when hysterectomy was done   • CATARACT EXTRACTION W/ INTRAOCULAR LENS IMPLANT Right 6/21/2019    Procedure: CATARACT PHACO EXTRACTION WITH INTRAOCULAR LENS IMPLANT RIGHT;  Surgeon: Tee Enrique MD; "  Location: [facility] OR;  Service: Ophthalmology   • CATARACT EXTRACTION W/ INTRAOCULAR LENS IMPLANT Left 7/5/2019    Procedure: CATARACT PHACO EXTRACTION WITH INTRAOCULAR LENS IMPLANT LEFT;  Surgeon: Tee Enrique MD;  Location: [facility];  Service: Ophthalmology   • COLONOSCOPY     • ELBOW EPICONDYLECTOMY Left 09/1990   • ENDOSCOPY     • HYSTERECTOMY  1983    Partial   • HYSTERECTOMY  1984    Complete    • MOUTH SURGERY      Full mouth extraction   • VASCULAR SURGERY Bilateral     For PAD - Reported the right leg was done first around 2014 and the left was done around 2015     History reviewed. No pertinent family history.      Social History     Tobacco Use   • Smoking status: Former     Packs/day: 1.50     Years: 38.00     Pack years: 57.00     Types: Cigarettes     Quit date: 2008     Years since quitting: 15.1   • Smokeless tobacco: Never   • Tobacco comments:     quit 13 yrs ago   Vaping Use   • Vaping Use: Never used   Substance Use Topics   • Alcohol use: No   • Drug use: No     Home medications:   Prior to Admission Medications     Prescriptions Last Dose Informant Patient Reported? Taking?    amLODIPine (NORVASC) 5 MG tablet Not Taking  No No    Take 1 tablet by mouth Daily.    Patient not taking:  Reported on 2/17/2023    aspirin  MG tablet Not Taking Self Yes No    Take 325 mg by mouth Daily.    Patient not taking:  Reported on 2/17/2023    atorvastatin (LIPITOR) 40 MG tablet 2/17/2023 Self Yes Yes    Take 40 mg by mouth Daily.    atorvastatin (LIPITOR) 80 MG tablet Not Taking Self Yes No    Take 80 mg by mouth Daily.    Patient not taking:  Reported on 2/17/2023    Cholecalciferol (VITAMIN D-3) 1000 units capsule 2/17/2023 Self Yes Yes    Take 1,000 Units by mouth Daily.    clopidogrel (PLAVIX) 75 MG tablet 2/17/2023 Self Yes Yes    Take 75 mg by mouth Daily.    Dulaglutide (Trulicity) 1.5 MG/0.5ML solution pen-injector Past Week  Yes Yes    Inject 1 each under the skin into the appropriate  area as directed 1 (One) Time Per Week.    gabapentin (NEURONTIN) 600 MG tablet  Self Yes No    Take 600 mg by mouth 3 (Three) Times a Day.    gabapentin (NEURONTIN) 600 MG tablet 2/17/2023 Self Yes Yes    Take 600 mg by mouth 3 (Three) Times a Day.    glipiZIDE (GLUCOTROL) 10 MG tablet Not Taking Self Yes No    Take 20 mg by mouth Every Morning.    Patient not taking:  Reported on 2/17/2023    insulin lispro protamine-insulin lispro (humaLOG 75-25) (75-25) 100 UNIT/ML suspension injection 2/17/2023  No Yes    Inject 60 Units under the skin into the appropriate area as directed 2 (Two) Times a Day With Meals.    insulin NPH (humuLIN N,novoLIN N) 100 UNIT/ML injection Not Taking Self Yes No    Inject 60 Units under the skin into the appropriate area as directed 2 (Two) Times a Day Before Meals.    Patient not taking:  Reported on 2/17/2023    levothyroxine (SYNTHROID, LEVOTHROID) 50 MCG tablet 2/17/2023 Self Yes Yes    Take 50 mcg by mouth Daily.    Loratadine 10 MG capsule Not Taking Self Yes No    Take 10 mg by mouth Daily.    Patient not taking:  Reported on 2/17/2023    losartan (COZAAR) 25 MG tablet 2/17/2023  Yes Yes    Take 25 mg by mouth Daily.    metFORMIN ER (GLUCOPHAGE-XR) 750 MG 24 hr tablet 2/17/2023 Self Yes Yes    Take 750 mg by mouth Daily With Breakfast.    metFORMIN ER (GLUCOPHAGE-XR) 750 MG 24 hr tablet  Self Yes No    Take 750 mg by mouth Daily With Breakfast.    metoprolol succinate XL (TOPROL-XL) 50 MG 24 hr tablet 2/17/2023 Self Yes Yes    Take 50 mg by mouth Daily.    metoprolol tartrate (LOPRESSOR) 50 MG tablet Not Taking Self Yes No    Take 50 mg by mouth Daily.    Patient not taking:  Reported on 2/17/2023    montelukast (SINGULAIR) 10 MG tablet Not Taking Self Yes No    Take 10 mg by mouth Every Night.    Patient not taking:  Reported on 2/17/2023    ondansetron ODT (ZOFRAN-ODT) 4 MG disintegrating tablet 2/16/2023  No Yes    Place 1 tablet on the tongue Every 8 (Eight) Hours As Needed for  Nausea or Vomiting.    oxymetazoline (AFRIN) 0.05 % nasal spray Not Taking Self Yes No    2 sprays into the nostril(s) as directed by provider 2 (Two) Times a Day As Needed for Congestion.    Patient not taking:  Reported on 2/17/2023    pantoprazole (PROTONIX) 40 MG EC tablet 2/17/2023 Self Yes Yes    Take 40 mg by mouth Daily.    pantoprazole (PROTONIX) 40 MG EC tablet  Self Yes No    Take 40 mg by mouth Daily.    prednisoLONE acetate (PRED FORTE) 1 % ophthalmic suspension Not Taking  No No    Follow instructions on the After Visit Summary.    Patient not taking:  Reported on 2/17/2023    sertraline (ZOLOFT) 50 MG tablet 2/17/2023 Self Yes Yes    Take 50 mg by mouth Daily.    vitamin D3 125 MCG (5000 UT) capsule capsule   Yes No    Take 5,000 Units by mouth Daily.        Emergency department medications:   Medications   sodium chloride 0.9 % flush 10 mL (has no administration in time range)   albuterol (PROVENTIL) nebulizer solution 0.083% 2.5 mg/3mL (2.5 mg Nebulization Not Given 2/17/23 1547)   dextrose (D50W) (25 g/50 mL) IV injection 50 mL (50 mL Intravenous Given 2/17/23 1446)   sodium zirconium cyclosilicate (LOKELMA) pack 10 g (10 g Oral Given 2/17/23 2058)   glipizide (GLUCOTROL) tablet 20 mg (20 mg Oral Not Given 2/18/23 0721)   aspirin EC tablet 325 mg (325 mg Oral Not Given 2/17/23 1710)   sertraline (ZOLOFT) tablet 50 mg (50 mg Oral Not Given 2/17/23 1713)   amLODIPine (NORVASC) tablet 5 mg (5 mg Oral Not Given 2/17/23 1709)   pantoprazole (PROTONIX) EC tablet 40 mg (40 mg Oral Given 2/18/23 0700)   montelukast (SINGULAIR) tablet 10 mg (10 mg Oral Not Given 2/17/23 2129)   atorvastatin (LIPITOR) tablet 40 mg (40 mg Oral Not Given 2/17/23 1711)   clopidogrel (PLAVIX) tablet 75 mg (75 mg Oral Not Given 2/17/23 1712)   levothyroxine (SYNTHROID, LEVOTHROID) tablet 50 mcg (50 mcg Oral Given 2/18/23 0700)   vitamin D3 capsule 5,000 Units (5,000 Units Oral Not Given 2/17/23 1713)   metoprolol succinate XL  (TOPROL-XL) 24 hr tablet 50 mg (50 mg Oral Not Given 2/17/23 1712)   gabapentin (NEURONTIN) capsule 300 mg (300 mg Oral Given 2/18/23 0700)   sodium chloride 0.9 % flush 3 mL (3 mL Intravenous Not Given 2/17/23 2130)   sodium chloride 0.9 % flush 3-10 mL (has no administration in time range)   sodium chloride 0.9 % infusion 40 mL (has no administration in time range)   acetaminophen (TYLENOL) tablet 650 mg (has no administration in time range)     Or   acetaminophen (TYLENOL) 160 MG/5ML solution 650 mg (has no administration in time range)     Or   acetaminophen (TYLENOL) suppository 650 mg (has no administration in time range)   sennosides-docusate (PERICOLACE) 8.6-50 MG per tablet 2 tablet (2 tablets Oral Not Given 2/17/23 2130)     And   polyethylene glycol (MIRALAX) packet 17 g (has no administration in time range)     And   bisacodyl (DULCOLAX) EC tablet 5 mg (has no administration in time range)     And   bisacodyl (DULCOLAX) suppository 10 mg (has no administration in time range)   ondansetron (ZOFRAN) injection 4 mg (has no administration in time range)   heparin (porcine) 5000 UNIT/ML injection 5,000 Units (5,000 Units Subcutaneous Given 2/17/23 2057)   melatonin tablet 5 mg (has no administration in time range)   dextrose (GLUTOSE) oral gel 15 g (has no administration in time range)   dextrose (D50W) (25 g/50 mL) IV injection 25 g (25 g Intravenous Given 2/17/23 1841)   glucagon (human recombinant) (GLUCAGEN DIAGNOSTIC) injection 1 mg (has no administration in time range)   Insulin Aspart (novoLOG) injection 0-7 Units (0 Units Subcutaneous Not Given 2/18/23 0721)   sodium chloride 0.9 % infusion (100 mL/hr Intravenous Currently Infusing 2/17/23 2059)   calcium gluconate injection 1 g (1 g Intravenous Given 2/17/23 1446)   sodium chloride 0.9 % bolus 500 mL (0 mL Intravenous Stopped 2/17/23 1841)   insulin regular (humuLIN R,novoLIN R) injection 10 Units (10 Units Intravenous Given 2/17/23 7498)   sodium  "polystyrene (KAYEXALATE) 15 GM/60ML suspension 30 g (30 g Oral Given 2/17/23 1755)   furosemide (LASIX) injection 40 mg (40 mg Intravenous Given 2/17/23 2058)       Allergies:  Patient has no known allergies.    Review of Systems  All review of system were reviewed and are negative except as mentioned in the history of present illness and assessment and plan.    Objective:  Vital Signs  /63 (BP Location: Left arm, Patient Position: Lying)   Pulse 95   Temp 98.2 °F (36.8 °C) (Oral)   Resp 16   Ht 152.4 cm (60\")   Wt 71.8 kg (158 lb 4.6 oz)   LMP  (LMP Unknown)   SpO2 92%   BMI 30.91 kg/m²          I/O this shift:  In: 240 [P.O.:240]  Out: -     Intake/Output Summary (Last 24 hours) at 2/18/2023 0938  Last data filed at 2/18/2023 0900  Gross per 24 hour   Intake 480 ml   Output 1300 ml   Net -820 ml       Physical Exam:  General Appearance:   Alert, cooperative, in no acute distress.     Head:   Normocephalic, without obvious abnormality, atraumatic.     Eyes:      Normal, conjunctivae and sclerae, no icterus, no pallor, corneas clear, PERRLA        Throat:   Oral mucosa dry      Neck:  No adenopathy, supple, trachea midline, no thyromegaly, no carotid bruit, no JVD        Lungs:    Clear to auscultation and fair air movement noted.      Heart::   Regular rhythm and normal rate, normal S1 and S2.       Abdomen:   Obese. Normal bowel sounds, no masses, no organomegaly, soft non-tender, non-distended, no guarding, no rebound tenderness      Genital urinary:   No urinary bladder palpable      Extremities:  Moves all extremities, trace edema, no cyanosis, no redness.     Pulses:  Pulses palpable and equal bilaterally but weak.     Skin:  No bleeding, bruising or rash        Neurologic:  Cranial nerves grossly intact, move all extremities         Results Review:   Results from last 7 days   Lab Units 02/18/23  0656 02/17/23  2103 02/17/23  1448 02/17/23  1258   SODIUM mmol/L 138  --   --  137   POTASSIUM " mmol/L 4.8 5.2 6.0* 6.5*   CHLORIDE mmol/L 101  --   --  99   CO2 mmol/L 27.6  --   --  25.8   BUN mg/dL 34*  --   --  41*   CREATININE mg/dL 1.98*  --   --  2.08*   CALCIUM mg/dL 9.5  --   --  10.5   ALBUMIN g/dL 4.0  --   --  4.8   BILIRUBIN mg/dL  --   --   --  0.3   ALK PHOS U/L  --   --   --  113   ALT (SGPT) U/L  --   --   --  18   AST (SGOT) U/L  --   --   --  28   GLUCOSE mg/dL 137*  --   --  78     Estimated Creatinine Clearance: 25 mL/min (A) (by C-G formula based on SCr of 1.98 mg/dL (H)).  Results from last 7 days   Lab Units 02/18/23  0656   PHOSPHORUS mg/dL 4.4         Results from last 7 days   Lab Units 02/18/23  0656 02/17/23  1448   WBC 10*3/mm3 10.18 10.04   HEMOGLOBIN g/dL 12.9 13.1   PLATELETS 10*3/mm3 166 165         Brief Urine Lab Results  (Last result in the past 365 days)      Color   Clarity   Blood   Leuk Est   Nitrite   Protein   CREAT   Urine HCG        02/17/23 1530 Yellow   Clear   Negative   Trace   Negative   Negative               No results found for: UTPCR  Imaging Results (Last 24 Hours)     ** No results found for the last 24 hours. **        albuterol, 2.5 mg, Nebulization, Once  amLODIPine, 5 mg, Oral, Daily  aspirin EC, 325 mg, Oral, Daily  atorvastatin, 40 mg, Oral, Daily  clopidogrel, 75 mg, Oral, Daily  gabapentin, 300 mg, Oral, Q8H  glipizide, 20 mg, Oral, QAM  heparin (porcine), 5,000 Units, Subcutaneous, Q12H  Insulin Aspart, 0-7 Units, Subcutaneous, TID AC  levothyroxine, 50 mcg, Oral, Daily  metoprolol succinate XL, 50 mg, Oral, Daily  montelukast, 10 mg, Oral, Nightly  pantoprazole, 40 mg, Oral, Daily  senna-docusate sodium, 2 tablet, Oral, BID  sertraline, 50 mg, Oral, Daily  sodium chloride, 3 mL, Intravenous, Q12H  sodium zirconium cyclosilicate, 10 g, Oral, TID  vitamin D3, 5,000 Units, Oral, Daily      sodium chloride, 100 mL/hr, Last Rate: 100 mL/hr (02/17/23 2059)        Assessment/Plan:    1. Acute kidney injury: Patient initially presented with worsening  renal function, her initial blood test shows BUN of 38 and creatinine of 2.77 with a EGFR of 18 mL/min; potassium was 6.9 and a bicarb of 18.  Her baseline creatinine has been between 1.38-1.52, since admission and has starting to improve.  2. Hyperkalemia: It has resolved at this point will not restart losartan at this point.  3. Chronic kidney disease stage IIIb: Baseline GFR has been greater than 30 usually runs around 37.  4. Hypertension with chronic kidney disease stage III: It is under optimal control we will have to reassess the need for angiotensin receptor blockers.  Patient is very clear about having significant cough with ACE inhibitors.  5. Type 2 diabetes: Stable follow-up with endocrinologist at , can resume home medications.  6. Peripheral vascular disease: Currently on Plavix can be continued.  7. Dyslipidemia: Continue statin.      Risk and complexity: High      Plan:  · She can continue her home medications except losartan.  · Continue with rest of the current treatment plan and surveillance labs.  · Details were discussed with the patient no family in the room.    · Details were also discussed with the hospitalist service.  We will see her next week for close follow-up.  Okay to discharge from renal standpoint.  · Further recommendations will depend on clinical course of the patient during the current hospitalization.    · I also discussed the details with the nursing staff.  · Rest as ordered.    In closing, I sincerely appreciate opportunity to participate in care of this patient. If I can be of any further assistance with the management of this patient, please don’t hesitate to contact me.    Jerman Cazlada MD, JUSTINO    02/18/23  09:38 EST    Dictated using Dragon.            Electronically signed by Jerman Calzada MD, JUSTINO at 02/18/23 1152     Deborah Giraldo at 02/18/23 0912          Case Management Discharge Note                Selected Continued Care - Admitted Since 2/17/2023      Destination    No services have been selected for the patient.              Durable Medical Equipment    No services have been selected for the patient.              Dialysis/Infusion    No services have been selected for the patient.              Home Medical Care    No services have been selected for the patient.              Therapy    No services have been selected for the patient.              Community Resources    No services have been selected for the patient.              Community & DME    No services have been selected for the patient.                  Transportation Services  Private: Car    Final Discharge Disposition Code: 01 - home or self-care    Electronically signed by Deborah Giraldo at 23 0912     Deborah Giraldo at 23 0911        Case Management/Social Work    Patient Name:  Viola Barlow  YOB: 1955  MRN: [id number]  Admit Date:  2023      Pt discharged before DCP could be completed.       Electronically signed by:  Deborah Giraldo  23 09:11 EST    Electronically signed by Deborah Giraldo at 23 0912     Karissa Mcwilliams, RN at 23 0628        Goal Outcome Evaluation:  Plan of Care Reviewed With: patient        Progress: improving  Outcome Evaluation: VSS; K+ 5.2 overnight; continue ivf and monitor labs    Electronically signed by Karissa Mcwilliams, RN at 23     Mary Ellen Gorman, RN at 23 1850        Goal Outcome Evaluation:               New admit from ED     Electronically signed by Mary Ellen Gorman, RN at 23 1850     Chris Brantley MD at 23 1531            [facility]   HISTORY AND PHYSICAL      Name:  Viola Barlow   Age:  67 y.o.  Sex:  female  :  1955  MRN:  [id number]   Visit Number:  [id number]  Admission Date:  2023  Date Of Service:  23  Primary Care Physician:  Live Donnelly MD    Chief Complaint:     Abnormal labs    History Of Presenting Illness:      Patient is  a 67 years old female with a past medical history of CKD, diabetes mellitus type 2 insulin-dependent, hyperlipidemia, GERD, hypertension and PVD who presented to the ER with a chief complaint of abnormal labs.  Patient with chronic kidney disease stage III follows up with Dr. Calzada, had blood work done as an outpatient yesterday and was called this morning and told that she had a critical potassium of 6.9 and was directed to the ER to be evaluated.  Patient otherwise asymptomatic and denies any recent sickness or illness.  No recent changes on her medicine.  Patient denies any chest pain, shortness of breath, cough, abdominal pain, headaches, fever, chills, nausea, vomiting, diarrhea or urinary symptoms.    On ER evaluation, patient was hemodynamically stable and was afebrile. Her labs were significant for potassium of 6.5, creatinine 2.08 (baseline creatinine of 1.6) BUN 41 otherwise within acceptable range of her CBC and CMP.  Urinalysis showed trace glucose, trace leukocytes, WBC 0-2, bacteria none, nitrates negative.  Nephrology consulted by ER provider recommended no indication for urgent dialysis, recommended also giving Lokelma in addition to calcium gluconate, albuterol and insulin IV which were given in the ER.  Hospitalist consulted for admission, further evaluation and treatment.    Review Of Systems:    All systems were reviewed and negative except as mentioned in history of presenting illness, assessment and plan.    Past Medical History: Patient  has a past medical history of Arthritis, Cataract, Depression, Diabetes mellitus (HCC), Dysphagia, Elevated cholesterol, Full dentures, GERD (gastroesophageal reflux disease), Headache, History of nuclear stress test, Hypertension, Kidney disease, Migraines, PVD (peripheral vascular disease) ([facility]), Seasonal allergies, Spinal headache, and Wears glasses.    Past Surgical History: Patient  has a past surgical history that includes Mouth surgery; Hysterectomy  (1983); Hysterectomy (1984); Abdominal surgery (08/1984); Elbow Epicondylectomy (Left, 09/1990); Vascular surgery (Bilateral); Colonoscopy; Esophagogastroduodenoscopy; Appendectomy; Cataract extraction w/ intraocular lens implant (Right, 6/21/2019); and Cataract extraction w/ intraocular lens implant (Left, 7/5/2019).    Social History: Patient  reports that she quit smoking about 15 years ago. Her smoking use included cigarettes. She has a 57.00 pack-year smoking history. She has never used smokeless tobacco. She reports that she does not drink alcohol and does not use drugs.    Family History:  Patient's family history has been reviewed and found to be noncontributory.     Allergies:      Patient has no known allergies.    Home Medications:    Prior to Admission Medications     Prescriptions Last Dose Informant Patient Reported? Taking?    amLODIPine (NORVASC) 5 MG tablet   No No    Take 1 tablet by mouth Daily.    aspirin  MG tablet  Self Yes No    Take 325 mg by mouth Daily.    atorvastatin (LIPITOR) 40 MG tablet  Self Yes No    Take 40 mg by mouth Daily.    atorvastatin (LIPITOR) 80 MG tablet  Self Yes No    Take 80 mg by mouth Daily.    Cholecalciferol (VITAMIN D-3) 1000 units capsule  Self Yes No    Take 1,000 Units by mouth Daily.    clopidogrel (PLAVIX) 75 MG tablet  Self Yes No    Take 75 mg by mouth Daily.    Dulaglutide (Trulicity) 1.5 MG/0.5ML solution pen-injector   Yes No    Inject 1 each under the skin into the appropriate area as directed 1 (One) Time Per Week.    gabapentin (NEURONTIN) 600 MG tablet  Self Yes No    Take 600 mg by mouth 3 (Three) Times a Day.    gabapentin (NEURONTIN) 600 MG tablet  Self Yes No    Take 600 mg by mouth 3 (Three) Times a Day.    glipiZIDE (GLUCOTROL) 10 MG tablet  Self Yes No    Take 20 mg by mouth Every Morning.    insulin lispro protamine-insulin lispro (humaLOG 75-25) (75-25) 100 UNIT/ML suspension injection   No No    Inject 60 Units under the skin into the  appropriate area as directed 2 (Two) Times a Day With Meals.    insulin NPH (humuLIN N,novoLIN N) 100 UNIT/ML injection  Self Yes No    Inject 60 Units under the skin into the appropriate area as directed 2 (Two) Times a Day Before Meals.    levothyroxine (SYNTHROID, LEVOTHROID) 50 MCG tablet  Self Yes No    Take 50 mcg by mouth Daily.    Loratadine 10 MG capsule  Self Yes No    Take 10 mg by mouth Daily.    metFORMIN ER (GLUCOPHAGE-XR) 750 MG 24 hr tablet  Self Yes No    Take 750 mg by mouth Daily With Breakfast.    metFORMIN ER (GLUCOPHAGE-XR) 750 MG 24 hr tablet  Self Yes No    Take 750 mg by mouth Daily With Breakfast.    metoprolol succinate XL (TOPROL-XL) 50 MG 24 hr tablet  Self Yes No    Take 50 mg by mouth Daily.    metoprolol tartrate (LOPRESSOR) 50 MG tablet  Self Yes No    Take 50 mg by mouth Daily.    montelukast (SINGULAIR) 10 MG tablet  Self Yes No    Take 10 mg by mouth Every Night.    ondansetron ODT (ZOFRAN-ODT) 4 MG disintegrating tablet   No No    Place 1 tablet on the tongue Every 8 (Eight) Hours As Needed for Nausea or Vomiting.    oxymetazoline (AFRIN) 0.05 % nasal spray  Self Yes No    2 sprays into the nostril(s) as directed by provider 2 (Two) Times a Day As Needed for Congestion.    pantoprazole (PROTONIX) 40 MG EC tablet  Self Yes No    Take 40 mg by mouth Daily.    pantoprazole (PROTONIX) 40 MG EC tablet  Self Yes No    Take 40 mg by mouth Daily.    prednisoLONE acetate (PRED FORTE) 1 % ophthalmic suspension   No No    Follow instructions on the After Visit Summary.    sertraline (ZOLOFT) 50 MG tablet  Self Yes No    Take 50 mg by mouth Daily.    vitamin D3 125 MCG (5000 UT) capsule capsule   Yes No    Take 5,000 Units by mouth Daily.        ED Medications:    Medications   sodium chloride 0.9 % flush 10 mL (has no administration in time range)   albuterol (PROVENTIL) nebulizer solution 0.083% 2.5 mg/3mL (has no administration in time range)   dextrose (D50W) (25 g/50 mL) IV injection 50  "mL (50 mL Intravenous Given 2/17/23 1446)   sodium zirconium cyclosilicate (LOKELMA) pack 10 g (10 g Oral Given 2/17/23 1449)   sodium chloride 0.9 % bolus 500 mL (500 mL Intravenous New Bag 2/17/23 1449)   calcium gluconate injection 1 g (1 g Intravenous Given 2/17/23 1446)   insulin regular (humuLIN R,novoLIN R) injection 10 Units (10 Units Intravenous Given 2/17/23 1448)     Vital Signs:  Temp:  [98.2 °F (36.8 °C)] 98.2 °F (36.8 °C)  Heart Rate:  [80-85] 84  Resp:  [20] 20  BP: (105-172)/(62-74) 105/74        02/17/23  1213   Weight: 72.6 kg (160 lb)     Body mass index is 31.25 kg/m².    Physical Exam:     Most recent vital Signs: /74   Pulse 84   Temp 98.2 °F (36.8 °C) (Oral)   Resp 20   Ht 152.4 cm (60\")   Wt 72.6 kg (160 lb)   LMP  (LMP Unknown)   SpO2 97%   BMI 31.25 kg/m²     Physical Exam  Vitals and nursing note reviewed.   Constitutional:       General: She is not in acute distress.     Appearance: Normal appearance.   HENT:      Head: Normocephalic and atraumatic.      Right Ear: External ear normal.      Left Ear: External ear normal.      Nose: Nose normal.      Mouth/Throat:      Mouth: Mucous membranes are dry.   Eyes:      Extraocular Movements: Extraocular movements intact.      Conjunctiva/sclera: Conjunctivae normal.      Pupils: Pupils are equal, round, and reactive to light.   Cardiovascular:      Rate and Rhythm: Normal rate and regular rhythm.      Pulses: Normal pulses.      Heart sounds: Normal heart sounds.   Pulmonary:      Effort: Pulmonary effort is normal. No respiratory distress.      Breath sounds: Normal breath sounds. No wheezing or rhonchi.   Abdominal:      General: Bowel sounds are normal. There is no distension.      Palpations: Abdomen is soft.      Tenderness: There is no abdominal tenderness.   Musculoskeletal:         General: No tenderness. Normal range of motion.      Cervical back: Normal range of motion and neck supple.      Right lower leg: No edema.     "  Left lower leg: No edema.   Skin:     General: Skin is warm and dry.      Findings: No rash.   Neurological:      General: No focal deficit present.      Mental Status: She is alert and oriented to person, place, and time. Mental status is at baseline.      Motor: No weakness.   Psychiatric:         Mood and Affect: Mood normal.         Behavior: Behavior normal.         Thought Content: Thought content normal.         Laboratory data:    I have reviewed the labs done in the emergency room.    Results from last 7 days   Lab Units 02/17/23  1448 02/17/23  1258   SODIUM mmol/L  --  137   POTASSIUM mmol/L 6.0* 6.5*   CHLORIDE mmol/L  --  99   CO2 mmol/L  --  25.8   BUN mg/dL  --  41*   CREATININE mg/dL  --  2.08*   CALCIUM mg/dL  --  10.5   BILIRUBIN mg/dL  --  0.3   ALK PHOS U/L  --  113   ALT (SGPT) U/L  --  18   AST (SGOT) U/L  --  28   GLUCOSE mg/dL  --  78     Results from last 7 days   Lab Units 02/17/23  1448   WBC 10*3/mm3 10.04   HEMOGLOBIN g/dL 13.1   HEMATOCRIT % 42.8   PLATELETS 10*3/mm3 165                                   Invalid input(s): USDES,  BLOODU, NITRITITE, BACT, EP    Pain Management Panel     Pain Management Panel Latest Ref Rng & Units 11/18/2022 8/16/2021    CREATININE UR mg/dL - 86.7    AMPHETAMINES SCREEN, URINE Negative Negative -    BARBITURATES SCREEN Negative Negative -    BENZODIAZEPINE SCREEN, URINE Negative Negative -    BUPRENORPHINEUR Negative Negative -    COCAINE SCREEN, URINE Negative Negative -    METHADONE SCREEN, URINE Negative Negative -    METHAMPHETAMINEUR Negative Negative -          EKG:      Normal sinus rhythm, heart rate 77, nonspecific ST/T wave changes.    Radiology:    No radiology results for the last 3 days    Assessment:    1. Hyperkalemia, POA  2. JOSE ALBERTO on CKD stage III  3. Diabetes mellitus type 2 insulin-dependent  4. Hyperlipidemia  5. GERD  6. Hypertension  7. PVD      Plan:    Patient is admitted to telemetry for further management and  treatment.    Hyperkalemia/JOSE ALBERTO on CKD  In the settings of CKD.   Nephrology consulted, appreciate recommendations.  Patient received calcium gluconate, albuterol and insulin IV   Patient started on Lokelma   Recheck potassium is 6.0  monitor potassium and kidney function.  Avoid nephrotoxic drugs  Patient received bolus of 500 mL of normal saline while in the ER, will continue with gentle maintenance fluids with normal saline at 50 mL/hr.    Diabetes mellitus type 2  Hold oral hypoglycemics   continue low-dose sliding scale insulin  Check hemoglobin A1c    Further orders as indicated per clinical course.    Risk Assessment: Moderate to high  DVT Prophylaxis: Heparin subcu (benefit> risk)  Code Status: Full  Diet: Cardiac/carbohydrate consistent/renal with low potassium    Advance Care Planning   ACP discussion was held with the patient during this visit. Patient does not have an advance directive, information provided.    Chris Brantley MD  02/17/23  15:31 EST    Dictated utilizing Dragon dictation.    Electronically signed by Chris Brantley MD at 02/17/23 1628     Kellie Mai PCT at 02/17/23 1423     Summary:Bed Assignment             Pt has been assigned to tele bed 307 at this time.     Electronically signed by Kellie Mai PCT at 02/17/23 1425     Kellie Mai PCT at 02/17/23 1421     Summary:Bed Request             Called Edvin Morrissey supervisor at this time requesting a tele bed. She stated she would call back.     Electronically signed by Kellie Mai PCT at 02/17/23 1421     Kellie Mai PCT at 02/17/23 1416     Summary:Hospitalist             Called [facility] at this time per QUITA Akhtar request. Call transferred.     Electronically signed by Kellie Mia PCT at 02/17/23 1417     Kenny Goldberg MD at 02/17/23 1411        Patient was to see Vickie Capellan NP in office today but directed to ER as labs yesterday showed K 6.9 and Cr 2.8 (BL 1.6 - 1.7).  Now K 6.5 and Cr little  better 2.1.  BG 7 bicarb normal.  Home meds notable for b blocker but no ACE/ARB.  D/W ER, to give temporizing measures and I'll give lokelma x3 doses, NS bolus, and recheck K.  Hospitalist to admit.    Electronically signed by Kenny Goldberg MD at 02/17/23 1413     Kellie Mai PCT at 02/17/23 1410     Summary:Nephrology             Yusuf called back for QUITA Akhtar at this time. Call transferred.     Electronically signed by Kellie Mai PCT at 02/17/23 1411     Kellie Mai PCT at 02/17/23 1358     Summary:[facility]             Called [facility] per QUITA Akhtar request at this time.  Carmen from  stated that Elsi is out of the office this week, but that she would page Dr. Goldberg and have him call back.     Electronically signed by Kellie Mai PCT at 02/17/23 1401     Francheska Dudley, RN at 02/17/23 1259        Unsuccessful IV attempt. JACOBY Dietz to attempt IV access     Electronically signed by Francheska Dudley, RN at 02/17/23 1331     Hermilo Molina PA-C at 02/17/23 1238     Attestation signed by Kenny Fraire MD at 02/18/23 0708        NON FACE TO FACE: This visit was performed by BOTH a physician and an APC. I performed all aspects of the MDM as documented.  Kenny Fraire MD 2/18/2023 07:08 EST                         Subjective  History of Present Illness:    Chief Complaint:   Chief Complaint   Patient presents with   • Abnormal Lab      History of Present Illness: Viola Barlow is a 67 y.o. female who presents to the emergency department complaining of abnormal potassium on outpatient testing.  Patient has a history of stage III kidney disease sees Dr. Calzada.  Had routine labs drawn yesterday in anticipation of a scheduled appointment today with Dr. Calzada.  Was called and told that her potassium was 6.2 and to come to the ER.  Patient has no complaints at this time.  No chest pain or shortness of breath.  No  "abdominal pain.  No urinary symptoms.  Onset: Labs drawn yesterday  Duration: Ongoing  Exacerbating / Alleviating factors: None  Associated symptoms: None      Nurses Notes reviewed and agree, including vitals, allergies, social history and prior medical history.     Review of Systems   Constitutional: Negative.         Hyperkalemia on outpatient testing   HENT: Negative.    Eyes: Negative.    Respiratory: Negative.    Cardiovascular: Negative for chest pain and palpitations.   Gastrointestinal: Negative.    Genitourinary: Negative.    Musculoskeletal: Negative.    Skin: Negative.    Neurological: Negative.    Psychiatric/Behavioral: Negative.        Past Medical History:   Diagnosis Date   • Arthritis    • Cataract    • Depression    • Diabetes mellitus (HCC)    • Dysphagia     Patient reported that intermittently food feels like it gets lodged    • Elevated cholesterol    • Full dentures     Instructed no adhesives the DOS   • GERD (gastroesophageal reflux disease)    • Headache    • History of nuclear stress test     Patient reported this was done with Dr. Douglas around 2017 and that she was started on Metoprolol after testing.    • Hypertension    • Kidney disease     Patient reported \"I have chronic kidney disease and they say my kidney function is at 33%\" and that she has routine care with Dr. Calzada    • Migraines    • PVD (peripheral vascular disease) ([facility])    • Seasonal allergies    • Spinal headache    • Wears glasses        Allergies:    Patient has no known allergies.      Past Surgical History:   Procedure Laterality Date   • ABDOMINAL SURGERY  08/1984    Patient reported after complete hysterectomy, she had another procedure to remove tumors in the abdominal area   • APPENDECTOMY      Reported removed when hysterectomy was done   • CATARACT EXTRACTION W/ INTRAOCULAR LENS IMPLANT Right 6/21/2019    Procedure: CATARACT PHACO EXTRACTION WITH INTRAOCULAR LENS IMPLANT RIGHT;  Surgeon: Tee Enrique, " "MD;  Location: [facility];  Service: Ophthalmology   • CATARACT EXTRACTION W/ INTRAOCULAR LENS IMPLANT Left 7/5/2019    Procedure: CATARACT PHACO EXTRACTION WITH INTRAOCULAR LENS IMPLANT LEFT;  Surgeon: Tee Enrique MD;  Location: [facility];  Service: Ophthalmology   • COLONOSCOPY     • ELBOW EPICONDYLECTOMY Left 09/1990   • ENDOSCOPY     • HYSTERECTOMY  1983    Partial   • HYSTERECTOMY  1984    Complete    • MOUTH SURGERY      Full mouth extraction   • VASCULAR SURGERY Bilateral     For PAD - Reported the right leg was done first around 2014 and the left was done around 2015         Social History     Socioeconomic History   • Marital status: Single   Tobacco Use   • Smoking status: Former     Packs/day: 1.50     Years: 38.00     Pack years: 57.00     Types: Cigarettes     Quit date: 2008     Years since quitting: 15.1   • Smokeless tobacco: Never   • Tobacco comments:     quit 13 yrs ago   Vaping Use   • Vaping Use: Never used   Substance and Sexual Activity   • Alcohol use: No   • Drug use: No   • Sexual activity: Defer         History reviewed. No pertinent family history.    Objective  Physical Exam:  /63 (BP Location: Left arm, Patient Position: Lying)   Pulse 81   Temp 97.9 °F (36.6 °C) (Oral)   Resp 18   Ht 152.4 cm (60\")   Wt 71.9 kg (158 lb 8.2 oz)   LMP  (LMP Unknown)   SpO2 97%   BMI 30.96 kg/m²      Physical Exam  Vitals and nursing note reviewed.   Constitutional:       General: She is not in acute distress.     Appearance: Normal appearance. She is not ill-appearing, toxic-appearing or diaphoretic.   HENT:      Head: Normocephalic and atraumatic.      Nose: Nose normal.   Eyes:      Extraocular Movements: Extraocular movements intact.   Cardiovascular:      Rate and Rhythm: Normal rate and regular rhythm.   Pulmonary:      Effort: Pulmonary effort is normal.   Abdominal:      General: Abdomen is flat.      Palpations: Abdomen is soft.      Tenderness: There is no abdominal " tenderness.   Musculoskeletal:         General: Normal range of motion.      Cervical back: Normal range of motion.   Skin:     General: Skin is warm and dry.   Neurological:      General: No focal deficit present.      Mental Status: She is alert and oriented to person, place, and time. Mental status is at baseline.   Psychiatric:         Mood and Affect: Mood normal.         Behavior: Behavior normal.           Procedures    ED Course:    ED Course as of 02/17/23 1922 Fri Feb 17, 2023   1305 EKG interpreted by me: Sinus rhythm, normal rate, no acute ST changes, some nonspecific T waves, this is an atypical EKG [MP]      ED Course User Index  [MP] Kenny Fraire MD       Lab Results (last 24 hours)     Procedure Component Value Units Date/Time    Comprehensive Metabolic Panel [[id number]]  (Abnormal) Collected: 02/17/23 1258    Specimen: Blood Updated: 02/17/23 1350     Glucose 78 mg/dL      BUN 41 mg/dL      Creatinine 2.08 mg/dL      Sodium 137 mmol/L      Potassium 6.5 mmol/L      Comment: Slight hemolysis detected by analyzer. Results may be affected.        Chloride 99 mmol/L      CO2 25.8 mmol/L      Calcium 10.5 mg/dL      Total Protein 8.1 g/dL      Albumin 4.8 g/dL      ALT (SGPT) 18 U/L      AST (SGOT) 28 U/L      Comment: Slight hemolysis detected by analyzer. Results may be affected.        Alkaline Phosphatase 113 U/L      Total Bilirubin 0.3 mg/dL      Globulin 3.3 gm/dL      A/G Ratio 1.5 g/dL      BUN/Creatinine Ratio 19.7     Anion Gap 12.2 mmol/L      eGFR 26.0 mL/min/1.73     Narrative:      GFR Normal >60  Chronic Kidney Disease <60  Kidney Failure <15      Potassium [[id number]]  (Abnormal) Collected: 02/17/23 1448    Specimen: Blood Updated: 02/17/23 1509     Potassium 6.0 mmol/L     CBC & Differential [[id number]]  (Abnormal) Collected: 02/17/23 1448    Specimen: Blood Updated: 02/17/23 1456    Narrative:      The following orders were created for panel order CBC &  Differential.  Procedure                               Abnormality         Status                     ---------                               -----------         ------                     CBC Auto Differential[[id number]]        Abnormal            Final result                 Please view results for these tests on the individual orders.    CBC Auto Differential [[id number]]  (Abnormal) Collected: 02/17/23 1448    Specimen: Blood Updated: 02/17/23 1456     WBC 10.04 10*3/mm3      RBC 4.38 10*6/mm3      Hemoglobin 13.1 g/dL      Hematocrit 42.8 %      MCV 97.7 fL      MCH 29.9 pg      MCHC 30.6 g/dL      RDW 13.2 %      RDW-SD 47.8 fl      MPV 9.9 fL      Platelets 165 10*3/mm3      Neutrophil % 53.6 %      Lymphocyte % 37.3 %      Monocyte % 6.7 %      Eosinophil % 1.4 %      Basophil % 0.7 %      Immature Grans % 0.3 %      Neutrophils, Absolute 5.39 10*3/mm3      Lymphocytes, Absolute 3.74 10*3/mm3      Monocytes, Absolute 0.67 10*3/mm3      Eosinophils, Absolute 0.14 10*3/mm3      Basophils, Absolute 0.07 10*3/mm3      Immature Grans, Absolute 0.03 10*3/mm3      nRBC 0.0 /100 WBC     Hemoglobin A1c [[id number]]  (Abnormal) Collected: 02/17/23 1448    Specimen: Blood Updated: 02/17/23 1643     Hemoglobin A1C 5.90 %     Narrative:      Hemoglobin A1C Ranges:    Increased Risk for Diabetes  5.7% to 6.4%  Diabetes                     >= 6.5%  Diabetic Goal                < 7.0%    Urinalysis With Microscopic If Indicated (No Culture) - Urine, Clean Catch [[id number]]  (Abnormal) Collected: 02/17/23 1530    Specimen: Urine, Clean Catch Updated: 02/17/23 1539     Color, UA Yellow     Appearance, UA Clear     pH, UA 6.5     Specific Gravity, UA 1.014     Glucose,  mg/dL (Trace)     Ketones, UA Negative     Bilirubin, UA Negative     Blood, UA Negative     Protein, UA Negative     Leuk Esterase, UA Trace     Nitrite, UA Negative     Urobilinogen, UA 0.2 E.U./dL    Urinalysis, Microscopic Only - Urine, Clean Catch  [[id number]]  (Abnormal) Collected: 02/17/23 1530    Specimen: Urine, Clean Catch Updated: 02/17/23 1546     RBC, UA None Seen /HPF      WBC, UA 0-2 /HPF      Bacteria, UA None Seen /HPF      Squamous Epithelial Cells, UA 13-20 /HPF      Hyaline Casts, UA None Seen /LPF      Methodology Manual Light Microscopy    POC Glucose Once [[id number]]  (Abnormal) Collected: 02/17/23 1846    Specimen: Blood Updated: 02/17/23 1851     Glucose 326 mg/dL      Comment: Serial Number: [id number]:  [id number]              No radiology results from the last 24 hrs       [facility]    Viola Barlow is a 67 y.o. female who presents to the emergency department for evaluation of hyperkalemia    Differential diagnosis includes dehydration, worsening renal failure among other etiologies.    CMP ordered for further evaluation of the patient's presentation.    Chart review including any outside testing was notable for baseline creatinine of 1.8    Patient was treated with IV D50, IV insulin, calcium gluconate and albuterol    Spoke with Dr. Goldberg, nephrology, he requests the above medications administered in the ED and he will add Lokelma and IV fluids.  Request admission to the hospitalist.    Plan for disposition is admission to the hospital.  Patient/family comfortable with and understanding of the plan.    Dr. Brantley graciously accepts pt for admission, tele obs.      Final diagnoses:   Hyperkalemia        Hermilo Molina PA-C  02/17/23 1922      Electronically signed by Kenny Fraire MD at 02/18/23 0708     William Harris, PCT at 02/17/23 1214        Pt states Potassium was over 6    Electronically signed by William Harris, PCT at 02/17/23 1215

## 2023-02-21 ENCOUNTER — READMISSION MANAGEMENT (OUTPATIENT)
Dept: CALL CENTER | Facility: HOSPITAL | Age: 68
End: 2023-02-21
Payer: MEDICARE

## 2023-02-21 NOTE — OUTREACH NOTE
Medical Week 1 Survey    Flowsheet Row Responses   [facility] patient discharged from? [facility]   Does the patient have one of the following disease processes/diagnoses(primary or secondary)? Other   Week 1 attempt successful? No   Unsuccessful attempts Attempt 1          Mandy COBURN - Licensed Nurse

## 2023-02-28 ENCOUNTER — READMISSION MANAGEMENT (OUTPATIENT)
Dept: CALL CENTER | Facility: HOSPITAL | Age: 68
End: 2023-02-28
Payer: MEDICARE

## 2023-02-28 NOTE — OUTREACH NOTE
Medical Week 2 Survey    Flowsheet Row Responses   [facility] facility patient discharged from? [facility]   Does the patient have one of the following disease processes/diagnoses(primary or secondary)? Other   Week 2 attempt successful? No   Unsuccessful attempts Attempt 1          Janessa MARIN - Registered Nurse

## 2023-03-07 ENCOUNTER — READMISSION MANAGEMENT (OUTPATIENT)
Dept: CALL CENTER | Facility: HOSPITAL | Age: 68
End: 2023-03-07
Payer: MEDICARE

## 2023-03-07 NOTE — OUTREACH NOTE
Medical Week 2 Survey    Flowsheet Row Responses   [facility] facility patient discharged from? [facility]   Does the patient have one of the following disease processes/diagnoses(primary or secondary)? Other   Week 2 attempt successful? No   Unsuccessful attempts Attempt 2          Marina COBURN - Registered Nurse

## 2023-03-15 ENCOUNTER — READMISSION MANAGEMENT (OUTPATIENT)
Dept: CALL CENTER | Facility: HOSPITAL | Age: 68
End: 2023-03-15
Payer: MEDICARE

## 2023-08-29 ENCOUNTER — HOSPITAL ENCOUNTER (EMERGENCY)
Facility: HOSPITAL | Age: 68
Discharge: HOME | End: 2023-08-29
Attending: EMERGENCY MEDICINE
Payer: MEDICARE

## 2023-08-29 VITALS
SYSTOLIC BLOOD PRESSURE: 127 MMHG | WEIGHT: 155 LBS | BODY MASS INDEX: 30.43 KG/M2 | OXYGEN SATURATION: 92 % | HEIGHT: 60 IN | TEMPERATURE: 98 F | RESPIRATION RATE: 18 BRPM | DIASTOLIC BLOOD PRESSURE: 79 MMHG | HEART RATE: 78 BPM

## 2023-08-29 DIAGNOSIS — E87.5 HYPERKALEMIA: Primary | ICD-10-CM

## 2023-08-29 LAB
ALBUMIN SERPL-MCNC: 4.4 G/DL (ref 3.5–5.2)
ALBUMIN/GLOB SERPL: 1.5 G/DL
ALP SERPL-CCNC: 98 U/L (ref 39–117)
ALT SERPL W P-5'-P-CCNC: 13 U/L (ref 1–33)
ANION GAP SERPL CALCULATED.3IONS-SCNC: 10.2 MMOL/L (ref 5–15)
AST SERPL-CCNC: 19 U/L (ref 1–32)
BASOPHILS # BLD AUTO: 0.05 10*3/MM3 (ref 0–0.2)
BASOPHILS NFR BLD AUTO: 0.6 % (ref 0–1.5)
BILIRUB SERPL-MCNC: 0.3 MG/DL (ref 0–1.2)
BUN SERPL-MCNC: 18 MG/DL (ref 8–23)
BUN/CREAT SERPL: 12.3 (ref 7–25)
CALCIUM SPEC-SCNC: 10 MG/DL (ref 8.6–10.5)
CHLORIDE SERPL-SCNC: 104 MMOL/L (ref 98–107)
CO2 SERPL-SCNC: 23.8 MMOL/L (ref 22–29)
CREAT SERPL-MCNC: 1.46 MG/DL (ref 0.57–1)
DEPRECATED RDW RBC AUTO: 45.1 FL (ref 37–54)
EGFRCR SERPLBLD CKD-EPI 2021: 39.5 ML/MIN/1.73
EOSINOPHIL # BLD AUTO: 0.07 10*3/MM3 (ref 0–0.4)
EOSINOPHIL NFR BLD AUTO: 0.8 % (ref 0.3–6.2)
ERYTHROCYTE [DISTWIDTH] IN BLOOD BY AUTOMATED COUNT: 12.6 % (ref 12.3–15.4)
GLOBULIN UR ELPH-MCNC: 3 GM/DL
GLUCOSE BLDC GLUCOMTR-MCNC: 125 MG/DL (ref 70–130)
GLUCOSE SERPL-MCNC: 112 MG/DL (ref 65–99)
HCT VFR BLD AUTO: 43.7 % (ref 34–46.6)
HGB BLD-MCNC: 13.8 G/DL (ref 12–15.9)
IMM GRANULOCYTES # BLD AUTO: 0.03 10*3/MM3 (ref 0–0.05)
IMM GRANULOCYTES NFR BLD AUTO: 0.3 % (ref 0–0.5)
LYMPHOCYTES # BLD AUTO: 1.87 10*3/MM3 (ref 0.7–3.1)
LYMPHOCYTES NFR BLD AUTO: 21.6 % (ref 19.6–45.3)
MCH RBC QN AUTO: 30.5 PG (ref 26.6–33)
MCHC RBC AUTO-ENTMCNC: 31.6 G/DL (ref 31.5–35.7)
MCV RBC AUTO: 96.5 FL (ref 79–97)
MONOCYTES # BLD AUTO: 0.47 10*3/MM3 (ref 0.1–0.9)
MONOCYTES NFR BLD AUTO: 5.4 % (ref 5–12)
NEUTROPHILS NFR BLD AUTO: 6.16 10*3/MM3 (ref 1.7–7)
NEUTROPHILS NFR BLD AUTO: 71.3 % (ref 42.7–76)
NRBC BLD AUTO-RTO: 0 /100 WBC (ref 0–0.2)
PLATELET # BLD AUTO: 167 10*3/MM3 (ref 140–450)
PMV BLD AUTO: 9.9 FL (ref 6–12)
POTASSIUM SERPL-SCNC: 5.6 MMOL/L (ref 3.5–5.2)
PROT SERPL-MCNC: 7.4 G/DL (ref 6–8.5)
RBC # BLD AUTO: 4.53 10*6/MM3 (ref 3.77–5.28)
SODIUM SERPL-SCNC: 138 MMOL/L (ref 136–145)
WBC NRBC COR # BLD: 8.65 10*3/MM3 (ref 3.4–10.8)

## 2023-08-29 PROCEDURE — 80053 COMPREHEN METABOLIC PANEL: CPT

## 2023-08-29 PROCEDURE — 82948 REAGENT STRIP/BLOOD GLUCOSE: CPT

## 2023-08-29 PROCEDURE — 94761 N-INVAS EAR/PLS OXIMETRY MLT: CPT

## 2023-08-29 PROCEDURE — 36415 COLL VENOUS BLD VENIPUNCTURE: CPT

## 2023-08-29 PROCEDURE — 85025 COMPLETE CBC W/AUTO DIFF WBC: CPT

## 2023-08-29 PROCEDURE — 99211 OFF/OP EST MAY X REQ PHY/QHP: CPT

## 2023-08-29 PROCEDURE — 99283 EMERGENCY DEPT VISIT LOW MDM: CPT

## 2023-08-29 PROCEDURE — 94640 AIRWAY INHALATION TREATMENT: CPT

## 2023-08-29 RX ORDER — FUROSEMIDE 20 MG/1
40 TABLET ORAL ONCE
Status: COMPLETED | OUTPATIENT
Start: 2023-08-29 | End: 2023-08-29

## 2023-08-29 RX ORDER — IPRATROPIUM BROMIDE AND ALBUTEROL SULFATE 2.5; .5 MG/3ML; MG/3ML
3 SOLUTION RESPIRATORY (INHALATION) ONCE
Status: COMPLETED | OUTPATIENT
Start: 2023-08-29 | End: 2023-08-29

## 2023-08-29 RX ORDER — FUROSEMIDE 10 MG/ML
20 INJECTION INTRAMUSCULAR; INTRAVENOUS ONCE
Status: DISCONTINUED | OUTPATIENT
Start: 2023-08-29 | End: 2023-08-29

## 2023-08-29 RX ADMIN — IPRATROPIUM BROMIDE AND ALBUTEROL SULFATE 3 ML: .5; 2.5 SOLUTION RESPIRATORY (INHALATION) at 17:54

## 2023-08-29 RX ADMIN — FUROSEMIDE 40 MG: 20 TABLET ORAL at 18:04

## 2023-08-29 NOTE — ED PROVIDER NOTES
"Subjective   History of Present Illness    68-year-old female presents to the ED with a chief complaint of abnormal labs.  Patient states that she had some blood work performed at her nephrologist office earlier and was called today and told that her potassium was elevated and to get to the ED immediately.  She is unsure of the potassium level.  She denies chest pain or shortness of breath.  No cough or wheeze.  No nausea vomiting diarrhea abdominal pain.  No recent illnesses.  No other complaints at this time.  Patient does note that she has been eating a lot of fresh garden tomatoes.    Review of Systems   All other systems reviewed and are negative.    Past Medical History:   Diagnosis Date    Arthritis     Cataract     Depression     Diabetes mellitus     Dysphagia     Patient reported that intermittently food feels like it gets lodged     Elevated cholesterol     Full dentures     Instructed no adhesives the DOS    GERD (gastroesophageal reflux disease)     Headache     History of nuclear stress test     Patient reported this was done with Dr. Douglas around 2017 and that she was started on Metoprolol after testing.     Hypertension     Kidney disease     Patient reported \"I have chronic kidney disease and they say my kidney function is at 33%\" and that she has routine care with Dr. Calzada     Migraines     PVD (peripheral vascular disease)     Seasonal allergies     Spinal headache     Wears glasses        No Known Allergies    Past Surgical History:   Procedure Laterality Date    ABDOMINAL SURGERY  08/1984    Patient reported after complete hysterectomy, she had another procedure to remove tumors in the abdominal area    APPENDECTOMY      Reported removed when hysterectomy was done    CATARACT EXTRACTION W/ INTRAOCULAR LENS IMPLANT Right 6/21/2019    Procedure: CATARACT PHACO EXTRACTION WITH INTRAOCULAR LENS IMPLANT RIGHT;  Surgeon: Tee Enrique MD;  Location: [facility];  Service: Ophthalmology    " CATARACT EXTRACTION W/ INTRAOCULAR LENS IMPLANT Left 7/5/2019    Procedure: CATARACT PHACO EXTRACTION WITH INTRAOCULAR LENS IMPLANT LEFT;  Surgeon: Tee Enrique MD;  Location: [facility];  Service: Ophthalmology    COLONOSCOPY      ELBOW EPICONDYLECTOMY Left 09/1990    ENDOSCOPY      HYSTERECTOMY  1983    Partial    HYSTERECTOMY  1984    Complete     MOUTH SURGERY      Full mouth extraction    VASCULAR SURGERY Bilateral     For PAD - Reported the right leg was done first around 2014 and the left was done around 2015       History reviewed. No pertinent family history.    Social History     Socioeconomic History    Marital status: Single   Tobacco Use    Smoking status: Former     Packs/day: 1.50     Years: 38.00     Pack years: 57.00     Types: Cigarettes     Quit date: 2008     Years since quitting: 15.6    Smokeless tobacco: Never    Tobacco comments:     quit 13 yrs ago   Vaping Use    Vaping Use: Never used   Substance and Sexual Activity    Alcohol use: No    Drug use: No    Sexual activity: Defer           Objective   Physical Exam  Vitals and nursing note reviewed.   Constitutional:       Appearance: Normal appearance.   HENT:      Head: Normocephalic and atraumatic.   Cardiovascular:      Rate and Rhythm: Normal rate.      Pulses: Normal pulses.      Heart sounds: Normal heart sounds.   Pulmonary:      Effort: Pulmonary effort is normal.      Breath sounds: Normal breath sounds.   Abdominal:      Palpations: Abdomen is soft.      Tenderness: There is no abdominal tenderness.   Neurological:      General: No focal deficit present.      Mental Status: She is alert and oriented to person, place, and time.       Procedures           ED Course                                           Medical Decision Making  Risk  Prescription drug management.    Data interpreted: Nursing notes reviewed vital signs reviewed Labs independently interpreted interpretative by me.  Imaging interpretative by me.  EKG  independently interpreted by me    Oxygen saturations included.    Counseling discussed the results above with the patient regarding need for admission or discharge.  Patient understands and agrees with plan of care      68-year-old female presents to the ED with outpatient abnormal lab work.  Patient was told she had hyperkalemia and to come to the ED.  Patient is well-appearing without complaints on arrival to the ED.  Her potassium is 5.6 which is minimally elevated.  Kidney function appears to actually be improving from her baseline.  She was given albuterol and Lasix.  Patient admits to eating a lot of garden tomatoes recently.  Discussed dietary changes.  She is appropriate for discharge to follow-up with her primary care physician and nephrologist.  Patient is agreeable to this plan.        Final diagnoses:   Hyperkalemia       ED Disposition  ED Disposition       ED Disposition   Discharge    Condition   Stable    Comment   --               Live Donnelly MD  [address] [place] [address] [phone]          Jerman Calzada MD, FASN  [address] [place] [address] [phone]    Call            Medication List      No changes were made to your prescriptions during this visit.            Colten Bullock, DO  08/29/23 1807

## 2024-01-01 NOTE — OUTREACH NOTE
Medical Week 3 Survey    Flowsheet Row Responses   [facility] patient discharged from? [facility]   Does the patient have one of the following disease processes/diagnoses(primary or secondary)? Other   Week 3 attempt successful? Yes   Call start time 1027   Call end time 1029   Discharge diagnosis Hyperkalemia, POA, resolved   Is the patient taking all medications as directed (includes completed medication regime)? Yes   Does the patient have a primary care provider?  Yes   Has the patient kept scheduled appointments due by today? Yes   Comments Has another follow up with PCP today.  Friday has appt with [facility]  next Wed.    Psychosocial issues? No   What is the patient's perception of their health status since discharge? Improving   Is the patient/caregiver able to teach back signs and symptoms related to disease process for when to call PCP? Yes   Is the patient/caregiver able to teach back signs and symptoms related to disease process for when to call 911? Yes   Is the patient/caregiver able to teach back the hierarchy of who to call/visit for symptoms/problems? PCP, Specialist, Home health nurse, Urgent Care, ED, 911 Yes   If the patient is a current smoker, are they able to teach back resources for cessation? Not a smoker   Additional teach back comments States she is doing well.  Her potassium levels has been good and blood sugars have not dropped where she needed to go to ED.  She has follow up with PCP today.     Week 3 Call Completed? Yes   Graduated Yes   Graduated/Revoked comments Denies questions or needs at this time.          Teodora HOLCOMB - Licensed Nurse  
[phone]

## 2024-11-18 ENCOUNTER — APPOINTMENT (OUTPATIENT)
Dept: CT IMAGING | Facility: HOSPITAL | Age: 69
End: 2024-11-18
Payer: MEDICARE

## 2024-11-18 ENCOUNTER — HOSPITAL ENCOUNTER (EMERGENCY)
Facility: HOSPITAL | Age: 69
Discharge: HOME | End: 2024-11-18
Attending: EMERGENCY MEDICINE | Admitting: EMERGENCY MEDICINE
Payer: MEDICARE

## 2024-11-18 ENCOUNTER — APPOINTMENT (OUTPATIENT)
Dept: GENERAL RADIOLOGY | Facility: HOSPITAL | Age: 69
End: 2024-11-18
Payer: MEDICARE

## 2024-11-18 VITALS
TEMPERATURE: 98.4 F | BODY MASS INDEX: 21.79 KG/M2 | SYSTOLIC BLOOD PRESSURE: 129 MMHG | DIASTOLIC BLOOD PRESSURE: 59 MMHG | RESPIRATION RATE: 16 BRPM | OXYGEN SATURATION: 98 % | HEART RATE: 82 BPM | WEIGHT: 111 LBS | HEIGHT: 60 IN

## 2024-11-18 DIAGNOSIS — S22.32XA CLOSED FRACTURE OF ONE RIB OF LEFT SIDE, INITIAL ENCOUNTER: Primary | ICD-10-CM

## 2024-11-18 LAB
ALBUMIN SERPL-MCNC: 4.5 G/DL (ref 3.5–5.2)
ALBUMIN/GLOB SERPL: 1.4 G/DL
ALP SERPL-CCNC: 103 U/L (ref 39–117)
ALT SERPL W P-5'-P-CCNC: 15 U/L (ref 1–33)
ANION GAP SERPL CALCULATED.3IONS-SCNC: 14.8 MMOL/L (ref 5–15)
AST SERPL-CCNC: 23 U/L (ref 1–32)
BASOPHILS # BLD AUTO: 0.06 10*3/MM3 (ref 0–0.2)
BASOPHILS NFR BLD AUTO: 0.6 % (ref 0–1.5)
BILIRUB SERPL-MCNC: 0.5 MG/DL (ref 0–1.2)
BUN SERPL-MCNC: 19 MG/DL (ref 8–23)
BUN/CREAT SERPL: 14.1 (ref 7–25)
CALCIUM SPEC-SCNC: 9.9 MG/DL (ref 8.6–10.5)
CHLORIDE SERPL-SCNC: 98 MMOL/L (ref 98–107)
CO2 SERPL-SCNC: 28.2 MMOL/L (ref 22–29)
CREAT SERPL-MCNC: 1.35 MG/DL (ref 0.57–1)
DEPRECATED RDW RBC AUTO: 45.6 FL (ref 37–54)
EGFRCR SERPLBLD CKD-EPI 2021: 43.2 ML/MIN/1.73
EOSINOPHIL # BLD AUTO: 0.07 10*3/MM3 (ref 0–0.4)
EOSINOPHIL NFR BLD AUTO: 0.7 % (ref 0.3–6.2)
ERYTHROCYTE [DISTWIDTH] IN BLOOD BY AUTOMATED COUNT: 13 % (ref 12.3–15.4)
GLOBULIN UR ELPH-MCNC: 3.2 GM/DL
GLUCOSE SERPL-MCNC: 194 MG/DL (ref 65–99)
HCT VFR BLD AUTO: 44.1 % (ref 34–46.6)
HGB BLD-MCNC: 14.1 G/DL (ref 12–15.9)
HOLD SPECIMEN: NORMAL
HOLD SPECIMEN: NORMAL
IMM GRANULOCYTES # BLD AUTO: 0.03 10*3/MM3 (ref 0–0.05)
IMM GRANULOCYTES NFR BLD AUTO: 0.3 % (ref 0–0.5)
LYMPHOCYTES # BLD AUTO: 3.66 10*3/MM3 (ref 0.7–3.1)
LYMPHOCYTES NFR BLD AUTO: 38.7 % (ref 19.6–45.3)
MAGNESIUM SERPL-MCNC: 1.7 MG/DL (ref 1.6–2.4)
MCH RBC QN AUTO: 30.5 PG (ref 26.6–33)
MCHC RBC AUTO-ENTMCNC: 32 G/DL (ref 31.5–35.7)
MCV RBC AUTO: 95.2 FL (ref 79–97)
MONOCYTES # BLD AUTO: 0.62 10*3/MM3 (ref 0.1–0.9)
MONOCYTES NFR BLD AUTO: 6.6 % (ref 5–12)
NEUTROPHILS NFR BLD AUTO: 5.01 10*3/MM3 (ref 1.7–7)
NEUTROPHILS NFR BLD AUTO: 53.1 % (ref 42.7–76)
NRBC BLD AUTO-RTO: 0 /100 WBC (ref 0–0.2)
PLATELET # BLD AUTO: 173 10*3/MM3 (ref 140–450)
PMV BLD AUTO: 11 FL (ref 6–12)
POTASSIUM SERPL-SCNC: 4.4 MMOL/L (ref 3.5–5.2)
PROT SERPL-MCNC: 7.7 G/DL (ref 6–8.5)
RBC # BLD AUTO: 4.63 10*6/MM3 (ref 3.77–5.28)
SODIUM SERPL-SCNC: 141 MMOL/L (ref 136–145)
TROPONIN T SERPL HS-MCNC: 18 NG/L
TROPONIN T SERPL HS-MCNC: 19 NG/L
WBC NRBC COR # BLD AUTO: 9.45 10*3/MM3 (ref 3.4–10.8)
WHOLE BLOOD HOLD COAG: NORMAL
WHOLE BLOOD HOLD SPECIMEN: NORMAL

## 2024-11-18 PROCEDURE — 71101 X-RAY EXAM UNILAT RIBS/CHEST: CPT

## 2024-11-18 PROCEDURE — 85025 COMPLETE CBC W/AUTO DIFF WBC: CPT | Performed by: EMERGENCY MEDICINE

## 2024-11-18 PROCEDURE — 80053 COMPREHEN METABOLIC PANEL: CPT | Performed by: EMERGENCY MEDICINE

## 2024-11-18 PROCEDURE — 99284 EMERGENCY DEPT VISIT MOD MDM: CPT | Performed by: EMERGENCY MEDICINE

## 2024-11-18 PROCEDURE — 70450 CT HEAD/BRAIN W/O DYE: CPT

## 2024-11-18 PROCEDURE — 83735 ASSAY OF MAGNESIUM: CPT | Performed by: EMERGENCY MEDICINE

## 2024-11-18 PROCEDURE — 36415 COLL VENOUS BLD VENIPUNCTURE: CPT

## 2024-11-18 PROCEDURE — 72125 CT NECK SPINE W/O DYE: CPT

## 2024-11-18 PROCEDURE — 84484 ASSAY OF TROPONIN QUANT: CPT | Performed by: EMERGENCY MEDICINE

## 2024-11-18 PROCEDURE — 93005 ELECTROCARDIOGRAM TRACING: CPT | Performed by: EMERGENCY MEDICINE

## 2024-11-18 RX ORDER — HYDROCODONE BITARTRATE AND ACETAMINOPHEN 5; 325 MG/1; MG/1
1 TABLET ORAL EVERY 6 HOURS PRN
Qty: 12 TABLET | Refills: 0 | Status: SHIPPED | OUTPATIENT
Start: 2024-11-18

## 2024-11-18 RX ORDER — HYDROCODONE BITARTRATE AND ACETAMINOPHEN 5; 325 MG/1; MG/1
1 TABLET ORAL ONCE
Status: COMPLETED | OUTPATIENT
Start: 2024-11-18 | End: 2024-11-18

## 2024-11-18 RX ORDER — SODIUM CHLORIDE 0.9 % (FLUSH) 0.9 %
10 SYRINGE (ML) INJECTION AS NEEDED
Status: DISCONTINUED | OUTPATIENT
Start: 2024-11-18 | End: 2024-11-18 | Stop reason: HOSPADM

## 2024-11-18 RX ORDER — LIDOCAINE 50 MG/G
1 PATCH TOPICAL EVERY 24 HOURS
Qty: 15 EACH | Refills: 0 | Status: SHIPPED | OUTPATIENT
Start: 2024-11-18

## 2024-11-18 RX ADMIN — HYDROCODONE BITARTRATE AND ACETAMINOPHEN 1 TABLET: 5; 325 TABLET ORAL at 17:49

## 2024-11-19 NOTE — ED PROVIDER NOTES
" EMERGENCY DEPARTMENT ENCOUNTER    Pt Name: Viola Barlow  MRN: [id number]  Pt :   1955  Room Number:  1515  Date of encounter:  2024  PCP: Provider, No Known  ED Provider: Fernie Castellanos MD    Historian: Patient      HPI:  Chief Complaint   Patient presents with    Fall          Context: Viola Barlow is a 69 y.o. female who presents to the ED c/o fall, she fell last night, she reports that she thinks she got dizzy and fell landing on her left chest.  Reports that for many months she has been having issues where she stands up and comes dizzy.  Unsure whether she struck her head.  Does report that she is mostly just complaining of pain in her left chest.  Denies any headache, neck pain.  Denies shortness of breath    PAST MEDICAL HISTORY  Past Medical History:   Diagnosis Date    Arthritis     Cataract     Depression     Diabetes mellitus     Dysphagia     Patient reported that intermittently food feels like it gets lodged     Elevated cholesterol     Full dentures     Instructed no adhesives the DOS    GERD (gastroesophageal reflux disease)     Headache     History of nuclear stress test     Patient reported this was done with Dr. Douglas around  and that she was started on Metoprolol after testing.     Hypertension     Kidney disease     Patient reported \"I have chronic kidney disease and they say my kidney function is at 33%\" and that she has routine care with Dr. Calzada     Migraines     PVD (peripheral vascular disease)     Seasonal allergies     Spinal headache     Wears glasses          PAST SURGICAL HISTORY  Past Surgical History:   Procedure Laterality Date    ABDOMINAL SURGERY  1984    Patient reported after complete hysterectomy, she had another procedure to remove tumors in the abdominal area    APPENDECTOMY      Reported removed when hysterectomy was done    CATARACT EXTRACTION W/ INTRAOCULAR LENS IMPLANT Right 2019    Procedure: CATARACT PHACO EXTRACTION WITH " INTRAOCULAR LENS IMPLANT RIGHT;  Surgeon: Tee Enrique MD;  Location: [facility] OR;  Service: Ophthalmology    CATARACT EXTRACTION W/ INTRAOCULAR LENS IMPLANT Left 2019    Procedure: CATARACT PHACO EXTRACTION WITH INTRAOCULAR LENS IMPLANT LEFT;  Surgeon: Tee Enrique MD;  Location: [facility] OR;  Service: Ophthalmology    COLONOSCOPY      ELBOW EPICONDYLECTOMY Left 1990    ENDOSCOPY      HYSTERECTOMY      Partial    HYSTERECTOMY      Complete     MOUTH SURGERY      Full mouth extraction    VASCULAR SURGERY Bilateral     For PAD - Reported the right leg was done first around  and the left was done around          FAMILY HISTORY  History reviewed. No pertinent family history.      SOCIAL HISTORY  Social History     Socioeconomic History    Marital status: Single   Tobacco Use    Smoking status: Former     Current packs/day: 0.00     Average packs/day: 1.5 packs/day for 38.0 years (57.0 ttl pk-yrs)     Types: Cigarettes     Start date:      Quit date:      Years since quittin.8    Smokeless tobacco: Never    Tobacco comments:     quit 13 yrs ago   Vaping Use    Vaping status: Never Used   Substance and Sexual Activity    Alcohol use: No    Drug use: No    Sexual activity: Defer         ALLERGIES  Patient has no known allergies.        REVIEW OF SYSTEMS  Review of Systems   Constitutional:  Negative for chills and fever.   HENT:  Negative for sore throat and trouble swallowing.    Eyes:  Negative for pain and redness.   Respiratory:  Negative for cough and shortness of breath.    Cardiovascular:  Positive for chest pain. Negative for leg swelling.   Gastrointestinal:  Negative for abdominal pain, nausea and vomiting.   Genitourinary:  Negative for dysuria and urgency.   Musculoskeletal:  Negative for back pain and neck pain.   Skin:  Negative for rash and wound.   Neurological:  Negative for dizziness and weakness.        All systems reviewed and negative except for those  discussed in HPI.       PHYSICAL EXAM    I have reviewed the triage vital signs and nursing notes.    ED Triage Vitals   Temp Heart Rate Resp BP SpO2   11/18/24 1617 11/18/24 1617 11/18/24 1617 11/18/24 1617 11/18/24 1617   98.4 °F (36.9 °C) 85 18 119/67 98 %      Temp src Heart Rate Source Patient Position BP Location FiO2 (%)   11/18/24 1617 11/18/24 1617 11/18/24 2108 11/18/24 1617 --   Oral Monitor Sitting Left arm        Physical Exam  Constitutional:       Appearance: Normal appearance. She is not ill-appearing.   HENT:      Head: Normocephalic and atraumatic.      Right Ear: External ear normal.      Left Ear: External ear normal.      Nose: Nose normal.      Mouth/Throat:      Mouth: Mucous membranes are moist.      Pharynx: Oropharynx is clear.   Eyes:      Extraocular Movements: Extraocular movements intact.      Conjunctiva/sclera: Conjunctivae normal.      Pupils: Pupils are equal, round, and reactive to light.   Cardiovascular:      Rate and Rhythm: Normal rate and regular rhythm.      Pulses:           Radial pulses are 2+ on the right side and 2+ on the left side.   Pulmonary:      Effort: Pulmonary effort is normal.      Breath sounds: Normal breath sounds.   Chest:      Chest wall: Tenderness present.       Abdominal:      General: There is no distension.      Palpations: Abdomen is soft.      Tenderness: There is no abdominal tenderness.   Musculoskeletal:         General: No tenderness or deformity. Normal range of motion.      Cervical back: Normal range of motion and neck supple. No spinous process tenderness or muscular tenderness.      Right lower leg: No edema.      Left lower leg: No edema.   Skin:     General: Skin is warm and dry.      Capillary Refill: Capillary refill takes less than 2 seconds.   Neurological:      General: No focal deficit present.      Mental Status: She is alert and oriented to person, place, and time.            LAB RESULTS  Recent Results (from the past 24 hours)    Green Top (Gel)    Collection Time: 11/18/24  5:06 PM   Result Value Ref Range    Extra Tube Hold for add-ons.    Lavender Top    Collection Time: 11/18/24  5:06 PM   Result Value Ref Range    Extra Tube hold for add-on    Gold Top - SST    Collection Time: 11/18/24  5:06 PM   Result Value Ref Range    Extra Tube Hold for add-ons.    Light Blue Top    Collection Time: 11/18/24  5:06 PM   Result Value Ref Range    Extra Tube Hold for add-ons.    CBC Auto Differential    Collection Time: 11/18/24  5:06 PM    Specimen: Blood   Result Value Ref Range    WBC 9.45 3.40 - 10.80 10*3/mm3    RBC 4.63 3.77 - 5.28 10*6/mm3    Hemoglobin 14.1 12.0 - 15.9 g/dL    Hematocrit 44.1 34.0 - 46.6 %    MCV 95.2 79.0 - 97.0 fL    MCH 30.5 26.6 - 33.0 pg    MCHC 32.0 31.5 - 35.7 g/dL    RDW 13.0 12.3 - 15.4 %    RDW-SD 45.6 37.0 - 54.0 fl    MPV 11.0 6.0 - 12.0 fL    Platelets 173 140 - 450 10*3/mm3    Neutrophil % 53.1 42.7 - 76.0 %    Lymphocyte % 38.7 19.6 - 45.3 %    Monocyte % 6.6 5.0 - 12.0 %    Eosinophil % 0.7 0.3 - 6.2 %    Basophil % 0.6 0.0 - 1.5 %    Immature Grans % 0.3 0.0 - 0.5 %    Neutrophils, Absolute 5.01 1.70 - 7.00 10*3/mm3    Lymphocytes, Absolute 3.66 (H) 0.70 - 3.10 10*3/mm3    Monocytes, Absolute 0.62 0.10 - 0.90 10*3/mm3    Eosinophils, Absolute 0.07 0.00 - 0.40 10*3/mm3    Basophils, Absolute 0.06 0.00 - 0.20 10*3/mm3    Immature Grans, Absolute 0.03 0.00 - 0.05 10*3/mm3    nRBC 0.0 0.0 - 0.2 /100 WBC   Comprehensive Metabolic Panel    Collection Time: 11/18/24  5:47 PM    Specimen: Blood   Result Value Ref Range    Glucose 194 (H) 65 - 99 mg/dL    BUN 19 8 - 23 mg/dL    Creatinine 1.35 (H) 0.57 - 1.00 mg/dL    Sodium 141 136 - 145 mmol/L    Potassium 4.4 3.5 - 5.2 mmol/L    Chloride 98 98 - 107 mmol/L    CO2 28.2 22.0 - 29.0 mmol/L    Calcium 9.9 8.6 - 10.5 mg/dL    Total Protein 7.7 6.0 - 8.5 g/dL    Albumin 4.5 3.5 - 5.2 g/dL    ALT (SGPT) 15 1 - 33 U/L    AST (SGOT) 23 1 - 32 U/L    Alkaline Phosphatase  103 39 - 117 U/L    Total Bilirubin 0.5 0.0 - 1.2 mg/dL    Globulin 3.2 gm/dL    A/G Ratio 1.4 g/dL    BUN/Creatinine Ratio 14.1 7.0 - 25.0    Anion Gap 14.8 5.0 - 15.0 mmol/L    eGFR 43.2 (L) >60.0 mL/min/1.73   Magnesium    Collection Time: 11/18/24  5:47 PM    Specimen: Blood   Result Value Ref Range    Magnesium 1.7 1.6 - 2.4 mg/dL   Single High Sensitivity Troponin T    Collection Time: 11/18/24  5:47 PM    Specimen: Blood   Result Value Ref Range    HS Troponin T 19 (H) <14 ng/L   Single High Sensitivity Troponin T    Collection Time: 11/18/24  8:13 PM    Specimen: Blood   Result Value Ref Range    HS Troponin T 18 (H) <14 ng/L       If labs were ordered, I independently reviewed the results and considered them in treating the patient.        RADIOLOGY  XR Ribs Left With PA Chest    Addendum Date: 11/18/2024    ADDENDUM REPORT ADDENDUM: Minimally displaced left lateral 6th rib fracture. Authenticated and Electronically Signed by Serjio Ge MD on 11/18/2024 08:13:50 PM    Result Date: 11/18/2024  FINAL REPORT TECHNIQUE: null CLINICAL HISTORY: fall, pain. COMPARISON: null FINDINGS: Single view of the chest with right rib films. COMPARISON: None FINDINGS: Vascular stent present along the left upper mediastinum. Normal heart size. Calcified right hilar lymph nodes. No consolidation. Calcified granuloma in the right midlung. No pleural effusion or pneumothorax. No fracture identified. Mild spondylosis.     IMPRESSION: 1. No acute cardiopulmonary abnormality. 2. No rib fracture identified. Authenticated and Electronically Signed by Serjio Ge MD on 11/18/2024 06:55:01 PM    CT Head Without Contrast    Result Date: 11/18/2024  FINAL REPORT TECHNIQUE: null CLINICAL HISTORY: head trauma COMPARISON: null FINDINGS: CT head without contrast. COMPARISON: None FINDINGS: The visualized paranasal sinuses are clear. The mastoid air cells are clear. No calvarial fracture. Atherosclerotic intracranial vasculature. No  evidence for mass or mass effect. No intracranial hemorrhage or abnormal extra-axial fluid collection. The ventricles are proportional with the degree of mild global cerebral volume loss without evidence of hydrocephalus. Basilar cisterns are patent. There are periventricular areas of low attenuation compatible with mild white matter small vessel disease. Posterior fossa appears unremarkable.     IMPRESSION: 1. No acute intracranial findings. Authenticated and Electronically Signed by Serjio Ge MD on 11/18/2024 07:44:42 PM    CT Cervical Spine Without Contrast    Result Date: 11/18/2024  FINAL REPORT TECHNIQUE: null CLINICAL HISTORY: head and neck pain COMPARISON: null FINDINGS: CT cervical spine without contrast. COMPARISON: None FINDINGS: Straightening of the normal cervical lordosis. Vertebral body heights are maintained. Calcified plaque present at the carotid bulbs bilaterally. Prominent degenerative pannus present at C1-C2. C2-C3: Posterior disc osteophyte complex. No significant neural foraminal narrowing. C3-C4: Posterior disc osteophyte complex. Uncovertebral joint hypertrophy. No significant neural foraminal narrowing. C4-C5: Anterior marginal osteophytes. Posterior disc osteophyte complex. Facet joint arthrosis. No significant neural foraminal narrowing. C5-C6: Anterior marginal osteophytes. Posterior disc osteophyte complex. Mild spinal canal stenosis at this level. Uncovertebral joint hypertrophy. Moderate left and mild-to-moderate right neural foraminal narrowing. C6-C7: No significant neuroforaminal narrowing or spinal canal stenosis.     IMPRESSION: 1. Straightening of the normal cervical lordosis, likely positional or secondary to muscle spasm. 2. Mild-to-moderate multilevel cervical spondylosis most pronounced at C5-6. Authenticated and Electronically Signed by Serjio Ge MD on 11/18/2024 07:41:23 PM         PROCEDURES    Procedures    Interpretations    O2 Sat: The patients oxygen saturation  was 98% on Room Air.  This was independently interpreted by me as Normal    EKG: I reviewed and independently interpreted the EKG as sinus rhythm rate of 86 bpm, normal axis, normal intervals, no ST elevation, no T wave inversions    Cardiac Monitoring: I reviewed and independently interpreted the Rhythm Strip as Normal Sinus rhythm rate of 82    Radiology: I ordered and independently reviewed the above noted radiographic studies.  I viewed images of Chest Xray which showed  rib fracture  per my independent interpretation. See radiologist's dictation for official interpretation.     Radiology: I ordered and independently reviewed the above noted radiographic studies.  I viewed images of CT Head and CT Cervical Spine which showed No fracture and No intracranial hemorrhage per my independent interpretation. See radiologist's dictation for official interpretation      MEDICATIONS GIVEN IN ER    Medications   HYDROcodone-acetaminophen (NORCO) 5-325 MG per tablet 1 tablet (1 tablet Oral Given 11/18/24 1749)         MEDICAL DECISION MAKING, PROGRESS, and CONSULTS    All labs, if obtained, have been independently reviewed by me.  All radiology studies, if obtained, have been reviewed by me and the radiologist dictating the report.  All EKG's, if obtained, have been independently viewed and interpreted by me      Discussion below represents my analysis of pertinent findings related to patient's condition, differential diagnosis, treatment plan and final disposition.      Differential diagnosis:    69-year-old female presented ED after a fall, she has significant pain in the left chest wall, I suspect she likely has a rib fracture, sounds like she had a syncopal episode sounds as if she has been having orthostatic syncope, will obtain labs including troponin, EKG, additionally obtain rib series x-rays, and a CT scan of the head cervical spine is unclear whether she had cervical spine or head trauma during the  fall.    Additional Sources:  External (non-ED) record review:  Nephrology note 11/15/2024 regarding chronic kidney disease       Orders placed during this visit:  Orders Placed This Encounter   Procedures    XR Ribs Left With PA Chest    CT Head Without Contrast    CT Cervical Spine Without Contrast    [facility] Draw    Comprehensive Metabolic Panel    Magnesium    Single High Sensitivity Troponin T    CBC Auto Differential    Single High Sensitivity Troponin T    Undress & Gown    Continuous Pulse Oximetry    Vital Signs    ECG 12 Lead ED Triage Standing Order; Syncope    CBC & Differential    Green Top (Gel)    Lavender Top    Gold Top - SST    Light Blue Top         Additional orders considered but not ordered:  None    ED Course:    Consultants:  None    ED Course as of 11/18/24 2317   Mon Nov 18, 2024   1747 Single High Sensitivity Troponin T(!)  Troponin is indeterminate, will repeat at the 2-hour brooke [CS]   1947 Single High Sensitivity Troponin T [CS]   1947 Comprehensive Metabolic Panel(!)  Creatinine is at baseline [CS]   2044 Single High Sensitivity Troponin T(!)  Repeat troponin is improved from previous [CS]   2049 Labs are benign, patient's scans are negative, x-ray does show a single rib fracture on the left consistent with the patient's pain, will prescribe oral pain medication, lidocaine patches, incentive spirometer, advised the patient to follow-up with primary care.  She voiced understanding is agreeable to plan for discharge home [CS]      ED Course User Index  [CS] Fernie Castellanos MD           After my consideration of clinical presentation and any laboratory/radiology studies obtained, I discussed the findings with the patient/patient representative who is in agreement with the treatment plan and the final disposition. Risks and benefits of discharge were discussed.     AS OF 23:17 EST VITALS:    BP - 129/59  HR - 82  TEMP - 98.4 °F (36.9 °C) (Oral)  O2 SATS - 98%    I reviewed the  patients prescription monitoring report if available prior to discharge    DIAGNOSIS  Final diagnoses:   Closed fracture of one rib of left side, initial encounter         DISPOSITION  ED Disposition       ED Disposition   Discharge    Condition   Stable    Comment   --                   Please note that portions of this document were completed with voice recognition software.        Fernie Castellanos MD  11/18/24 9118

## 2024-12-16 ENCOUNTER — APPOINTMENT (OUTPATIENT)
Dept: CT IMAGING | Facility: HOSPITAL | Age: 69
End: 2024-12-16
Payer: MEDICARE

## 2024-12-16 ENCOUNTER — HOSPITAL ENCOUNTER (INPATIENT)
Facility: HOSPITAL | Age: 69
LOS: 1 days | Discharge: HOME | End: 2024-12-19
Attending: STUDENT IN AN ORGANIZED HEALTH CARE EDUCATION/TRAINING PROGRAM | Admitting: STUDENT IN AN ORGANIZED HEALTH CARE EDUCATION/TRAINING PROGRAM
Payer: MEDICARE

## 2024-12-16 DIAGNOSIS — E86.0 DEHYDRATION: ICD-10-CM

## 2024-12-16 DIAGNOSIS — N17.9 ACUTE KIDNEY INJURY: Primary | ICD-10-CM

## 2024-12-16 DIAGNOSIS — R73.9 HYPERGLYCEMIA: ICD-10-CM

## 2024-12-16 DIAGNOSIS — E11.21 TYPE 2 DIABETES MELLITUS WITH NEPHROPATHY: ICD-10-CM

## 2024-12-16 PROBLEM — N18.9 ACUTE KIDNEY INJURY SUPERIMPOSED ON CHRONIC KIDNEY DISEASE: Status: ACTIVE | Noted: 2024-12-16

## 2024-12-16 LAB
ALBUMIN SERPL-MCNC: 5 G/DL (ref 3.5–5.2)
ALBUMIN/GLOB SERPL: 1.1 G/DL
ALP SERPL-CCNC: 148 U/L (ref 39–117)
ALT SERPL W P-5'-P-CCNC: 16 U/L (ref 1–33)
ANION GAP SERPL CALCULATED.3IONS-SCNC: 18.4 MMOL/L (ref 5–15)
ANION GAP SERPL CALCULATED.3IONS-SCNC: 29.7 MMOL/L (ref 5–15)
AST SERPL-CCNC: 22 U/L (ref 1–32)
ATMOSPHERIC PRESS: 736 MMHG
BASE EXCESS BLDV CALC-SCNC: 1.4 MMOL/L (ref 0–2)
BDY SITE: NORMAL
BILIRUB SERPL-MCNC: 0.7 MG/DL (ref 0–1.2)
BUN SERPL-MCNC: 54 MG/DL (ref 8–23)
BUN SERPL-MCNC: 56 MG/DL (ref 8–23)
BUN/CREAT SERPL: 16.9 (ref 7–25)
BUN/CREAT SERPL: 20.4 (ref 7–25)
CALCIUM SPEC-SCNC: 10.9 MG/DL (ref 8.6–10.5)
CALCIUM SPEC-SCNC: 9.8 MG/DL (ref 8.6–10.5)
CHLORIDE SERPL-SCNC: 101 MMOL/L (ref 98–107)
CHLORIDE SERPL-SCNC: 93 MMOL/L (ref 98–107)
CO2 SERPL-SCNC: 19.3 MMOL/L (ref 22–29)
CO2 SERPL-SCNC: 22.6 MMOL/L (ref 22–29)
COHGB MFR BLD: 1 % (ref 0–5)
CREAT SERPL-MCNC: 2.74 MG/DL (ref 0.57–1)
CREAT SERPL-MCNC: 3.19 MG/DL (ref 0.57–1)
DEPRECATED RDW RBC AUTO: 47 FL (ref 37–54)
EGFRCR SERPLBLD CKD-EPI 2021: 15.4 ML/MIN/1.73
EGFRCR SERPLBLD CKD-EPI 2021: 18.5 ML/MIN/1.73
ERYTHROCYTE [DISTWIDTH] IN BLOOD BY AUTOMATED COUNT: 13.9 % (ref 12.3–15.4)
GLOBULIN UR ELPH-MCNC: 4.4 GM/DL
GLUCOSE BLDC GLUCOMTR-MCNC: 162 MG/DL (ref 70–130)
GLUCOSE BLDC GLUCOMTR-MCNC: 267 MG/DL (ref 70–130)
GLUCOSE BLDC GLUCOMTR-MCNC: 333 MG/DL (ref 70–130)
GLUCOSE SERPL-MCNC: 333 MG/DL (ref 65–99)
GLUCOSE SERPL-MCNC: 363 MG/DL (ref 65–99)
HBA1C MFR BLD: 8.4 % (ref 4.8–5.6)
HCO3 BLDV-SCNC: 27.1 MMOL/L (ref 22–28)
HCT VFR BLD AUTO: 50.6 % (ref 34–46.6)
HGB BLD-MCNC: 16.6 G/DL (ref 12–15.9)
HOLD SPECIMEN: NORMAL
HOLD SPECIMEN: NORMAL
LIPASE SERPL-CCNC: 31 U/L (ref 13–60)
LYMPHOCYTES # BLD MANUAL: 3.08 10*3/MM3 (ref 0.7–3.1)
LYMPHOCYTES NFR BLD MANUAL: 2 % (ref 5–12)
Lab: NORMAL
MCH RBC QN AUTO: 30.5 PG (ref 26.6–33)
MCHC RBC AUTO-ENTMCNC: 32.8 G/DL (ref 31.5–35.7)
MCV RBC AUTO: 92.8 FL (ref 79–97)
METHGB BLD QL: 0.6 % (ref 0–3)
MODALITY: NORMAL
MONOCYTES # BLD: 0.39 10*3/MM3 (ref 0.1–0.9)
NEUTROPHILS # BLD AUTO: 15.81 10*3/MM3 (ref 1.7–7)
NEUTROPHILS NFR BLD MANUAL: 82 % (ref 42.7–76)
OXYHGB MFR BLDV: 64.9 % (ref 40–70)
PCO2 BLDV: 45.7 MM HG (ref 40–50)
PH BLDV: 7.38 PH UNITS (ref 7.32–7.42)
PLATELET # BLD AUTO: 373 10*3/MM3 (ref 140–450)
PMV BLD AUTO: 10.5 FL (ref 6–12)
PO2 BLDV: 33.1 MM HG (ref 30–50)
POTASSIUM SERPL-SCNC: 4.3 MMOL/L (ref 3.5–5.2)
POTASSIUM SERPL-SCNC: 4.6 MMOL/L (ref 3.5–5.2)
PROT SERPL-MCNC: 9.4 G/DL (ref 6–8.5)
RBC # BLD AUTO: 5.45 10*6/MM3 (ref 3.77–5.28)
RBC MORPH BLD: NORMAL
SAO2 % BLDCOV: 66 % (ref 45–75)
SCAN SLIDE: NORMAL
SMALL PLATELETS BLD QL SMEAR: ADEQUATE
SODIUM SERPL-SCNC: 142 MMOL/L (ref 136–145)
SODIUM SERPL-SCNC: 142 MMOL/L (ref 136–145)
VARIANT LYMPHS NFR BLD MANUAL: 16 % (ref 19.6–45.3)
VENTILATOR MODE: NORMAL
WBC MORPH BLD: NORMAL
WBC NRBC COR # BLD AUTO: 19.28 10*3/MM3 (ref 3.4–10.8)
WHOLE BLOOD HOLD COAG: NORMAL
WHOLE BLOOD HOLD SPECIMEN: NORMAL

## 2024-12-16 PROCEDURE — 25010000002 DIPHENHYDRAMINE PER 50 MG: Performed by: STUDENT IN AN ORGANIZED HEALTH CARE EDUCATION/TRAINING PROGRAM

## 2024-12-16 PROCEDURE — 82948 REAGENT STRIP/BLOOD GLUCOSE: CPT | Performed by: STUDENT IN AN ORGANIZED HEALTH CARE EDUCATION/TRAINING PROGRAM

## 2024-12-16 PROCEDURE — 85025 COMPLETE CBC W/AUTO DIFF WBC: CPT | Performed by: STUDENT IN AN ORGANIZED HEALTH CARE EDUCATION/TRAINING PROGRAM

## 2024-12-16 PROCEDURE — 25810000003 SODIUM CHLORIDE 0.9 % SOLUTION: Performed by: STUDENT IN AN ORGANIZED HEALTH CARE EDUCATION/TRAINING PROGRAM

## 2024-12-16 PROCEDURE — 25010000002 PROCHLORPERAZINE 10 MG/2ML SOLUTION: Performed by: INTERNAL MEDICINE

## 2024-12-16 PROCEDURE — 99285 EMERGENCY DEPT VISIT HI MDM: CPT | Performed by: STUDENT IN AN ORGANIZED HEALTH CARE EDUCATION/TRAINING PROGRAM

## 2024-12-16 PROCEDURE — 83690 ASSAY OF LIPASE: CPT | Performed by: STUDENT IN AN ORGANIZED HEALTH CARE EDUCATION/TRAINING PROGRAM

## 2024-12-16 PROCEDURE — 25010000002 ONDANSETRON PER 1 MG: Performed by: STUDENT IN AN ORGANIZED HEALTH CARE EDUCATION/TRAINING PROGRAM

## 2024-12-16 PROCEDURE — 80053 COMPREHEN METABOLIC PANEL: CPT | Performed by: STUDENT IN AN ORGANIZED HEALTH CARE EDUCATION/TRAINING PROGRAM

## 2024-12-16 PROCEDURE — 63710000001 INSULIN GLARGINE PER 5 UNITS: Performed by: STUDENT IN AN ORGANIZED HEALTH CARE EDUCATION/TRAINING PROGRAM

## 2024-12-16 PROCEDURE — 85007 BL SMEAR W/DIFF WBC COUNT: CPT | Performed by: STUDENT IN AN ORGANIZED HEALTH CARE EDUCATION/TRAINING PROGRAM

## 2024-12-16 PROCEDURE — G0378 HOSPITAL OBSERVATION PER HR: HCPCS

## 2024-12-16 PROCEDURE — 82805 BLOOD GASES W/O2 SATURATION: CPT

## 2024-12-16 PROCEDURE — 82948 REAGENT STRIP/BLOOD GLUCOSE: CPT

## 2024-12-16 PROCEDURE — 99223 1ST HOSP IP/OBS HIGH 75: CPT | Performed by: STUDENT IN AN ORGANIZED HEALTH CARE EDUCATION/TRAINING PROGRAM

## 2024-12-16 PROCEDURE — 74176 CT ABD & PELVIS W/O CONTRAST: CPT

## 2024-12-16 PROCEDURE — 83036 HEMOGLOBIN GLYCOSYLATED A1C: CPT | Performed by: STUDENT IN AN ORGANIZED HEALTH CARE EDUCATION/TRAINING PROGRAM

## 2024-12-16 PROCEDURE — 82820 HEMOGLOBIN-OXYGEN AFFINITY: CPT

## 2024-12-16 PROCEDURE — 25010000002 HEPARIN (PORCINE) PER 1000 UNITS: Performed by: STUDENT IN AN ORGANIZED HEALTH CARE EDUCATION/TRAINING PROGRAM

## 2024-12-16 PROCEDURE — 63710000001 INSULIN LISPRO (HUMAN) PER 5 UNITS: Performed by: STUDENT IN AN ORGANIZED HEALTH CARE EDUCATION/TRAINING PROGRAM

## 2024-12-16 PROCEDURE — 25010000002 METOCLOPRAMIDE PER 10 MG: Performed by: STUDENT IN AN ORGANIZED HEALTH CARE EDUCATION/TRAINING PROGRAM

## 2024-12-16 RX ORDER — SODIUM CHLORIDE 9 MG/ML
40 INJECTION, SOLUTION INTRAVENOUS AS NEEDED
Status: DISCONTINUED | OUTPATIENT
Start: 2024-12-16 | End: 2024-12-19 | Stop reason: HOSPADM

## 2024-12-16 RX ORDER — PROCHLORPERAZINE 25 MG
25 SUPPOSITORY, RECTAL RECTAL EVERY 12 HOURS PRN
Status: DISCONTINUED | OUTPATIENT
Start: 2024-12-16 | End: 2024-12-19 | Stop reason: HOSPADM

## 2024-12-16 RX ORDER — DIPHENHYDRAMINE HYDROCHLORIDE 50 MG/ML
25 INJECTION INTRAMUSCULAR; INTRAVENOUS ONCE
Status: COMPLETED | OUTPATIENT
Start: 2024-12-16 | End: 2024-12-16

## 2024-12-16 RX ORDER — SODIUM CHLORIDE 0.9 % (FLUSH) 0.9 %
10 SYRINGE (ML) INJECTION AS NEEDED
Status: DISCONTINUED | OUTPATIENT
Start: 2024-12-16 | End: 2024-12-19 | Stop reason: HOSPADM

## 2024-12-16 RX ORDER — HEPARIN SODIUM 5000 [USP'U]/ML
5000 INJECTION, SOLUTION INTRAVENOUS; SUBCUTANEOUS EVERY 12 HOURS SCHEDULED
Status: DISCONTINUED | OUTPATIENT
Start: 2024-12-16 | End: 2024-12-19 | Stop reason: HOSPADM

## 2024-12-16 RX ORDER — INSULIN LISPRO 100 [IU]/ML
1-200 INJECTION, SOLUTION INTRAVENOUS; SUBCUTANEOUS AS NEEDED
Status: DISCONTINUED | OUTPATIENT
Start: 2024-12-16 | End: 2024-12-19 | Stop reason: HOSPADM

## 2024-12-16 RX ORDER — IBUPROFEN 600 MG/1
1 TABLET ORAL
Status: DISCONTINUED | OUTPATIENT
Start: 2024-12-16 | End: 2024-12-19 | Stop reason: HOSPADM

## 2024-12-16 RX ORDER — SODIUM CHLORIDE 0.9 % (FLUSH) 0.9 %
10 SYRINGE (ML) INJECTION EVERY 12 HOURS SCHEDULED
Status: DISCONTINUED | OUTPATIENT
Start: 2024-12-16 | End: 2024-12-19 | Stop reason: HOSPADM

## 2024-12-16 RX ORDER — METOCLOPRAMIDE HYDROCHLORIDE 5 MG/ML
5 INJECTION INTRAMUSCULAR; INTRAVENOUS ONCE
Status: COMPLETED | OUTPATIENT
Start: 2024-12-16 | End: 2024-12-16

## 2024-12-16 RX ORDER — SODIUM CHLORIDE 9 MG/ML
100 INJECTION, SOLUTION INTRAVENOUS CONTINUOUS
Status: DISCONTINUED | OUTPATIENT
Start: 2024-12-16 | End: 2024-12-18

## 2024-12-16 RX ORDER — ONDANSETRON 2 MG/ML
4 INJECTION INTRAMUSCULAR; INTRAVENOUS ONCE
Status: COMPLETED | OUTPATIENT
Start: 2024-12-16 | End: 2024-12-16

## 2024-12-16 RX ORDER — PROCHLORPERAZINE EDISYLATE 5 MG/ML
5 INJECTION INTRAMUSCULAR; INTRAVENOUS EVERY 6 HOURS PRN
Status: DISCONTINUED | OUTPATIENT
Start: 2024-12-16 | End: 2024-12-19 | Stop reason: HOSPADM

## 2024-12-16 RX ORDER — DEXTROSE MONOHYDRATE 25 G/50ML
10-50 INJECTION, SOLUTION INTRAVENOUS
Status: DISCONTINUED | OUTPATIENT
Start: 2024-12-16 | End: 2024-12-19 | Stop reason: HOSPADM

## 2024-12-16 RX ORDER — PROCHLORPERAZINE MALEATE 10 MG
5 TABLET ORAL EVERY 6 HOURS PRN
Status: DISCONTINUED | OUTPATIENT
Start: 2024-12-16 | End: 2024-12-19 | Stop reason: HOSPADM

## 2024-12-16 RX ORDER — SODIUM CHLORIDE 9 MG/ML
100 INJECTION, SOLUTION INTRAVENOUS CONTINUOUS
Status: DISCONTINUED | OUTPATIENT
Start: 2024-12-16 | End: 2024-12-17

## 2024-12-16 RX ORDER — INSULIN LISPRO 100 [IU]/ML
1-200 INJECTION, SOLUTION INTRAVENOUS; SUBCUTANEOUS
Status: DISCONTINUED | OUTPATIENT
Start: 2024-12-16 | End: 2024-12-19 | Stop reason: HOSPADM

## 2024-12-16 RX ORDER — ONDANSETRON 2 MG/ML
4 INJECTION INTRAMUSCULAR; INTRAVENOUS EVERY 6 HOURS PRN
Status: DISCONTINUED | OUTPATIENT
Start: 2024-12-16 | End: 2024-12-16

## 2024-12-16 RX ORDER — NITROGLYCERIN 0.4 MG/1
0.4 TABLET SUBLINGUAL
Status: DISCONTINUED | OUTPATIENT
Start: 2024-12-16 | End: 2024-12-19 | Stop reason: HOSPADM

## 2024-12-16 RX ORDER — NICOTINE POLACRILEX 4 MG
15 LOZENGE BUCCAL
Status: DISCONTINUED | OUTPATIENT
Start: 2024-12-16 | End: 2024-12-19 | Stop reason: HOSPADM

## 2024-12-16 RX ADMIN — INSULIN LISPRO 5 UNITS: 100 INJECTION, SOLUTION INTRAVENOUS; SUBCUTANEOUS at 17:30

## 2024-12-16 RX ADMIN — SODIUM CHLORIDE 100 ML/HR: 9 INJECTION, SOLUTION INTRAVENOUS at 19:27

## 2024-12-16 RX ADMIN — DIPHENHYDRAMINE HYDROCHLORIDE 25 MG: 50 INJECTION, SOLUTION INTRAMUSCULAR; INTRAVENOUS at 13:29

## 2024-12-16 RX ADMIN — ONDANSETRON 4 MG: 2 INJECTION INTRAMUSCULAR; INTRAVENOUS at 12:31

## 2024-12-16 RX ADMIN — HEPARIN SODIUM 5000 UNITS: 5000 INJECTION INTRAVENOUS; SUBCUTANEOUS at 21:28

## 2024-12-16 RX ADMIN — ONDANSETRON 4 MG: 2 INJECTION INTRAMUSCULAR; INTRAVENOUS at 19:28

## 2024-12-16 RX ADMIN — PROCHLORPERAZINE EDISYLATE 5 MG: 5 INJECTION INTRAMUSCULAR; INTRAVENOUS at 23:00

## 2024-12-16 RX ADMIN — Medication 10 ML: at 21:30

## 2024-12-16 RX ADMIN — SODIUM CHLORIDE 100 ML/HR: 9 INJECTION, SOLUTION INTRAVENOUS at 18:42

## 2024-12-16 RX ADMIN — SODIUM CHLORIDE 1000 ML: 9 INJECTION, SOLUTION INTRAVENOUS at 12:31

## 2024-12-16 RX ADMIN — INSULIN GLARGINE 7 UNITS: 100 INJECTION, SOLUTION SUBCUTANEOUS at 21:29

## 2024-12-16 RX ADMIN — METOCLOPRAMIDE 5 MG: 5 INJECTION, SOLUTION INTRAMUSCULAR; INTRAVENOUS at 13:28

## 2024-12-16 NOTE — ED PROVIDER NOTES
"     [facility]  Emergency Department Encounter  Emergency Medicine Physician Note       Pt Name: Viola Barlow  MRN: [id number]  Pt :   1955  Room Number:  10/10  Date of encounter:  2024  PCP: Jyoti Tomlinson APRN  ED Physician: Tomas White DO    HPI:  Viola Barlow is a 69 y.o. female who presents to the ED with chief complaint of HYPERGLYCEMIA.  Patient presents from home via EMS.  Reports that she has been experiencing nausea and vomiting for the past 4 days.  Also having elevated blood glucose levels in the 400s.  Past medical history of type 2 diabetes on chronic insulin.  States that she has been taking her insulin as prescribed.  No missed doses.  No fever, chills, chest pain, shortness of breath, back pain, problems with urination or bowel movements, leg pain or swelling.  Does report associated abdominal cramping.  POC glucose per .    PAST MEDICAL HISTORY  Past Medical History:   Diagnosis Date    Arthritis     Cataract     Depression     Diabetes mellitus     Dysphagia     Patient reported that intermittently food feels like it gets lodged     Elevated cholesterol     Full dentures     Instructed no adhesives the DOS    GERD (gastroesophageal reflux disease)     Headache     History of nuclear stress test     Patient reported this was done with Dr. Douglas around 2017 and that she was started on Metoprolol after testing.     Hypertension     Kidney disease     Patient reported \"I have chronic kidney disease and they say my kidney function is at 33%\" and that she has routine care with Dr. Calzada     Migraines     PVD (peripheral vascular disease)     Seasonal allergies     Spinal headache     Wears glasses      Current Outpatient Medications   Medication Instructions    amLODIPine (NORVASC) 5 mg, Oral, Daily    aspirin 325 mg, Daily    atorvastatin (LIPITOR) 40 mg, Oral, Daily    clopidogrel (PLAVIX) 75 mg, Oral, Daily    Dulaglutide (Trulicity) 1.5 " MG/0.5ML solution pen-injector 1 each, Subcutaneous, Weekly    gabapentin (NEURONTIN) 600 mg, Oral, 3 Times Daily    gabapentin (NEURONTIN) 600 mg, Oral, 3 Times Daily    glipizide (GLUCOTROL) 20 mg, Every Morning    HYDROcodone-acetaminophen (NORCO) 5-325 MG per tablet 1 tablet, Oral, Every 6 Hours PRN    insulin lispro protamine-insulin lispro (humaLOG 75-25) (75-25) 100 UNIT/ML suspension injection 60 Units, Subcutaneous, 2 Times Daily With Meals    levothyroxine (SYNTHROID, LEVOTHROID) 50 mcg, Oral, Daily    lidocaine (LIDODERM) 5 % 1 patch, Transdermal, Every 24 Hours, Remove & Discard patch within 12 hours or as directed by MD    metFORMIN ER (GLUCOPHAGE-XR) 750 mg, Oral, Daily With Breakfast    metoprolol succinate XL (TOPROL-XL) 50 mg, Oral, Daily    montelukast (SINGULAIR) 10 mg, Nightly    ondansetron ODT (ZOFRAN-ODT) 4 mg, Translingual, Every 8 Hours PRN    pantoprazole (PROTONIX) 40 mg, Oral, Daily    sertraline (ZOLOFT) 50 mg, Oral, Daily    Vitamin D-3 1,000 Units, Oral, Daily    vitamin D3 5,000 Units, Oral, Daily      PAST SURGICAL HISTORY  Past Surgical History:   Procedure Laterality Date    ABDOMINAL SURGERY  08/1984    Patient reported after complete hysterectomy, she had another procedure to remove tumors in the abdominal area    APPENDECTOMY      Reported removed when hysterectomy was done    CATARACT EXTRACTION W/ INTRAOCULAR LENS IMPLANT Right 6/21/2019    Procedure: CATARACT PHACO EXTRACTION WITH INTRAOCULAR LENS IMPLANT RIGHT;  Surgeon: Tee Enrique MD;  Location: [facility] OR;  Service: Ophthalmology    CATARACT EXTRACTION W/ INTRAOCULAR LENS IMPLANT Left 7/5/2019    Procedure: CATARACT PHACO EXTRACTION WITH INTRAOCULAR LENS IMPLANT LEFT;  Surgeon: Tee Enrique MD;  Location: [facility] OR;  Service: Ophthalmology    COLONOSCOPY      ELBOW EPICONDYLECTOMY Left 09/1990    ENDOSCOPY      HYSTERECTOMY  1983    Partial    HYSTERECTOMY  1984    Complete     MOUTH SURGERY      Full  mouth extraction    VASCULAR SURGERY Bilateral     For PAD - Reported the right leg was done first around  and the left was done around        FAMILY HISTORY  History reviewed. No pertinent family history.    SOCIAL HISTORY  Social History     Socioeconomic History    Marital status: Single   Tobacco Use    Smoking status: Former     Current packs/day: 0.00     Average packs/day: 1.5 packs/day for 38.0 years (57.0 ttl pk-yrs)     Types: Cigarettes     Start date:      Quit date:      Years since quittin.9    Smokeless tobacco: Never    Tobacco comments:     quit 13 yrs ago   Vaping Use    Vaping status: Never Used   Substance and Sexual Activity    Alcohol use: No    Drug use: No    Sexual activity: Defer     ALLERGIES  Lisinopril    REVIEW OF SYSTEMS  All systems reviewed and negative except for those discussed in HPI.     PHYSICAL EXAM  ED Triage Vitals   Temp Heart Rate Resp BP SpO2   24 1214 24 1214 24 1214 24 1216 24 1214   98.3 °F (36.8 °C) 112 22 (!) 193/103 98 %      Temp src Heart Rate Source Patient Position BP Location FiO2 (%)   -- -- -- -- --            I have reviewed the triage vital signs and nursing notes.    General: Alert.  Appears chronically ill, but nontoxic.  No acute distress.  Head: Normocephalic.  Atraumatic.  Eyes: No scleral icterus.  ENT: Dry mucous membranes.  Cardiovascular: Tachycardic.  Regular rhythm.  No murmurs.  No rubs.  2+ distal pulses bilaterally.  Respiratory: Equal breath sounds bilaterally. No wheezing. No rales.  No rhonchi.  GI: Abdomen is soft.  Nondistended.  Nontender to palpation.  No rebound.  No guarding.  No CVA tenderness.  MSK: Moves all 4 extremities.  Neurologic: Oriented x 3.  No focal deficits.  Skin: No edema. No erythema. No pallor. No cyanosis.  Psych: Normal mood and affect.    LAB RESULTS  Recent Results (from the past 24 hours)   Lipase    Collection Time: 24 12:23 PM    Specimen: Blood   Result  Value Ref Range    Lipase 31 13 - 60 U/L   Comprehensive Metabolic Panel    Collection Time: 12/16/24 12:23 PM    Specimen: Blood   Result Value Ref Range    Glucose 363 (H) 65 - 99 mg/dL    BUN 54 (H) 8 - 23 mg/dL    Creatinine 3.19 (H) 0.57 - 1.00 mg/dL    Sodium 142 136 - 145 mmol/L    Potassium 4.3 3.5 - 5.2 mmol/L    Chloride 93 (L) 98 - 107 mmol/L    CO2 19.3 (L) 22.0 - 29.0 mmol/L    Calcium 10.9 (H) 8.6 - 10.5 mg/dL    Total Protein 9.4 (H) 6.0 - 8.5 g/dL    Albumin 5.0 3.5 - 5.2 g/dL    ALT (SGPT) 16 1 - 33 U/L    AST (SGOT) 22 1 - 32 U/L    Alkaline Phosphatase 148 (H) 39 - 117 U/L    Total Bilirubin 0.7 0.0 - 1.2 mg/dL    Globulin 4.4 gm/dL    A/G Ratio 1.1 g/dL    BUN/Creatinine Ratio 16.9 7.0 - 25.0    Anion Gap 29.7 (H) 5.0 - 15.0 mmol/L    eGFR 15.4 (L) >60.0 mL/min/1.73   Green Top (Gel)    Collection Time: 12/16/24 12:23 PM   Result Value Ref Range    Extra Tube Hold for add-ons.    Lavender Top    Collection Time: 12/16/24 12:23 PM   Result Value Ref Range    Extra Tube hold for add-on    Gold Top - SST    Collection Time: 12/16/24 12:23 PM   Result Value Ref Range    Extra Tube Hold for add-ons.    Light Blue Top    Collection Time: 12/16/24 12:23 PM   Result Value Ref Range    Extra Tube Hold for add-ons.    CBC Auto Differential    Collection Time: 12/16/24 12:23 PM    Specimen: Blood   Result Value Ref Range    WBC 19.28 (H) 3.40 - 10.80 10*3/mm3    RBC 5.45 (H) 3.77 - 5.28 10*6/mm3    Hemoglobin 16.6 (H) 12.0 - 15.9 g/dL    Hematocrit 50.6 (H) 34.0 - 46.6 %    MCV 92.8 79.0 - 97.0 fL    MCH 30.5 26.6 - 33.0 pg    MCHC 32.8 31.5 - 35.7 g/dL    RDW 13.9 12.3 - 15.4 %    RDW-SD 47.0 37.0 - 54.0 fl    MPV 10.5 6.0 - 12.0 fL    Platelets 373 140 - 450 10*3/mm3   Scan Slide    Collection Time: 12/16/24 12:23 PM    Specimen: Blood   Result Value Ref Range    Scan Slide     Manual Differential    Collection Time: 12/16/24 12:23 PM    Specimen: Blood   Result Value Ref Range    Neutrophil % 82.0 (H)  42.7 - 76.0 %    Lymphocyte % 16.0 (L) 19.6 - 45.3 %    Monocyte % 2.0 (L) 5.0 - 12.0 %    Neutrophils Absolute 15.81 (H) 1.70 - 7.00 10*3/mm3    Lymphocytes Absolute 3.08 0.70 - 3.10 10*3/mm3    Monocytes Absolute 0.39 0.10 - 0.90 10*3/mm3    RBC Morphology Normal Normal    WBC Morphology Normal Normal    Platelet Estimate Adequate Normal   POC Glucose Once    Collection Time: 12/16/24 12:39 PM    Specimen: Blood   Result Value Ref Range    Glucose 333 (H) 70 - 130 mg/dL   Blood Gas, Venous With Co-Ox    Collection Time: 12/16/24  3:04 PM    Specimen: Venous Blood   Result Value Ref Range    Site IVC     pH, Venous 7.381 7.320 - 7.420 pH Units    pCO2, Venous 45.7 40.0 - 50.0 mm Hg    pO2, Venous 33.1 30.0 - 50.0 mm Hg    HCO3, Venous 27.1 22.0 - 28.0 mmol/L    Base Excess, Venous 1.4 0.0 - 2.0 mmol/L    O2 Saturation, Venous 66.0 45.0 - 75.0 %    Oxyhemoglobin Venous 64.9 40.0 - 70.0 %    Methemoglobin Venous 0.6 0.0 - 3.0 %    Carboxyhemoglobin Venous 1.0 0.0 - 5.0 %    Barometric Pressure for Blood Gas 736 mmHg    Modality Room Air     Ventilator Mode NA     Collected by N        RADIOLOGY  CT Abdomen Pelvis Without Contrast    Result Date: 12/16/2024  PROCEDURE: CT ABDOMEN PELVIS WO CONTRAST-  HISTORY: Nausea, vomiting, leukocytosis, JOSE ALBERTO  COMPARISON: June 2021.  PROCEDURE: Axial images were obtained from the lung bases through the pubic symphysis without intravenous contrast.  FINDINGS:  ABDOMEN: There is streak artifact from patient's arm position. The lung bases are clear. The heart size is normal. There are vascular calcifications. The limited noncontrast images of the liver are normal. Gallbladder is unremarkable. There is no CT evidence of gallstones. The spleen is normal. No adrenal masses are seen.  The pancreas has an unremarkable unenhanced appearance. The aorta is normal in caliber. There is no significant free fluid or adenopathy.  There is bilateral nephrolithiasis. There is no hydronephrosis.  There is colonic diverticulosis.  PELVIS: The appendix is not identified. Uterus is diminutive or absent. The urinary bladder is mostly collapsed. There is no significant fluid or adenopathy. There is no bony destructive lesion.      Bilateral nephrolithiasis without hydronephrosis.  Colonic diverticulosis.     CTDI: 9.5 mGy DLP: 486.92 mGy.cm   This study was performed with techniques to keep radiation doses as low as reasonably achievable (ALARA). Individualized dose reduction techniques using automated exposure control or adjustment of mA and/or kV according to the patient size were employed.     Images were reviewed, interpreted, and dictated by Dr. Nadia Coleman MD Transcribed by Chelo Diallo PA-C.  This report was signed and finalized on 12/16/2024 2:10 PM by Nadia Coleman MD.       PROCEDURES  Critical Care    Performed by: Tomas White DO  Authorized by: Tomas White DO    Comments:      CRITICAL CARE PROCEDURE NOTE  Authorized and performed by: Dr. White    Total critical care time: Approximately 35 minutes.    Patient was critically ill due to: Acute kidney injury    Interventions: IV fluids, close airway/mental status/hemodynamic monitoring.    Due to a high probability of clinically significant, life threatening deterioration, the patient required my highest level of preparedness to intervene emergently and I personally spent this critical care time directly and personally managing the patient.  This critical care time included obtaining a history; examining the patient; pulse oximetry; ordering and review of studies; arranging urgent treatment with development of a management plan; evaluation of patient's response to treatment; frequent reassessment; and, discussions with other providers.    This critical care time was performed to assess and manage the high probability of imminent, life-threatening deterioration that could result in multiorgan failure.  It was exclusive of separately  billable procedures, treating other patients and teaching time.    Please see MDM section and the rest of the note for further information on patient assessment and interventions.      RISK STRATIFICATION    MEDICAL DECISION MAKING  69 y.o. female with past medical history listed above who presents with hyperglycemia, nausea, vomiting.    Patient arrives via EMS.  I have reviewed the EMS documentation/notes and included that information in my HPI.    Vital signs remarkable for tachycardia and hypertension, otherwise within normal limits.  Pulse ox 95% on room air.    Based on clinical presentation and physical exam, differential diagnosis includes, but is not limited to, metabolic derangement, DKA, dehydration, intra-abdominal infection/obstruction.    At least 3 different tests have been ordered on this patient.    Please see ED course below for my interpretation of the ED workup.  ED Course as of 12/16/24 1532   Mon Dec 16, 2024   1514 I reviewed the labs listed above. Notable findings are highlighted below.    Old laboratory data was reviewed from the medical records and compared to today's results.   [JS]   1514 CBC & Differential(!) [JS]   1514 WBC(!): 19.28 [JS]   1514 Hemoglobin(!): 16.6 [JS]   1514 Comprehensive Metabolic Panel(!) [JS]   1514 Glucose(!): 363 [JS]   1514 Creatinine(!): 3.19  Baseline 1.4. [JS]   1514 Anion Gap(!): 29.7 [JS]   1514 Blood Gas, Venous With Co-Ox [JS]   1514 pH, Venous: 7.381 [JS]   1514 pCO2, Venous: 45.7 [JS]   1514 HCO3, Venous: 27.1 [JS]   1514 CT Abdomen Pelvis Without Contrast  I have independently reviewed and interpreted the CT abdomen pelvis.  My interpretation is negative for small bowel obstruction.   [JS]      ED Course User Index  [JS] Tomas White DO     Medications administered in ED:  Medications   insulin glargine (LANTUS, SEMGLEE) injection 1-200 Units (has no administration in time range)   insulin lispro (humaLOG) injection 1-200 Units (has no  administration in time range)   insulin lispro (humaLOG) injection 1-200 Units (has no administration in time range)   dextrose (GLUTOSE) oral gel 15 g (has no administration in time range)   dextrose (D50W) (25 g/50 mL) IV injection 10-50 mL (has no administration in time range)   Glucagon (GLUCAGEN) injection 1 mg (has no administration in time range)   sodium chloride 0.9 % bolus 1,000 mL (0 mL Intravenous Stopped 12/16/24 1505)   ondansetron (ZOFRAN) injection 4 mg (4 mg Intravenous Given 12/16/24 1231)   metoclopramide (REGLAN) injection 5 mg (5 mg Intravenous Given 12/16/24 1328)   diphenhydrAMINE (BENADRYL) injection 25 mg (25 mg Intravenous Given 12/16/24 1329)     On re-evaluation, patient resting comfortably.  Vital signs remained stable on room air.  Patient will require medical admission for further workup and management.  I discussed the findings of the ED workup with the patient including my recommendation for admission.  Patient agreeable with plan and disposition.    Case discussed with Dr. Kerley (hospitalist) who agrees to evaluate and admit the patient.  We discussed the HPI, pertinent PMHx, ED course and workup.    Chronic conditions affecting care: Diabetes    Social determinants of health impacting treatment or disposition: None    REPEAT VITAL SIGNS  AS OF 15:32 EST VITALS:  BP - 162/100  HR - 110  TEMP - 98.3 °F (36.8 °C)  O2 SATS - 95%    DIAGNOSIS  Final diagnoses:   Acute kidney injury   Hyperglycemia   Dehydration     DISPOSITION  ED Disposition       ED Disposition   Decision to Admit    Condition   --    Comment   Level of Care: Telemetry [5]   Diagnosis: Acute kidney injury superimposed on chronic kidney disease [[id number]]   Admitting Physician: KERLEY, BRIAN JOSEPH [[id number]]   Attending Physician: KERLEY, BRIAN JOSEPH [[id number]]               Please note that portions of this document were completed with voice recognition software.        Tomas White DO  12/17/24 0621

## 2024-12-16 NOTE — H&P
[facility]      Name:  Viola Barlow   Age:  69 y.o.  Sex:  female  :  1955  MRN:  [id number]   Visit Number:  [id number]  Admission Date:  2024  Date Of Service:  24  Primary Care Physician:  Jyoti Tomlinson APRN    Chief Complaint:     Nausea and vomiting, hyperglycemia    History Of Presenting Illness:      Viola Barlow is a 69-year-old woman with past medical history of type 2 diabetes, hypertension, CKD, nephrolithiasis, diverticulosis, DARELL, hyperlipidemia, peripheral vascular disease, migraines, arthritis.  Presented to [facility] ED on 2024 with concern nausea and nonbloody vomiting for 4 days, hyperglycemia in the 400s.  She has been taking insulin as prescribed, no missed doses.  Denied fevers or chills, chest pain, shortness of air, back pain, leg pain or swelling.      ED summary: Afebrile, tachycardic, tachypneic, hypertensive which improved, other vital signs stable room air.  ABG normal.  Chloride 93, CO2 19, anion gap 29, BUN 54, creatinine 3.19, EGFR 15, blood glucose 363, calcium 10, alk phos 148, total protein 9.4.  Lipase 31.  Leukocytosis 19.  Hemoglobin 16.  CT ab/pelvis bilateral nephrolithiasis without hydronephrosis, chronic diverticulosis.  She was provided Benadryl, Reglan, Zofran, 1 L NS bolus.    Review Of Systems:    All systems were reviewed and negative except as mentioned in history of presenting illness, assessment and plan.    Past Medical History: Patient  has a past medical history of Arthritis, Cataract, Depression, Diabetes mellitus, Dysphagia, Elevated cholesterol, Full dentures, GERD (gastroesophageal reflux disease), Headache, History of nuclear stress test, Hypertension, Kidney disease, Migraines, PVD (peripheral vascular disease), Seasonal allergies, Spinal headache, and Wears glasses.    Past Surgical History: Patient  has a past surgical history that includes Mouth surgery; Hysterectomy  (1983); Hysterectomy (1984); Abdominal surgery (08/1984); Elbow Epicondylectomy (Left, 09/1990); Vascular surgery (Bilateral); Colonoscopy; Esophagogastroduodenoscopy; Appendectomy; Cataract extraction w/ intraocular lens implant (Right, 6/21/2019); and Cataract extraction w/ intraocular lens implant (Left, 7/5/2019).    Social History: Patient  reports that she quit smoking about 16 years ago. Her smoking use included cigarettes. She started smoking about 54 years ago. She has a 57 pack-year smoking history. She has never used smokeless tobacco. She reports that she does not drink alcohol and does not use drugs.    Family History:  Patient's family history has been reviewed and found to be noncontributory.     Allergies:      Lisinopril    Home Medications:    Prior to Admission Medications       Prescriptions Last Dose Informant Patient Reported? Taking?    amLODIPine (NORVASC) 5 MG tablet   No No    Take 1 tablet by mouth Daily.    Patient not taking:  Reported on 2/17/2023    aspirin  MG tablet  Self Yes No    Take 325 mg by mouth Daily.    Patient not taking:  Reported on 2/17/2023    atorvastatin (LIPITOR) 40 MG tablet  Self Yes No    Take 40 mg by mouth Daily.    Cholecalciferol (VITAMIN D-3) 1000 units capsule  Self Yes No    Take 1,000 Units by mouth Daily.    clopidogrel (PLAVIX) 75 MG tablet  Self Yes No    Take 75 mg by mouth Daily.    Dulaglutide (Trulicity) 1.5 MG/0.5ML solution pen-injector   Yes No    Inject 1 each under the skin into the appropriate area as directed 1 (One) Time Per Week.    gabapentin (NEURONTIN) 600 MG tablet  Self Yes No    Take 600 mg by mouth 3 (Three) Times a Day.    gabapentin (NEURONTIN) 600 MG tablet  Self Yes No    Take 600 mg by mouth 3 (Three) Times a Day.    glipiZIDE (GLUCOTROL) 10 MG tablet  Self Yes No    Take 20 mg by mouth Every Morning.    Patient not taking:  Reported on 2/17/2023    HYDROcodone-acetaminophen (NORCO) 5-325 MG per tablet   No No    Take 1  tablet by mouth Every 6 (Six) Hours As Needed for Moderate Pain.    insulin lispro protamine-insulin lispro (humaLOG 75-25) (75-25) 100 UNIT/ML suspension injection   No No    Inject 60 Units under the skin into the appropriate area as directed 2 (Two) Times a Day With Meals.    levothyroxine (SYNTHROID, LEVOTHROID) 50 MCG tablet  Self Yes No    Take 50 mcg by mouth Daily.    lidocaine (LIDODERM) 5 %   No No    Place 1 patch on the skin as directed by provider Daily. Remove & Discard patch within 12 hours or as directed by MD    metFORMIN ER (GLUCOPHAGE-XR) 750 MG 24 hr tablet  Self Yes No    Take 750 mg by mouth Daily With Breakfast.    metoprolol succinate XL (TOPROL-XL) 50 MG 24 hr tablet  Self Yes No    Take 50 mg by mouth Daily.    montelukast (SINGULAIR) 10 MG tablet  Self Yes No    Take 10 mg by mouth Every Night.    Patient not taking:  Reported on 2/17/2023    ondansetron ODT (ZOFRAN-ODT) 4 MG disintegrating tablet   No No    Place 1 tablet on the tongue Every 8 (Eight) Hours As Needed for Nausea or Vomiting.    pantoprazole (PROTONIX) 40 MG EC tablet  Self Yes No    Take 40 mg by mouth Daily.    sertraline (ZOLOFT) 50 MG tablet  Self Yes No    Take 50 mg by mouth Daily.    vitamin D3 125 MCG (5000 UT) capsule capsule   Yes No    Take 5,000 Units by mouth Daily.          ED Medications:    Medications   sodium chloride 0.9 % bolus 1,000 mL (0 mL Intravenous Stopped 12/16/24 1505)   ondansetron (ZOFRAN) injection 4 mg (4 mg Intravenous Given 12/16/24 1231)   metoclopramide (REGLAN) injection 5 mg (5 mg Intravenous Given 12/16/24 1328)   diphenhydrAMINE (BENADRYL) injection 25 mg (25 mg Intravenous Given 12/16/24 1329)     Vital Signs:  Temp:  [98.3 °F (36.8 °C)] 98.3 °F (36.8 °C)  Heart Rate:  [110-112] 110  Resp:  [22] 22  BP: (162-193)/(100-103) 162/100        12/16/24  1214   Weight: 52.2 kg (115 lb)     Body mass index is 22.46 kg/m².    Physical Exam:     Most recent vital Signs: /100 (BP  "Location: Right arm, Patient Position: Lying)   Pulse 110   Temp 98.3 °F (36.8 °C)   Resp 22   Ht 152.4 cm (60\")   Wt 52.2 kg (115 lb)   LMP  (LMP Unknown)   SpO2 95%   BMI 22.46 kg/m²     Physical Exam  Constitutional:       General: She is not in acute distress.     Appearance: She is not ill-appearing or toxic-appearing.   HENT:      Mouth/Throat:      Mouth: Mucous membranes are moist.   Eyes:      Extraocular Movements: Extraocular movements intact.   Cardiovascular:      Rate and Rhythm: Normal rate and regular rhythm.      Pulses: Normal pulses.      Heart sounds: Normal heart sounds.   Pulmonary:      Effort: Pulmonary effort is normal.      Breath sounds: Normal breath sounds.   Abdominal:      Palpations: Abdomen is soft.      Tenderness: There is no abdominal tenderness.   Musculoskeletal:      Right lower leg: No edema.      Left lower leg: No edema.   Skin:     General: Skin is warm.   Neurological:      General: No focal deficit present.      Mental Status: She is alert.   Psychiatric:         Mood and Affect: Mood normal.         Thought Content: Thought content normal.         Laboratory data:    I have reviewed the labs done in the emergency room.    Results from last 7 days   Lab Units 12/16/24  1223   SODIUM mmol/L 142   POTASSIUM mmol/L 4.3   CHLORIDE mmol/L 93*   CO2 mmol/L 19.3*   BUN mg/dL 54*   CREATININE mg/dL 3.19*   CALCIUM mg/dL 10.9*   BILIRUBIN mg/dL 0.7   ALK PHOS U/L 148*   ALT (SGPT) U/L 16   AST (SGOT) U/L 22   GLUCOSE mg/dL 363*     Results from last 7 days   Lab Units 12/16/24  1223   WBC 10*3/mm3 19.28*   HEMOGLOBIN g/dL 16.6*   HEMATOCRIT % 50.6*   PLATELETS 10*3/mm3 373                     Results from last 7 days   Lab Units 12/16/24  1223   LIPASE U/L 31               Invalid input(s): \"USDES\", \"NITRITITE\", \"BACT\", \"EP\"    Pain Management Panel  More data exists         Latest Ref Rng & Units 11/18/2022 8/16/2021   Pain Management Panel   Creatinine, Urine mg/dL - " 86.7    Amphetamine, Urine Qual Negative Negative  -   Barbiturates Screen, Urine Negative Negative  -   Benzodiazepine Screen, Urine Negative Negative  -   Buprenorphine, Screen, Urine Negative Negative  -   Cocaine Screen, Urine Negative Negative  -   Methadone Screen , Urine Negative Negative  -   Methamphetamine, Ur Negative Negative  -      Details                     Radiology:    CT Abdomen Pelvis Without Contrast    Result Date: 12/16/2024  PROCEDURE: CT ABDOMEN PELVIS WO CONTRAST-  HISTORY: Nausea, vomiting, leukocytosis, JOSE ALBERTO  COMPARISON: June 2021.  PROCEDURE: Axial images were obtained from the lung bases through the pubic symphysis without intravenous contrast.  FINDINGS:  ABDOMEN: There is streak artifact from patient's arm position. The lung bases are clear. The heart size is normal. There are vascular calcifications. The limited noncontrast images of the liver are normal. Gallbladder is unremarkable. There is no CT evidence of gallstones. The spleen is normal. No adrenal masses are seen.  The pancreas has an unremarkable unenhanced appearance. The aorta is normal in caliber. There is no significant free fluid or adenopathy.  There is bilateral nephrolithiasis. There is no hydronephrosis. There is colonic diverticulosis.  PELVIS: The appendix is not identified. Uterus is diminutive or absent. The urinary bladder is mostly collapsed. There is no significant fluid or adenopathy. There is no bony destructive lesion.      Bilateral nephrolithiasis without hydronephrosis.  Colonic diverticulosis.     CTDI: 9.5 mGy DLP: 486.92 mGy.cm   This study was performed with techniques to keep radiation doses as low as reasonably achievable (ALARA). Individualized dose reduction techniques using automated exposure control or adjustment of mA and/or kV according to the patient size were employed.     Images were reviewed, interpreted, and dictated by Dr. Nadia Coleman MD Transcribed by Chelo Diallo PA-C.  This  report was signed and finalized on 12/16/2024 2:10 PM by Nadia Coleman MD.       Assessment/Plan:    Observation general floor admission 12/16/2020 for nausea and vomiting, JOSE ALBERTO on CKD, hyperglycemia without metabolic acidosis.    Nausea and vomiting  JOSE ALBERTO on CKD  Nephrology consultation, recreations appreciated.  IVF.  At time of admission she was eating a sandwich, no difficulty with p.o.  Unclear trigger for her nausea and vomiting.  Said that she has been on Mounjaro for about a year and they reduced her dose recently.  Hyperglycemia  Subcutaneous insulin protocol.    Chronic: Type 2 diabetes, hypertension, CKD, nephrolithiasis, diverticulosis, DARELL, hyperlipidemia, peripheral vascular disease, migraines, arthritis.  Continue other home medications.    Risk Assessment: High  DVT Prophylaxis: Heparin  Code Status: Full code  Diet: Cardiac/diabetic    Advance Care Planning   ACP discussion was held with the patient during this visit. Patient does not have an advance directive, information provided.           Brian Joseph Kerley, DO  12/16/24  15:23 EST    Dictated utilizing Dragon dictation.

## 2024-12-16 NOTE — CASE MANAGEMENT/SOCIAL WORK
Discharge Planning Assessment  [facility]     Patient Name: Viola Barlow  MRN: [id number]  Today's Date: 12/16/2024    Admit Date: 12/16/2024    Plan: The patient is awake and able to answer questions.  She is a current patient of Jyoti Tomlinson and gets her medications from [facility].  She elects to enroll in Meds to Bed. She uses a cane and walker at home.  She lives alone but occasioanally comes to stay with daughter in [place].  She denies the need for additional DME or services at [place].  At the time of IN the patient plans to return home.  Questions and concerns were addressed, GARVEY delivered at the time of this conversation.  Will provide additional resources and information upon patient request.   Discharge Needs Assessment       Row Name 12/16/24 1807       Living Environment    People in Home alone    Unique Family Situation Patient stays with daughter occasionally    Current Living Arrangements home    Duration at Residence 24 years    Potentially Unsafe Housing Conditions none    In the past 12 months has the electric, gas, oil, or water company threatened to shut off services in your home? No    Primary Care Provided by self    Provides Primary Care For no one, unable/limited ability to care for self    Family Caregiver if Needed none    Quality of Family Relationships helpful;involved;supportive    Able to Return to Prior Arrangements yes       Resource/Environmental Concerns    Resource/Environmental Concerns none    Transportation Concerns none       Transportation Needs    In the past 12 months, has lack of transportation kept you from medical appointments or from getting medications? no    In the past 12 months, has lack of transportation kept you from meetings, work, or from getting things needed for daily living? No       Food Insecurity    Within the past 12 months, you worried that your food would run out before you got the money to buy more. Never true    Within the past 12 months, the  food you bought just didn't last and you didn't have money to get more. Never true       Transition Planning    Patient/Family Anticipates Transition to home    Patient/Family Anticipated Services at Transition none    Transportation Anticipated family or friend will provide       Discharge Needs Assessment    Readmission Within the Last 30 Days no previous admission in last 30 days    Equipment Currently Used at Home walker, rolling;cane, straight    Concerns to be Addressed denies needs/concerns at this time    Do you want help finding or keeping work or a job? I do not need or want help    Do you want help with school or training? For example, starting or completing job training or getting a high school diploma, GED or equivalent No    Anticipated Changes Related to Illness inability to care for self    Equipment Needed After Discharge none    Provided Post Acute Provider List? N/A    N/A Provider List Comment Patient plans to return home; no DME needs at this time    Provided Post Acute Provider Quality & Resource List? N/A    N/A Quality & Resource List Comment Patient plans to return home; no DME needs at this time    Offered/Gave Vendor List no    Current Discharge Risk lives alone                   Discharge Plan       Row Name 12/16/24 9174       Plan    Plan The patient is awake and able to answer questions.  She is a current patient of Jyoti Tomlinson and gets her medications from [facility].  She elects to enroll in Meds to Bed. She uses a cane and walker at home.  She lives alone but occasioanally comes to stay with daughter in [place].  She denies the need for additional DME or services at [place].  At the time of DC the patient plans to return home.  Questions and concerns were addressed, GARVEY delivered at the time of this conversation.  Will provide additional resources and information upon patient request.    Patient/Family in Agreement with Plan unable to assess    Provided Post Acute Provider List?  N/A    N/A Provider List Comment Patient plans to return home; no DME needs at this time    Provided Post Acute Provider Quality & Resource List? N/A    N/A Quality & Resource List Comment Patient plans to return home; no DME needs at this time    Plan Comments Patient denies needs at this time    Final Discharge Disposition Code 01 - home or self-care    Final Note Patient plans to return home                  Continued Care and Services - Admitted Since 12/16/2024    No active coordination exists for this encounter.          Demographic Summary       Row Name 12/16/24 1806       General Information    Admission Type observation    Arrived From emergency department    Required Notices Provided Observation Status Notice    Referral Source admission list    Reason for Consult discharge planning    Preferred Language English       Contact Information    Permission Granted to Share Info With                    Functional Status       Row Name 12/16/24 1806       Functional Status    Usual Activity Tolerance good    Current Activity Tolerance moderate       Physical Activity    On average, how many days per week do you engage in moderate to strenuous exercise (like a brisk walk)? 0 days    On average, how many minutes do you engage in exercise at this level? 0 min    Number of minutes of exercise per week 0       Assessment of Health Literacy    How often do you have someone help you read hospital materials? Never    How often do you have problems learning about your medical condition because of difficulty understanding written information? Never    How often do you have a problem understanding what is told to you about your medical condition? Never    How confident are you filling out medical forms by yourself? Extremely    Health Literacy Excellent       Functional Status, IADL    Medications independent    Meal Preparation independent    Housekeeping independent    Laundry independent    Shopping  assistive person    If for any reason you need help with day-to-day activities such as bathing, preparing meals, shopping, managing finances, etc., do you get the help you need? I get all the help I need       Mental Status    General Appearance WDL WDL       Mental Status Summary    Recent Changes in Mental Status/Cognitive Functioning no changes                   Psychosocial       Row Name 12/16/24 1806       Values/Beliefs    Spiritual, Cultural Beliefs, [employer] Practices, Values that Affect Care no       Behavior WDL    Behavior WDL WDL       Emotion Mood WDL    Emotion/Mood/Affect WDL WDL       Speech WDL    Speech WDL WDL       Perceptual State WDL    Perceptual State WDL WDL       Thought Process WDL    Thought Process WDL WDL       Intellectual Performance WDL    Intellectual Performance WDL WDL                   Abuse/Neglect       Row Name 12/16/24 1806       Personal Safety    Feels Unsafe at Home or Work/School no    Feels Threatened by Someone no    Does Anyone Try to Keep You From Having Contact with Others or Doing Things Outside Your Home? no    Physical Signs of Abuse Present no                   Legal       Row Name 12/16/24 1806       Financial Resource Strain    How hard is it for you to pay for the very basics like food, housing, medical care, and heating? Not hard                   Substance Abuse       Row Name 12/16/24 1807       Substance Use    Substance Use Status never used                   Patient Forms       Row Name 12/16/24 1812       Patient Forms    Patient Observation Letter Delivered    Delivered to Patient    Method of delivery In person                      Elis Espinoza RN

## 2024-12-17 LAB
ANION GAP SERPL CALCULATED.3IONS-SCNC: 15.4 MMOL/L (ref 5–15)
BASOPHILS # BLD AUTO: 0.04 10*3/MM3 (ref 0–0.2)
BASOPHILS NFR BLD AUTO: 0.2 % (ref 0–1.5)
BUN SERPL-MCNC: 50 MG/DL (ref 8–23)
BUN/CREAT SERPL: 24.4 (ref 7–25)
CALCIUM SPEC-SCNC: 9.2 MG/DL (ref 8.6–10.5)
CHLORIDE SERPL-SCNC: 106 MMOL/L (ref 98–107)
CO2 SERPL-SCNC: 21.6 MMOL/L (ref 22–29)
CREAT SERPL-MCNC: 2.05 MG/DL (ref 0.57–1)
DEPRECATED RDW RBC AUTO: 51.9 FL (ref 37–54)
EGFRCR SERPLBLD CKD-EPI 2021: 26.1 ML/MIN/1.73
EOSINOPHIL # BLD AUTO: 0 10*3/MM3 (ref 0–0.4)
EOSINOPHIL NFR BLD AUTO: 0 % (ref 0.3–6.2)
ERYTHROCYTE [DISTWIDTH] IN BLOOD BY AUTOMATED COUNT: 14.2 % (ref 12.3–15.4)
GLUCOSE BLDC GLUCOMTR-MCNC: 205 MG/DL (ref 70–130)
GLUCOSE BLDC GLUCOMTR-MCNC: 223 MG/DL (ref 70–130)
GLUCOSE BLDC GLUCOMTR-MCNC: 245 MG/DL (ref 70–130)
GLUCOSE BLDC GLUCOMTR-MCNC: 262 MG/DL (ref 70–130)
GLUCOSE SERPL-MCNC: 218 MG/DL (ref 65–99)
HCT VFR BLD AUTO: 46.8 % (ref 34–46.6)
HGB BLD-MCNC: 14.5 G/DL (ref 12–15.9)
IMM GRANULOCYTES # BLD AUTO: 0.07 10*3/MM3 (ref 0–0.05)
IMM GRANULOCYTES NFR BLD AUTO: 0.4 % (ref 0–0.5)
LYMPHOCYTES # BLD AUTO: 4.02 10*3/MM3 (ref 0.7–3.1)
LYMPHOCYTES NFR BLD AUTO: 23.5 % (ref 19.6–45.3)
MCH RBC QN AUTO: 30.5 PG (ref 26.6–33)
MCHC RBC AUTO-ENTMCNC: 31 G/DL (ref 31.5–35.7)
MCV RBC AUTO: 98.3 FL (ref 79–97)
MONOCYTES # BLD AUTO: 1.14 10*3/MM3 (ref 0.1–0.9)
MONOCYTES NFR BLD AUTO: 6.7 % (ref 5–12)
NEUTROPHILS NFR BLD AUTO: 11.81 10*3/MM3 (ref 1.7–7)
NEUTROPHILS NFR BLD AUTO: 69.2 % (ref 42.7–76)
NRBC BLD AUTO-RTO: 0 /100 WBC (ref 0–0.2)
PLATELET # BLD AUTO: 261 10*3/MM3 (ref 140–450)
PMV BLD AUTO: 10.6 FL (ref 6–12)
POTASSIUM SERPL-SCNC: 4.3 MMOL/L (ref 3.5–5.2)
RBC # BLD AUTO: 4.76 10*6/MM3 (ref 3.77–5.28)
SODIUM SERPL-SCNC: 143 MMOL/L (ref 136–145)
WBC NRBC COR # BLD AUTO: 17.08 10*3/MM3 (ref 3.4–10.8)

## 2024-12-17 PROCEDURE — 63710000001 PROCHLORPERAZINE MALEATE PER 10 MG: Performed by: INTERNAL MEDICINE

## 2024-12-17 PROCEDURE — 85025 COMPLETE CBC W/AUTO DIFF WBC: CPT | Performed by: STUDENT IN AN ORGANIZED HEALTH CARE EDUCATION/TRAINING PROGRAM

## 2024-12-17 PROCEDURE — 25010000002 PROCHLORPERAZINE 10 MG/2ML SOLUTION: Performed by: INTERNAL MEDICINE

## 2024-12-17 PROCEDURE — 82948 REAGENT STRIP/BLOOD GLUCOSE: CPT | Performed by: STUDENT IN AN ORGANIZED HEALTH CARE EDUCATION/TRAINING PROGRAM

## 2024-12-17 PROCEDURE — 80048 BASIC METABOLIC PNL TOTAL CA: CPT | Performed by: STUDENT IN AN ORGANIZED HEALTH CARE EDUCATION/TRAINING PROGRAM

## 2024-12-17 PROCEDURE — 63710000001 INSULIN LISPRO (HUMAN) PER 5 UNITS: Performed by: STUDENT IN AN ORGANIZED HEALTH CARE EDUCATION/TRAINING PROGRAM

## 2024-12-17 PROCEDURE — 82948 REAGENT STRIP/BLOOD GLUCOSE: CPT

## 2024-12-17 PROCEDURE — 25010000002 METOCLOPRAMIDE PER 10 MG: Performed by: INTERNAL MEDICINE

## 2024-12-17 PROCEDURE — G0378 HOSPITAL OBSERVATION PER HR: HCPCS

## 2024-12-17 PROCEDURE — 99232 SBSQ HOSP IP/OBS MODERATE 35: CPT | Performed by: INTERNAL MEDICINE

## 2024-12-17 PROCEDURE — 63710000001 INSULIN GLARGINE PER 5 UNITS: Performed by: STUDENT IN AN ORGANIZED HEALTH CARE EDUCATION/TRAINING PROGRAM

## 2024-12-17 PROCEDURE — 25810000003 SODIUM CHLORIDE 0.9 % SOLUTION: Performed by: STUDENT IN AN ORGANIZED HEALTH CARE EDUCATION/TRAINING PROGRAM

## 2024-12-17 PROCEDURE — 63710000001 ONDANSETRON ODT 4 MG TABLET DISPERSIBLE: Performed by: INTERNAL MEDICINE

## 2024-12-17 PROCEDURE — 25010000002 HEPARIN (PORCINE) PER 1000 UNITS: Performed by: STUDENT IN AN ORGANIZED HEALTH CARE EDUCATION/TRAINING PROGRAM

## 2024-12-17 PROCEDURE — 25010000002 PROMETHAZINE PER 50 MG: Performed by: INTERNAL MEDICINE

## 2024-12-17 RX ORDER — METOPROLOL SUCCINATE 50 MG/1
50 TABLET, EXTENDED RELEASE ORAL DAILY
Status: DISCONTINUED | OUTPATIENT
Start: 2024-12-17 | End: 2024-12-19 | Stop reason: HOSPADM

## 2024-12-17 RX ORDER — HYDROCODONE BITARTRATE AND ACETAMINOPHEN 5; 325 MG/1; MG/1
1 TABLET ORAL EVERY 6 HOURS PRN
Status: DISCONTINUED | OUTPATIENT
Start: 2024-12-17 | End: 2024-12-19 | Stop reason: HOSPADM

## 2024-12-17 RX ORDER — LEVOTHYROXINE SODIUM 50 UG/1
50 TABLET ORAL DAILY
Status: DISCONTINUED | OUTPATIENT
Start: 2024-12-17 | End: 2024-12-19 | Stop reason: HOSPADM

## 2024-12-17 RX ORDER — METOCLOPRAMIDE HYDROCHLORIDE 5 MG/ML
INJECTION INTRAMUSCULAR; INTRAVENOUS
Status: DISPENSED
Start: 2024-12-17 | End: 2024-12-17

## 2024-12-17 RX ORDER — CLOPIDOGREL BISULFATE 75 MG/1
75 TABLET ORAL DAILY
Status: DISCONTINUED | OUTPATIENT
Start: 2024-12-17 | End: 2024-12-19 | Stop reason: HOSPADM

## 2024-12-17 RX ORDER — PANTOPRAZOLE SODIUM 40 MG/1
40 TABLET, DELAYED RELEASE ORAL DAILY
Status: DISCONTINUED | OUTPATIENT
Start: 2024-12-17 | End: 2024-12-19 | Stop reason: HOSPADM

## 2024-12-17 RX ORDER — AMLODIPINE BESYLATE 5 MG/1
5 TABLET ORAL
Status: DISCONTINUED | OUTPATIENT
Start: 2024-12-18 | End: 2024-12-19

## 2024-12-17 RX ORDER — ASPIRIN 81 MG/1
81 TABLET ORAL DAILY
Status: DISCONTINUED | OUTPATIENT
Start: 2024-12-17 | End: 2024-12-19 | Stop reason: HOSPADM

## 2024-12-17 RX ORDER — METOCLOPRAMIDE 5 MG/1
5 TABLET ORAL
Status: DISCONTINUED | OUTPATIENT
Start: 2024-12-18 | End: 2024-12-18

## 2024-12-17 RX ORDER — ONDANSETRON 4 MG/1
4 TABLET, ORALLY DISINTEGRATING ORAL EVERY 6 HOURS PRN
Status: DISCONTINUED | OUTPATIENT
Start: 2024-12-17 | End: 2024-12-19 | Stop reason: HOSPADM

## 2024-12-17 RX ORDER — METOCLOPRAMIDE HYDROCHLORIDE 5 MG/ML
5 INJECTION INTRAMUSCULAR; INTRAVENOUS ONCE
Status: COMPLETED | OUTPATIENT
Start: 2024-12-17 | End: 2024-12-17

## 2024-12-17 RX ADMIN — ASPIRIN 81 MG: 81 TABLET, COATED ORAL at 10:32

## 2024-12-17 RX ADMIN — METOPROLOL SUCCINATE 50 MG: 25 TABLET, EXTENDED RELEASE ORAL at 10:31

## 2024-12-17 RX ADMIN — PROMETHAZINE HYDROCHLORIDE 12.5 MG: 25 INJECTION INTRAMUSCULAR; INTRAVENOUS at 16:05

## 2024-12-17 RX ADMIN — INSULIN GLARGINE 7 UNITS: 100 INJECTION, SOLUTION SUBCUTANEOUS at 09:16

## 2024-12-17 RX ADMIN — SERTRALINE HYDROCHLORIDE 50 MG: 50 TABLET ORAL at 10:32

## 2024-12-17 RX ADMIN — Medication 10 ML: at 08:05

## 2024-12-17 RX ADMIN — CLOPIDOGREL BISULFATE 75 MG: 75 TABLET ORAL at 10:32

## 2024-12-17 RX ADMIN — PROCHLORPERAZINE EDISYLATE 5 MG: 5 INJECTION INTRAMUSCULAR; INTRAVENOUS at 20:32

## 2024-12-17 RX ADMIN — Medication 10 ML: at 08:41

## 2024-12-17 RX ADMIN — PANTOPRAZOLE SODIUM 40 MG: 40 TABLET, DELAYED RELEASE ORAL at 10:31

## 2024-12-17 RX ADMIN — PROCHLORPERAZINE MALEATE 5 MG: 10 TABLET ORAL at 06:26

## 2024-12-17 RX ADMIN — LEVOTHYROXINE SODIUM 50 MCG: 50 TABLET ORAL at 10:32

## 2024-12-17 RX ADMIN — INSULIN LISPRO 1 UNITS: 100 INJECTION, SOLUTION INTRAVENOUS; SUBCUTANEOUS at 09:15

## 2024-12-17 RX ADMIN — INSULIN GLARGINE 7 UNITS: 100 INJECTION, SOLUTION SUBCUTANEOUS at 20:28

## 2024-12-17 RX ADMIN — PROCHLORPERAZINE EDISYLATE 5 MG: 5 INJECTION INTRAMUSCULAR; INTRAVENOUS at 11:53

## 2024-12-17 RX ADMIN — ONDANSETRON 4 MG: 4 TABLET, ORALLY DISINTEGRATING ORAL at 09:47

## 2024-12-17 RX ADMIN — HEPARIN SODIUM 5000 UNITS: 5000 INJECTION INTRAVENOUS; SUBCUTANEOUS at 08:42

## 2024-12-17 RX ADMIN — METOCLOPRAMIDE 5 MG: 5 INJECTION, SOLUTION INTRAMUSCULAR; INTRAVENOUS at 08:41

## 2024-12-17 RX ADMIN — SODIUM CHLORIDE 100 ML/HR: 9 INJECTION, SOLUTION INTRAVENOUS at 04:49

## 2024-12-17 RX ADMIN — SODIUM CHLORIDE 100 ML/HR: 9 INJECTION, SOLUTION INTRAVENOUS at 16:05

## 2024-12-17 NOTE — PLAN OF CARE
Goal Outcome Evaluation:   Nausea unrelieved by zofran or compazine.  Relief obtained from phenergan.

## 2024-12-17 NOTE — PAYOR COMM NOTE
"TO:BC  FROM:MARIANA GARCIA, RN PHONE [phone]  OBSERVATION NOTIFICATION  TAX ID [id number] [facility] [id number]    Viola Rodriguez (69 y.o. Female)       Date of Birth   1955    Social Security Number       Address   [address] [place] [address]    Home Phone   [phone]    MRN   [id number]       [employer]   [employer]    Marital Status   Single                            Admission Date   12/16/24    Admission Type   Emergency    Admitting Provider   Kerley, Brian Joseph, DO    Attending Provider   Gerald Zhao MD    Department, Room/Bed   [facility] EMERGENCY DEPARTMENT, 10/10       Discharge Date       Discharge Disposition       Discharge Destination                                 Attending Provider: Gerald Zhao MD    Allergies: Lisinopril    Isolation: None   Infection: MRSA/History Only (11/08/21)   Code Status: Prior    Ht: 152.4 cm (60\")   Wt: 52.2 kg (115 lb)    Admission Cmt: None   Principal Problem: Acute kidney injury superimposed on chronic kidney disease [N17.9,N18.9]                   Active Insurance as of 12/16/2024       Primary Coverage       Payor Plan Insurance Group Employer/Plan Group    ANTHEM MEDICARE REPLACEMENT ANTHEM MEDICARE ADVANTAGE [id number]       Payor Plan Address Payor Plan Phone Number Payor Plan Fax Number Effective Dates    [address] [phone]  8/1/2022 - None Entered    [facility] [address]         Subscriber Name Subscriber Birth Date Member ID       VIOLA RODRIGUEZ 1955 [id number]               Secondary Coverage       Payor Plan Insurance Group Employer/Plan Group    [place]        Payor Plan Address Payor Plan Phone Number Payor Plan Fax Number Effective Dates    [address] [phone]  1/23/2023 - None Entered    [facility] [id number]         Subscriber Name Subscriber Birth Date Member ID       VIOLA RODRIGUEZ 1955 [id number]                     Emergency Contacts       Contact " Person (Rel.) Home Phone Work Phone Mobile Phone    Lisa Barlow (Daughter) [phone] -- --    FIDEL STRONG (Son) -- -- [phone]                 History & Physical        Kerley, Brian Joseph, DO at 24 1523            [facility]   HISTORY AND PHYSICAL      Name:  Viola Barlow   Age:  69 y.o.  Sex:  female  :  1955  MRN:  [id number]   Visit Number:  [id number]  Admission Date:  2024  Date Of Service:  24  Primary Care Physician:  Jyoti Tomlinson APRN    Chief Complaint:     Nausea and vomiting, hyperglycemia    History Of Presenting Illness:      Viola Barlow is a 69-year-old woman with past medical history of type 2 diabetes, hypertension, CKD, nephrolithiasis, diverticulosis, DARELL, hyperlipidemia, peripheral vascular disease, migraines, arthritis.  Presented to [facility] ED on 2024 with concern nausea and nonbloody vomiting for 4 days, hyperglycemia in the 400s.  She has been taking insulin as prescribed, no missed doses.  Denied fevers or chills, chest pain, shortness of air, back pain, leg pain or swelling.      ED summary: Afebrile, tachycardic, tachypneic, hypertensive which improved, other vital signs stable room air.  ABG normal.  Chloride 93, CO2 19, anion gap 29, BUN 54, creatinine 3.19, EGFR 15, blood glucose 363, calcium 10, alk phos 148, total protein 9.4.  Lipase 31.  Leukocytosis 19.  Hemoglobin 16.  CT ab/pelvis bilateral nephrolithiasis without hydronephrosis, chronic diverticulosis.  She was provided Benadryl, Reglan, Zofran, 1 L NS bolus.    Review Of Systems:    All systems were reviewed and negative except as mentioned in history of presenting illness, assessment and plan.    Past Medical History: Patient  has a past medical history of Arthritis, Cataract, Depression, Diabetes mellitus, Dysphagia, Elevated cholesterol, Full dentures, GERD (gastroesophageal reflux disease), Headache, History of nuclear stress test,  Hypertension, Kidney disease, Migraines, PVD (peripheral vascular disease), Seasonal allergies, Spinal headache, and Wears glasses.    Past Surgical History: Patient  has a past surgical history that includes Mouth surgery; Hysterectomy (1983); Hysterectomy (1984); Abdominal surgery (08/1984); Elbow Epicondylectomy (Left, 09/1990); Vascular surgery (Bilateral); Colonoscopy; Esophagogastroduodenoscopy; Appendectomy; Cataract extraction w/ intraocular lens implant (Right, 6/21/2019); and Cataract extraction w/ intraocular lens implant (Left, 7/5/2019).    Social History: Patient  reports that she quit smoking about 16 years ago. Her smoking use included cigarettes. She started smoking about 54 years ago. She has a 57 pack-year smoking history. She has never used smokeless tobacco. She reports that she does not drink alcohol and does not use drugs.    Family History:  Patient's family history has been reviewed and found to be noncontributory.     Allergies:      Lisinopril    Home Medications:    Prior to Admission Medications       Prescriptions Last Dose Informant Patient Reported? Taking?    amLODIPine (NORVASC) 5 MG tablet   No No    Take 1 tablet by mouth Daily.    Patient not taking:  Reported on 2/17/2023    aspirin  MG tablet  Self Yes No    Take 325 mg by mouth Daily.    Patient not taking:  Reported on 2/17/2023    atorvastatin (LIPITOR) 40 MG tablet  Self Yes No    Take 40 mg by mouth Daily.    Cholecalciferol (VITAMIN D-3) 1000 units capsule  Self Yes No    Take 1,000 Units by mouth Daily.    clopidogrel (PLAVIX) 75 MG tablet  Self Yes No    Take 75 mg by mouth Daily.    Dulaglutide (Trulicity) 1.5 MG/0.5ML solution pen-injector   Yes No    Inject 1 each under the skin into the appropriate area as directed 1 (One) Time Per Week.    gabapentin (NEURONTIN) 600 MG tablet  Self Yes No    Take 600 mg by mouth 3 (Three) Times a Day.    gabapentin (NEURONTIN) 600 MG tablet  Self Yes No    Take 600 mg by  mouth 3 (Three) Times a Day.    glipiZIDE (GLUCOTROL) 10 MG tablet  Self Yes No    Take 20 mg by mouth Every Morning.    Patient not taking:  Reported on 2/17/2023    HYDROcodone-acetaminophen (NORCO) 5-325 MG per tablet   No No    Take 1 tablet by mouth Every 6 (Six) Hours As Needed for Moderate Pain.    insulin lispro protamine-insulin lispro (humaLOG 75-25) (75-25) 100 UNIT/ML suspension injection   No No    Inject 60 Units under the skin into the appropriate area as directed 2 (Two) Times a Day With Meals.    levothyroxine (SYNTHROID, LEVOTHROID) 50 MCG tablet  Self Yes No    Take 50 mcg by mouth Daily.    lidocaine (LIDODERM) 5 %   No No    Place 1 patch on the skin as directed by provider Daily. Remove & Discard patch within 12 hours or as directed by MD    metFORMIN ER (GLUCOPHAGE-XR) 750 MG 24 hr tablet  Self Yes No    Take 750 mg by mouth Daily With Breakfast.    metoprolol succinate XL (TOPROL-XL) 50 MG 24 hr tablet  Self Yes No    Take 50 mg by mouth Daily.    montelukast (SINGULAIR) 10 MG tablet  Self Yes No    Take 10 mg by mouth Every Night.    Patient not taking:  Reported on 2/17/2023    ondansetron ODT (ZOFRAN-ODT) 4 MG disintegrating tablet   No No    Place 1 tablet on the tongue Every 8 (Eight) Hours As Needed for Nausea or Vomiting.    pantoprazole (PROTONIX) 40 MG EC tablet  Self Yes No    Take 40 mg by mouth Daily.    sertraline (ZOLOFT) 50 MG tablet  Self Yes No    Take 50 mg by mouth Daily.    vitamin D3 125 MCG (5000 UT) capsule capsule   Yes No    Take 5,000 Units by mouth Daily.          ED Medications:    Medications   sodium chloride 0.9 % bolus 1,000 mL (0 mL Intravenous Stopped 12/16/24 1505)   ondansetron (ZOFRAN) injection 4 mg (4 mg Intravenous Given 12/16/24 1231)   metoclopramide (REGLAN) injection 5 mg (5 mg Intravenous Given 12/16/24 1328)   diphenhydrAMINE (BENADRYL) injection 25 mg (25 mg Intravenous Given 12/16/24 1329)     Vital Signs:  Temp:  [98.3 °F (36.8 °C)] 98.3 °F  "(36.8 °C)  Heart Rate:  [110-112] 110  Resp:  [22] 22  BP: (162-193)/(100-103) 162/100        12/16/24  1214   Weight: 52.2 kg (115 lb)     Body mass index is 22.46 kg/m².    Physical Exam:     Most recent vital Signs: /100 (BP Location: Right arm, Patient Position: Lying)   Pulse 110   Temp 98.3 °F (36.8 °C)   Resp 22   Ht 152.4 cm (60\")   Wt 52.2 kg (115 lb)   LMP  (LMP Unknown)   SpO2 95%   BMI 22.46 kg/m²     Physical Exam  Constitutional:       General: She is not in acute distress.     Appearance: She is not ill-appearing or toxic-appearing.   HENT:      Mouth/Throat:      Mouth: Mucous membranes are moist.   Eyes:      Extraocular Movements: Extraocular movements intact.   Cardiovascular:      Rate and Rhythm: Normal rate and regular rhythm.      Pulses: Normal pulses.      Heart sounds: Normal heart sounds.   Pulmonary:      Effort: Pulmonary effort is normal.      Breath sounds: Normal breath sounds.   Abdominal:      Palpations: Abdomen is soft.      Tenderness: There is no abdominal tenderness.   Musculoskeletal:      Right lower leg: No edema.      Left lower leg: No edema.   Skin:     General: Skin is warm.   Neurological:      General: No focal deficit present.      Mental Status: She is alert.   Psychiatric:         Mood and Affect: Mood normal.         Thought Content: Thought content normal.         Laboratory data:    I have reviewed the labs done in the emergency room.    Results from last 7 days   Lab Units 12/16/24  1223   SODIUM mmol/L 142   POTASSIUM mmol/L 4.3   CHLORIDE mmol/L 93*   CO2 mmol/L 19.3*   BUN mg/dL 54*   CREATININE mg/dL 3.19*   CALCIUM mg/dL 10.9*   BILIRUBIN mg/dL 0.7   ALK PHOS U/L 148*   ALT (SGPT) U/L 16   AST (SGOT) U/L 22   GLUCOSE mg/dL 363*     Results from last 7 days   Lab Units 12/16/24  1223   WBC 10*3/mm3 19.28*   HEMOGLOBIN g/dL 16.6*   HEMATOCRIT % 50.6*   PLATELETS 10*3/mm3 373                     Results from last 7 days   Lab Units 12/16/24  1223 " "  LIPASE U/L 31               Invalid input(s): \"USDES\", \"NITRITITE\", \"BACT\", \"EP\"    Pain Management Panel  More data exists         Latest Ref Rng & Units 11/18/2022 8/16/2021   Pain Management Panel   Creatinine, Urine mg/dL - 86.7    Amphetamine, Urine Qual Negative Negative  -   Barbiturates Screen, Urine Negative Negative  -   Benzodiazepine Screen, Urine Negative Negative  -   Buprenorphine, Screen, Urine Negative Negative  -   Cocaine Screen, Urine Negative Negative  -   Methadone Screen , Urine Negative Negative  -   Methamphetamine, Ur Negative Negative  -      Details                     Radiology:    CT Abdomen Pelvis Without Contrast    Result Date: 12/16/2024  PROCEDURE: CT ABDOMEN PELVIS WO CONTRAST-  HISTORY: Nausea, vomiting, leukocytosis, JOSE ALBERTO  COMPARISON: June 2021.  PROCEDURE: Axial images were obtained from the lung bases through the pubic symphysis without intravenous contrast.  FINDINGS:  ABDOMEN: There is streak artifact from patient's arm position. The lung bases are clear. The heart size is normal. There are vascular calcifications. The limited noncontrast images of the liver are normal. Gallbladder is unremarkable. There is no CT evidence of gallstones. The spleen is normal. No adrenal masses are seen.  The pancreas has an unremarkable unenhanced appearance. The aorta is normal in caliber. There is no significant free fluid or adenopathy.  There is bilateral nephrolithiasis. There is no hydronephrosis. There is colonic diverticulosis.  PELVIS: The appendix is not identified. Uterus is diminutive or absent. The urinary bladder is mostly collapsed. There is no significant fluid or adenopathy. There is no bony destructive lesion.      Bilateral nephrolithiasis without hydronephrosis.  Colonic diverticulosis.     CTDI: 9.5 mGy DLP: 486.92 mGy.cm   This study was performed with techniques to keep radiation doses as low as reasonably achievable (ALARA). Individualized dose reduction techniques " using automated exposure control or adjustment of mA and/or kV according to the patient size were employed.     Images were reviewed, interpreted, and dictated by Dr. Nadia Coleman MD Transcribed by Chelo Diallo PA-C.  This report was signed and finalized on 2024 2:10 PM by Nadia Coleman MD.       Assessment/Plan:    Observation general floor admission 2020 for nausea and vomiting, JOSE ALBERTO on CKD, hyperglycemia without metabolic acidosis.    Nausea and vomiting  JOSE ALBERTO on CKD  Nephrology consultation, recreations appreciated.  IVF.  At time of admission she was eating a sandwich, no difficulty with p.o.  Unclear trigger for her nausea and vomiting.  Said that she has been on Mounjaro for about a year and they reduced her dose recently.  Hyperglycemia  Subcutaneous insulin protocol.    Chronic: Type 2 diabetes, hypertension, CKD, nephrolithiasis, diverticulosis, DARELL, hyperlipidemia, peripheral vascular disease, migraines, arthritis.  Continue other home medications.    Risk Assessment: High  DVT Prophylaxis: Heparin  Code Status: Full code  Diet: Cardiac/diabetic    Advance Care Planning  ACP discussion was held with the patient during this visit. Patient does not have an advance directive, information provided.           Brian Joseph Kerley, DO  24  15:23 EST    Dictated utilizing Dragon dictation.    Electronically signed by Kerley, Brian Joseph, DO at 24 1756          Emergency Department Notes        Heriberto Dolan, Paramedic at 24 1704          Blood glucose 267mg/dl    Electronically signed by Heriberto Dolan Paramedic at 24 1704       Tomas White DO at 24 1234        Procedure Orders    1. Critical Care [[id number]] ordered by Tomas White DO                        [facility]  Emergency Department Encounter  Emergency Medicine Physician Note       Pt Name: Viola Barlow  MRN: [id number]  Pt :   1955  Room Number:  10/10  Date of  "encounter:  12/16/2024  PCP: Jyoti Tomlinson APRN  ED Physician: Tomas White DO    HPI:  Viola Barlow is a 69 y.o. female who presents to the ED with chief complaint of HYPERGLYCEMIA.  Patient presents from home via EMS.  Reports that she has been experiencing nausea and vomiting for the past 4 days.  Also having elevated blood glucose levels in the 400s.  Past medical history of type 2 diabetes on chronic insulin.  States that she has been taking her insulin as prescribed.  No missed doses.  No fever, chills, chest pain, shortness of breath, back pain, problems with urination or bowel movements, leg pain or swelling.  Does report associated abdominal cramping.  POC glucose per .    PAST MEDICAL HISTORY  Past Medical History:   Diagnosis Date    Arthritis     Cataract     Depression     Diabetes mellitus     Dysphagia     Patient reported that intermittently food feels like it gets lodged     Elevated cholesterol     Full dentures     Instructed no adhesives the DOS    GERD (gastroesophageal reflux disease)     Headache     History of nuclear stress test     Patient reported this was done with Dr. Douglas around 2017 and that she was started on Metoprolol after testing.     Hypertension     Kidney disease     Patient reported \"I have chronic kidney disease and they say my kidney function is at 33%\" and that she has routine care with Dr. Calzada     Migraines     PVD (peripheral vascular disease)     Seasonal allergies     Spinal headache     Wears glasses      Current Outpatient Medications   Medication Instructions    amLODIPine (NORVASC) 5 mg, Oral, Daily    aspirin 325 mg, Daily    atorvastatin (LIPITOR) 40 mg, Oral, Daily    clopidogrel (PLAVIX) 75 mg, Oral, Daily    Dulaglutide (Trulicity) 1.5 MG/0.5ML solution pen-injector 1 each, Subcutaneous, Weekly    gabapentin (NEURONTIN) 600 mg, Oral, 3 Times Daily    gabapentin (NEURONTIN) 600 mg, Oral, 3 Times Daily    glipizide (GLUCOTROL) 20 mg, " Every Morning    HYDROcodone-acetaminophen (NORCO) 5-325 MG per tablet 1 tablet, Oral, Every 6 Hours PRN    insulin lispro protamine-insulin lispro (humaLOG 75-25) (75-25) 100 UNIT/ML suspension injection 60 Units, Subcutaneous, 2 Times Daily With Meals    levothyroxine (SYNTHROID, LEVOTHROID) 50 mcg, Oral, Daily    lidocaine (LIDODERM) 5 % 1 patch, Transdermal, Every 24 Hours, Remove & Discard patch within 12 hours or as directed by MD    metFORMIN ER (GLUCOPHAGE-XR) 750 mg, Oral, Daily With Breakfast    metoprolol succinate XL (TOPROL-XL) 50 mg, Oral, Daily    montelukast (SINGULAIR) 10 mg, Nightly    ondansetron ODT (ZOFRAN-ODT) 4 mg, Translingual, Every 8 Hours PRN    pantoprazole (PROTONIX) 40 mg, Oral, Daily    sertraline (ZOLOFT) 50 mg, Oral, Daily    Vitamin D-3 1,000 Units, Oral, Daily    vitamin D3 5,000 Units, Oral, Daily      PAST SURGICAL HISTORY  Past Surgical History:   Procedure Laterality Date    ABDOMINAL SURGERY  08/1984    Patient reported after complete hysterectomy, she had another procedure to remove tumors in the abdominal area    APPENDECTOMY      Reported removed when hysterectomy was done    CATARACT EXTRACTION W/ INTRAOCULAR LENS IMPLANT Right 6/21/2019    Procedure: CATARACT PHACO EXTRACTION WITH INTRAOCULAR LENS IMPLANT RIGHT;  Surgeon: Tee Enrique MD;  Location: [facility] OR;  Service: Ophthalmology    CATARACT EXTRACTION W/ INTRAOCULAR LENS IMPLANT Left 7/5/2019    Procedure: CATARACT PHACO EXTRACTION WITH INTRAOCULAR LENS IMPLANT LEFT;  Surgeon: Tee Enrique MD;  Location: [facility] OR;  Service: Ophthalmology    COLONOSCOPY      ELBOW EPICONDYLECTOMY Left 09/1990    ENDOSCOPY      HYSTERECTOMY  1983    Partial    HYSTERECTOMY  1984    Complete     MOUTH SURGERY      Full mouth extraction    VASCULAR SURGERY Bilateral     For PAD - Reported the right leg was done first around 2014 and the left was done around 2015       FAMILY HISTORY  History reviewed. No pertinent family  history.    SOCIAL HISTORY  Social History     Socioeconomic History    Marital status: Single   Tobacco Use    Smoking status: Former     Current packs/day: 0.00     Average packs/day: 1.5 packs/day for 38.0 years (57.0 ttl pk-yrs)     Types: Cigarettes     Start date:      Quit date: 2008     Years since quittin.9    Smokeless tobacco: Never    Tobacco comments:     quit 13 yrs ago   Vaping Use    Vaping status: Never Used   Substance and Sexual Activity    Alcohol use: No    Drug use: No    Sexual activity: Defer     ALLERGIES  Lisinopril    REVIEW OF SYSTEMS  All systems reviewed and negative except for those discussed in HPI.     PHYSICAL EXAM  ED Triage Vitals   Temp Heart Rate Resp BP SpO2   24 1214 24 1214 24 1214 24 1216 24 1214   98.3 °F (36.8 °C) 112 22 (!) 193/103 98 %      Temp src Heart Rate Source Patient Position BP Location FiO2 (%)   -- -- -- -- --            I have reviewed the triage vital signs and nursing notes.    General: Alert.  Appears chronically ill, but nontoxic.  No acute distress.  Head: Normocephalic.  Atraumatic.  Eyes: No scleral icterus.  ENT: Dry mucous membranes.  Cardiovascular: Tachycardic.  Regular rhythm.  No murmurs.  No rubs.  2+ distal pulses bilaterally.  Respiratory: Equal breath sounds bilaterally. No wheezing. No rales.  No rhonchi.  GI: Abdomen is soft.  Nondistended.  Nontender to palpation.  No rebound.  No guarding.  No CVA tenderness.  MSK: Moves all 4 extremities.  Neurologic: Oriented x 3.  No focal deficits.  Skin: No edema. No erythema. No pallor. No cyanosis.  Psych: Normal mood and affect.    LAB RESULTS  Recent Results (from the past 24 hours)   Lipase    Collection Time: 24 12:23 PM    Specimen: Blood   Result Value Ref Range    Lipase 31 13 - 60 U/L   Comprehensive Metabolic Panel    Collection Time: 24 12:23 PM    Specimen: Blood   Result Value Ref Range    Glucose 363 (H) 65 - 99 mg/dL    BUN 54 (H) 8 -  23 mg/dL    Creatinine 3.19 (H) 0.57 - 1.00 mg/dL    Sodium 142 136 - 145 mmol/L    Potassium 4.3 3.5 - 5.2 mmol/L    Chloride 93 (L) 98 - 107 mmol/L    CO2 19.3 (L) 22.0 - 29.0 mmol/L    Calcium 10.9 (H) 8.6 - 10.5 mg/dL    Total Protein 9.4 (H) 6.0 - 8.5 g/dL    Albumin 5.0 3.5 - 5.2 g/dL    ALT (SGPT) 16 1 - 33 U/L    AST (SGOT) 22 1 - 32 U/L    Alkaline Phosphatase 148 (H) 39 - 117 U/L    Total Bilirubin 0.7 0.0 - 1.2 mg/dL    Globulin 4.4 gm/dL    A/G Ratio 1.1 g/dL    BUN/Creatinine Ratio 16.9 7.0 - 25.0    Anion Gap 29.7 (H) 5.0 - 15.0 mmol/L    eGFR 15.4 (L) >60.0 mL/min/1.73   Green Top (Gel)    Collection Time: 12/16/24 12:23 PM   Result Value Ref Range    Extra Tube Hold for add-ons.    Lavender Top    Collection Time: 12/16/24 12:23 PM   Result Value Ref Range    Extra Tube hold for add-on    Gold Top - SST    Collection Time: 12/16/24 12:23 PM   Result Value Ref Range    Extra Tube Hold for add-ons.    Light Blue Top    Collection Time: 12/16/24 12:23 PM   Result Value Ref Range    Extra Tube Hold for add-ons.    CBC Auto Differential    Collection Time: 12/16/24 12:23 PM    Specimen: Blood   Result Value Ref Range    WBC 19.28 (H) 3.40 - 10.80 10*3/mm3    RBC 5.45 (H) 3.77 - 5.28 10*6/mm3    Hemoglobin 16.6 (H) 12.0 - 15.9 g/dL    Hematocrit 50.6 (H) 34.0 - 46.6 %    MCV 92.8 79.0 - 97.0 fL    MCH 30.5 26.6 - 33.0 pg    MCHC 32.8 31.5 - 35.7 g/dL    RDW 13.9 12.3 - 15.4 %    RDW-SD 47.0 37.0 - 54.0 fl    MPV 10.5 6.0 - 12.0 fL    Platelets 373 140 - 450 10*3/mm3   Scan Slide    Collection Time: 12/16/24 12:23 PM    Specimen: Blood   Result Value Ref Range    Scan Slide     Manual Differential    Collection Time: 12/16/24 12:23 PM    Specimen: Blood   Result Value Ref Range    Neutrophil % 82.0 (H) 42.7 - 76.0 %    Lymphocyte % 16.0 (L) 19.6 - 45.3 %    Monocyte % 2.0 (L) 5.0 - 12.0 %    Neutrophils Absolute 15.81 (H) 1.70 - 7.00 10*3/mm3    Lymphocytes Absolute 3.08 0.70 - 3.10 10*3/mm3    Monocytes  Absolute 0.39 0.10 - 0.90 10*3/mm3    RBC Morphology Normal Normal    WBC Morphology Normal Normal    Platelet Estimate Adequate Normal   POC Glucose Once    Collection Time: 12/16/24 12:39 PM    Specimen: Blood   Result Value Ref Range    Glucose 333 (H) 70 - 130 mg/dL   Blood Gas, Venous With Co-Ox    Collection Time: 12/16/24  3:04 PM    Specimen: Venous Blood   Result Value Ref Range    Site IVC     pH, Venous 7.381 7.320 - 7.420 pH Units    pCO2, Venous 45.7 40.0 - 50.0 mm Hg    pO2, Venous 33.1 30.0 - 50.0 mm Hg    HCO3, Venous 27.1 22.0 - 28.0 mmol/L    Base Excess, Venous 1.4 0.0 - 2.0 mmol/L    O2 Saturation, Venous 66.0 45.0 - 75.0 %    Oxyhemoglobin Venous 64.9 40.0 - 70.0 %    Methemoglobin Venous 0.6 0.0 - 3.0 %    Carboxyhemoglobin Venous 1.0 0.0 - 5.0 %    Barometric Pressure for Blood Gas 736 mmHg    Modality Room Air     Ventilator Mode NA     Collected by [facility]        RADIOLOGY  CT Abdomen Pelvis Without Contrast    Result Date: 12/16/2024  PROCEDURE: CT ABDOMEN PELVIS WO CONTRAST-  HISTORY: Nausea, vomiting, leukocytosis, JOSE ALBERTO  COMPARISON: June 2021.  PROCEDURE: Axial images were obtained from the lung bases through the pubic symphysis without intravenous contrast.  FINDINGS:  ABDOMEN: There is streak artifact from patient's arm position. The lung bases are clear. The heart size is normal. There are vascular calcifications. The limited noncontrast images of the liver are normal. Gallbladder is unremarkable. There is no CT evidence of gallstones. The spleen is normal. No adrenal masses are seen.  The pancreas has an unremarkable unenhanced appearance. The aorta is normal in caliber. There is no significant free fluid or adenopathy.  There is bilateral nephrolithiasis. There is no hydronephrosis. There is colonic diverticulosis.  PELVIS: The appendix is not identified. Uterus is diminutive or absent. The urinary bladder is mostly collapsed. There is no significant fluid or adenopathy. There is no bony  destructive lesion.      Bilateral nephrolithiasis without hydronephrosis.  Colonic diverticulosis.     CTDI: 9.5 mGy DLP: 486.92 mGy.cm   This study was performed with techniques to keep radiation doses as low as reasonably achievable (ALARA). Individualized dose reduction techniques using automated exposure control or adjustment of mA and/or kV according to the patient size were employed.     Images were reviewed, interpreted, and dictated by Dr. Nadia Coleman MD Transcribed by Chelo Diallo PA-C.  This report was signed and finalized on 12/16/2024 2:10 PM by Nadia Coleman MD.       PROCEDURES  Critical Care    Performed by: Tomas White DO  Authorized by: Tomas White DO    Comments:      CRITICAL CARE PROCEDURE NOTE  Authorized and performed by: Dr. White    Total critical care time: Approximately 35 minutes.    Patient was critically ill due to: Acute kidney injury    Interventions: IV fluids, close airway/mental status/hemodynamic monitoring.    Due to a high probability of clinically significant, life threatening deterioration, the patient required my highest level of preparedness to intervene emergently and I personally spent this critical care time directly and personally managing the patient.  This critical care time included obtaining a history; examining the patient; pulse oximetry; ordering and review of studies; arranging urgent treatment with development of a management plan; evaluation of patient's response to treatment; frequent reassessment; and, discussions with other providers.    This critical care time was performed to assess and manage the high probability of imminent, life-threatening deterioration that could result in multiorgan failure.  It was exclusive of separately billable procedures, treating other patients and teaching time.    Please see MDM section and the rest of the note for further information on patient assessment and interventions.      RISK STRATIFICATION    MEDICAL  DECISION MAKING  69 y.o. female with past medical history listed above who presents with hyperglycemia, nausea, vomiting.    Patient arrives via EMS.  I have reviewed the EMS documentation/notes and included that information in my HPI.    Vital signs remarkable for tachycardia and hypertension, otherwise within normal limits.  Pulse ox 95% on room air.    Based on clinical presentation and physical exam, differential diagnosis includes, but is not limited to, metabolic derangement, DKA, dehydration, intra-abdominal infection/obstruction.    At least 3 different tests have been ordered on this patient.    Please see ED course below for my interpretation of the ED workup.  ED Course as of 12/16/24 1532   Mon Dec 16, 2024   1514 I reviewed the labs listed above. Notable findings are highlighted below.    Old laboratory data was reviewed from the medical records and compared to today's results.   [JS]   1514 CBC & Differential(!) [JS]   1514 WBC(!): 19.28 [JS]   1514 Hemoglobin(!): 16.6 [JS]   1514 Comprehensive Metabolic Panel(!) [JS]   1514 Glucose(!): 363 [JS]   1514 Creatinine(!): 3.19  Baseline 1.4. [JS]   1514 Anion Gap(!): 29.7 [JS]   1514 Blood Gas, Venous With Co-Ox [JS]   1514 pH, Venous: 7.381 [JS]   1514 pCO2, Venous: 45.7 [JS]   1514 HCO3, Venous: 27.1 [JS]   1514 CT Abdomen Pelvis Without Contrast  I have independently reviewed and interpreted the CT abdomen pelvis.  My interpretation is negative for small bowel obstruction.   [JS]      ED Course User Index  [JS] Tomas White DO     Medications administered in ED:  Medications   insulin glargine (LANTUS, SEMGLEE) injection 1-200 Units (has no administration in time range)   insulin lispro (humaLOG) injection 1-200 Units (has no administration in time range)   insulin lispro (humaLOG) injection 1-200 Units (has no administration in time range)   dextrose (GLUTOSE) oral gel 15 g (has no administration in time range)   dextrose (D50W) (25 g/50 mL) IV  injection 10-50 mL (has no administration in time range)   Glucagon (GLUCAGEN) injection 1 mg (has no administration in time range)   sodium chloride 0.9 % bolus 1,000 mL (0 mL Intravenous Stopped 12/16/24 1505)   ondansetron (ZOFRAN) injection 4 mg (4 mg Intravenous Given 12/16/24 1231)   metoclopramide (REGLAN) injection 5 mg (5 mg Intravenous Given 12/16/24 1328)   diphenhydrAMINE (BENADRYL) injection 25 mg (25 mg Intravenous Given 12/16/24 1329)     On re-evaluation, patient resting comfortably.  Vital signs remained stable on room air.  Patient will require medical admission for further workup and management.  I discussed the findings of the ED workup with the patient including my recommendation for admission.  Patient agreeable with plan and disposition.    Case discussed with Dr. Kerley (hospitalist) who agrees to evaluate and admit the patient.  We discussed the HPI, pertinent PMHx, ED course and workup.    Chronic conditions affecting care: Diabetes    Social determinants of health impacting treatment or disposition: None    REPEAT VITAL SIGNS  AS OF 15:32 EST VITALS:  BP - 162/100  HR - 110  TEMP - 98.3 °F (36.8 °C)  O2 SATS - 95%    DIAGNOSIS  Final diagnoses:   Acute kidney injury   Hyperglycemia   Dehydration     DISPOSITION  ED Disposition       ED Disposition   Decision to Admit    Condition   --    Comment   Level of Care: Telemetry [5]   Diagnosis: Acute kidney injury superimposed on chronic kidney disease [2131191]   Admitting Physician: KERLEY, BRIAN JOSEPH [[id number]]   Attending Physician: KERLEY, BRIAN JOSEPH [[id number]]               Please note that portions of this document were completed with voice recognition software.        Tomas hWite DO  12/17/24 0629      Electronically signed by Tomas White DO at 12/17/24 0629       Vital Signs (last day)       Date/Time Temp Temp src Pulse Resp BP Patient Position SpO2    12/17/24 08:07:45 98.1 (36.7) Oral -- 16 -- -- --    12/17/24 0802 -- --  90 -- 174/92 -- 97    12/17/24 0600 -- -- 89 -- 178/87 -- 95    12/17/24 0500 -- -- 91 -- 153/79 -- 92    12/17/24 0430 -- -- 88 -- -- -- 91    12/17/24 0400 -- -- 90 -- 140/63 -- 92    12/17/24 0326 -- -- 88 16 126/64 Lying 90    12/17/24 0302 -- -- 89 -- 116/59 -- 91    12/17/24 0201 -- -- 92 -- 123/60 -- 91    12/17/24 0101 -- -- 96 -- 134/66 -- 93    12/17/24 0001 -- -- 99 -- 122/58 -- --    12/16/24 2301 -- -- 102 -- 190/87 -- --    12/16/24 2158 -- -- 101 -- 181/86 -- 97    12/16/24 2058 -- -- 96 -- 178/74 -- 91    12/16/24 2048 -- -- 97 -- 178/71 -- --    12/16/24 1958 -- -- 98 -- 171/78 -- 93    12/16/24 1928 -- -- -- -- 158/84 -- 98    12/16/24 1900 -- -- -- -- 169/86 -- 95    12/16/24 18:42:46 -- -- 102 -- 159/84 Lying 96    12/16/24 1729 -- -- 103 14 162/100 Lying 97    12/16/24 1445 -- -- 110 -- 162/100 Lying 95    12/16/24 1400 -- -- -- -- -- -- 93    12/16/24 1315 -- -- -- -- -- -- 99    12/16/24 1300 -- -- -- -- -- -- 100    12/16/24 12:16:47 -- -- -- -- 193/103 -- --    12/16/24 1214 98.3 (36.8) -- 112 22 -- -- 98          Current Facility-Administered Medications   Medication Dose Route Frequency Provider Last Rate Last Admin    Calcium Replacement - Follow Nurse / BPA Driven Protocol   Not Applicable PRN Kerley, Brian Joseph,         dextrose (D50W) (25 g/50 mL) IV injection 10-50 mL  10-50 mL Intravenous Q15 Min PRN Kerley, Brian Joseph, DO        dextrose (GLUTOSE) oral gel 15 g  15 g Oral Q15 Min PRN Kerley, Brian Joseph, DO        Glucagon (GLUCAGEN) injection 1 mg  1 mg Intramuscular Q15 Min PRN Kerley, Brian Joseph, DO        heparin (porcine) 5000 UNIT/ML injection 5,000 Units  5,000 Units Subcutaneous Q12H Kerley, Brian Joseph, DO   5,000 Units at 12/16/24 2128    insulin glargine (LANTUS, SEMGLEE) injection 1-200 Units  1-200 Units Subcutaneous BID - Glucommander Kerley, Brian Joseph, DO   7 Units at 12/16/24 2129    insulin lispro (humaLOG) injection 1-200 Units  1-200 Units  Subcutaneous 4x Daily With Meals & Nightly Kerley, Brian Joseph, DO   5 Units at 12/16/24 1730    insulin lispro (humaLOG) injection 1-200 Units  1-200 Units Subcutaneous PRN Kerley, Brian Joseph, DO        Magnesium Standard Dose Replacement - Follow Nurse / BPA Driven Protocol   Not Applicable PRN Kerley, Brian Joseph, DO        nitroglycerin (NITROSTAT) SL tablet 0.4 mg  0.4 mg Sublingual Q5 Min PRN Kerley, Brian Joseph, DO        Phosphorus Replacement - Follow Nurse / BPA Driven Protocol   Not Applicable PRN Kerley, Brian Joseph, DO        Potassium Replacement - Follow Nurse / BPA Driven Protocol   Not Applicable PRN Kerley, Brian Joseph, DO        prochlorperazine (COMPAZINE) injection 5 mg  5 mg Intravenous Q6H PRN William Velázquez DO   5 mg at 12/16/24 2300    Or    prochlorperazine (COMPAZINE) tablet 5 mg  5 mg Oral Q6H PRN William Velázquez DO   5 mg at 12/17/24 0626    Or    prochlorperazine (COMPAZINE) suppository 25 mg  25 mg Rectal Q12H PRN William Velázquez DO        sodium chloride 0.9 % flush 10 mL  10 mL Intravenous Q12H Kerley, Brian Joseph, DO   10 mL at 12/17/24 0805    sodium chloride 0.9 % flush 10 mL  10 mL Intravenous PRN Kerley, Brian Joseph, DO        sodium chloride 0.9 % infusion 40 mL  40 mL Intravenous PRN Kerley, Brian Joseph, DO        sodium chloride 0.9 % infusion  100 mL/hr Intravenous Continuous Kerley, Brian Joseph,  mL/hr at 12/17/24 0750 100 mL/hr at 12/17/24 0750    sodium chloride 0.9 % infusion  100 mL/hr Intravenous Continuous Kerley, Brian Joseph, DO   Stopped at 12/16/24 1927     Current Outpatient Medications   Medication Sig Dispense Refill    aspirin  MG tablet Take 1 tablet by mouth Daily.      atorvastatin (LIPITOR) 40 MG tablet Take 1 tablet by mouth Daily.      clopidogrel (PLAVIX) 75 MG tablet Take 1 tablet by mouth Daily.      gabapentin (NEURONTIN) 600 MG tablet Take 1 tablet by mouth 3 (Three) Times a Day.      HYDROcodone-acetaminophen (NORCO)  5-325 MG per tablet Take 1 tablet by mouth Every 6 (Six) Hours As Needed for Moderate Pain. 12 tablet 0    insulin lispro protamine-insulin lispro (humaLOG 75-25) (75-25) 100 UNIT/ML suspension injection Inject 60 Units under the skin into the appropriate area as directed 2 (Two) Times a Day With Meals.  12    levothyroxine (SYNTHROID, LEVOTHROID) 50 MCG tablet Take 1 tablet by mouth Daily.      metoprolol succinate XL (TOPROL-XL) 50 MG 24 hr tablet Take 1 tablet by mouth Daily.      ondansetron ODT (ZOFRAN-ODT) 4 MG disintegrating tablet Place 1 tablet on the tongue Every 8 (Eight) Hours As Needed for Nausea or Vomiting. 20 tablet 0    pantoprazole (PROTONIX) 40 MG EC tablet Take 1 tablet by mouth Daily.      sertraline (ZOLOFT) 50 MG tablet Take 1 tablet by mouth Daily.      amLODIPine (NORVASC) 5 MG tablet Take 1 tablet by mouth Daily. (Patient not taking: Reported on 2/17/2023) 30 tablet 0    Cholecalciferol (VITAMIN D-3) 1000 units capsule Take 1,000 Units by mouth Daily.      Dulaglutide (Trulicity) 1.5 MG/0.5ML solution pen-injector Inject 1.5 mg under the skin into the appropriate area as directed 1 (One) Time Per Week.      gabapentin (NEURONTIN) 600 MG tablet Take 600 mg by mouth 3 (Three) Times a Day.      glipiZIDE (GLUCOTROL) 10 MG tablet Take 20 mg by mouth Every Morning. (Patient not taking: Reported on 2/17/2023)      lidocaine (LIDODERM) 5 % Place 1 patch on the skin as directed by provider Daily. Remove & Discard patch within 12 hours or as directed by MD 15 each 0    metFORMIN ER (GLUCOPHAGE-XR) 750 MG 24 hr tablet Take 1 tablet by mouth Daily With Breakfast.      montelukast (SINGULAIR) 10 MG tablet Take 10 mg by mouth Every Night. (Patient not taking: Reported on 2/17/2023)      vitamin D3 125 MCG (5000 UT) capsule capsule Take 1 capsule by mouth Daily.       Lab Results (last 24 hours)       Procedure Component Value Units Date/Time    POC Glucose 4x Daily Before Meals & at Bedtime [[id number]]   (Abnormal) Collected: 12/17/24 0730    Specimen: Blood Updated: 12/17/24 0733     Glucose 205 mg/dL      Comment: Serial Number: [id number]:  [id number]       Basic Metabolic Panel [[id number]]  (Abnormal) Collected: 12/17/24 0533    Specimen: Blood Updated: 12/17/24 0637     Glucose 218 mg/dL      BUN 50 mg/dL      Creatinine 2.05 mg/dL      Sodium 143 mmol/L      Potassium 4.3 mmol/L      Comment: Slight hemolysis detected by analyzer. Result may be falsely elevated.        Chloride 106 mmol/L      CO2 21.6 mmol/L      Calcium 9.2 mg/dL      BUN/Creatinine Ratio 24.4     Anion Gap 15.4 mmol/L      eGFR 26.1 mL/min/1.73     Narrative:      GFR Categories in Chronic Kidney Disease (CKD)      GFR Category          GFR (mL/min/1.73)    Interpretation  G1                     90 or greater         Normal or high (1)  G2                      60-89                Mild decrease (1)  G3a                   45-59                Mild to moderate decrease  G3b                   30-44                Moderate to severe decrease  G4                    15-29                Severe decrease  G5                    14 or less           Kidney failure          (1)In the absence of evidence of kidney disease, neither GFR category G1 or G2 fulfill the criteria for CKD.    eGFR calculation 2021 CKD-EPI creatinine equation, which does not include race as a factor    CBC Auto Differential [[id number]]  (Abnormal) Collected: 12/17/24 0533    Specimen: Blood Updated: 12/17/24 0635     WBC 17.08 10*3/mm3      RBC 4.76 10*6/mm3      Hemoglobin 14.5 g/dL      Hematocrit 46.8 %      MCV 98.3 fL      MCH 30.5 pg      MCHC 31.0 g/dL      RDW 14.2 %      RDW-SD 51.9 fl      MPV 10.6 fL      Platelets 261 10*3/mm3      Neutrophil % 69.2 %      Lymphocyte % 23.5 %      Monocyte % 6.7 %      Eosinophil % 0.0 %      Basophil % 0.2 %      Immature Grans % 0.4 %      Neutrophils, Absolute 11.81 10*3/mm3      Lymphocytes, Absolute 4.02 10*3/mm3       Monocytes, Absolute 1.14 10*3/mm3      Eosinophils, Absolute 0.00 10*3/mm3      Basophils, Absolute 0.04 10*3/mm3      Immature Grans, Absolute 0.07 10*3/mm3      nRBC 0.0 /100 WBC     POC Glucose Once [[id number]]  (Abnormal) Collected: 12/16/24 2123    Specimen: Blood Updated: 12/16/24 2127     Glucose 162 mg/dL      Comment: Serial Number: [id number]:  [id number]       POC Glucose 4x Daily Before Meals & at Bedtime [[id number]]  (Abnormal) Collected: 12/16/24 1703    Specimen: Blood Updated: 12/16/24 1706     Glucose 267 mg/dL      Comment: Serial Number: [id number]:  [id number]       Basic Metabolic Panel [[id number]]  (Abnormal) Collected: 12/16/24 1618    Specimen: Blood Updated: 12/16/24 1643     Glucose 333 mg/dL      BUN 56 mg/dL      Creatinine 2.74 mg/dL      Sodium 142 mmol/L      Potassium 4.6 mmol/L      Comment: Slight hemolysis detected by analyzer. Result may be falsely elevated.        Chloride 101 mmol/L      CO2 22.6 mmol/L      Calcium 9.8 mg/dL      BUN/Creatinine Ratio 20.4     Anion Gap 18.4 mmol/L      eGFR 18.5 mL/min/1.73     Narrative:      GFR Categories in Chronic Kidney Disease (CKD)      GFR Category          GFR (mL/min/1.73)    Interpretation  G1                     90 or greater         Normal or high (1)  G2                      60-89                Mild decrease (1)  G3a                   45-59                Mild to moderate decrease  G3b                   30-44                Moderate to severe decrease  G4                    15-29                Severe decrease  G5                    14 or less           Kidney failure          (1)In the absence of evidence of kidney disease, neither GFR category G1 or G2 fulfill the criteria for CKD.    eGFR calculation 2021 CKD-EPI creatinine equation, which does not include race as a factor    Hemoglobin A1c [[id number]]  (Abnormal) Collected: 12/16/24 1223    Specimen: Blood Updated: 12/16/24 1552     Hemoglobin A1C 8.40 %      Narrative:      Hemoglobin A1C Ranges:    Increased Risk for Diabetes  5.7% to 6.4%  Diabetes                     >= 6.5%  Diabetic Goal                < 7.0%    Blood Gas, Venous With Co-Ox [[id number]] Collected: 12/16/24 1504    Specimen: Venous Blood Updated: 12/16/24 1505     Site IVC     pH, Venous 7.381 pH Units      pCO2, Venous 45.7 mm Hg      pO2, Venous 33.1 mm Hg      HCO3, Venous 27.1 mmol/L      Base Excess, Venous 1.4 mmol/L      O2 Saturation, Venous 66.0 %      Oxyhemoglobin Venous 64.9 %      Methemoglobin Venous 0.6 %      Carboxyhemoglobin Venous 1.0 %      Barometric Pressure for Blood Gas 736 mmHg      Modality Room Air     Ventilator Mode NA     Collected by 4RN     Comment: Meter: [id number]     :  [id number]       Comprehensive Metabolic Panel [[id number]]  (Abnormal) Collected: 12/16/24 1223    Specimen: Blood Updated: 12/16/24 1307     Glucose 363 mg/dL      Comment: Glucose >180, Hemoglobin A1C recommended.        BUN 54 mg/dL      Creatinine 3.19 mg/dL      Sodium 142 mmol/L      Potassium 4.3 mmol/L      Comment: Slight hemolysis detected by analyzer. Result may be falsely elevated.        Chloride 93 mmol/L      CO2 19.3 mmol/L      Calcium 10.9 mg/dL      Total Protein 9.4 g/dL      Albumin 5.0 g/dL      ALT (SGPT) 16 U/L      AST (SGOT) 22 U/L      Comment: Slight hemolysis detected by analyzer. Result may be falsely elevated.        Alkaline Phosphatase 148 U/L      Total Bilirubin 0.7 mg/dL      Globulin 4.4 gm/dL      A/G Ratio 1.1 g/dL      BUN/Creatinine Ratio 16.9     Anion Gap 29.7 mmol/L      eGFR 15.4 mL/min/1.73     Narrative:      GFR Categories in Chronic Kidney Disease (CKD)      GFR Category          GFR (mL/min/1.73)    Interpretation  G1                     90 or greater         Normal or high (1)  G2                      60-89                Mild decrease (1)  G3a                   45-59                Mild to moderate decrease  G3b                   30-44                 Moderate to severe decrease  G4                    15-29                Severe decrease  G5                    14 or less           Kidney failure          (1)In the absence of evidence of kidney disease, neither GFR category G1 or G2 fulfill the criteria for CKD.    eGFR calculation 2021 CKD-EPI creatinine equation, which does not include race as a factor    CBC & Differential [[id number]]  (Abnormal) Collected: 12/16/24 1223    Specimen: Blood Updated: 12/16/24 1257    Narrative:      The following orders were created for panel order CBC & Differential.  Procedure                               Abnormality         Status                     ---------                               -----------         ------                     CBC Auto Differential[[id number]]        Abnormal            Final result               Scan Slide[[id number]]                                       Final result                 Please view results for these tests on the individual orders.    CBC Auto Differential [[id number]]  (Abnormal) Collected: 12/16/24 1223    Specimen: Blood Updated: 12/16/24 1257     WBC 19.28 10*3/mm3      RBC 5.45 10*6/mm3      Hemoglobin 16.6 g/dL      Hematocrit 50.6 %      MCV 92.8 fL      MCH 30.5 pg      MCHC 32.8 g/dL      RDW 13.9 %      RDW-SD 47.0 fl      MPV 10.5 fL      Platelets 373 10*3/mm3     Scan Slide [[id number]] Collected: 12/16/24 1223    Specimen: Blood Updated: 12/16/24 1257     Scan Slide --     Comment: See Manual Differential Results       Manual Differential [[id number]]  (Abnormal) Collected: 12/16/24 1223    Specimen: Blood Updated: 12/16/24 1257     Neutrophil % 82.0 %      Lymphocyte % 16.0 %      Monocyte % 2.0 %      Neutrophils Absolute 15.81 10*3/mm3      Lymphocytes Absolute 3.08 10*3/mm3      Monocytes Absolute 0.39 10*3/mm3      RBC Morphology Normal     WBC Morphology Normal     Platelet Estimate Adequate    Lipase [[id number]]  (Normal) Collected: 12/16/24 1223     Specimen: Blood Updated: 12/16/24 1256     Lipase 31 U/L     POC Glucose Once [[id number]]  (Abnormal) Collected: 12/16/24 1239    Specimen: Blood Updated: 12/16/24 1242     Glucose 333 mg/dL      Comment: Serial Number: [id number]:  [id number]       [place] Draw [[id number]] Collected: 12/16/24 1223    Specimen: Blood Updated: 12/16/24 1231    Narrative:      The following orders were created for panel order [place] Draw.  Procedure                               Abnormality         Status                     ---------                               -----------         ------                     Green Top (Gel)[[id number]]                                  Final result               Lavender Top[[id number]]                                     Final result               Gold Top - SST[[id number]]                                   Final result               Light Blue Top[[id number]]                                   Final result                 Please view results for these tests on the individual orders.    Green Top (Gel) [[id number]] Collected: 12/16/24 1223    Specimen: Blood Updated: 12/16/24 1231     Extra Tube Hold for add-ons.     Comment: Auto resulted.       Lavender Top [[id number]] Collected: 12/16/24 1223    Specimen: Blood Updated: 12/16/24 1231     Extra Tube hold for add-on     Comment: Auto resulted       Gold Top - SST [[id number]] Collected: 12/16/24 1223    Specimen: Blood Updated: 12/16/24 1231     Extra Tube Hold for add-ons.     Comment: Auto resulted.       Light Blue Top [[id number]] Collected: 12/16/24 1223    Specimen: Blood Updated: 12/16/24 1231     Extra Tube Hold for add-ons.     Comment: Auto resulted             Operative/Procedure Notes (last 24 hours)  Notes from 12/16/24 0810 through 12/17/24 0810   No notes of this type exist for this encounter.

## 2024-12-17 NOTE — PROGRESS NOTES
[facility]    Name:  Viola Barlow   Age:  69 y.o.  Sex:  female  :  1955  MRN:  [id number]   Visit Number:  [id number]  Admission Date:  2024  Date Of Service:  24  Primary Care Physician:  Jyoti Tomlinson APRN     LOS: 0 days :    Chief Complaint:      Nausea and vomiting.    Subjective:    Viola Barlow patient was seen and examined this afternoon.  She was boarded at night in the emergency room due to lack of beds.  She is now on the floor floor.  She is currently sitting up by the side of the bed and states that she is nauseous.  Zofran and Compazine did not work.  We will place her on Phenergan.  She is currently on IV fluids and her creatinine level has improved.  Previous physician documentation, laboratory and imaging data have been reviewed.    Hospital Course:    Viola Barlow is a 69-year-old woman with past medical history of type 2 diabetes, hypertension, CKD, nephrolithiasis, diverticulosis, DARELL, hyperlipidemia, peripheral vascular disease, migraines, arthritis.  Presented to [facility] ED on 2024 with concern nausea and nonbloody vomiting for 4 days, hyperglycemia in the 400s.  She has been taking insulin as prescribed, no missed doses.  Denied fevers or chills, chest pain, shortness of air, back pain, leg pain or swelling.       ED summary: Afebrile, tachycardic, tachypneic, hypertensive which improved, other vital signs stable room air.  ABG normal.  Chloride 93, CO2 19, anion gap 29, BUN 54, creatinine 3.19, EGFR 15, blood glucose 363, calcium 10, alk phos 148, total protein 9.4.  Lipase 31.  Leukocytosis 19.  Hemoglobin 16.  CT ab/pelvis bilateral nephrolithiasis without hydronephrosis, chronic diverticulosis.  She was provided Benadryl, Reglan, Zofran, 1 L NS bolus.    Review of Systems:     All systems were reviewed and negative except as mentioned in subjective, assessment and plan.    Vital Signs:    Temp:   [98.1 °F (36.7 °C)-99.1 °F (37.3 °C)] 99.1 °F (37.3 °C)  Heart Rate:  [] 91  Resp:  [16-18] 18  BP: (116-201)/(58-97) 175/78    Intake and output:    I/O last 3 completed shifts:  In: 75 [I.V.:75]  Out: 400 [Urine:400]  I/O this shift:  In: 2408.3 [I.V.:2358.3; IV Piggyback:50]  Out: 700 [Urine:700]    Physical Examination:    General Appearance:  Alert and cooperative.    Head:  Atraumatic and normocephalic.   Eyes: Conjunctivae and sclerae normal, no icterus. No pallor.   Throat: No oral lesions, no thrush, oral mucosa moist.   Neck: Supple, trachea midline, no thyromegaly.   Lungs:   Breath sounds heard bilaterally equally.  No wheezing or crackles. No Pleural rub or bronchial breathing.   Heart:  Normal S1 and S2, no murmur, no gallop, no rub. No JVD.   Abdomen:   Normal bowel sounds, no masses, no organomegaly. Soft, epigastric tenderness noted, nondistended, no rebound tenderness.   Extremities: Supple, no edema, no cyanosis, no clubbing.   Skin: No bleeding or rash.   Neurologic: Alert and oriented x 3. No facial asymmetry. Moves all four limbs. No tremors.    Laboratory results:    Results from last 7 days   Lab Units 12/17/24  0533 12/16/24  1618 12/16/24  1223   SODIUM mmol/L 143 142 142   POTASSIUM mmol/L 4.3 4.6 4.3   CHLORIDE mmol/L 106 101 93*   CO2 mmol/L 21.6* 22.6 19.3*   BUN mg/dL 50* 56* 54*   CREATININE mg/dL 2.05* 2.74* 3.19*   CALCIUM mg/dL 9.2 9.8 10.9*   BILIRUBIN mg/dL  --   --  0.7   ALK PHOS U/L  --   --  148*   ALT (SGPT) U/L  --   --  16   AST (SGOT) U/L  --   --  22   GLUCOSE mg/dL 218* 333* 363*     Results from last 7 days   Lab Units 12/17/24  0533 12/16/24  1223   WBC 10*3/mm3 17.08* 19.28*   HEMOGLOBIN g/dL 14.5 16.6*   HEMATOCRIT % 46.8* 50.6*   PLATELETS 10*3/mm3 261 373       I have reviewed the patient's laboratory results.    Radiology results:    CT Abdomen Pelvis Without Contrast    Result Date: 12/16/2024  PROCEDURE: CT ABDOMEN PELVIS WO CONTRAST-  HISTORY: Nausea,  vomiting, leukocytosis, JOSE ALBERTO  COMPARISON: June 2021.  PROCEDURE: Axial images were obtained from the lung bases through the pubic symphysis without intravenous contrast.  FINDINGS:  ABDOMEN: There is streak artifact from patient's arm position. The lung bases are clear. The heart size is normal. There are vascular calcifications. The limited noncontrast images of the liver are normal. Gallbladder is unremarkable. There is no CT evidence of gallstones. The spleen is normal. No adrenal masses are seen.  The pancreas has an unremarkable unenhanced appearance. The aorta is normal in caliber. There is no significant free fluid or adenopathy.  There is bilateral nephrolithiasis. There is no hydronephrosis. There is colonic diverticulosis.  PELVIS: The appendix is not identified. Uterus is diminutive or absent. The urinary bladder is mostly collapsed. There is no significant fluid or adenopathy. There is no bony destructive lesion.      Impression: Bilateral nephrolithiasis without hydronephrosis.  Colonic diverticulosis.     CTDI: 9.5 mGy DLP: 486.92 mGy.cm   This study was performed with techniques to keep radiation doses as low as reasonably achievable (ALARA). Individualized dose reduction techniques using automated exposure control or adjustment of mA and/or kV according to the patient size were employed.     Images were reviewed, interpreted, and dictated by Dr. Nadia Coleman MD Transcribed by Chelo Diallo PA-C.  This report was signed and finalized on 12/16/2024 2:10 PM by Nadia Coleman MD.     I have reviewed the patient's radiology reports.    Medication Review:     I have reviewed the patient's active and prn medications.     Problem List:      Acute kidney injury superimposed on chronic kidney disease    Type 2 diabetes mellitus with nephropathy    Essential hypertension    Chronic kidney disease, stage III (moderate)    Assessment:    Acute gastroenteritis, POA.  Dehydration, POA.  Acute renal failure  secondary to #2, POA.  Chronic kidney disease stage III.  Diabetes mellitus type 2 with nephropathy.  Essential hypertension.  Peripheral vascular disease.  Acquired hypothyroidism.    Plan:    Acute gastroenteritis.  - Patient does have history of GERD and does currently have epigastric tenderness.  - Continue IV fluids for hydration.  - Continue Protonix, Zofran, Phenergan.    Acute renal failure.  - Secondary to dehydration but has improved with IV hydration.  - Repeat renal function tomorrow.    Diabetes mellitus type 2.  - Continue subcutaneous insulin protocol for Glucomander.  - Hemoglobin A1c 8.4 on 12/16/2024.    Discussed with nursing staff and multidisciplinary team.    I have reviewed the copied text and it is accurate as of 12/17/24    DVT Prophylaxis: Heparin  Code Status: Full  Diet: Cardiac diabetic  Discharge Plan: Hopefully, home in 2-3 days.    Gerald Zhao MD  12/17/24  17:32 EST    Dictated utilizing Dragon dictation.

## 2024-12-17 NOTE — THERAPY EVALUATION
OT evaluation held today d/t elevated /81 and pt still feeling nauseated.  Pt reports she hasn't slept.  OT to follow up with pt tomorrow.

## 2024-12-17 NOTE — THERAPY EVALUATION
PT miguel held this date d/t elevated /81mmHg and reports of a headache. RN notified. PT will follow-up as appropriate.

## 2024-12-17 NOTE — CONSULTS
[facility]      Nephrology Consultation      Referring Provider:   No ref. provider found    Reason for Consultation:  Acute Kidney Injury on chronic kidney disease 3b and associated problems.      Subjective:  Chief complaint   Chief Complaint   Patient presents with    Hyperglycemia     History of present illness:    Viola Barlow is a 69-year-old woman with past medical history of type 2 diabetes, hypertension, CKD, nephrolithiasis, diverticulosis, DARELL, hyperlipidemia, peripheral vascular disease, migraines, arthritis.  Presented to [facility] ED on 12/16/2024 with concern nausea and nonbloody vomiting for 4 days, hyperglycemia in the 400s.  She has been taking insulin as prescribed, no missed doses.  Denied fevers or chills, chest pain, shortness of air, back pain, leg pain or swelling.    Patient currently is sitting up in the bed gagging herself with her finger trying to vomit.  She says she has really bad nausea and has been sick for 5 days at this point.  She is tearful while she talks about it.  She says she has not been eating or drinking for 5 days.  Patient was last seen in the nephrology clinic on 11/15/2024.  Labs were done on 11/18/2024, 3 days later.  GFR 43, glucose 194.  I have reviewed labs/imaging/records from this hospitalization, including ER staff and admitting/attending physicians H/P's and progress notes to establish a comprehensive understanding of this patient's clinical hospital course, as well as to establish plan of care appropriately.     Past Medical History:   Diagnosis Date    Arthritis     Cataract     Depression     Diabetes mellitus     Dysphagia     Patient reported that intermittently food feels like it gets lodged     Elevated cholesterol     Full dentures     Instructed no adhesives the DOS    GERD (gastroesophageal reflux disease)     Headache     History of nuclear stress test     Patient reported this was done with Dr. Douglas around 2017 and that she was  "started on Metoprolol after testing.     Hypertension     Kidney disease     Patient reported \"I have chronic kidney disease and they say my kidney function is at 33%\" and that she has routine care with Dr. Elsi Campbell     PVD (peripheral vascular disease)     Seasonal allergies     Spinal headache     Wears glasses        Past Surgical History:   Procedure Laterality Date    ABDOMINAL SURGERY  1984    Patient reported after complete hysterectomy, she had another procedure to remove tumors in the abdominal area    APPENDECTOMY      Reported removed when hysterectomy was done    CATARACT EXTRACTION W/ INTRAOCULAR LENS IMPLANT Right 2019    Procedure: CATARACT PHACO EXTRACTION WITH INTRAOCULAR LENS IMPLANT RIGHT;  Surgeon: Tee Enrique MD;  Location: [facility] OR;  Service: Ophthalmology    CATARACT EXTRACTION W/ INTRAOCULAR LENS IMPLANT Left 2019    Procedure: CATARACT PHACO EXTRACTION WITH INTRAOCULAR LENS IMPLANT LEFT;  Surgeon: Tee Enrique MD;  Location: [facility] OR;  Service: Ophthalmology    COLONOSCOPY      ELBOW EPICONDYLECTOMY Left 1990    ENDOSCOPY      HYSTERECTOMY      Partial    HYSTERECTOMY      Complete     MOUTH SURGERY      Full mouth extraction    VASCULAR SURGERY Bilateral     For PAD - Reported the right leg was done first around  and the left was done around      History reviewed. No pertinent family history.  negative h/o ESRD     Social History     Tobacco Use    Smoking status: Former     Current packs/day: 0.00     Average packs/day: 1.5 packs/day for 38.0 years (57.0 ttl pk-yrs)     Types: Cigarettes     Start date:      Quit date:      Years since quittin.9    Smokeless tobacco: Never    Tobacco comments:     quit 13 yrs ago   Vaping Use    Vaping status: Never Used   Substance Use Topics    Alcohol use: No    Drug use: No     Home medications:   Prior to Admission Medications       Prescriptions Last Dose Informant Patient " Reported? Taking?    aspirin  MG tablet 12/16/2024 Self Yes Yes    Take 1 tablet by mouth Daily.    atorvastatin (LIPITOR) 40 MG tablet 12/16/2024 Self Yes Yes    Take 1 tablet by mouth Daily.    clopidogrel (PLAVIX) 75 MG tablet 12/16/2024 Self Yes Yes    Take 1 tablet by mouth Daily.    gabapentin (NEURONTIN) 600 MG tablet 12/16/2024 Self Yes Yes    Take 1 tablet by mouth 3 (Three) Times a Day.    HYDROcodone-acetaminophen (NORCO) 5-325 MG per tablet Past Month  No Yes    Take 1 tablet by mouth Every 6 (Six) Hours As Needed for Moderate Pain.    insulin lispro protamine-insulin lispro (humaLOG 75-25) (75-25) 100 UNIT/ML suspension injection 12/15/2024  No Yes    Inject 60 Units under the skin into the appropriate area as directed 2 (Two) Times a Day With Meals.    levothyroxine (SYNTHROID, LEVOTHROID) 50 MCG tablet 12/16/2024 Self Yes Yes    Take 1 tablet by mouth Daily.    metoprolol succinate XL (TOPROL-XL) 50 MG 24 hr tablet 12/16/2024 Self Yes Yes    Take 1 tablet by mouth Daily.    ondansetron ODT (ZOFRAN-ODT) 4 MG disintegrating tablet Past Month  No Yes    Place 1 tablet on the tongue Every 8 (Eight) Hours As Needed for Nausea or Vomiting.    pantoprazole (PROTONIX) 40 MG EC tablet 12/16/2024 Self Yes Yes    Take 1 tablet by mouth Daily.    sertraline (ZOLOFT) 50 MG tablet 12/16/2024 Self Yes Yes    Take 1 tablet by mouth Daily.    amLODIPine (NORVASC) 5 MG tablet   No No    Take 1 tablet by mouth Daily.    Patient not taking:  Reported on 2/17/2023    Cholecalciferol (VITAMIN D-3) 1000 units capsule Unknown Self Yes No    Take 1,000 Units by mouth Daily.    Dulaglutide (Trulicity) 1.5 MG/0.5ML solution pen-injector Unknown  Yes No    Inject 1.5 mg under the skin into the appropriate area as directed 1 (One) Time Per Week.    gabapentin (NEURONTIN) 600 MG tablet  Self Yes No    Take 600 mg by mouth 3 (Three) Times a Day.    glipiZIDE (GLUCOTROL) 10 MG tablet  Self Yes No    Take 20 mg by mouth Every  Morning.    Patient not taking:  Reported on 2/17/2023    lidocaine (LIDODERM) 5 % Unknown  No No    Place 1 patch on the skin as directed by provider Daily. Remove & Discard patch within 12 hours or as directed by MD    metFORMIN ER (GLUCOPHAGE-XR) 750 MG 24 hr tablet Unknown Self Yes No    Take 1 tablet by mouth Daily With Breakfast.    montelukast (SINGULAIR) 10 MG tablet Unknown Self Yes No    Take 10 mg by mouth Every Night.    Patient not taking:  Reported on 2/17/2023    vitamin D3 125 MCG (5000 UT) capsule capsule Unknown  Yes No    Take 1 capsule by mouth Daily.          Emergency department medications:   Medications   sodium chloride 0.9 % flush 10 mL (10 mL Intravenous Given 12/16/24 2130)   sodium chloride 0.9 % flush 10 mL (has no administration in time range)   sodium chloride 0.9 % infusion 40 mL (has no administration in time range)   heparin (porcine) 5000 UNIT/ML injection 5,000 Units (5,000 Units Subcutaneous Given 12/16/24 2128)   nitroglycerin (NITROSTAT) SL tablet 0.4 mg (has no administration in time range)   Potassium Replacement - Follow Nurse / BPA Driven Protocol (has no administration in time range)   Magnesium Standard Dose Replacement - Follow Nurse / BPA Driven Protocol (has no administration in time range)   Phosphorus Replacement - Follow Nurse / BPA Driven Protocol (has no administration in time range)   Calcium Replacement - Follow Nurse / BPA Driven Protocol (has no administration in time range)   sodium chloride 0.9 % infusion (100 mL/hr Intravenous New Bag 12/17/24 0449)   insulin glargine (LANTUS, SEMGLEE) injection 1-200 Units (7 Units Subcutaneous Given 12/16/24 2129)   insulin lispro (humaLOG) injection 1-200 Units ( Subcutaneous Not Given 12/16/24 2130)   insulin lispro (humaLOG) injection 1-200 Units (has no administration in time range)   dextrose (GLUTOSE) oral gel 15 g (has no administration in time range)   dextrose (D50W) (25 g/50 mL) IV injection 10-50 mL (has no  "administration in time range)   Glucagon (GLUCAGEN) injection 1 mg (has no administration in time range)   sodium chloride 0.9 % infusion (0 mL/hr Intravenous Stopped 12/16/24 1927)   prochlorperazine (COMPAZINE) injection 5 mg ( Intravenous Not Given:  See Alt 12/17/24 0626)     Or   prochlorperazine (COMPAZINE) tablet 5 mg (5 mg Oral Given 12/17/24 0626)     Or   prochlorperazine (COMPAZINE) suppository 25 mg ( Rectal Not Given:  See Alt 12/17/24 0626)   sodium chloride 0.9 % bolus 1,000 mL (0 mL Intravenous Stopped 12/16/24 1505)   ondansetron (ZOFRAN) injection 4 mg (4 mg Intravenous Given 12/16/24 1231)   metoclopramide (REGLAN) injection 5 mg (5 mg Intravenous Given 12/16/24 1328)   diphenhydrAMINE (BENADRYL) injection 25 mg (25 mg Intravenous Given 12/16/24 1329)       Allergies:  Lisinopril    Review of Systems  14 point review of system were done and are negative except as mentioned in the history of present illness and assessment and plan.    Physical Exam:  Objective:  Vital Signs  /87   Pulse 89   Temp 98.3 °F (36.8 °C)   Resp 16   Ht 152.4 cm (60\")   Wt 52.2 kg (115 lb)   LMP  (LMP Unknown)   SpO2 95%   BMI 22.46 kg/m²   Objective    No intake/output data recorded.    Intake/Output Summary (Last 24 hours) at 12/17/2024 0733  Last data filed at 12/17/2024 0502  Gross per 24 hour   Intake 75 ml   Output 400 ml   Net -325 ml        Physical Exam     Constitutional: Awake, alert, tearful  Eyes: sclerae anicteric, no conjunctival injection  HEENT: mucous membranes dry  Neck: Supple, no thyromegaly, no lymphadenopathy, trachea midline, No JVD  Respiratory: Clear to auscultation bilaterally, nonlabored respirations   Cardiovascular: RRR, borderline tachycardic, + murmurs, rubs, or gallops.  Gastrointestinal: Positive bowel sounds, obese, soft, nontender, nondistended  Musculoskeletal: No edema, no clubbing or cyanosis  Psychiatric: Appropriate affect, cooperative  Neurologic: Oriented x 3, moving " "all extremities, Cranial Nerves grossly intact, speech clear  Skin: warm and dry, no rashes            Results Review:   Results from last 7 days   Lab Units 12/17/24  0533 12/16/24  1618 12/16/24  1223   SODIUM mmol/L 143 142 142   POTASSIUM mmol/L 4.3 4.6 4.3   CHLORIDE mmol/L 106 101 93*   CO2 mmol/L 21.6* 22.6 19.3*   BUN mg/dL 50* 56* 54*   CREATININE mg/dL 2.05* 2.74* 3.19*   CALCIUM mg/dL 9.2 9.8 10.9*   ALBUMIN g/dL  --   --  5.0   BILIRUBIN mg/dL  --   --  0.7   ALK PHOS U/L  --   --  148*   ALT (SGPT) U/L  --   --  16   AST (SGOT) U/L  --   --  22   GLUCOSE mg/dL 218* 333* 363*     Estimated Creatinine Clearance: 21.9 mL/min (A) (by C-G formula based on SCr of 2.05 mg/dL (H)).          Results from last 7 days   Lab Units 12/17/24  0533 12/16/24  1223   WBC 10*3/mm3 17.08* 19.28*   HEMOGLOBIN g/dL 14.5 16.6*   PLATELETS 10*3/mm3 261 373         Brief Urine Lab Results       None          No results found for: \"UTPCR\"  Imaging Results (Last 24 Hours)       Procedure Component Value Units Date/Time    CT Abdomen Pelvis Without Contrast [[id number]] Collected: 12/16/24 1409     Updated: 12/16/24 1412    Narrative:      PROCEDURE: CT ABDOMEN PELVIS WO CONTRAST-     HISTORY: Nausea, vomiting, leukocytosis, JOSE ALBERTO     COMPARISON: June 2021.     PROCEDURE: Axial images were obtained from the lung bases through the  pubic symphysis without intravenous contrast.     FINDINGS:     ABDOMEN: There is streak artifact from patient's arm position. The lung  bases are clear. The heart size is normal. There are vascular  calcifications. The limited noncontrast images of the liver are normal.  Gallbladder is unremarkable. There is no CT evidence of gallstones. The  spleen is normal. No adrenal masses are seen.  The pancreas has an  unremarkable unenhanced appearance. The aorta is normal in caliber.  There is no significant free fluid or adenopathy.  There is bilateral  nephrolithiasis. There is no hydronephrosis. There is " colonic  diverticulosis.     PELVIS: The appendix is not identified. Uterus is diminutive or absent.  The urinary bladder is mostly collapsed. There is no significant fluid  or adenopathy. There is no bony destructive lesion.       Impression:      Bilateral nephrolithiasis without hydronephrosis.     Colonic diverticulosis.              CTDI: 9.5 mGy  DLP: 486.92 mGy.cm        This study was performed with techniques to keep radiation doses as low  as reasonably achievable (ALARA). Individualized dose reduction  techniques using automated exposure control or adjustment of mA and/or  kV according to the patient size were employed.              Images were reviewed, interpreted, and dictated by Dr. Nadia Coleman MD  Transcribed by Chelo Diallo PA-C.     This report was signed and finalized on 12/16/2024 2:10 PM by Nadia Coleman MD.             heparin (porcine), 5,000 Units, Subcutaneous, Q12H  insulin glargine, 1-200 Units, Subcutaneous, BID - Glucommander  insulin lispro, 1-200 Units, Subcutaneous, 4x Daily With Meals & Nightly  sodium chloride, 10 mL, Intravenous, Q12H      sodium chloride, 100 mL/hr, Last Rate: 100 mL/hr (12/17/24 0449)  sodium chloride, 100 mL/hr, Last Rate: Stopped (12/16/24 1927)        Assessment/Plan:      Acute kidney injury superimposed on chronic kidney disease    JOSE ALBERTO on CKD 3B: Patient's baseline GFR is 37-43.  Patient came in with GFR 15.4, currently 26.1.  Suspect dehydration due to nausea with vomiting, not eating or drinking x 5 days.  Electrolytes and bicarb appears to be stable.  Nausea and vomiting: Most likely has diabetic gastroparesis, will benefit from Reglan.  Hyperglycemia with type 2 diabetes: Last hemoglobin A1c 8.4% on 12/16/2024.  Hypertension: BP currently 174/92.  Patient dehydrated.  Normally in clinic she runs about 110 systolic and says she runs the same at home.  Continue watch closely if blood pressure stays high we will restart her home dose of  amlodipine.      Risk and complexity: Moderate      Plan:  Patient currently on normal saline 100 mL/h.  I will go ahead and start her on Reglan 5 mg twice a day p.o.  Continue with rest of the current treatment plan and surveillance labs.  Details were discussed with the patient as well as family in the room.  Daughter at the bedside  Details were also discussed with the hospitalist service.   Further recommendations will depend on clinical course of the patient during the current hospitalization.    I also discussed the details with the nursing staff.  Rest as ordered.    In closing, I sincerely appreciate opportunity to participate in care of this patient. If I can be of any further assistance with the management of this patient, please don’t hesitate to contact me.    Jerman Calzada MD, WHITNEYN    12/17/24  07:33 EST    Dictated using Dragon.

## 2024-12-18 LAB
ALBUMIN SERPL-MCNC: 4.3 G/DL (ref 3.5–5.2)
ALBUMIN/GLOB SERPL: 1.2 G/DL
ALP SERPL-CCNC: 131 U/L (ref 39–117)
ALT SERPL W P-5'-P-CCNC: 10 U/L (ref 1–33)
ANION GAP SERPL CALCULATED.3IONS-SCNC: 14 MMOL/L (ref 5–15)
AST SERPL-CCNC: 19 U/L (ref 1–32)
BILIRUB SERPL-MCNC: 0.8 MG/DL (ref 0–1.2)
BUN SERPL-MCNC: 37 MG/DL (ref 8–23)
BUN/CREAT SERPL: 25.3 (ref 7–25)
CALCIUM SPEC-SCNC: 9.7 MG/DL (ref 8.6–10.5)
CHLORIDE SERPL-SCNC: 107 MMOL/L (ref 98–107)
CO2 SERPL-SCNC: 22 MMOL/L (ref 22–29)
CREAT SERPL-MCNC: 1.46 MG/DL (ref 0.57–1)
DEPRECATED RDW RBC AUTO: 49.2 FL (ref 37–54)
EGFRCR SERPLBLD CKD-EPI 2021: 39.3 ML/MIN/1.73
ERYTHROCYTE [DISTWIDTH] IN BLOOD BY AUTOMATED COUNT: 13.8 % (ref 12.3–15.4)
GLOBULIN UR ELPH-MCNC: 3.6 GM/DL
GLUCOSE BLDC GLUCOMTR-MCNC: 171 MG/DL (ref 70–130)
GLUCOSE BLDC GLUCOMTR-MCNC: 193 MG/DL (ref 70–130)
GLUCOSE BLDC GLUCOMTR-MCNC: 201 MG/DL (ref 70–130)
GLUCOSE BLDC GLUCOMTR-MCNC: 237 MG/DL (ref 70–130)
GLUCOSE SERPL-MCNC: 145 MG/DL (ref 65–99)
HCT VFR BLD AUTO: 49.2 % (ref 34–46.6)
HGB BLD-MCNC: 16 G/DL (ref 12–15.9)
MCH RBC QN AUTO: 31.1 PG (ref 26.6–33)
MCHC RBC AUTO-ENTMCNC: 32.5 G/DL (ref 31.5–35.7)
MCV RBC AUTO: 95.5 FL (ref 79–97)
PLATELET # BLD AUTO: 247 10*3/MM3 (ref 140–450)
PMV BLD AUTO: 10.3 FL (ref 6–12)
POTASSIUM SERPL-SCNC: 3.8 MMOL/L (ref 3.5–5.2)
PROT SERPL-MCNC: 7.9 G/DL (ref 6–8.5)
RBC # BLD AUTO: 5.15 10*6/MM3 (ref 3.77–5.28)
SODIUM SERPL-SCNC: 143 MMOL/L (ref 136–145)
WBC NRBC COR # BLD AUTO: 14.36 10*3/MM3 (ref 3.4–10.8)

## 2024-12-18 PROCEDURE — 99232 SBSQ HOSP IP/OBS MODERATE 35: CPT | Performed by: INTERNAL MEDICINE

## 2024-12-18 PROCEDURE — 85027 COMPLETE CBC AUTOMATED: CPT | Performed by: INTERNAL MEDICINE

## 2024-12-18 PROCEDURE — 25010000002 PROCHLORPERAZINE 10 MG/2ML SOLUTION: Performed by: INTERNAL MEDICINE

## 2024-12-18 PROCEDURE — 97161 PT EVAL LOW COMPLEX 20 MIN: CPT

## 2024-12-18 PROCEDURE — 25010000002 HEPARIN (PORCINE) PER 1000 UNITS: Performed by: STUDENT IN AN ORGANIZED HEALTH CARE EDUCATION/TRAINING PROGRAM

## 2024-12-18 PROCEDURE — G0378 HOSPITAL OBSERVATION PER HR: HCPCS

## 2024-12-18 PROCEDURE — 25010000002 METOCLOPRAMIDE PER 10 MG: Performed by: INTERNAL MEDICINE

## 2024-12-18 PROCEDURE — 97165 OT EVAL LOW COMPLEX 30 MIN: CPT

## 2024-12-18 PROCEDURE — 80053 COMPREHEN METABOLIC PANEL: CPT | Performed by: INTERNAL MEDICINE

## 2024-12-18 PROCEDURE — 82948 REAGENT STRIP/BLOOD GLUCOSE: CPT

## 2024-12-18 PROCEDURE — 63710000001 INSULIN GLARGINE PER 5 UNITS: Performed by: STUDENT IN AN ORGANIZED HEALTH CARE EDUCATION/TRAINING PROGRAM

## 2024-12-18 PROCEDURE — 82948 REAGENT STRIP/BLOOD GLUCOSE: CPT | Performed by: STUDENT IN AN ORGANIZED HEALTH CARE EDUCATION/TRAINING PROGRAM

## 2024-12-18 PROCEDURE — 25810000003 SODIUM CHLORIDE 0.9 % SOLUTION: Performed by: STUDENT IN AN ORGANIZED HEALTH CARE EDUCATION/TRAINING PROGRAM

## 2024-12-18 PROCEDURE — 63710000001 INSULIN LISPRO (HUMAN) PER 5 UNITS: Performed by: STUDENT IN AN ORGANIZED HEALTH CARE EDUCATION/TRAINING PROGRAM

## 2024-12-18 RX ORDER — METOCLOPRAMIDE HYDROCHLORIDE 5 MG/ML
10 INJECTION INTRAMUSCULAR; INTRAVENOUS EVERY 6 HOURS
Status: DISCONTINUED | OUTPATIENT
Start: 2024-12-18 | End: 2024-12-19 | Stop reason: HOSPADM

## 2024-12-18 RX ADMIN — INSULIN GLARGINE 7 UNITS: 100 INJECTION, SOLUTION SUBCUTANEOUS at 21:10

## 2024-12-18 RX ADMIN — INSULIN LISPRO 1 UNITS: 100 INJECTION, SOLUTION INTRAVENOUS; SUBCUTANEOUS at 12:32

## 2024-12-18 RX ADMIN — PROCHLORPERAZINE EDISYLATE 5 MG: 5 INJECTION INTRAMUSCULAR; INTRAVENOUS at 21:15

## 2024-12-18 RX ADMIN — METOCLOPRAMIDE 10 MG: 5 INJECTION, SOLUTION INTRAMUSCULAR; INTRAVENOUS at 12:31

## 2024-12-18 RX ADMIN — HEPARIN SODIUM 5000 UNITS: 5000 INJECTION INTRAVENOUS; SUBCUTANEOUS at 08:48

## 2024-12-18 RX ADMIN — METOCLOPRAMIDE 10 MG: 5 INJECTION, SOLUTION INTRAMUSCULAR; INTRAVENOUS at 18:04

## 2024-12-18 RX ADMIN — INSULIN LISPRO 1 UNITS: 100 INJECTION, SOLUTION INTRAVENOUS; SUBCUTANEOUS at 18:04

## 2024-12-18 RX ADMIN — PROCHLORPERAZINE EDISYLATE 5 MG: 5 INJECTION INTRAMUSCULAR; INTRAVENOUS at 11:32

## 2024-12-18 RX ADMIN — Medication 10 ML: at 21:11

## 2024-12-18 RX ADMIN — SODIUM CHLORIDE 100 ML/HR: 9 INJECTION, SOLUTION INTRAVENOUS at 04:01

## 2024-12-18 RX ADMIN — INSULIN LISPRO 1 UNITS: 100 INJECTION, SOLUTION INTRAVENOUS; SUBCUTANEOUS at 21:11

## 2024-12-18 RX ADMIN — HEPARIN SODIUM 5000 UNITS: 5000 INJECTION INTRAVENOUS; SUBCUTANEOUS at 21:10

## 2024-12-18 NOTE — PROGRESS NOTES
[facility] Progress Note  [facility]. [place]        Patient Name: Viola Barlow  : 1955  MRN: [id number]   LOS: 0 days    Patient Care Team:  Jyoti Tomlinson APRN as PCP - General (Family Medicine)  Live Donnelly MD    Chief Complaint:    Chief Complaint   Patient presents with    Hyperglycemia     Primary Care Physician:  Jyoti Tomlinson APRN  Date of admission: 2024    Subjective     Interval History:   Follow-up Acute Kidney Injury on chronic kidney disease 3b and associated problems.   Patient still having nausea and dry heaves today.  She does feel little better than yesterday.  She says she has constipation.  Last bowel movement was 4 to 5 days ago.  She denies shortness of breath or chest pain.  She wants to go home.  Events noted from last 24 hours.  I reviewed the chart and other providers notes, labs and procedures done since my last note.    Review of Systems:   As noted above.    Objective     Vitals:   Temp:  [98.3 °F (36.8 °C)-99.3 °F (37.4 °C)] 98.3 °F (36.8 °C)  Heart Rate:  [85-92] 89  Resp:  [16-18] 18  BP: (141-196)/(71-88) 178/84    Intake/Output Summary (Last 24 hours) at 2024 1501  Last data filed at 2024 1212  Gross per 24 hour   Intake 1268.33 ml   Output --   Net 1268.33 ml       Physical Exam:    General Appearance: alert, oriented x 3, no acute distress   Skin: warm and dry  HEENT: oral mucosa normal, nonicteric sclera  Neck: supple, no JVD  Lungs: CTA  Heart: RRR, normal S1 and S2,+ murmur  Abdomen: obese, soft, nontender, non distended and positive bowel sounds.  : no palpable bladder  Extremities: Trace edema, legs are tender to palpation, no cyanosis or clubbing  Neuro: normal speech and mental status     Scheduled Meds:     Current Facility-Administered Medications   Medication Dose Route Frequency Provider Last Rate Last Admin    amLODIPine (NORVASC) tablet 5 mg  5 mg Oral Q24H Jerman Calzada,  MD, JUSTINO        aspirin EC tablet 81 mg  81 mg Oral Daily Gerlad Zhao MD   81 mg at 12/17/24 1032    Calcium Replacement - Follow Nurse / BPA Driven Protocol   Not Applicable PRN Kerley, Brian Joseph, DO        clopidogrel (PLAVIX) tablet 75 mg  75 mg Oral Daily Gerald Zhao MD   75 mg at 12/17/24 1032    dextrose (D50W) (25 g/50 mL) IV injection 10-50 mL  10-50 mL Intravenous Q15 Min PRN Kerley, Brian Joseph, DO        dextrose (GLUTOSE) oral gel 15 g  15 g Oral Q15 Min PRN Kerley, Brian Joseph, DO        Glucagon (GLUCAGEN) injection 1 mg  1 mg Intramuscular Q15 Min PRN Kerley, Brian Joseph, DO        heparin (porcine) 5000 UNIT/ML injection 5,000 Units  5,000 Units Subcutaneous Q12H Kerley, Brian Joseph, DO   5,000 Units at 12/18/24 0848    HYDROcodone-acetaminophen (NORCO) 5-325 MG per tablet 1 tablet  1 tablet Oral Q6H PRN Gerald Zhao MD        insulin glargine (LANTUS, SEMGLEE) injection 1-200 Units  1-200 Units Subcutaneous BID - Glucommander Kerley, Brian Joseph, DO   7 Units at 12/17/24 2028    insulin lispro (humaLOG) injection 1-200 Units  1-200 Units Subcutaneous 4x Daily With Meals & Nightly Kerley, Brian Joseph, DO   1 Units at 12/18/24 1232    insulin lispro (humaLOG) injection 1-200 Units  1-200 Units Subcutaneous PRN Kerley, Brian Joseph, DO        levothyroxine (SYNTHROID, LEVOTHROID) tablet 50 mcg  50 mcg Oral Daily Gerald Zhao MD   50 mcg at 12/17/24 1032    Magnesium Standard Dose Replacement - Follow Nurse / BPA Driven Protocol   Not Applicable PRN Kerley, Brian Joseph, DO        metoclopramide (REGLAN) injection 10 mg  10 mg Intravenous Q6H Gerald Zhao MD   10 mg at 12/18/24 1231    metoprolol succinate XL (TOPROL-XL) 24 hr tablet 50 mg  50 mg Oral Daily Gerald Zhao MD   50 mg at 12/17/24 1031    nitroglycerin (NITROSTAT) SL tablet 0.4 mg  0.4 mg Sublingual Q5 Min PRN Kerley, Brian Joseph,         ondansetron ODT (ZOFRAN-ODT) disintegrating tablet 4 mg  4 mg Translingual Q6H PRN  Gerald Zhao MD   4 mg at 12/17/24 0947    pantoprazole (PROTONIX) EC tablet 40 mg  40 mg Oral Daily Gerald Zhao MD   40 mg at 12/17/24 1031    Phosphorus Replacement - Follow Nurse / BPA Driven Protocol   Not Applicable PRN Kerley, Brian Joseph,         prochlorperazine (COMPAZINE) injection 5 mg  5 mg Intravenous Q6H PRN William Velázquez DO   5 mg at 12/18/24 1132    Or    prochlorperazine (COMPAZINE) tablet 5 mg  5 mg Oral Q6H PRN William Velázquez DO   5 mg at 12/17/24 0626    Or    prochlorperazine (COMPAZINE) suppository 25 mg  25 mg Rectal Q12H PRN William Velázquez DO        promethazine (PHENERGAN) 12.5 mg in sodium chloride 0.9 % 50 mL  12.5 mg Intravenous Q6H PRN Gerald Zhao MD   Stopped at 12/17/24 1645    sertraline (ZOLOFT) tablet 50 mg  50 mg Oral Daily Gerald Zhao MD   50 mg at 12/17/24 1032    sodium chloride 0.9 % flush 10 mL  10 mL Intravenous Q12H Kerley, Brian Joseph,    10 mL at 12/17/24 0805    sodium chloride 0.9 % flush 10 mL  10 mL Intravenous PRN Kerley, Brian Joseph,    10 mL at 12/17/24 0841    sodium chloride 0.9 % infusion 40 mL  40 mL Intravenous PRN Kerley, Brian Joseph, DO        sodium chloride 0.9 % infusion  100 mL/hr Intravenous Continuous Kerley, Brian Joseph,  mL/hr at 12/18/24 1438 100 mL/hr at 12/18/24 1438       amLODIPine, 5 mg, Oral, Q24H  aspirin, 81 mg, Oral, Daily  clopidogrel, 75 mg, Oral, Daily  heparin (porcine), 5,000 Units, Subcutaneous, Q12H  insulin glargine, 1-200 Units, Subcutaneous, BID - Glucommander  insulin lispro, 1-200 Units, Subcutaneous, 4x Daily With Meals & Nightly  levothyroxine, 50 mcg, Oral, Daily  metoclopramide, 10 mg, Intravenous, Q6H  metoprolol succinate XL, 50 mg, Oral, Daily  pantoprazole, 40 mg, Oral, Daily  sertraline, 50 mg, Oral, Daily  sodium chloride, 10 mL, Intravenous, Q12H        IV Meds:   sodium chloride, 100 mL/hr, Last Rate: 100 mL/hr (12/18/24 9029)        Results Reviewed:   I have personally reviewed the  "results from the time of this admission to 12/18/2024 15:01 EST     Results from last 7 days   Lab Units 12/18/24  0539 12/17/24  0533 12/16/24  1618 12/16/24  1223   SODIUM mmol/L 143 143 142 142   POTASSIUM mmol/L 3.8 4.3 4.6 4.3   CHLORIDE mmol/L 107 106 101 93*   CO2 mmol/L 22.0 21.6* 22.6 19.3*   BUN mg/dL 37* 50* 56* 54*   CREATININE mg/dL 1.46* 2.05* 2.74* 3.19*   CALCIUM mg/dL 9.7 9.2 9.8 10.9*   BILIRUBIN mg/dL 0.8  --   --  0.7   ALK PHOS U/L 131*  --   --  148*   ALT (SGPT) U/L 10  --   --  16   AST (SGOT) U/L 19  --   --  22   GLUCOSE mg/dL 145* 218* 333* 363*       Estimated Creatinine Clearance: 27.7 mL/min (A) (by C-G formula based on SCr of 1.46 mg/dL (H)).                Results from last 7 days   Lab Units 12/18/24  0539 12/17/24  0533 12/16/24  1223   WBC 10*3/mm3 14.36* 17.08* 19.28*   HEMOGLOBIN g/dL 16.0* 14.5 16.6*   PLATELETS 10*3/mm3 247 261 373             Brief Urine Lab Results       None            No results found for: \"UTPCR\"    Imaging Results (Last 24 Hours)       ** No results found for the last 24 hours. **                Assessment / Plan     ASSESSMENT:    Acute kidney injury superimposed on chronic kidney disease    Type 2 diabetes mellitus with nephropathy    Essential hypertension    Chronic kidney disease, stage III (moderate)    JOSE ALBERTO on CKD 3B: Patient's baseline GFR is 37-43.  Patient came in with GFR 15.4, currently back back to baseline at 39.3.  Suspect dehydration due to nausea with vomiting, not eating or drinking x 5 days.  Electrolytes and bicarb appears to be stable.  Nausea and vomiting: Most likely has diabetic gastroparesis, will benefit from Reglan.  Hyperglycemia with type 2 diabetes: Last hemoglobin A1c 8.4% on 12/16/2024.  Hypertension: BP previously was 174/92.  Currently 141/71.  Patient dehydrated.  Normally in clinic she runs about 110 systolic and says she runs the same at home.  Continue watch closely if blood pressure stays high we will restart her home " dose of amlodipine.      PLAN:  Patient currently on normal saline 100 mL/h.  We will go ahead and stop it after the current bag is done.  Renal function is almost getting close to baseline.  I will go ahead and start her on Reglan 5 mg p.o. 3 times daily.  Appears to be helping to some extent.  Blood pressure appears to be still running on the high side, amlodipine 5 mg have been started we will see if that will help with the blood pressure, she is not getting any more fluids that likely will help as well.  Details were discussed with the patient no family in the room.    Details were also discussed with the hospitalist service and or other providers as needed.   Continue with rest of the current treatment plan, and monitor with surveillance labs.  Further recommendations will depend on clinical course of the patient during the current hospitalization.   I have reviewed the copied text to this note, it was edited and the changes made as needed.  It is accurate to the point, when the note was signed today.     Thank you for involving us in the care of Viola Barlow.  Please feel free to call with any questions.    Jerman Calzada MD, FASN  12/18/24  15:01 [facility]    [facility]  [phone]      Part of this note may be an electronic transcription/translation of spoken language to printed text using the Dragon Dictation System.

## 2024-12-18 NOTE — PLAN OF CARE
Goal Outcome Evaluation:  Plan of Care Reviewed With: patient        Progress: no change  Outcome Evaluation: Patient continues to have nausea this shift. No vomiting at this time. IV Reglan started. Will continue to monitor.

## 2024-12-18 NOTE — CONSULTS
"Diabetes Education    Patient Name:  Viola Barlow  YOB: 1955  MRN: [id number]  Admit Date:  12/16/2024        DM education attempted related to A1c greater than 7%.  A1c is 8.4%.  Pt. Declined education, but was agreeable to written information being left.  Pt. Was left with \"Diabetes Basics\", BG logs and information on the Healthy Plate Method.      Electronically signed by:  Opal Antonio RN  12/18/24 12:53 EST  "

## 2024-12-18 NOTE — PROGRESS NOTES
[facility]    Name:  Viola Barlow   Age:  69 y.o.  Sex:  female  :  1955  MRN:  [id number]   Visit Number:  [id number]  Admission Date:  2024  Date Of Service:  24  Primary Care Physician:  Jyoti Tomlinson APRN     LOS: 0 days :    Chief Complaint:      Nausea and vomiting.    Subjective:    Viola Barlow patient was seen and examined this morning.  She continues to have nausea and is unable to take any p.o. medications and diet.  Her renal function however has improved with IV hydration.  Denies any fevers, chest pain or shortness of breath.    Hospital Course:    Viola Barlow is a 69-year-old woman with past medical history of type 2 diabetes, hypertension, CKD, nephrolithiasis, diverticulosis, DARELL, hyperlipidemia, peripheral vascular disease, migraines, arthritis.  Presented to [facility] ED on 2024 with concern nausea and nonbloody vomiting for 4 days, hyperglycemia in the 400s.  She has been taking insulin as prescribed, no missed doses.  Denied fevers or chills, chest pain, shortness of air, back pain, leg pain or swelling.       ED summary: Afebrile, tachycardic, tachypneic, hypertensive which improved, other vital signs stable room air.  ABG normal.  Chloride 93, CO2 19, anion gap 29, BUN 54, creatinine 3.19, EGFR 15, blood glucose 363, calcium 10, alk phos 148, total protein 9.4.  Lipase 31.  Leukocytosis 19.  Hemoglobin 16.  CT ab/pelvis bilateral nephrolithiasis without hydronephrosis, chronic diverticulosis.  She was provided Benadryl, Reglan, Zofran, 1 L NS bolus.    Patient was continued on IV fluids and antiemetics.  She was also continued on Protonix.  Renal function improved with IV hydration.  Patient has been a long-term diabetic and likely has underlying diabetic gastroparesis which could be contributing to her nausea and vomiting symptoms.  She was initiated on Reglan therapy.    Review of Systems:     All  Date of Discharge:  2018



This is a 25-year-old female,  4, para 3. 





DICTATION ENDS HERE.





ASHLEY/KEI

DD:  2018 07:13:21Voice ID:  [id number]

DT:  2018 01:19:03Report ID:  [id number] systems were reviewed and negative except as mentioned in subjective, assessment and plan.    Vital Signs:    Temp:  [98.3 °F (36.8 °C)-99.3 °F (37.4 °C)] 98.3 °F (36.8 °C)  Heart Rate:  [85-92] 89  Resp:  [16-18] 18  BP: (141-196)/(71-88) 178/84    Intake and output:    I/O last 3 completed shifts:  In: 2911.7 [I.V.:2861.7; IV Piggyback:50]  Out: 1100 [Urine:1100]  I/O this shift:  In: 240 [P.O.:240]  Out: -     Physical Examination:    General Appearance:  Alert and cooperative.    Head:  Atraumatic and normocephalic.   Eyes: Conjunctivae and sclerae normal, no icterus. No pallor.   Throat: No oral lesions, no thrush, oral mucosa moist.   Neck: Supple, trachea midline, no thyromegaly.   Lungs:   Breath sounds heard bilaterally equally.  No wheezing or crackles. No Pleural rub or bronchial breathing.   Heart:  Normal S1 and S2, no murmur, no gallop, no rub. No JVD.   Abdomen:   Normal bowel sounds, no masses, no organomegaly. Soft, epigastric tenderness noted, nondistended, no rebound tenderness.   Extremities: Supple, no edema, no cyanosis, no clubbing.   Skin: No bleeding or rash.   Neurologic: Alert and oriented x 3. No facial asymmetry. Moves all four limbs. No tremors.    Laboratory results:    Results from last 7 days   Lab Units 12/18/24  0539 12/17/24  0533 12/16/24  1618 12/16/24  1223   SODIUM mmol/L 143 143 142 142   POTASSIUM mmol/L 3.8 4.3 4.6 4.3   CHLORIDE mmol/L 107 106 101 93*   CO2 mmol/L 22.0 21.6* 22.6 19.3*   BUN mg/dL 37* 50* 56* 54*   CREATININE mg/dL 1.46* 2.05* 2.74* 3.19*   CALCIUM mg/dL 9.7 9.2 9.8 10.9*   BILIRUBIN mg/dL 0.8  --   --  0.7   ALK PHOS U/L 131*  --   --  148*   ALT (SGPT) U/L 10  --   --  16   AST (SGOT) U/L 19  --   --  22   GLUCOSE mg/dL 145* 218* 333* 363*     Results from last 7 days   Lab Units 12/18/24  0539 12/17/24  0533 12/16/24  1223   WBC 10*3/mm3 14.36* 17.08* 19.28*   HEMOGLOBIN g/dL 16.0* 14.5 16.6*   HEMATOCRIT % 49.2* 46.8* 50.6*   PLATELETS 10*3/mm3 247 261  373       I have reviewed the patient's laboratory results.    Radiology results:    No radiology results from the last 24 hrs    Medication Review:     I have reviewed the patient's active and prn medications.     Problem List:      Acute kidney injury superimposed on chronic kidney disease    Type 2 diabetes mellitus with nephropathy    Essential hypertension    Chronic kidney disease, stage III (moderate)    Assessment:    Acute gastroenteritis with suspected diabetic gastroparesis, POA.  Dehydration, POA.  Acute renal failure secondary to #2, POA.  Chronic kidney disease stage III.  Diabetes mellitus type 2 with nephropathy.  Essential hypertension.  Peripheral vascular disease.  Acquired hypothyroidism.    Plan:    Acute gastroenteritis.  - Patient does have history of GERD and does currently have epigastric tenderness.  - She may have underlying diabetic gastroparesis and we will place her on Reglan.  - Continue Protonix, Zofran, Phenergan.    Acute renal failure.  - Secondary to dehydration but has improved with IV hydration.  - She is being followed by Dr. Calzada and I have discussed the patient's treatment plan with him.    Diabetes mellitus type 2.  - Continue subcutaneous insulin protocol for Glucomander.  - Hemoglobin A1c 8.4 on 12/16/2024.    Discussed with nursing staff at the bedside.    I have reviewed the copied text and it is accurate as of 12/18/24    DVT Prophylaxis: Heparin  Code Status: Full  Diet: Cardiac diabetic  Discharge Plan: Hopefully, home in 1 to 2 days.    Gerald Zhao MD  12/18/24  16:12 EST    Dictated utilizing Dragon dictation.

## 2024-12-18 NOTE — THERAPY EVALUATION
"Patient Name: Viola Barlow  : 1955    MRN: [id number]                              Today's Date: 2024       Admit Date: 2024    Visit Dx:     ICD-10-CM ICD-9-CM   1. Acute kidney injury  N17.9 584.9   2. Hyperglycemia  R73.9 790.29   3. Dehydration  E86.0 276.51     Patient Active Problem List   Diagnosis    Age-related nuclear cataract of right eye    Acute respiratory failure with hypoxia and hypercapnia    Type 2 diabetes mellitus with nephropathy    Obstructive sleep apnea    Essential hypertension    Chronic kidney disease, stage III (moderate)    Hyperkalemia    Acute kidney injury superimposed on chronic kidney disease     Past Medical History:   Diagnosis Date    Arthritis     Cataract     Depression     Diabetes mellitus     Dysphagia     Patient reported that intermittently food feels like it gets lodged     Elevated cholesterol     Full dentures     Instructed no adhesives the DOS    GERD (gastroesophageal reflux disease)     Headache     History of nuclear stress test     Patient reported this was done with Dr. Douglas around  and that she was started on Metoprolol after testing.     Hypertension     Impaired mobility     Kidney disease     Patient reported \"I have chronic kidney disease and they say my kidney function is at 33%\" and that she has routine care with Dr. Calzada     Migraines     PVD (peripheral vascular disease)     Seasonal allergies     Spinal headache     Wears glasses      Past Surgical History:   Procedure Laterality Date    ABDOMINAL SURGERY  1984    Patient reported after complete hysterectomy, she had another procedure to remove tumors in the abdominal area    APPENDECTOMY      Reported removed when hysterectomy was done    CATARACT EXTRACTION W/ INTRAOCULAR LENS IMPLANT Right 2019    Procedure: CATARACT PHACO EXTRACTION WITH INTRAOCULAR LENS IMPLANT RIGHT;  Surgeon: Tee Enrique MD;  Location: [facility];  Service: Ophthalmology    " CATARACT EXTRACTION W/ INTRAOCULAR LENS IMPLANT Left 7/5/2019    Procedure: CATARACT PHACO EXTRACTION WITH INTRAOCULAR LENS IMPLANT LEFT;  Surgeon: Tee Enrique MD;  Location: [facility];  Service: Ophthalmology    COLONOSCOPY      ELBOW EPICONDYLECTOMY Left 09/1990    ENDOSCOPY      HYSTERECTOMY  1983    Partial    HYSTERECTOMY  1984    Complete     MOUTH SURGERY      Full mouth extraction    VASCULAR SURGERY Bilateral     For PAD - Reported the right leg was done first around 2014 and the left was done around 2015      General Information       Row Name 12/18/24 1538          OT Time and Intention    Subjective Information no complaints  -SD     Document Type evaluation  -SD     Mode of Treatment occupational therapy  -SD     Patient Effort good  -SD     Symptoms Noted During/After Treatment fatigue  -SD       Row Name 12/18/24 1538          General Information    Patient Profile Reviewed yes  -SD     Prior Level of Function independent:;all household mobility;ADL's  -SD     Existing Precautions/Restrictions fall  -SD     Barriers to Rehab medically complex  -SD       Row Name 12/18/24 1538          Living Environment    People in Home alone  -SD       Row Name 12/18/24 1538          Home Main Entrance    Number of Stairs, Main Entrance none  -SD       Row Name 12/18/24 1538          Stairs Within Home, Primary    Number of Stairs, Within Home, Primary none  -SD       Row Name 12/18/24 1538          Cognition    Orientation Status (Cognition) oriented x 4  -SD       Row Name 12/18/24 1538          Safety Issues/Impairments Affecting Functional Mobility    Safety Issues Affecting Function (Mobility) safety precautions follow-through/compliance  -SD     Impairments Affecting Function (Mobility) balance;endurance/activity tolerance;strength  -SD               User Key  (r) = Recorded By, (t) = Taken By, (c) = Cosigned By      Initials Name Provider Type    Merly Brush OT Occupational Therapist                      Mobility/ADL's       Row Name 12/18/24 1539          Bed Mobility    Bed Mobility supine-sit  -SD     Supine-Sit Wallace (Bed Mobility) standby assist  -SD     Assistive Device (Bed Mobility) bed rails;head of bed elevated  -SD       Row Name 12/18/24 1539          Transfers    Transfers sit-stand transfer  -SD       Row Name 12/18/24 1539          Sit-Stand Transfer    Sit-Stand Wallace (Transfers) contact guard  -SD     Assistive Device (Sit-Stand Transfers) walker, front-wheeled  -SD       Row Name 12/18/24 1539          Functional Mobility    Functional Mobility- Ind. Level contact guard assist  -SD     Functional Mobility- Device walker, front-wheeled  -SD     Functional Mobility-Distance (Feet) 70  -SD     Functional Mobility- Safety Issues balance decreased during turns  -SD       Row Name 12/18/24 1539          Activities of Daily Living    BADL Assessment/Intervention bathing;upper body dressing;lower body dressing;grooming;feeding;toileting  -SD       Row Name 12/18/24 1539          Bathing Assessment/Intervention    Wallace Level (Bathing) minimum assist (75% patient effort)  -SD       Row Name 12/18/24 1539          Upper Body Dressing Assessment/Training    Wallace Level (Upper Body Dressing) set up  -SD       [facility] Name 12/18/24 1539          Lower Body Dressing Assessment/Training    Wallace Level (Lower Body Dressing) contact guard assist  -SD       Row Name 12/18/24 1539          Grooming Assessment/Training    Wallace Level (Grooming) set up  -SD       Row Name 12/18/24 1539          Self-Feeding Assessment/Training    Wallace Level (Feeding) set up  -SD       Row Name 12/18/24 1539          Toileting Assessment/Training    Wallace Level (Toileting) minimum assist (75% patient effort)  -SD               User Key  (r) = Recorded By, (t) = Taken By, (c) = Cosigned By      Initials Name Provider Type    Merly Brush OT Occupational Therapist                    Obj/Interventions       Row Name 12/18/24 1540          Range of Motion Comprehensive    General Range of Motion bilateral upper extremity ROM WFL  -SD       [facility] Name 12/18/24 1540          Strength Comprehensive (MMT)    General Manual Muscle Testing (MMT) Assessment upper extremity strength deficits identified  -SD               User Key  (r) = Recorded By, (t) = Taken By, (c) = Cosigned By      Initials Name Provider Type    SD Smiley, Merly, OT Occupational Therapist                   Goals/Plan       Row Name 12/18/24 1543          Transfer Goal 1 (OT)    Activity/Assistive Device (Transfer Goal 1, OT) sit-to-stand/stand-to-sit;walker, rolling  -SD     Falls Level/Cues Needed (Transfer Goal 1, OT) modified independence  -SD     Time Frame (Transfer Goal 1, OT) long term goal (LTG)  -SD     Progress/Outcome (Transfer Goal 1, OT) goal ongoing  -SD       Row Name 12/18/24 1543          Bathing Goal 1 (OT)    Activity/Device (Bathing Goal 1, OT) bathing skills, all  -SD     Falls Level/Cues Needed (Bathing Goal 1, OT) standby assist  -SD     Time Frame (Bathing Goal 1, OT) 2 weeks  -SD     Progress/Outcomes (Bathing Goal 1, OT) goal ongoing  -SD       Row Name 12/18/24 1543          Dressing Goal 1 (OT)    Activity/Device (Dressing Goal 1, OT) dressing skills, all  -SD     Falls/Cues Needed (Dressing Goal 1, OT) standby assist  -SD     Time Frame (Dressing Goal 1, OT) 2 weeks  -SD     Progress/Outcome (Dressing Goal 1, OT) goal ongoing  -SD       Row Name 12/18/24 1543          Toileting Goal 1 (OT)    Activity/Device (Toileting Goal 1, OT) toileting skills, all;commode  -SD     Falls Level/Cues Needed (Toileting Goal 1, OT) standby assist  -SD     Time Frame (Toileting Goal 1, OT) 2 weeks  -SD     Progress/Outcome (Toileting Goal 1, OT) goal ongoing  -SD       Row Name 12/18/24 1543          Strength Goal 1 (OT)    Strength Goal 1 (OT) Patient to perform UB ther ex  as tolerated  -SD     Time Frame (Strength Goal 1, OT) long term goal (LTG)  -SD     Progress/Outcome (Strength Goal 1, OT) goal ongoing  -SD       Row Name 12/18/24 0097          Therapy Assessment/Plan (OT)    Planned Therapy Interventions (OT) activity tolerance training;adaptive equipment training;BADL retraining;patient/caregiver education/training;ROM/therapeutic exercise;strengthening exercise;transfer/mobility retraining  -SD               User Key  (r) = Recorded By, (t) = Taken By, (c) = Cosigned By      Initials Name Provider Type    Merly Brush OT Occupational Therapist                   Clinical Impression       Row Name 12/18/24 1547          Pain Assessment    Pretreatment Pain Rating 7/10  -SD     Posttreatment Pain Rating 7/10  -SD     Pain Location head  -SD     Pain Management Interventions exercise or physical activity utilized  -SD     Response to Pain Interventions no change per patient report  -SD       Row Name 12/18/24 9935          Plan of Care Review    Plan of Care Reviewed With patient  -SD     Progress no change  -SD     Outcome Evaluation OT eval completed. Patient is supine in bed, Ox4, reports 7/10 headache. Patient lives alone, has a rollator, rolling walker, and shower chair. Patient is normally ind with all mobility, ADLs. Patient reports increased falls. Patient's daughter or friend supervision bathing. Patient drives and is ind with household tasks. Patient performed supine to sit with SBA, STS CGA and walked 70' using RW. Patient requires CGA-Min A for ADLs d/t generalized weakness. Patient is expected to benefit from continued OT services prior to DC to address ADL decline. Would benefit from HH.  -SD       Row Name 12/18/24 5142          Therapy Assessment/Plan (OT)    Patient/Family Therapy Goal Statement (OT) home  -SD     Rehab Potential (OT) good  -SD     Criteria for Skilled Therapeutic Interventions Met (OT) skilled treatment is necessary  -SD     Therapy  Frequency (OT) 5 times/wk  Mon-Fri  -SD       Row Name 12/18/24 1540          Therapy Plan Review/Discharge Plan (OT)    Anticipated Discharge Disposition (OT) home with assist;home with home health  -SD       Row Name 12/18/24 1540          Vital Signs    O2 Delivery Pre Treatment room air  -SD     O2 Delivery Intra Treatment room air  -SD     O2 Delivery Post Treatment room air  -SD       Row Name 12/18/24 1540          Positioning and Restraints    Pre-Treatment Position in bed  -SD     Post Treatment Position chair  -SD     In Chair reclined;call light within reach;encouraged to call for assist;exit alarm on  -SD               User Key  (r) = Recorded By, (t) = Taken By, (c) = Cosigned By      Initials Name Provider Type    Merly Brush OT Occupational Therapist                   Outcome Measures       Row Name 12/18/24 1543          How much help from another is currently needed...    Putting on and taking off regular lower body clothing? 3  -SD     Bathing (including washing, rinsing, and drying) 3  -SD     Toileting (which includes using toilet bed pan or urinal) 3  -SD     Putting on and taking off regular upper body clothing 3  -SD     Taking care of personal grooming (such as brushing teeth) 3  -SD       Row Name 12/18/24 1535 12/18/24 0845       How much help from another person do you currently need...    Turning from your back to your side while in flat bed without using bedrails? 4  -HW 4  -HS    Moving from lying on back to sitting on the side of a flat bed without bedrails? 4  -HW 4  -HS    Moving to and from a bed to a chair (including a wheelchair)? 3  -HW 4  -HS    Standing up from a chair using your arms (e.g., wheelchair, bedside chair)? 3  -HW 3  -HS    Climbing 3-5 steps with a railing? 3  -HW 2  -HS    To walk in hospital room? 3  -HW 3  -HS    AM-PAC 6 Clicks Score (PT) 20  -HW 20  -HS    Highest Level of Mobility Goal 6 --> Walk 10 steps or more  -HW 6 --> Walk 10 steps or more  -HS       Row Name 12/18/24 1543 12/18/24 1535       Functional Assessment    Outcome Measure Options AM-PAC 6 Clicks Daily Activity (OT)  -SD AM-PAC 6 Clicks Basic Mobility (PT)  -              User Key  (r) = Recorded By, (t) = Taken By, (c) = Cosigned By      Initials Name Provider Type    SD Merly Reis OT Occupational Therapist    HS Lena Eldridge, RN Registered Nurse    HW Alayna Hummel, PT Physical Therapist                    Occupational Therapy Education       Title: PT OT SLP Therapies (In Progress)       Topic: Occupational Therapy (In Progress)       Point: ADL training (Done)       Description:   Instruct learner(s) on proper safety adaptation and remediation techniques during self care or transfers.   Instruct in proper use of assistive devices.                  Learning Progress Summary            Patient Acceptance, E,TB, VU by SD at 12/18/2024 1544    Comment: OT POC                      Point: Home exercise program (Not Started)       Description:   Instruct learner(s) on appropriate technique for monitoring, assisting and/or progressing therapeutic exercises/activities.                  Learner Progress:  Not documented in this visit.              Point: Precautions (Not Started)       Description:   Instruct learner(s) on prescribed precautions during self-care and functional transfers.                  Learner Progress:  Not documented in this visit.              Point: Body mechanics (Not Started)       Description:   Instruct learner(s) on proper positioning and spine alignment during self-care, functional mobility activities and/or exercises.                  Learner Progress:  Not documented in this visit.                              User Key       Initials Effective Dates Name Provider Type Discipline    SD 06/16/21 -  Merly Reis OT Occupational Therapist OT                  OT Recommendation and Plan  Planned Therapy Interventions (OT): activity tolerance training, adaptive  equipment training, BADL retraining, patient/caregiver education/training, ROM/therapeutic exercise, strengthening exercise, transfer/mobility retraining  Therapy Frequency (OT): 5 times/wk (Mon-Fri)  Plan of Care Review  Plan of Care Reviewed With: patient  Progress: no change  Outcome Evaluation: OT eval completed. Patient is supine in bed, Ox4, reports 7/10 headache. Patient lives alone, has a rollator, rolling walker, and shower chair. Patient is normally ind with all mobility, ADLs. Patient reports increased falls. Patient's daughter or friend supervision bathing. Patient drives and is ind with household tasks. Patient performed supine to sit with SBA, STS CGA and walked 70' using RW. Patient requires CGA-Min A for ADLs d/t generalized weakness. Patient is expected to benefit from continued OT services prior to DC to address ADL decline. Would benefit from HH.     Time Calculation:   Evaluation Complexity (OT)  Review Occupational Profile/Medical/Therapy History Complexity: brief/low complexity  Assessment, Occupational Performance/Identification of Deficit Complexity: 1-3 performance deficits  Clinical Decision Making Complexity (OT): problem focused assessment/low complexity  Overall Complexity of Evaluation (OT): low complexity     Time Calculation- OT       Row Name 12/18/24 1544             Time Calculation- OT    OT Start Time 1330  -SD      OT Received On 12/18/24  -SD      OT Goal Re-Cert Due Date 12/30/24  -SD         Untimed Charges    OT Eval/Re-eval Minutes 45  -SD         Total Minutes    Untimed Charges Total Minutes 45  -SD       Total Minutes 45  -SD                User Key  (r) = Recorded By, (t) = Taken By, (c) = Cosigned By      Initials Name Provider Type    Merly Brush OT Occupational Therapist                  Therapy Charges for Today       Code Description Service Date Service Provider Modifiers Qty    [id number]  OT EVAL LOW COMPLEXITY 3 12/18/2024 Merly Reis OT GO 1                  Merly Reis, OT  12/18/2024

## 2024-12-18 NOTE — PLAN OF CARE
Goal Outcome Evaluation:  Plan of Care Reviewed With: patient        Progress: no change  Outcome Evaluation: OT eval completed. Patient is supine in bed, Ox4, reports 7/10 headache. Patient lives alone, has a rollator, rolling walker, and shower chair. Patient is normally ind with all mobility, ADLs. Patient reports increased falls. Patient's daughter or friend supervision bathing. Patient drives and is ind with household tasks. Patient performed supine to sit with SBA, STS CGA and walked 70' using RW. Patient requires CGA-Min A for ADLs d/t generalized weakness. Patient is expected to benefit from continued OT services prior to DC to address ADL decline. Would benefit from HH.    Anticipated Discharge Disposition (OT): home with assist, home with home health

## 2024-12-18 NOTE — CASE MANAGEMENT/SOCIAL WORK
Case Management/Social Work    Patient Name:  Viola Barlow  YOB: 1955  MRN: [id number]  Admit Date:  12/16/2024        10:08 EST  Met with patient at bedside. Patient plans to return home once medically ready. CM will continue to follow.      Electronically signed by:  Brayden Miller RN  12/18/24 10:08 EST

## 2024-12-18 NOTE — THERAPY EVALUATION
"Patient Name: Viola Barlow  : 1955    MRN: [id number]                              Today's Date: 2024       Admit Date: 2024    Visit Dx:     ICD-10-CM ICD-9-CM   1. Acute kidney injury  N17.9 584.9   2. Hyperglycemia  R73.9 790.29   3. Dehydration  E86.0 276.51     Patient Active Problem List   Diagnosis    Age-related nuclear cataract of right eye    Acute respiratory failure with hypoxia and hypercapnia    Type 2 diabetes mellitus with nephropathy    Obstructive sleep apnea    Essential hypertension    Chronic kidney disease, stage III (moderate)    Hyperkalemia    Acute kidney injury superimposed on chronic kidney disease     Past Medical History:   Diagnosis Date    Arthritis     Cataract     Depression     Diabetes mellitus     Dysphagia     Patient reported that intermittently food feels like it gets lodged     Elevated cholesterol     Full dentures     Instructed no adhesives the DOS    GERD (gastroesophageal reflux disease)     Headache     History of nuclear stress test     Patient reported this was done with Dr. Douglas around  and that she was started on Metoprolol after testing.     Hypertension     Impaired mobility     Kidney disease     Patient reported \"I have chronic kidney disease and they say my kidney function is at 33%\" and that she has routine care with Dr. Calzada     Migraines     PVD (peripheral vascular disease)     Seasonal allergies     Spinal headache     Wears glasses      Past Surgical History:   Procedure Laterality Date    ABDOMINAL SURGERY  1984    Patient reported after complete hysterectomy, she had another procedure to remove tumors in the abdominal area    APPENDECTOMY      Reported removed when hysterectomy was done    CATARACT EXTRACTION W/ INTRAOCULAR LENS IMPLANT Right 2019    Procedure: CATARACT PHACO EXTRACTION WITH INTRAOCULAR LENS IMPLANT RIGHT;  Surgeon: Tee Enrique MD;  Location: [facility];  Service: Ophthalmology    " CATARACT EXTRACTION W/ INTRAOCULAR LENS IMPLANT Left 7/5/2019    Procedure: CATARACT PHACO EXTRACTION WITH INTRAOCULAR LENS IMPLANT LEFT;  Surgeon: Tee Enrique MD;  Location: [facility];  Service: Ophthalmology    COLONOSCOPY      ELBOW EPICONDYLECTOMY Left 09/1990    ENDOSCOPY      HYSTERECTOMY  1983    Partial    HYSTERECTOMY  1984    Complete     MOUTH SURGERY      Full mouth extraction    VASCULAR SURGERY Bilateral     For PAD - Reported the right leg was done first around 2014 and the left was done around 2015      General Information       Row Name 12/18/24 1508          Physical Therapy Time and Intention    Document Type evaluation  -     Mode of Treatment physical therapy  -       Row Name 12/18/24 1508          General Information    Patient Profile Reviewed yes  -     Prior Level of Function independent:;all household mobility;min assist:;community mobility  -     Existing Precautions/Restrictions fall  -     Barriers to Rehab medically complex  -       Row Name 12/18/24 1508          Living Environment    People in Home alone  -       Row Name 12/18/24 1508          Home Main Entrance    Number of Stairs, Main Entrance none  -       Row Name 12/18/24 1508          Stairs Within Home, Primary    Number of Stairs, Within Home, Primary none  -       Row Name 12/18/24 1508          Cognition    Orientation Status (Cognition) oriented x 4  -       Row Name 12/18/24 1508          Safety Issues/Impairments Affecting Functional Mobility    Safety Issues Affecting Function (Mobility) insight into deficits/self-awareness;safety precaution awareness;safety precautions follow-through/compliance;awareness of need for assistance  -     Impairments Affecting Function (Mobility) balance;postural/trunk control;strength;endurance/activity tolerance  -               User Key  (r) = Recorded By, (t) = Taken By, (c) = Cosigned By      Initials Name Provider Type     Alayna Hummel PT Physical  Therapist                   Mobility       Row Name 12/18/24 1508          Bed Mobility    Bed Mobility supine-sit  -     Supine-Sit [place] (Bed Mobility) standby assist;verbal cues  -     Assistive Device (Bed Mobility) head of bed elevated  -       Row Name 12/18/24 1508          Sit-Stand Transfer    Assistive Device (Sit-Stand Transfers) walker, front-wheeled  -       Row Name 12/18/24 1508          Gait/Stairs (Locomotion)    [place] Level (Gait) contact guard  -     Assistive Device (Gait) walker, front-wheeled  -     Patient was able to Ambulate yes  -     Distance in Feet (Gait) 75  -     Deviations/Abnormal Patterns (Gait) bilateral deviations;estefany decreased;festinating/shuffling;base of support, narrow;gait speed decreased;stride length decreased  -     Bilateral Gait Deviations forward flexed posture;heel strike decreased  -     [place] Level (Stairs) not tested  -               User Key  (r) = Recorded By, (t) = Taken By, (c) = Cosigned By      Initials Name Provider Type     Alayna Hummel PT Physical Therapist                   Obj/Interventions       Row Name 12/18/24 1509          Range of Motion Comprehensive    General Range of Motion bilateral lower extremity ROM WFL  -       Row Name 12/18/24 1509          Strength Comprehensive (MMT)    General Manual Muscle Testing (MMT) Assessment lower extremity strength deficits identified  -     Comment, General Manual Muscle Testing (MMT) Assessment BLE MMT grossly 4-/5  -       Row Name 12/18/24 1509          Balance    Balance Assessment sitting static balance;sitting dynamic balance;standing static balance;standing dynamic balance  -     Static Sitting Balance standby assist  -     Dynamic Sitting Balance standby assist  -     Position, Sitting Balance unsupported;sitting edge of bed  -     Static Standing Balance contact guard  -     Dynamic Standing Balance contact guard  -      Position/Device Used, Standing Balance supported;walker, front-wheeled  -       Row Name 12/18/24 1505          Sensory Assessment (Somatosensory)    Sensory Assessment (Somatosensory) LE sensation intact  -               User Key  (r) = Recorded By, (t) = Taken By, (c) = Cosigned By      Initials Name Provider Type    HW Alayna Hummel, PT Physical Therapist                   Goals/Plan       Row Name 12/18/24 1532          Bed Mobility Goal 1 (PT)    Activity/Assistive Device (Bed Mobility Goal 1, PT) bed mobility activities, all  -HW     [place] Level/Cues Needed (Bed Mobility Goal 1, PT) modified independence  -HW     Time Frame (Bed Mobility Goal 1, PT) long term goal (LTG);10 days  -HW     Progress/Outcomes (Bed Mobility Goal 1, PT) new goal  -       Row Name 12/18/24 1532          Transfer Goal 1 (PT)    Activity/Assistive Device (Transfer Goal 1, PT) transfers, all  -HW     [place] Level/Cues Needed (Transfer Goal 1, PT) modified independence  -HW     Time Frame (Transfer Goal 1, PT) long term goal (LTG);10 days  -HW     Progress/Outcome (Transfer Goal 1, PT) new goal  -       Row Name 12/18/24 1532          Gait Training Goal 1 (PT)    Activity/Assistive Device (Gait Training Goal 1, PT) gait (walking locomotion)  -HW     [place] Level (Gait Training Goal 1, PT) modified independence  -HW     Distance (Gait Training Goal 1, PT) 200  -HW     Time Frame (Gait Training Goal 1, PT) long term goal (LTG);10 days  -HW     Progress/Outcome (Gait Training Goal 1, PT) new goal  -       Row Name 12/18/24 2662          Patient Education Goal (PT)    Activity (Patient Education Goal, PT) Pt will perform 1x10 standing BLE therex  with mod (I) to allow for improved BLE strength and endurance  -HW     [place]/Cues/Accuracy (Memory Goal 2, PT) demonstrates adequately  -HW     Time Frame (Patient Education Goal, PT) short term goal (STG);5 days  -HW     Progress/Outcome (Patient Education Goal,  PT) new goal  -       Row Name 12/18/24 1532          Therapy Assessment/Plan (PT)    Planned Therapy Interventions (PT) balance training;bed mobility training;gait training;home exercise program;patient/family education;postural re-education;stretching;ROM (range of motion);strengthening;transfer training;neuromuscular re-education;lumbar stabilization  -               User Key  (r) = Recorded By, (t) = Taken By, (c) = Cosigned By      Initials Name Provider Type     Alayna Hummel, PATRICIO Physical Therapist                   Clinical Impression       Row Name 12/18/24 1511          Pain    Pretreatment Pain Rating 7/10  -     Pain Location head  -     Pain Side/Orientation generalized  -     Pain Management Interventions exercise or physical activity utilized  -     Response to Pain Interventions activity participation with tolerable pain;mobility function improved;activity level improved;functional ability improved  -       Row Name 12/18/24 1511          Plan of Care Review    Plan of Care Reviewed With patient  -     Progress no change  -     Outcome Evaluation Pt participated in PT initial evaluation this date. Pt presents supine in bed, agreeable to PT evaluation with encouragement. Pt AOX4, reports 7/10 headache at rest. Pt reports at baseline, she lives at home alone in a H with 0 COLT. Pt reports she ambulates with a rollator at baseline. Pt reports frequent falls at home. Pt reports she has been staying with her daughter frequently d/t requiring increased assistance with household tasks. Pt performed supine > sit on EOB with SBA. Pt performed STS with CGA and use of a RW. Pt ambulated 75' with RW and CGA. Pt demonstrated increased FF posture, decreased estefany and short (B) steps. Pt reported increased nausea during ambulation. Following evaluation, pt left reclined in bedside chair with chair alarm active, call light and all needs within reach. Recommend skilled PT intervention during IP  admission to address identified deficits, reduce risk of falls and prevent functional decline. Once medically stable, recommend pt d/c home with assistance from family and HHPT.  -       Row Name 12/18/24 1511          Therapy Assessment/Plan (PT)    Patient/Family Therapy Goals Statement (PT) Pt is eager to improve functional mobility  -     Rehab Potential (PT) fair  -     Criteria for Skilled Interventions Met (PT) yes  -     Therapy Frequency (PT) 5 times/wk  -     Predicted Duration of Therapy Intervention (PT) 10 days  -       Row Name 12/18/24 1511          Vital Signs    Pre Systolic BP Rehab 172  -HW     Pre Treatment Diastolic BP 77  -     Pretreatment Heart Rate (beats/min) 88  -HW     Pre SpO2 (%) 97  -HW     O2 Delivery Pre Treatment room air  -     O2 Delivery Intra Treatment room air  -     O2 Delivery Post Treatment room air  -HW     Pre Patient Position Supine  -HW     Intra Patient Position Standing  -     Post Patient Position Sitting  -       Row Name 12/18/24 1511          Positioning and Restraints    Pre-Treatment Position in bed  -     Post Treatment Position chair  -HW     In Chair reclined;with family/caregiver;exit alarm on;encouraged to call for assist;call light within reach  -               User Key  (r) = Recorded By, (t) = Taken By, (c) = Cosigned By      Initials Name Provider Type     Alayna Hummel, PT Physical Therapist                   Outcome Measures       Row Name 12/18/24 1535 12/18/24 0845       How much help from another person do you currently need...    Turning from your back to your side while in flat bed without using bedrails? 4  - 4  -HS    Moving from lying on back to sitting on the side of a flat bed without bedrails? 4  - 4  -HS    Moving to and from a bed to a chair (including a wheelchair)? 3  -HW 4  -HS    Standing up from a chair using your arms (e.g., wheelchair, bedside chair)? 3  - 3  -HS    Climbing 3-5 steps with a railing?  3  - 2  -HS    To walk in hospital room? 3  - 3  -HS    AM-PAC 6 Clicks Score (PT) 20  - 20  -HS    Highest Level of Mobility Goal 6 --> Walk 10 steps or more  - 6 --> Walk 10 steps or more  -      Row Name 12/18/24 1535          Functional Assessment    Outcome Measure Options AM-PAC 6 Clicks Basic Mobility (PT)  -               User Key  (r) = Recorded By, (t) = Taken By, (c) = Cosigned By      Initials Name Provider Type     Lena Eldridge, RN Registered Nurse     Alayna Hummel PT Physical Therapist                                 Physical Therapy Education       Title: PT OT SLP Therapies (In Progress)       Topic: Physical Therapy (In Progress)       Point: Mobility training (Done)       Learning Progress Summary            Patient Acceptance, E,TB, VU by  at 12/18/2024 1535    Comment: role of PT during IP admission                      Point: Home exercise program (Not Started)       Learner Progress:  Not documented in this visit.              Point: Body mechanics (Not Started)       Learner Progress:  Not documented in this visit.              Point: Precautions (Not Started)       Learner Progress:  Not documented in this visit.                              User Key       Initials Effective Dates Name Provider Type Discipline     11/29/23 -  Alayna Hummel PT Physical Therapist PT                  PT Recommendation and Plan  Planned Therapy Interventions (PT): balance training, bed mobility training, gait training, home exercise program, patient/family education, postural re-education, stretching, ROM (range of motion), strengthening, transfer training, neuromuscular re-education, lumbar stabilization  Progress: no change  Outcome Evaluation: Pt participated in PT initial evaluation this date. Pt presents supine in bed, agreeable to PT evaluation with encouragement. Pt AOX4, reports 7/10 headache at rest. Pt reports at baseline, she lives at home alone in a [facility] with 0 COLT. Pt reports  she ambulates with a rollator at baseline. Pt reports frequent falls at home. Pt reports she has been staying with her daughter frequently d/t requiring increased assistance with household tasks. Pt performed supine > sit on EOB with SBA. Pt performed STS with CGA and use of a RW. Pt ambulated 75' with RW and CGA. Pt demonstrated increased FF posture, decreased estefany and short (B) steps. Pt reported increased nausea during ambulation. Following evaluation, pt left reclined in bedside chair with chair alarm active, call light and all needs within reach. Recommend skilled PT intervention during IP admission to address identified deficits, reduce risk of falls and prevent functional decline. Once medically stable, recommend pt d/c home with assistance from family and HHPT.     Time Calculation:   PT Evaluation Complexity  History, PT Evaluation Complexity: 1-2 personal factors and/or comorbidities  Examination of Body Systems (PT Eval Complexity): 1-2 elements  Clinical Presentation (PT Evaluation Complexity): stable  Clinical Decision Making (PT Evaluation Complexity): low complexity  Overall Complexity (PT Evaluation Complexity): low complexity     PT Charges       Row Name 12/18/24 1536             Time Calculation    Start Time 1324  -HW      PT Received On 12/18/24  -      PT Goal Re-Cert Due Date 12/28/24  -         Untimed Charges    PT Eval/Re-eval Minutes 41  -HW         Total Minutes    Untimed Charges Total Minutes 41  -HW       Total Minutes 41  -HW                User Key  (r) = Recorded By, (t) = Taken By, (c) = Cosigned By      Initials Name Provider Type     Alayna Hummel PT Physical Therapist                  Therapy Charges for Today       Code Description Service Date Service Provider Modifiers Qty    [id number]  PT EVAL LOW COMPLEXITY 3 12/18/2024 Alayna Hummel, PT GP 1            PT G-Codes  Outcome Measure Options: AM-PAC 6 Clicks Basic Mobility (PT)  AM-PAC 6 Clicks Score (PT): 20  PT  Discharge Summary  Anticipated Discharge Disposition (PT): home with assist, home with home health    Alayna Hummel, PT  12/18/2024

## 2024-12-18 NOTE — PLAN OF CARE
Problem: Adult Inpatient Plan of Care  Goal: Plan of Care Review  Outcome: Progressing  Goal: Patient-Specific Goal (Individualized)  Outcome: Progressing  Goal: Absence of Hospital-Acquired Illness or Injury  Outcome: Progressing  Intervention: Identify and Manage Fall Risk  Recent Flowsheet Documentation  Taken 12/18/2024 0200 by Armando Joshi RN  Safety Promotion/Fall Prevention: safety round/check completed  Taken 12/18/2024 0000 by Armando Joshi RN  Safety Promotion/Fall Prevention: safety round/check completed  Taken 12/17/2024 2200 by Armando Joshi RN  Safety Promotion/Fall Prevention: safety round/check completed  Taken 12/17/2024 2000 by Armando Joshi RN  Safety Promotion/Fall Prevention: safety round/check completed  Intervention: Prevent Skin Injury  Recent Flowsheet Documentation  Taken 12/18/2024 0200 by Armando Joshi RN  Body Position: position changed independently  Taken 12/18/2024 0000 by Armando Joshi RN  Body Position: position changed independently  Taken 12/17/2024 2200 by Armando Joshi RN  Body Position: position changed independently  Taken 12/17/2024 2000 by Armando Joshi RN  Body Position: position changed independently  Intervention: Prevent Infection  Recent Flowsheet Documentation  Taken 12/18/2024 0200 by Armando Joshi RN  Infection Prevention: environmental surveillance performed  Taken 12/18/2024 0000 by Armando Joshi RN  Infection Prevention: environmental surveillance performed  Taken 12/17/2024 2200 by Armando Joshi RN  Infection Prevention: environmental surveillance performed  Taken 12/17/2024 2000 by Armando Joshi RN  Infection Prevention: environmental surveillance performed  Goal: Optimal Comfort and Wellbeing  Outcome: Progressing  Goal: Readiness for Transition of Care  Outcome: Progressing     Problem: Fall Injury Risk  Goal: Absence of Fall and Fall-Related Injury  Outcome: Progressing  Intervention: Promote Injury-Free Environment  Recent  Flowsheet Documentation  Taken 12/18/2024 0200 by Armando Joshi RN  Safety Promotion/Fall Prevention: safety round/check completed  Taken 12/18/2024 0000 by Armando Joshi RN  Safety Promotion/Fall Prevention: safety round/check completed  Taken 12/17/2024 2200 by Armando Joshi RN  Safety Promotion/Fall Prevention: safety round/check completed  Taken 12/17/2024 2000 by Armando Joshi RN  Safety Promotion/Fall Prevention: safety round/check completed     Problem: Electrolyte Imbalance  Goal: Electrolyte Balance  Outcome: Progressing     Problem: Comorbidity Management  Goal: Blood Glucose Level Within Target Range  Outcome: Progressing     Problem: Skin Injury Risk Increased  Goal: Skin Health and Integrity  Outcome: Progressing  Intervention: Optimize Skin Protection  Recent Flowsheet Documentation  Taken 12/18/2024 0200 by Armando Joshi RN  Activity Management: activity minimized  Head of Bed (HOB) Positioning: HOB elevated  Taken 12/18/2024 0000 by Armando Joshi RN  Activity Management: activity minimized  Head of Bed (HOB) Positioning: HOB elevated  Taken 12/17/2024 2200 by Armando Joshi RN  Activity Management: activity minimized  Head of Bed (HOB) Positioning: HOB elevated  Taken 12/17/2024 2000 by Armando Joshi RN  Activity Management: activity minimized  Head of Bed (HOB) Positioning: HOB elevated   Goal Outcome Evaluation:

## 2024-12-18 NOTE — PLAN OF CARE
Goal Outcome Evaluation:  Plan of Care Reviewed With: patient        Progress: no change  Outcome Evaluation: Pt participated in PT initial evaluation this date. Pt presents supine in bed, agreeable to PT evaluation with encouragement. Pt AOX4, reports 7/10 headache at rest. Pt reports at baseline, she lives at home alone in a H with 0 COLT. Pt reports she ambulates with a rollator at baseline. Pt reports frequent falls at home. Pt reports she has been staying with her daughter frequently d/t requiring increased assistance with household tasks. Pt performed supine > sit on EOB with SBA. Pt performed STS with CGA and use of a RW. Pt ambulated 75' with RW and CGA. Pt demonstrated increased FF posture, decreased estefany and short (B) steps. Pt reported increased nausea during ambulation. Following evaluation, pt left reclined in bedside chair with chair alarm active, call light and all needs within reach. Recommend skilled PT intervention during IP admission to address identified deficits, reduce risk of falls and prevent functional decline. Once medically stable, recommend pt d/c home with assistance from family and HHPT.    Anticipated Discharge Disposition (PT): home with assist, home with home health

## 2024-12-19 ENCOUNTER — READMISSION MANAGEMENT (OUTPATIENT)
Dept: CALL CENTER | Facility: HOSPITAL | Age: 69
End: 2024-12-19
Payer: MEDICARE

## 2024-12-19 VITALS
WEIGHT: 101.41 LBS | RESPIRATION RATE: 16 BRPM | OXYGEN SATURATION: 98 % | HEART RATE: 89 BPM | HEIGHT: 60 IN | TEMPERATURE: 99 F | DIASTOLIC BLOOD PRESSURE: 74 MMHG | SYSTOLIC BLOOD PRESSURE: 138 MMHG | BODY MASS INDEX: 19.91 KG/M2

## 2024-12-19 PROBLEM — N17.9 ACUTE KIDNEY FAILURE, UNSPECIFIED: Status: ACTIVE | Noted: 2024-12-19

## 2024-12-19 LAB
ANION GAP SERPL CALCULATED.3IONS-SCNC: 11.6 MMOL/L (ref 5–15)
BUN SERPL-MCNC: 34 MG/DL (ref 8–23)
BUN/CREAT SERPL: 24.5 (ref 7–25)
CALCIUM SPEC-SCNC: 9.5 MG/DL (ref 8.6–10.5)
CHLORIDE SERPL-SCNC: 102 MMOL/L (ref 98–107)
CO2 SERPL-SCNC: 24.4 MMOL/L (ref 22–29)
CREAT SERPL-MCNC: 1.39 MG/DL (ref 0.57–1)
EGFRCR SERPLBLD CKD-EPI 2021: 41.7 ML/MIN/1.73
GLUCOSE BLDC GLUCOMTR-MCNC: 219 MG/DL (ref 70–130)
GLUCOSE BLDC GLUCOMTR-MCNC: 289 MG/DL (ref 70–130)
GLUCOSE SERPL-MCNC: 334 MG/DL (ref 65–99)
POTASSIUM SERPL-SCNC: 4.2 MMOL/L (ref 3.5–5.2)
SODIUM SERPL-SCNC: 138 MMOL/L (ref 136–145)

## 2024-12-19 PROCEDURE — 63710000001 INSULIN GLARGINE PER 5 UNITS: Performed by: STUDENT IN AN ORGANIZED HEALTH CARE EDUCATION/TRAINING PROGRAM

## 2024-12-19 PROCEDURE — 25010000002 METOCLOPRAMIDE PER 10 MG: Performed by: INTERNAL MEDICINE

## 2024-12-19 PROCEDURE — 80048 BASIC METABOLIC PNL TOTAL CA: CPT | Performed by: INTERNAL MEDICINE

## 2024-12-19 PROCEDURE — 25010000002 PROCHLORPERAZINE 10 MG/2ML SOLUTION: Performed by: INTERNAL MEDICINE

## 2024-12-19 PROCEDURE — 82948 REAGENT STRIP/BLOOD GLUCOSE: CPT | Performed by: STUDENT IN AN ORGANIZED HEALTH CARE EDUCATION/TRAINING PROGRAM

## 2024-12-19 PROCEDURE — 63710000001 INSULIN LISPRO (HUMAN) PER 5 UNITS: Performed by: STUDENT IN AN ORGANIZED HEALTH CARE EDUCATION/TRAINING PROGRAM

## 2024-12-19 PROCEDURE — 99238 HOSP IP/OBS DSCHRG MGMT 30/<: CPT | Performed by: INTERNAL MEDICINE

## 2024-12-19 PROCEDURE — 25010000002 HEPARIN (PORCINE) PER 1000 UNITS: Performed by: STUDENT IN AN ORGANIZED HEALTH CARE EDUCATION/TRAINING PROGRAM

## 2024-12-19 RX ORDER — AMLODIPINE BESYLATE 5 MG/1
10 TABLET ORAL
Status: DISCONTINUED | OUTPATIENT
Start: 2024-12-19 | End: 2024-12-19 | Stop reason: HOSPADM

## 2024-12-19 RX ORDER — ASPIRIN 81 MG/1
81 TABLET ORAL DAILY
Qty: 30 TABLET | Refills: 0 | Status: SHIPPED | OUTPATIENT
Start: 2024-12-19

## 2024-12-19 RX ORDER — CLONIDINE 0.3 MG/24H
1 PATCH, EXTENDED RELEASE TRANSDERMAL WEEKLY
Status: DISCONTINUED | OUTPATIENT
Start: 2024-12-19 | End: 2024-12-19 | Stop reason: HOSPADM

## 2024-12-19 RX ORDER — AMLODIPINE BESYLATE 5 MG/1
5 TABLET ORAL DAILY
Qty: 30 TABLET | Refills: 0 | Status: SHIPPED | OUTPATIENT
Start: 2024-12-19 | End: 2024-12-24 | Stop reason: HOSPADM

## 2024-12-19 RX ORDER — METOCLOPRAMIDE 5 MG/1
5 TABLET ORAL
Qty: 60 TABLET | Refills: 0 | Status: SHIPPED | OUTPATIENT
Start: 2024-12-19

## 2024-12-19 RX ADMIN — METOPROLOL SUCCINATE 50 MG: 25 TABLET, EXTENDED RELEASE ORAL at 09:07

## 2024-12-19 RX ADMIN — ASPIRIN 81 MG: 81 TABLET, COATED ORAL at 09:07

## 2024-12-19 RX ADMIN — Medication 10 ML: at 09:08

## 2024-12-19 RX ADMIN — METOCLOPRAMIDE 10 MG: 5 INJECTION, SOLUTION INTRAMUSCULAR; INTRAVENOUS at 06:31

## 2024-12-19 RX ADMIN — PROCHLORPERAZINE EDISYLATE 5 MG: 5 INJECTION INTRAMUSCULAR; INTRAVENOUS at 09:05

## 2024-12-19 RX ADMIN — INSULIN LISPRO 4 UNITS: 100 INJECTION, SOLUTION INTRAVENOUS; SUBCUTANEOUS at 11:39

## 2024-12-19 RX ADMIN — PANTOPRAZOLE SODIUM 40 MG: 40 TABLET, DELAYED RELEASE ORAL at 09:20

## 2024-12-19 RX ADMIN — INSULIN GLARGINE 9 UNITS: 100 INJECTION, SOLUTION SUBCUTANEOUS at 09:03

## 2024-12-19 RX ADMIN — CLOPIDOGREL BISULFATE 75 MG: 75 TABLET ORAL at 09:07

## 2024-12-19 RX ADMIN — AMLODIPINE BESYLATE 10 MG: 5 TABLET ORAL at 09:18

## 2024-12-19 RX ADMIN — LEVOTHYROXINE SODIUM 50 MCG: 50 TABLET ORAL at 09:07

## 2024-12-19 RX ADMIN — SERTRALINE HYDROCHLORIDE 50 MG: 50 TABLET ORAL at 09:08

## 2024-12-19 RX ADMIN — INSULIN LISPRO 4 UNITS: 100 INJECTION, SOLUTION INTRAVENOUS; SUBCUTANEOUS at 09:04

## 2024-12-19 RX ADMIN — METOCLOPRAMIDE 10 MG: 5 INJECTION, SOLUTION INTRAMUSCULAR; INTRAVENOUS at 01:41

## 2024-12-19 NOTE — CASE MANAGEMENT/SOCIAL WORK
Case Management Discharge Note      Final Note: Patient plans to return home    Provided Post Acute Provider List?: N/A  N/A Provider List Comment: Patient plans to return home; no DME needs at this time  Provided Post Acute Provider Quality & Resource List?: N/A  N/A Quality & Resource List Comment: Patient plans to return home; no DME needs at this time    Selected Continued Care - Admitted Since 12/16/2024       Destination    No services have been selected for the patient.                Durable Medical Equipment Coordination complete.      Service Provider Services Address Phone Fax Patient Preferred    [facility] [address] [place], [place] [address]ATE DR GREGORY [address] [place] [id number] [phone] --       Internal Comment last updated by Brayden Miller RN 12/19/2024 1041    BS delivered                         Dialysis/Infusion    No services have been selected for the patient.                Home Medical Care    No services have been selected for the patient.                Therapy    No services have been selected for the patient.                Community Resources    No services have been selected for the patient.                Community & DME    No services have been selected for the patient.                    Transportation Services  Private: Car    Final Discharge Disposition Code: 01 - home or self-care

## 2024-12-19 NOTE — DISCHARGE SUMMARY
[facility]   DISCHARGE SUMMARY      Name:  Viola Barlow   Age:  69 y.o.  Sex:  female  :  1955  MRN:  [id number]   Visit Number:  [id number]    Admission Date:  2024  Date of Discharge:  2024  Primary Care Physician:  Jyoti Tomlinson APRN    Important issues to note:    1.  Patient was admitted with intractable nausea and vomiting likely related to diabetic gastroparesis.  Unfortunately, due to this she had dehydration and acute renal failure.  She was treated with IV fluids and was initiated on Reglan.  She was continued on Protonix.  Her symptoms improved and her renal function improved back to baseline with IV hydration.  She was seen by Dr. Calzada during the hospitalization.  2.  Patient will be discharged home and will be arranged a bedside commode.  She declined home health services.  She lives with her daughter.  She already has a walker at home.  3.  Follow-up with Dr. Calzada in 3 to 4 weeks.  4.  Follow-up with primary care provider in 1 week.    Discharge Diagnoses:     Acute gastroenteritis with suspected diabetic gastroparesis, POA, improved.  Dehydration, POA, resolved.  Acute renal failure secondary to #2, POA, resolved.  Hypokalemia, improved.  Chronic kidney disease stage III.  Diabetes mellitus type 2 with nephropathy.  Essential hypertension.  Peripheral vascular disease.  Acquired hypothyroidism.  Bilateral nephrolithiasis.    Problem List:     Active Hospital Problems    Diagnosis  POA    **Acute kidney injury superimposed on chronic kidney disease [N17.9, N18.9]  Yes    Acute kidney failure, unspecified [N17.9]  Yes    Type 2 diabetes mellitus with nephropathy [E11.21]  Yes    Essential hypertension [I10]  Yes    Chronic kidney disease, stage III (moderate) [N18.30]  Yes      Resolved Hospital Problems   No resolved problems to display.     Presenting Problem:    Chief Complaint   Patient presents with    Hyperglycemia      Consults:      Consulting Physician(s)         Provider   Role Specialty     Jerman Calzada MD, JUSTINO      Consulting Physician Nephrology          Procedures Performed:    None.    History of presenting illness/Hospital Course:    Viola Barlow is a 69-year-old woman with past medical history of type 2 diabetes, hypertension, CKD, nephrolithiasis, diverticulosis, DARELL, hyperlipidemia, peripheral vascular disease, migraines, arthritis.  Presented to [facility] ED on 12/16/2024 with concern nausea and nonbloody vomiting for 4 days, hyperglycemia in the 400s.  She has been taking insulin as prescribed, no missed doses.  Denied fevers or chills, chest pain, shortness of air, back pain, leg pain or swelling.       ED summary: Afebrile, tachycardic, tachypneic, hypertensive which improved, other vital signs stable room air.  ABG normal.  Chloride 93, CO2 19, anion gap 29, BUN 54, creatinine 3.19, EGFR 15, blood glucose 363, calcium 10, alk phos 148, total protein 9.4.  Lipase 31.  Leukocytosis 19.  Hemoglobin 16.  CT ab/pelvis bilateral nephrolithiasis without hydronephrosis, chronic diverticulosis.  She was provided Benadryl, Reglan, Zofran, 1 L NS bolus.     Patient was continued on IV fluids and antiemetics.  She was also continued on Protonix.  Renal function improved with IV hydration.  Patient has been a long-term diabetic and likely has underlying diabetic gastroparesis which could be contributing to her nausea and vomiting symptoms.  She was initiated on Reglan therapy.  Patient improved significantly with her nausea and vomiting and her renal function is back to baseline.  She will be discharged home today.  I have discussed the discharge plan with Dr. Calzada.    The patient is physically incapable of utilizing regular toilet facilities. Use of a bedside commode is necessary as they are confined to one level of home  with no toileting facilities available on that level.  Patient to follow-up with Dr. Calzada in 3 to 4 weeks and  primary care provider in 1 week.    Acute gastroenteritis.  - Patient does have history of GERD and does currently have epigastric tenderness.  - She may have underlying diabetic gastroparesis and has been placed on Reglan.  - Continue Protonix, Zofran, Phenergan.     Acute renal failure.  - Secondary to dehydration but has improved with IV hydration.  - She is being followed by Dr. Calzada and I have discussed the patient's treatment plan with him.     Diabetes mellitus type 2.  - Continue subcutaneous insulin protocol for Glucomander.  - Hemoglobin A1c 8.4 on 12/16/2024.    Vital Signs:    Temp:  [98.3 °F (36.8 °C)-99 °F (37.2 °C)] 99 °F (37.2 °C)  Heart Rate:  [89] 89  Resp:  [16-18] 16  BP: (157-196)/(64-88) 181/81    Physical Exam:    General Appearance:  Alert and cooperative.    Head:  Atraumatic and normocephalic.   Eyes: Conjunctivae and sclerae normal, no icterus. No pallor.   Ears:  Ears with no abnormalities noted.   Throat: No oral lesions, no thrush, oral mucosa moist.  Edentulous.   Neck: Supple, trachea midline, no thyromegaly.   Back:   No kyphoscoliosis present. No tenderness to palpation.   Lungs:   Breath sounds heard bilaterally equally.  No crackles or wheezing. No Pleural rub or bronchial breathing.   Heart:  Normal S1 and S2, no murmur, no gallop, no rub. No JVD.   Abdomen:   Normal bowel sounds, no masses, no organomegaly. Soft, epigastric tenderness on deep palpation, nondistended, no rebound tenderness.   Extremities: Supple, no edema, no cyanosis, no clubbing.   Pulses: Pulses palpable bilaterally.   Skin: No bleeding or rash.   Neurologic: Alert and oriented x 3. No facial asymmetry. Moves all four limbs. No tremors.     Pertinent Lab Results:     Results from last 7 days   Lab Units 12/19/24  0603 12/18/24  0539 12/17/24  0533 12/16/24  1618 12/16/24  1223   SODIUM mmol/L 138 143 143   < > 142   POTASSIUM mmol/L 4.2 3.8 4.3   < > 4.3   CHLORIDE mmol/L 102 107 106   < > 93*   CO2 mmol/L  24.4 22.0 21.6*   < > 19.3*   BUN mg/dL 34* 37* 50*   < > 54*   CREATININE mg/dL 1.39* 1.46* 2.05*   < > 3.19*   CALCIUM mg/dL 9.5 9.7 9.2   < > 10.9*   BILIRUBIN mg/dL  --  0.8  --   --  0.7   ALK PHOS U/L  --  131*  --   --  148*   ALT (SGPT) U/L  --  10  --   --  16   AST (SGOT) U/L  --  19  --   --  22   GLUCOSE mg/dL 334* 145* 218*   < > 363*    < > = values in this interval not displayed.     Results from last 7 days   Lab Units 12/18/24  0539 12/17/24  0533 12/16/24  1223   WBC 10*3/mm3 14.36* 17.08* 19.28*   HEMOGLOBIN g/dL 16.0* 14.5 16.6*   HEMATOCRIT % 49.2* 46.8* 50.6*   PLATELETS 10*3/mm3 247 261 373       Results from last 7 days   Lab Units 12/16/24  1223   LIPASE U/L 31     Pertinent Radiology Results:    Imaging Results (All)       Procedure Component Value Units Date/Time    CT Abdomen Pelvis Without Contrast [[id number]] Collected: 12/16/24 1409     Updated: 12/16/24 1412    Narrative:      PROCEDURE: CT ABDOMEN PELVIS WO CONTRAST-     HISTORY: Nausea, vomiting, leukocytosis, JOSE ALBERTO     COMPARISON: June 2021.     PROCEDURE: Axial images were obtained from the lung bases through the  pubic symphysis without intravenous contrast.     FINDINGS:     ABDOMEN: There is streak artifact from patient's arm position. The lung  bases are clear. The heart size is normal. There are vascular  calcifications. The limited noncontrast images of the liver are normal.  Gallbladder is unremarkable. There is no CT evidence of gallstones. The  spleen is normal. No adrenal masses are seen.  The pancreas has an  unremarkable unenhanced appearance. The aorta is normal in caliber.  There is no significant free fluid or adenopathy.  There is bilateral  nephrolithiasis. There is no hydronephrosis. There is colonic  diverticulosis.     PELVIS: The appendix is not identified. Uterus is diminutive or absent.  The urinary bladder is mostly collapsed. There is no significant fluid  or adenopathy. There is no bony destructive  lesion.       Impression:      Bilateral nephrolithiasis without hydronephrosis.     Colonic diverticulosis.     Images were reviewed, interpreted, and dictated by Dr. Nadia Coleman MD  Transcribed by Chelo Diallo PA-C.     This report was signed and finalized on 12/16/2024 2:10 PM by Nadia Coleman MD.        Echo:    Results for orders placed during the hospital encounter of 11/04/21    Adult Transthoracic Echo Complete W/ Cont if Necessary Per Protocol    Interpretation Summary  1.  Normal left ventricular size and systolic function, LVEF 55-60%.  2.  Mild concentric LVH.  3.  Normal LV diastolic filling pattern.  4.  Normal right ventricular size and systolic function.  5.  Normal left atrial volume index.  6.  No significant valvular abnormalities.    Condition on Discharge:      Stable.    Code status during the hospital stay:    Code Status and Medical Interventions: CPR (Attempt to Resuscitate); Full Support   Ordered at: 12/17/24 0918     Code Status (Patient has no pulse and is not breathing):    CPR (Attempt to Resuscitate)     Medical Interventions (Patient has pulse or is breathing):    Full Support     Discharge Disposition:    Home or Self Care    Discharge Medications:       Discharge Medications        New Medications        Instructions Start Date   metoclopramide 5 MG tablet  Commonly known as: Reglan   5 mg, Oral, 3 Times Daily Before Meals             Changes to Medications        Instructions Start Date   aspirin 81 MG EC tablet  Commonly known as: aspirin EC  What changed:   medication strength  how much to take   81 mg, Oral, Daily      gabapentin 600 MG tablet  Commonly known as: NEURONTIN  What changed: Another medication with the same name was removed. Continue taking this medication, and follow the directions you see here.   600 mg, Oral, 3 Times Daily             Continue These Medications        Instructions Start Date   amLODIPine 5 MG tablet  Commonly known as: NORVASC   5 mg, Oral,  Daily      atorvastatin 40 MG tablet  Commonly known as: LIPITOR   40 mg, Daily      clopidogrel 75 MG tablet  Commonly known as: PLAVIX   75 mg, Daily      HYDROcodone-acetaminophen 5-325 MG per tablet  Commonly known as: NORCO   1 tablet, Oral, Every 6 Hours PRN      insulin lispro protamine-insulin lispro (75-25) 100 UNIT/ML suspension injection  Commonly known as: humaLOG 75-25   60 Units, Subcutaneous, 2 Times Daily With Meals      levothyroxine 50 MCG tablet  Commonly known as: SYNTHROID, LEVOTHROID   50 mcg, Daily      lidocaine 5 %  Commonly known as: LIDODERM   1 patch, Transdermal, Every 24 Hours, Remove & Discard patch within 12 hours or as directed by MD      metFORMIN  MG 24 hr tablet  Commonly known as: GLUCOPHAGE-XR   750 mg, Oral, Daily With Breakfast      metoprolol succinate XL 50 MG 24 hr tablet  Commonly known as: TOPROL-XL   50 mg, Daily      ondansetron ODT 4 MG disintegrating tablet  Commonly known as: ZOFRAN-ODT   4 mg, Translingual, Every 8 Hours PRN      pantoprazole 40 MG EC tablet  Commonly known as: PROTONIX   40 mg, Daily      sertraline 50 MG tablet  Commonly known as: ZOLOFT   50 mg, Daily      Trulicity 1.5 MG/0.5ML solution pen-injector  Generic drug: Dulaglutide   1 each, Subcutaneous, Weekly      vitamin D3 125 MCG (5000 UT) capsule capsule   5,000 Units, Oral, Daily      Vitamin D-3 25 MCG (1000 UT) capsule   1,000 Units, Oral, Daily             Stop These Medications      glipizide 10 MG tablet  Commonly known as: GLUCOTROL     montelukast 10 MG tablet  Commonly known as: SINGULAIR            Discharge Diet:     Diet Instructions       Diet: Cardiac Diets, Diabetic Diets; Healthy Heart (2-3 Na+); Regular (IDDSI 7); Thin (IDDSI 0); Consistent Carbohydrate      Discharge Diet:  Cardiac Diets  Diabetic Diets       Cardiac Diet: Healthy Heart (2-3 Na+)    Texture: Regular (IDDSI 7)    Fluid Consistency: Thin (IDDSI 0)    Diabetic Diet: Consistent Carbohydrate          Activity  at Discharge:     Activity Instructions       Activity as Tolerated            Follow-up Appointments:     Follow-up Information       Jyoti Tomlinson APRN Follow up in 1 week(s).    Specialty: Family Medicine  Contact information:  [address] [place] [id number] [phone]               Jerman Calzada MD, FASN Follow up in 4 week(s).    Specialty: Nephrology  Contact information:  [address] [place] [address] [phone]                           Test Results Pending at Discharge:    Pending Results       None             Gerald Zhao MD  12/19/24  10:05 EST    Time: I spent 25 minutes on this discharge activity which included: face-to-face encounter with the patient, reviewing the data in the system, coordination of the care with the nursing staff as well as consultants, documentation, and entering orders.     Dictated utilizing Dragon dictation.

## 2024-12-19 NOTE — PLAN OF CARE
Goal Outcome Evaluation:  Plan of Care Reviewed With: patient        Progress: no change          Problem: Adult Inpatient Plan of Care  Goal: Plan of Care Review  Outcome: Progressing  Flowsheets (Taken 12/19/2024 0554)  Progress: no change  Plan of Care Reviewed With: patient  Goal: Patient-Specific Goal (Individualized)  Outcome: Progressing  Goal: Absence of Hospital-Acquired Illness or Injury  Outcome: Progressing  Intervention: Identify and Manage Fall Risk  Recent Flowsheet Documentation  Taken 12/19/2024 0400 by Mary Ellen Coles LPN  Safety Promotion/Fall Prevention:   activity supervised   assistive device/personal items within reach   clutter free environment maintained   fall prevention program maintained   nonskid shoes/slippers when out of bed   room organization consistent   safety round/check completed  Taken 12/19/2024 0000 by Tincher, Mary Ellen, LPN  Safety Promotion/Fall Prevention:   activity supervised   assistive device/personal items within reach   clutter free environment maintained   fall prevention program maintained   nonskid shoes/slippers when out of bed   room organization consistent   safety round/check completed  Taken 12/18/2024 2200 by Tincher, Mary Ellen, LPN  Safety Promotion/Fall Prevention:   activity supervised   assistive device/personal items within reach   clutter free environment maintained   fall prevention program maintained   nonskid shoes/slippers when out of bed   room organization consistent   safety round/check completed  Taken 12/18/2024 2000 by Tincher, Mary Ellen, LPN  Safety Promotion/Fall Prevention:   activity supervised   assistive device/personal items within reach   clutter free environment maintained   fall prevention program maintained   nonskid shoes/slippers when out of bed   room organization consistent   safety round/check completed  Intervention: Prevent Skin Injury  Recent Flowsheet Documentation  Taken 12/19/2024 0400 by Mary Ellen Coles  LPN  Body Position: legs elevated  Taken 12/19/2024 0000 by Mary Ellen Coles LPN  Body Position: position changed independently  Taken 12/18/2024 2200 by Mary Ellen Coles LPN  Body Position: sitting up in bed  Taken 12/18/2024 2000 by Mary Ellen Coles LPN  Body Position:   side-lying   right  Skin Protection:   transparent dressing maintained   pulse oximeter probe site changed  Intervention: Prevent and Manage VTE (Venous Thromboembolism) Risk  Recent Flowsheet Documentation  Taken 12/18/2024 2000 by Mary Ellen Coles LPN  VTE Prevention/Management: (see MAR) other (see comments)  Intervention: Prevent Infection  Recent Flowsheet Documentation  Taken 12/19/2024 0400 by Mary Ellen Coles LPN  Infection Prevention:   environmental surveillance performed   rest/sleep promoted   single patient room provided  Taken 12/19/2024 0000 by Mary Ellen Coles LPN  Infection Prevention:   environmental surveillance performed   rest/sleep promoted   single patient room provided  Taken 12/18/2024 2200 by Mary Ellen Coles LPN  Infection Prevention:   environmental surveillance performed   rest/sleep promoted   single patient room provided  Taken 12/18/2024 2000 by Mary Ellen Coles LPN  Infection Prevention:   environmental surveillance performed   hand hygiene promoted   rest/sleep promoted   single patient room provided  Goal: Optimal Comfort and Wellbeing  Outcome: Progressing  Intervention: Provide Person-Centered Care  Recent Flowsheet Documentation  Taken 12/18/2024 2000 by Mary Ellen Coles LPN  Trust Relationship/Rapport:   care explained   choices provided   emotional support provided  Goal: Readiness for Transition of Care  Outcome: Progressing     Problem: Fall Injury Risk  Goal: Absence of Fall and Fall-Related Injury  Outcome: Progressing  Intervention: Promote Injury-Free Environment  Recent Flowsheet Documentation  Taken 12/19/2024 0400 by Mary Ellen Coles LPN  Safety Promotion/Fall  Prevention:   activity supervised   assistive device/personal items within reach   clutter free environment maintained   fall prevention program maintained   nonskid shoes/slippers when out of bed   room organization consistent   safety round/check completed  Taken 12/19/2024 0000 by Tincher, Mary Ellen, LPN  Safety Promotion/Fall Prevention:   activity supervised   assistive device/personal items within reach   clutter free environment maintained   fall prevention program maintained   nonskid shoes/slippers when out of bed   room organization consistent   safety round/check completed  Taken 12/18/2024 2200 by Tincher, Mary Ellen, LPN  Safety Promotion/Fall Prevention:   activity supervised   assistive device/personal items within reach   clutter free environment maintained   fall prevention program maintained   nonskid shoes/slippers when out of bed   room organization consistent   safety round/check completed  Taken 12/18/2024 2000 by Tincher, Mary Ellen, LPN  Safety Promotion/Fall Prevention:   activity supervised   assistive device/personal items within reach   clutter free environment maintained   fall prevention program maintained   nonskid shoes/slippers when out of bed   room organization consistent   safety round/check completed     Problem: Electrolyte Imbalance  Goal: Electrolyte Balance  Outcome: Progressing  Intervention: Monitor and Manage Electrolyte Imbalance  Recent Flowsheet Documentation  Taken 12/18/2024 2000 by Tincher, Mary Ellen, LPN  Fluid/Electrolyte Management: fluids provided     Problem: Comorbidity Management  Goal: Blood Glucose Level Within Target Range  Outcome: Progressing     Problem: Skin Injury Risk Increased  Goal: Skin Health and Integrity  Outcome: Progressing  Intervention: Optimize Skin Protection  Recent Flowsheet Documentation  Taken 12/19/2024 0400 by Mary Ellen Coles LPN  Activity Management:   activity encouraged   up in chair  Taken 12/19/2024 0000 by Mary Ellen Coles  LPN  Activity Management: activity encouraged  Head of Bed (HOB) Positioning: HOB elevated  Taken 12/18/2024 2200 by Mary Ellen Coles LPN  Activity Management: activity encouraged  Head of Bed (HOB) Positioning: HOB elevated  Taken 12/18/2024 2000 by Mary Ellen Coles LPN  Activity Management: activity encouraged  Pressure Reduction Techniques:   frequent weight shift encouraged   weight shift assistance provided  Head of Bed (HOB) Positioning: HOB elevated  Pressure Reduction Devices: positioning supports utilized  Skin Protection:   transparent dressing maintained   pulse oximeter probe site changed

## 2024-12-19 NOTE — CASE MANAGEMENT/SOCIAL WORK
Case Management/Social Work    Patient Name:  Voila Barlow  YOB: 1955  MRN: [id number]  Admit Date:  12/16/2024        09:34 EST  Met with patient at bedside. Patient plans to return home once medically ready. CM will continue to follow.      Electronically signed by:  Brayden Miller RN  12/19/24 09:34 EST

## 2024-12-19 NOTE — NURSING NOTE
Multiple attempts to reach family for patient to discharge. Messages left for both daughter and son.

## 2024-12-19 NOTE — PAYOR COMM NOTE
"TO:MK  FROM:MARIANA GARCIA, RN PHONE [phone]  CLINICALS REF# [id number]    Viola Rodriguez (69 y.o. Female)       Date of Birth   1955    Social Security Number       Address   [address] [id number]    Home Phone   [phone]    MRN   [id number]       Muslim   Caodaism    Marital Status   Single                            Admission Date   12/16/24    Admission Type   Emergency    Admitting Provider   Kerley, Brian Joseph, DO    Attending Provider   Gerald Zhao MD    Department, Room/Bed   [facility] TELEMETRY 4, 423/1       Discharge Date       Discharge Disposition       Discharge Destination                                 Attending Provider: Gerald Zhao MD    Allergies: Lisinopril    Isolation: None   Infection: None   Code Status: CPR    Ht: 152.4 cm (60\")   Wt: 46 kg (101 lb 6.6 oz)    Admission Cmt: None   Principal Problem: Acute kidney injury superimposed on chronic kidney disease [N17.9,N18.9]                   Active Insurance as of 12/16/2024       Primary Coverage       Payor Plan Insurance Group Employer/Plan Group    ANTHEM MEDICARE REPLACEMENT ANTHEM MEDICARE ADVANTAGE [id number]       Payor Plan Address Payor Plan Phone Number Payor Plan Fax Number Effective Dates    [address] [phone]  8/1/2022 - None Entered    [facility] [address]         Subscriber Name Subscriber Birth Date Member ID       VIOLA RODRIGUEZ 1955 [id number]               Secondary Coverage       Payor Plan Insurance Group Employer/Plan Group    [place]        Payor Plan Address Payor Plan Phone Number Payor Plan Fax Number Effective Dates    [address] [phone]  1/23/2023 - None Entered    [facility] [id number]         Subscriber Name Subscriber Birth Date Member ID       VIOLA RODRIGUEZ 1955 [id number]                     Emergency Contacts        (Rel.) Home Phone Work Phone Mobile Phone    " CainWilberLisa (Daughter) [phone] -- --    FIDEL STRONG (Son) -- -- [phone]                 History & Physical        Kerley, Brian Joseph, DO at 24 1523            [facility]   HISTORY AND PHYSICAL      Name:  Viola Barlow   Age:  69 y.o.  Sex:  female  :  1955  MRN:  [id number]   Visit Number:  [id number]  Admission Date:  2024  Date Of Service:  24  Primary Care Physician:  Jyoti Tomlinson APRN    Chief Complaint:     Nausea and vomiting, hyperglycemia    History Of Presenting Illness:      Viola Barlow is a 69-year-old woman with past medical history of type 2 diabetes, hypertension, CKD, nephrolithiasis, diverticulosis, DARELL, hyperlipidemia, peripheral vascular disease, migraines, arthritis.  Presented to [facility] ED on 2024 with concern nausea and nonbloody vomiting for 4 days, hyperglycemia in the 400s.  She has been taking insulin as prescribed, no missed doses.  Denied fevers or chills, chest pain, shortness of air, back pain, leg pain or swelling.      ED summary: Afebrile, tachycardic, tachypneic, hypertensive which improved, other vital signs stable room air.  ABG normal.  Chloride 93, CO2 19, anion gap 29, BUN 54, creatinine 3.19, EGFR 15, blood glucose 363, calcium 10, alk phos 148, total protein 9.4.  Lipase 31.  Leukocytosis 19.  Hemoglobin 16.  CT ab/pelvis bilateral nephrolithiasis without hydronephrosis, chronic diverticulosis.  She was provided Benadryl, Reglan, Zofran, 1 L NS bolus.    Review Of Systems:    All systems were reviewed and negative except as mentioned in history of presenting illness, assessment and plan.    Past Medical History: Patient  has a past medical history of Arthritis, Cataract, Depression, Diabetes mellitus, Dysphagia, Elevated cholesterol, Full dentures, GERD (gastroesophageal reflux disease), Headache, History of nuclear stress test, Hypertension, Kidney disease, Migraines, PVD (peripheral  vascular disease), Seasonal allergies, Spinal headache, and Wears glasses.    Past Surgical History: Patient  has a past surgical history that includes Mouth surgery; Hysterectomy (1983); Hysterectomy (1984); Abdominal surgery (08/1984); Elbow Epicondylectomy (Left, 09/1990); Vascular surgery (Bilateral); Colonoscopy; Esophagogastroduodenoscopy; Appendectomy; Cataract extraction w/ intraocular lens implant (Right, 6/21/2019); and Cataract extraction w/ intraocular lens implant (Left, 7/5/2019).    Social History: Patient  reports that she quit smoking about 16 years ago. Her smoking use included cigarettes. She started smoking about 54 years ago. She has a 57 pack-year smoking history. She has never used smokeless tobacco. She reports that she does not drink alcohol and does not use drugs.    Family History:  Patient's family history has been reviewed and found to be noncontributory.     Allergies:      Lisinopril    Home Medications:    Prior to Admission Medications       Prescriptions Last Dose Informant Patient Reported? Taking?    amLODIPine (NORVASC) 5 MG tablet   No No    Take 1 tablet by mouth Daily.    Patient not taking:  Reported on 2/17/2023    aspirin  MG tablet  Self Yes No    Take 325 mg by mouth Daily.    Patient not taking:  Reported on 2/17/2023    atorvastatin (LIPITOR) 40 MG tablet  Self Yes No    Take 40 mg by mouth Daily.    Cholecalciferol (VITAMIN D-3) 1000 units capsule  Self Yes No    Take 1,000 Units by mouth Daily.    clopidogrel (PLAVIX) 75 MG tablet  Self Yes No    Take 75 mg by mouth Daily.    Dulaglutide (Trulicity) 1.5 MG/0.5ML solution pen-injector   Yes No    Inject 1 each under the skin into the appropriate area as directed 1 (One) Time Per Week.    gabapentin (NEURONTIN) 600 MG tablet  Self Yes No    Take 600 mg by mouth 3 (Three) Times a Day.    gabapentin (NEURONTIN) 600 MG tablet  Self Yes No    Take 600 mg by mouth 3 (Three) Times a Day.    glipiZIDE (GLUCOTROL) 10 MG  tablet  Self Yes No    Take 20 mg by mouth Every Morning.    Patient not taking:  Reported on 2/17/2023    HYDROcodone-acetaminophen (NORCO) 5-325 MG per tablet   No No    Take 1 tablet by mouth Every 6 (Six) Hours As Needed for Moderate Pain.    insulin lispro protamine-insulin lispro (humaLOG 75-25) (75-25) 100 UNIT/ML suspension injection   No No    Inject 60 Units under the skin into the appropriate area as directed 2 (Two) Times a Day With Meals.    levothyroxine (SYNTHROID, LEVOTHROID) 50 MCG tablet  Self Yes No    Take 50 mcg by mouth Daily.    lidocaine (LIDODERM) 5 %   No No    Place 1 patch on the skin as directed by provider Daily. Remove & Discard patch within 12 hours or as directed by MD    metFORMIN ER (GLUCOPHAGE-XR) 750 MG 24 hr tablet  Self Yes No    Take 750 mg by mouth Daily With Breakfast.    metoprolol succinate XL (TOPROL-XL) 50 MG 24 hr tablet  Self Yes No    Take 50 mg by mouth Daily.    montelukast (SINGULAIR) 10 MG tablet  Self Yes No    Take 10 mg by mouth Every Night.    Patient not taking:  Reported on 2/17/2023    ondansetron ODT (ZOFRAN-ODT) 4 MG disintegrating tablet   No No    Place 1 tablet on the tongue Every 8 (Eight) Hours As Needed for Nausea or Vomiting.    pantoprazole (PROTONIX) 40 MG EC tablet  Self Yes No    Take 40 mg by mouth Daily.    sertraline (ZOLOFT) 50 MG tablet  Self Yes No    Take 50 mg by mouth Daily.    vitamin D3 125 MCG (5000 UT) capsule capsule   Yes No    Take 5,000 Units by mouth Daily.          ED Medications:    Medications   sodium chloride 0.9 % bolus 1,000 mL (0 mL Intravenous Stopped 12/16/24 1505)   ondansetron (ZOFRAN) injection 4 mg (4 mg Intravenous Given 12/16/24 1231)   metoclopramide (REGLAN) injection 5 mg (5 mg Intravenous Given 12/16/24 1328)   diphenhydrAMINE (BENADRYL) injection 25 mg (25 mg Intravenous Given 12/16/24 1329)     Vital Signs:  Temp:  [98.3 °F (36.8 °C)] 98.3 °F (36.8 °C)  Heart Rate:  [110-112] 110  Resp:  [22] 22  BP:  "(162-193)/(100-103) 162/100        12/16/24  1214   Weight: 52.2 kg (115 lb)     Body mass index is 22.46 kg/m².    Physical Exam:     Most recent vital Signs: /100 (BP Location: Right arm, Patient Position: Lying)   Pulse 110   Temp 98.3 °F (36.8 °C)   Resp 22   Ht 152.4 cm (60\")   Wt 52.2 kg (115 lb)   LMP  (LMP Unknown)   SpO2 95%   BMI 22.46 kg/m²     Physical Exam  Constitutional:       General: She is not in acute distress.     Appearance: She is not ill-appearing or toxic-appearing.   HENT:      Mouth/Throat:      Mouth: Mucous membranes are moist.   Eyes:      Extraocular Movements: Extraocular movements intact.   Cardiovascular:      Rate and Rhythm: Normal rate and regular rhythm.      Pulses: Normal pulses.      Heart sounds: Normal heart sounds.   Pulmonary:      Effort: Pulmonary effort is normal.      Breath sounds: Normal breath sounds.   Abdominal:      Palpations: Abdomen is soft.      Tenderness: There is no abdominal tenderness.   Musculoskeletal:      Right lower leg: No edema.      Left lower leg: No edema.   Skin:     General: Skin is warm.   Neurological:      General: No focal deficit present.      Mental Status: She is alert.   Psychiatric:         Mood and Affect: Mood normal.         Thought Content: Thought content normal.         Laboratory data:    I have reviewed the labs done in the emergency room.    Results from last 7 days   Lab Units 12/16/24  1223   SODIUM mmol/L 142   POTASSIUM mmol/L 4.3   CHLORIDE mmol/L 93*   CO2 mmol/L 19.3*   BUN mg/dL 54*   CREATININE mg/dL 3.19*   CALCIUM mg/dL 10.9*   BILIRUBIN mg/dL 0.7   ALK PHOS U/L 148*   ALT (SGPT) U/L 16   AST (SGOT) U/L 22   GLUCOSE mg/dL 363*     Results from last 7 days   Lab Units 12/16/24  1223   WBC 10*3/mm3 19.28*   HEMOGLOBIN g/dL 16.6*   HEMATOCRIT % 50.6*   PLATELETS 10*3/mm3 373                     Results from last 7 days   Lab Units 12/16/24  1223   LIPASE U/L 31               Invalid input(s): \"USDES\", " "\"NITRITITE\", \"BACT\", \"EP\"    Pain Management Panel  More data exists         Latest Ref Rng & Units 11/18/2022 8/16/2021   Pain Management Panel   Creatinine, Urine mg/dL - 86.7    Amphetamine, Urine Qual Negative Negative  -   Barbiturates Screen, Urine Negative Negative  -   Benzodiazepine Screen, Urine Negative Negative  -   Buprenorphine, Screen, Urine Negative Negative  -   Cocaine Screen, Urine Negative Negative  -   Methadone Screen , Urine Negative Negative  -   Methamphetamine, Ur Negative Negative  -      Details                     Radiology:    CT Abdomen Pelvis Without Contrast    Result Date: 12/16/2024  PROCEDURE: CT ABDOMEN PELVIS WO CONTRAST-  HISTORY: Nausea, vomiting, leukocytosis, JOSE ALBERTO  COMPARISON: June 2021.  PROCEDURE: Axial images were obtained from the lung bases through the pubic symphysis without intravenous contrast.  FINDINGS:  ABDOMEN: There is streak artifact from patient's arm position. The lung bases are clear. The heart size is normal. There are vascular calcifications. The limited noncontrast images of the liver are normal. Gallbladder is unremarkable. There is no CT evidence of gallstones. The spleen is normal. No adrenal masses are seen.  The pancreas has an unremarkable unenhanced appearance. The aorta is normal in caliber. There is no significant free fluid or adenopathy.  There is bilateral nephrolithiasis. There is no hydronephrosis. There is colonic diverticulosis.  PELVIS: The appendix is not identified. Uterus is diminutive or absent. The urinary bladder is mostly collapsed. There is no significant fluid or adenopathy. There is no bony destructive lesion.      Bilateral nephrolithiasis without hydronephrosis.  Colonic diverticulosis.     CTDI: 9.5 mGy DLP: 486.92 mGy.cm   This study was performed with techniques to keep radiation doses as low as reasonably achievable (ALARA). Individualized dose reduction techniques using automated exposure control or adjustment of mA and/or " kV according to the patient size were employed.     Images were reviewed, interpreted, and dictated by Dr. Nadia Coleman MD Transcribed by Chelo Diallo PA-C.  This report was signed and finalized on 2024 2:10 PM by Nadia Coleman MD.       Assessment/Plan:    Observation general floor admission 2020 for nausea and vomiting, JOSE ALBERTO on CKD, hyperglycemia without metabolic acidosis.    Nausea and vomiting  JOSE ALBERTO on CKD  Nephrology consultation, recreations appreciated.  IVF.  At time of admission she was eating a sandwich, no difficulty with p.o.  Unclear trigger for her nausea and vomiting.  Said that she has been on Mounjaro for about a year and they reduced her dose recently.  Hyperglycemia  Subcutaneous insulin protocol.    Chronic: Type 2 diabetes, hypertension, CKD, nephrolithiasis, diverticulosis, DARELL, hyperlipidemia, peripheral vascular disease, migraines, arthritis.  Continue other home medications.    Risk Assessment: High  DVT Prophylaxis: Heparin  Code Status: Full code  Diet: Cardiac/diabetic    Advance Care Planning  ACP discussion was held with the patient during this visit. Patient does not have an advance directive, information provided.           Brian Joseph Kerley, DO  24  15:23 EST    Dictated utilizing Dragon dictation.    Electronically signed by Kerley, Brian Joseph, DO at 24 1756          Emergency Department Notes        Heriberto Dolan, Paramedic at 24 1704          Blood glucose 267mg/dl    Electronically signed by Heriberto Dolan, Paramedic at 24 1704       Tomas White DO at 24 1234        Procedure Orders    1. Critical Care [[id number]] ordered by Tomas White DO                        [facility]  Emergency Department Encounter  Emergency Medicine Physician Note       Pt Name: Viola Barlow  MRN: [id number]  Pt :   1955  Room Number:  10/10  Date of encounter:  2024  PCP: Jyoti Tomlinson APRN  ED  "Physician: Tomas White DO    HPI:  Viola Barlow is a 69 y.o. female who presents to the ED with chief complaint of HYPERGLYCEMIA.  Patient presents from home via EMS.  Reports that she has been experiencing nausea and vomiting for the past 4 days.  Also having elevated blood glucose levels in the 400s.  Past medical history of type 2 diabetes on chronic insulin.  States that she has been taking her insulin as prescribed.  No missed doses.  No fever, chills, chest pain, shortness of breath, back pain, problems with urination or bowel movements, leg pain or swelling.  Does report associated abdominal cramping.  POC glucose per .    PAST MEDICAL HISTORY  Past Medical History:   Diagnosis Date    Arthritis     Cataract     Depression     Diabetes mellitus     Dysphagia     Patient reported that intermittently food feels like it gets lodged     Elevated cholesterol     Full dentures     Instructed no adhesives the DOS    GERD (gastroesophageal reflux disease)     Headache     History of nuclear stress test     Patient reported this was done with Dr. Douglas around 2017 and that she was started on Metoprolol after testing.     Hypertension     Kidney disease     Patient reported \"I have chronic kidney disease and they say my kidney function is at 33%\" and that she has routine care with Dr. Calzada     Migraines     PVD (peripheral vascular disease)     Seasonal allergies     Spinal headache     Wears glasses      Current Outpatient Medications   Medication Instructions    amLODIPine (NORVASC) 5 mg, Oral, Daily    aspirin 325 mg, Daily    atorvastatin (LIPITOR) 40 mg, Oral, Daily    clopidogrel (PLAVIX) 75 mg, Oral, Daily    Dulaglutide (Trulicity) 1.5 MG/0.5ML solution pen-injector 1 each, Subcutaneous, Weekly    gabapentin (NEURONTIN) 600 mg, Oral, 3 Times Daily    gabapentin (NEURONTIN) 600 mg, Oral, 3 Times Daily    glipizide (GLUCOTROL) 20 mg, Every Morning    HYDROcodone-acetaminophen (NORCO) 5-325 MG " per tablet 1 tablet, Oral, Every 6 Hours PRN    insulin lispro protamine-insulin lispro (humaLOG 75-25) (75-25) 100 UNIT/ML suspension injection 60 Units, Subcutaneous, 2 Times Daily With Meals    levothyroxine (SYNTHROID, LEVOTHROID) 50 mcg, Oral, Daily    lidocaine (LIDODERM) 5 % 1 patch, Transdermal, Every 24 Hours, Remove & Discard patch within 12 hours or as directed by MD    metFORMIN ER (GLUCOPHAGE-XR) 750 mg, Oral, Daily With Breakfast    metoprolol succinate XL (TOPROL-XL) 50 mg, Oral, Daily    montelukast (SINGULAIR) 10 mg, Nightly    ondansetron ODT (ZOFRAN-ODT) 4 mg, Translingual, Every 8 Hours PRN    pantoprazole (PROTONIX) 40 mg, Oral, Daily    sertraline (ZOLOFT) 50 mg, Oral, Daily    Vitamin D-3 1,000 Units, Oral, Daily    vitamin D3 5,000 Units, Oral, Daily      PAST SURGICAL HISTORY  Past Surgical History:   Procedure Laterality Date    ABDOMINAL SURGERY  08/1984    Patient reported after complete hysterectomy, she had another procedure to remove tumors in the abdominal area    APPENDECTOMY      Reported removed when hysterectomy was done    CATARACT EXTRACTION W/ INTRAOCULAR LENS IMPLANT Right 6/21/2019    Procedure: CATARACT PHACO EXTRACTION WITH INTRAOCULAR LENS IMPLANT RIGHT;  Surgeon: Tee Enrique MD;  Location: [facility] OR;  Service: Ophthalmology    CATARACT EXTRACTION W/ INTRAOCULAR LENS IMPLANT Left 7/5/2019    Procedure: CATARACT PHACO EXTRACTION WITH INTRAOCULAR LENS IMPLANT LEFT;  Surgeon: Tee Enrique MD;  Location: [facility] OR;  Service: Ophthalmology    COLONOSCOPY      ELBOW EPICONDYLECTOMY Left 09/1990    ENDOSCOPY      HYSTERECTOMY  1983    Partial    HYSTERECTOMY  1984    Complete     MOUTH SURGERY      Full mouth extraction    VASCULAR SURGERY Bilateral     For PAD - Reported the right leg was done first around 2014 and the left was done around 2015       FAMILY HISTORY  History reviewed. No pertinent family history.    SOCIAL HISTORY  Social History      Socioeconomic History    Marital status: Single   Tobacco Use    Smoking status: Former     Current packs/day: 0.00     Average packs/day: 1.5 packs/day for 38.0 years (57.0 ttl pk-yrs)     Types: Cigarettes     Start date:      Quit date: 2008     Years since quittin.9    Smokeless tobacco: Never    Tobacco comments:     quit 13 yrs ago   Vaping Use    Vaping status: Never Used   Substance and Sexual Activity    Alcohol use: No    Drug use: No    Sexual activity: Defer     ALLERGIES  Lisinopril    REVIEW OF SYSTEMS  All systems reviewed and negative except for those discussed in HPI.     PHYSICAL EXAM  ED Triage Vitals   Temp Heart Rate Resp BP SpO2   24 1214 24 1214 24 1214 24 1216 24 1214   98.3 °F (36.8 °C) 112 22 (!) 193/103 98 %      Temp src Heart Rate Source Patient Position BP Location FiO2 (%)   -- -- -- -- --            I have reviewed the triage vital signs and nursing notes.    General: Alert.  Appears chronically ill, but nontoxic.  No acute distress.  Head: Normocephalic.  Atraumatic.  Eyes: No scleral icterus.  ENT: Dry mucous membranes.  Cardiovascular: Tachycardic.  Regular rhythm.  No murmurs.  No rubs.  2+ distal pulses bilaterally.  Respiratory: Equal breath sounds bilaterally. No wheezing. No rales.  No rhonchi.  GI: Abdomen is soft.  Nondistended.  Nontender to palpation.  No rebound.  No guarding.  No CVA tenderness.  MSK: Moves all 4 extremities.  Neurologic: Oriented x 3.  No focal deficits.  Skin: No edema. No erythema. No pallor. No cyanosis.  Psych: Normal mood and affect.    LAB RESULTS  Recent Results (from the past 24 hours)   Lipase    Collection Time: 24 12:23 PM    Specimen: Blood   Result Value Ref Range    Lipase 31 13 - 60 U/L   Comprehensive Metabolic Panel    Collection Time: 24 12:23 PM    Specimen: Blood   Result Value Ref Range    Glucose 363 (H) 65 - 99 mg/dL    BUN 54 (H) 8 - 23 mg/dL    Creatinine 3.19 (H) 0.57 -  1.00 mg/dL    Sodium 142 136 - 145 mmol/L    Potassium 4.3 3.5 - 5.2 mmol/L    Chloride 93 (L) 98 - 107 mmol/L    CO2 19.3 (L) 22.0 - 29.0 mmol/L    Calcium 10.9 (H) 8.6 - 10.5 mg/dL    Total Protein 9.4 (H) 6.0 - 8.5 g/dL    Albumin 5.0 3.5 - 5.2 g/dL    ALT (SGPT) 16 1 - 33 U/L    AST (SGOT) 22 1 - 32 U/L    Alkaline Phosphatase 148 (H) 39 - 117 U/L    Total Bilirubin 0.7 0.0 - 1.2 mg/dL    Globulin 4.4 gm/dL    A/G Ratio 1.1 g/dL    BUN/Creatinine Ratio 16.9 7.0 - 25.0    Anion Gap 29.7 (H) 5.0 - 15.0 mmol/L    eGFR 15.4 (L) >60.0 mL/min/1.73   Green Top (Gel)    Collection Time: 12/16/24 12:23 PM   Result Value Ref Range    Extra Tube Hold for add-ons.    Lavender Top    Collection Time: 12/16/24 12:23 PM   Result Value Ref Range    Extra Tube hold for add-on    Gold Top - SST    Collection Time: 12/16/24 12:23 PM   Result Value Ref Range    Extra Tube Hold for add-ons.    Light Blue Top    Collection Time: 12/16/24 12:23 PM   Result Value Ref Range    Extra Tube Hold for add-ons.    CBC Auto Differential    Collection Time: 12/16/24 12:23 PM    Specimen: Blood   Result Value Ref Range    WBC 19.28 (H) 3.40 - 10.80 10*3/mm3    RBC 5.45 (H) 3.77 - 5.28 10*6/mm3    Hemoglobin 16.6 (H) 12.0 - 15.9 g/dL    Hematocrit 50.6 (H) 34.0 - 46.6 %    MCV 92.8 79.0 - 97.0 fL    MCH 30.5 26.6 - 33.0 pg    MCHC 32.8 31.5 - 35.7 g/dL    RDW 13.9 12.3 - 15.4 %    RDW-SD 47.0 37.0 - 54.0 fl    MPV 10.5 6.0 - 12.0 fL    Platelets 373 140 - 450 10*3/mm3   Scan Slide    Collection Time: 12/16/24 12:23 PM    Specimen: Blood   Result Value Ref Range    Scan Slide     Manual Differential    Collection Time: 12/16/24 12:23 PM    Specimen: Blood   Result Value Ref Range    Neutrophil % 82.0 (H) 42.7 - 76.0 %    Lymphocyte % 16.0 (L) 19.6 - 45.3 %    Monocyte % 2.0 (L) 5.0 - 12.0 %    Neutrophils Absolute 15.81 (H) 1.70 - 7.00 10*3/mm3    Lymphocytes Absolute 3.08 0.70 - 3.10 10*3/mm3    Monocytes Absolute 0.39 0.10 - 0.90 10*3/mm3    RBC  Morphology Normal Normal    WBC Morphology Normal Normal    Platelet Estimate Adequate Normal   POC Glucose Once    Collection Time: 12/16/24 12:39 PM    Specimen: Blood   Result Value Ref Range    Glucose 333 (H) 70 - 130 mg/dL   Blood Gas, Venous With Co-Ox    Collection Time: 12/16/24  3:04 PM    Specimen: Venous Blood   Result Value Ref Range    Site IVC     pH, Venous 7.381 7.320 - 7.420 pH Units    pCO2, Venous 45.7 40.0 - 50.0 mm Hg    pO2, Venous 33.1 30.0 - 50.0 mm Hg    HCO3, Venous 27.1 22.0 - 28.0 mmol/L    Base Excess, Venous 1.4 0.0 - 2.0 mmol/L    O2 Saturation, Venous 66.0 45.0 - 75.0 %    Oxyhemoglobin Venous 64.9 40.0 - 70.0 %    Methemoglobin Venous 0.6 0.0 - 3.0 %    Carboxyhemoglobin Venous 1.0 0.0 - 5.0 %    Barometric Pressure for Blood Gas 736 mmHg    Modality Room Air     Ventilator Mode NA     Collected by [facility]        RADIOLOGY  CT Abdomen Pelvis Without Contrast    Result Date: 12/16/2024  PROCEDURE: CT ABDOMEN PELVIS WO CONTRAST-  HISTORY: Nausea, vomiting, leukocytosis, JOSE ALBERTO  COMPARISON: June 2021.  PROCEDURE: Axial images were obtained from the lung bases through the pubic symphysis without intravenous contrast.  FINDINGS:  ABDOMEN: There is streak artifact from patient's arm position. The lung bases are clear. The heart size is normal. There are vascular calcifications. The limited noncontrast images of the liver are normal. Gallbladder is unremarkable. There is no CT evidence of gallstones. The spleen is normal. No adrenal masses are seen.  The pancreas has an unremarkable unenhanced appearance. The aorta is normal in caliber. There is no significant free fluid or adenopathy.  There is bilateral nephrolithiasis. There is no hydronephrosis. There is colonic diverticulosis.  PELVIS: The appendix is not identified. Uterus is diminutive or absent. The urinary bladder is mostly collapsed. There is no significant fluid or adenopathy. There is no bony destructive lesion.      Bilateral  nephrolithiasis without hydronephrosis.  Colonic diverticulosis.     CTDI: 9.5 mGy DLP: 486.92 mGy.cm   This study was performed with techniques to keep radiation doses as low as reasonably achievable (ALARA). Individualized dose reduction techniques using automated exposure control or adjustment of mA and/or kV according to the patient size were employed.     Images were reviewed, interpreted, and dictated by Dr. Nadia Coleman MD Transcribed by Chelo Diallo PA-C.  This report was signed and finalized on 12/16/2024 2:10 PM by Nadia Coleman MD.       PROCEDURES  Critical Care    Performed by: Tomas White DO  Authorized by: Tomas White DO    Comments:      CRITICAL CARE PROCEDURE NOTE  Authorized and performed by: Dr. White    Total critical care time: Approximately 35 minutes.    Patient was critically ill due to: Acute kidney injury    Interventions: IV fluids, close airway/mental status/hemodynamic monitoring.    Due to a high probability of clinically significant, life threatening deterioration, the patient required my highest level of preparedness to intervene emergently and I personally spent this critical care time directly and personally managing the patient.  This critical care time included obtaining a history; examining the patient; pulse oximetry; ordering and review of studies; arranging urgent treatment with development of a management plan; evaluation of patient's response to treatment; frequent reassessment; and, discussions with other providers.    This critical care time was performed to assess and manage the high probability of imminent, life-threatening deterioration that could result in multiorgan failure.  It was exclusive of separately billable procedures, treating other patients and teaching time.    Please see MDM section and the rest of the note for further information on patient assessment and interventions.      RISK STRATIFICATION    MEDICAL DECISION MAKING  69 y.o. female  with past medical history listed above who presents with hyperglycemia, nausea, vomiting.    Patient arrives via EMS.  I have reviewed the EMS documentation/notes and included that information in my HPI.    Vital signs remarkable for tachycardia and hypertension, otherwise within normal limits.  Pulse ox 95% on room air.    Based on clinical presentation and physical exam, differential diagnosis includes, but is not limited to, metabolic derangement, DKA, dehydration, intra-abdominal infection/obstruction.    At least 3 different tests have been ordered on this patient.    Please see ED course below for my interpretation of the ED workup.  ED Course as of 12/16/24 1532   Mon Dec 16, 2024   1514 I reviewed the labs listed above. Notable findings are highlighted below.    Old laboratory data was reviewed from the medical records and compared to today's results.   [JS]   1514 CBC & Differential(!) [JS]   1514 WBC(!): 19.28 [JS]   1514 Hemoglobin(!): 16.6 [JS]   1514 Comprehensive Metabolic Panel(!) [JS]   1514 Glucose(!): 363 [JS]   1514 Creatinine(!): 3.19  Baseline 1.4. [JS]   1514 Anion Gap(!): 29.7 [JS]   1514 Blood Gas, Venous With Co-Ox [JS]   1514 pH, Venous: 7.381 [JS]   1514 pCO2, Venous: 45.7 [JS]   1514 HCO3, Venous: 27.1 [JS]   1514 CT Abdomen Pelvis Without Contrast  I have independently reviewed and interpreted the CT abdomen pelvis.  My interpretation is negative for small bowel obstruction.   [JS]      ED Course User Index  [JS] Tomas White DO     Medications administered in ED:  Medications   insulin glargine (LANTUS, SEMGLEE) injection 1-200 Units (has no administration in time range)   insulin lispro (humaLOG) injection 1-200 Units (has no administration in time range)   insulin lispro (humaLOG) injection 1-200 Units (has no administration in time range)   dextrose (GLUTOSE) oral gel 15 g (has no administration in time range)   dextrose (D50W) (25 g/50 mL) IV injection 10-50 mL (has no  administration in time range)   Glucagon (GLUCAGEN) injection 1 mg (has no administration in time range)   sodium chloride 0.9 % bolus 1,000 mL (0 mL Intravenous Stopped 12/16/24 1505)   ondansetron (ZOFRAN) injection 4 mg (4 mg Intravenous Given 12/16/24 1231)   metoclopramide (REGLAN) injection 5 mg (5 mg Intravenous Given 12/16/24 1328)   diphenhydrAMINE (BENADRYL) injection 25 mg (25 mg Intravenous Given 12/16/24 1329)     On re-evaluation, patient resting comfortably.  Vital signs remained stable on room air.  Patient will require medical admission for further workup and management.  I discussed the findings of the ED workup with the patient including my recommendation for admission.  Patient agreeable with plan and disposition.    Case discussed with Dr. Kerley (hospitalist) who agrees to evaluate and admit the patient.  We discussed the HPI, pertinent PMHx, ED course and workup.    Chronic conditions affecting care: Diabetes    Social determinants of health impacting treatment or disposition: None    REPEAT VITAL SIGNS  AS OF 15:32 EST VITALS:  BP - 162/100  HR - 110  TEMP - 98.3 °F (36.8 °C)  O2 SATS - 95%    DIAGNOSIS  Final diagnoses:   Acute kidney injury   Hyperglycemia   Dehydration     DISPOSITION  ED Disposition       ED Disposition   Decision to Admit    Condition   --    Comment   Level of Care: Telemetry [5]   Diagnosis: Acute kidney injury superimposed on chronic kidney disease [6801123]   Admitting Physician: KERLEY, BRIAN JOSEPH [[id number]]   Attending Physician: KERLEY, BRIAN JOSEPH [[id number]]               Please note that portions of this document were completed with voice recognition software.        Tomas White DO  12/17/24 0629      Electronically signed by Tomas White DO at 12/17/24 0629       Vital Signs (last day)       Date/Time Temp Temp src Pulse Resp BP Patient Position SpO2    12/19/24 0303 99 (37.2) Oral 89 16 181/81 Lying --    12/18/24 1856 99 (37.2) Oral 89 16 157/64 Lying  --    12/18/24 1236 -- -- -- -- 178/84 Lying --    12/18/24 1113 98.3 (36.8) Oral 89 18 196/88 Lying 98    12/18/24 0401 98.3 (36.8) Oral 85 18 141/71 Lying 94          Current Facility-Administered Medications   Medication Dose Route Frequency Provider Last Rate Last Admin    amLODIPine (NORVASC) tablet 5 mg  5 mg Oral Q24H Jerman Calzada MD, FASUMER        aspirin EC tablet 81 mg  81 mg Oral Daily Gerald Zhao MD   81 mg at 12/17/24 1032    Calcium Replacement - Follow Nurse / BPA Driven Protocol   Not Applicable PRN Kerley, Brian Joseph, DO        clopidogrel (PLAVIX) tablet 75 mg  75 mg Oral Daily Gerald Zhao MD   75 mg at 12/17/24 1032    dextrose (D50W) (25 g/50 mL) IV injection 10-50 mL  10-50 mL Intravenous Q15 Min PRN Kerley, Brian Joseph, DO        dextrose (GLUTOSE) oral gel 15 g  15 g Oral Q15 Min PRN Kerley, Brian Joseph, DO        Glucagon (GLUCAGEN) injection 1 mg  1 mg Intramuscular Q15 Min PRN Kerley, Brian Joseph, DO        heparin (porcine) 5000 UNIT/ML injection 5,000 Units  5,000 Units Subcutaneous Q12H Kerley, Brian Joseph, DO   5,000 Units at 12/18/24 2110    HYDROcodone-acetaminophen (NORCO) 5-325 MG per tablet 1 tablet  1 tablet Oral Q6H PRN Gerald Zhao MD        insulin glargine (LANTUS, SEMGLEE) injection 1-200 Units  1-200 Units Subcutaneous BID - Glucommander Kerley, Brian Joseph, DO   7 Units at 12/18/24 2110    insulin lispro (humaLOG) injection 1-200 Units  1-200 Units Subcutaneous 4x Daily With Meals & Nightly Kerley, Brian Joseph, DO   1 Units at 12/18/24 2111    insulin lispro (humaLOG) injection 1-200 Units  1-200 Units Subcutaneous PRN Kerley, Brian Joseph, DO        levothyroxine (SYNTHROID, LEVOTHROID) tablet 50 mcg  50 mcg Oral Daily Gerald Zhao MD   50 mcg at 12/17/24 1032    Magnesium Standard Dose Replacement - Follow Nurse / BPA Driven Protocol   Not Applicable PRN Kerley, Brian Joseph, DO        metoclopramide (REGLAN) injection 10 mg  10 mg Intravenous Q6H Cece,  MD Gerald   10 mg at 12/19/24 0631    metoprolol succinate XL (TOPROL-XL) 24 hr tablet 50 mg  50 mg Oral Daily Gerald Zhao MD   50 mg at 12/17/24 1031    nitroglycerin (NITROSTAT) SL tablet 0.4 mg  0.4 mg Sublingual Q5 Min PRN Kerley, Brian Joseph, DO        ondansetron ODT (ZOFRAN-ODT) disintegrating tablet 4 mg  4 mg Translingual Q6H PRN Gerald Zhao MD   4 mg at 12/17/24 0947    pantoprazole (PROTONIX) EC tablet 40 mg  40 mg Oral Daily Gerald Zhao MD   40 mg at 12/17/24 1031    Phosphorus Replacement - Follow Nurse / BPA Driven Protocol   Not Applicable PRN Kerley, Brian Joseph, DO        prochlorperazine (COMPAZINE) injection 5 mg  5 mg Intravenous Q6H PRN William Velázquez DO   5 mg at 12/18/24 2115    Or    prochlorperazine (COMPAZINE) tablet 5 mg  5 mg Oral Q6H PRN William Veálzquez DO   5 mg at 12/17/24 0626    Or    prochlorperazine (COMPAZINE) suppository 25 mg  25 mg Rectal Q12H PRN William Velázquez DO        promethazine (PHENERGAN) 12.5 mg in sodium chloride 0.9 % 50 mL  12.5 mg Intravenous Q6H PRN Gerald Zhao MD   Stopped at 12/17/24 1645    sertraline (ZOLOFT) tablet 50 mg  50 mg Oral Daily Gerald Zhao MD   50 mg at 12/17/24 1032    sodium chloride 0.9 % flush 10 mL  10 mL Intravenous Q12H Kerley, Brian Joseph, DO   10 mL at 12/18/24 2111    sodium chloride 0.9 % flush 10 mL  10 mL Intravenous PRN Kerley, Brian Joseph, DO   10 mL at 12/17/24 0841    sodium chloride 0.9 % infusion 40 mL  40 mL Intravenous PRN Kerley, Brian Joseph, DO         Lab Results (last 24 hours)       Procedure Component Value Units Date/Time    Basic Metabolic Panel [[id number]]  (Abnormal) Collected: 12/19/24 0603    Specimen: Blood Updated: 12/19/24 0633     Glucose 334 mg/dL      BUN 34 mg/dL      Creatinine 1.39 mg/dL      Sodium 138 mmol/L      Potassium 4.2 mmol/L      Chloride 102 mmol/L      CO2 24.4 mmol/L      Calcium 9.5 mg/dL      BUN/Creatinine Ratio 24.5     Anion Gap 11.6 mmol/L      eGFR 41.7 mL/min/1.73      Narrative:      GFR Categories in Chronic Kidney Disease (CKD)      GFR Category          GFR (mL/min/1.73)    Interpretation  G1                     90 or greater         Normal or high (1)  G2                      60-89                Mild decrease (1)  G3a                   45-59                Mild to moderate decrease  G3b                   30-44                Moderate to severe decrease  G4                    15-29                Severe decrease  G5                    14 or less           Kidney failure          (1)In the absence of evidence of kidney disease, neither GFR category G1 or G2 fulfill the criteria for CKD.    eGFR calculation  CKD-EPI creatinine equation, which does not include race as a factor    POC Glucose Once [[id number]]  (Abnormal) Collected: 24    Specimen: Blood Updated: 24 2053     Glucose 193 mg/dL      Comment: Serial Number: [id number]:  [id number]       POC Glucose Once [[id number]]  (Abnormal) Collected: 24 1639    Specimen: Blood Updated: 24 1649     Glucose 237 mg/dL      Comment: Serial Number: [id number]:  [id number]       POC Glucose Once [[id number]]  (Abnormal) Collected: 24 1124    Specimen: Blood Updated: 24 1134     Glucose 201 mg/dL      Comment: Serial Number: [id number]:  [id number]       POC Glucose 4x Daily Before Meals & at Bedtime [[id number]]  (Abnormal) Collected: 24 0712    Specimen: Blood Updated: 24 0725     Glucose 171 mg/dL      Comment: Serial Number: [id number]:  [id number]             Imaging Results (Last 24 Hours)       ** No results found for the last 24 hours. **             Physician Progress Notes (last 72 hours)        Gerald Zhao MD at 24 1612                [facility]    [facility]    Name:  Viola Barlow   Age:  69 y.o.  Sex:  female  :  1955  MRN:  [id number]   Visit Number:  [id number]  Admission Date:  2024  Date Of  Service:  12/18/24  Primary Care Physician:  Jyoti Tomlinson APRN     LOS: 0 days :    Chief Complaint:      Nausea and vomiting.    Subjective:    Viola Barlow patient was seen and examined this morning.  She continues to have nausea and is unable to take any p.o. medications and diet.  Her renal function however has improved with IV hydration.  Denies any fevers, chest pain or shortness of breath.    Hospital Course:    Viola Barlow is a 69-year-old woman with past medical history of type 2 diabetes, hypertension, CKD, nephrolithiasis, diverticulosis, DARELL, hyperlipidemia, peripheral vascular disease, migraines, arthritis.  Presented to [facility] ED on 12/16/2024 with concern nausea and nonbloody vomiting for 4 days, hyperglycemia in the 400s.  She has been taking insulin as prescribed, no missed doses.  Denied fevers or chills, chest pain, shortness of air, back pain, leg pain or swelling.       ED summary: Afebrile, tachycardic, tachypneic, hypertensive which improved, other vital signs stable room air.  ABG normal.  Chloride 93, CO2 19, anion gap 29, BUN 54, creatinine 3.19, EGFR 15, blood glucose 363, calcium 10, alk phos 148, total protein 9.4.  Lipase 31.  Leukocytosis 19.  Hemoglobin 16.  CT ab/pelvis bilateral nephrolithiasis without hydronephrosis, chronic diverticulosis.  She was provided Benadryl, Reglan, Zofran, 1 L NS bolus.    Patient was continued on IV fluids and antiemetics.  She was also continued on Protonix.  Renal function improved with IV hydration.  Patient has been a long-term diabetic and likely has underlying diabetic gastroparesis which could be contributing to her nausea and vomiting symptoms.  She was initiated on Reglan therapy.    Review of Systems:     All systems were reviewed and negative except as mentioned in subjective, assessment and plan.    Vital Signs:    Temp:  [98.3 °F (36.8 °C)-99.3 °F (37.4 °C)] 98.3 °F (36.8 °C)  Heart Rate:  [85-92] 89  Resp:  [16-18] 18  BP:  (141-196)/(71-88) 178/84    Intake and output:    I/O last 3 completed shifts:  In: 2911.7 [I.V.:2861.7; IV Piggyback:50]  Out: 1100 [Urine:1100]  I/O this shift:  In: 240 [P.O.:240]  Out: -     Physical Examination:    General Appearance:  Alert and cooperative.    Head:  Atraumatic and normocephalic.   Eyes: Conjunctivae and sclerae normal, no icterus. No pallor.   Throat: No oral lesions, no thrush, oral mucosa moist.   Neck: Supple, trachea midline, no thyromegaly.   Lungs:   Breath sounds heard bilaterally equally.  No wheezing or crackles. No Pleural rub or bronchial breathing.   Heart:  Normal S1 and S2, no murmur, no gallop, no rub. No JVD.   Abdomen:   Normal bowel sounds, no masses, no organomegaly. Soft, epigastric tenderness noted, nondistended, no rebound tenderness.   Extremities: Supple, no edema, no cyanosis, no clubbing.   Skin: No bleeding or rash.   Neurologic: Alert and oriented x 3. No facial asymmetry. Moves all four limbs. No tremors.    Laboratory results:    Results from last 7 days   Lab Units 12/18/24  0539 12/17/24  0533 12/16/24  1618 12/16/24  1223   SODIUM mmol/L 143 143 142 142   POTASSIUM mmol/L 3.8 4.3 4.6 4.3   CHLORIDE mmol/L 107 106 101 93*   CO2 mmol/L 22.0 21.6* 22.6 19.3*   BUN mg/dL 37* 50* 56* 54*   CREATININE mg/dL 1.46* 2.05* 2.74* 3.19*   CALCIUM mg/dL 9.7 9.2 9.8 10.9*   BILIRUBIN mg/dL 0.8  --   --  0.7   ALK PHOS U/L 131*  --   --  148*   ALT (SGPT) U/L 10  --   --  16   AST (SGOT) U/L 19  --   --  22   GLUCOSE mg/dL 145* 218* 333* 363*     Results from last 7 days   Lab Units 12/18/24  0539 12/17/24  0533 12/16/24  1223   WBC 10*3/mm3 14.36* 17.08* 19.28*   HEMOGLOBIN g/dL 16.0* 14.5 16.6*   HEMATOCRIT % 49.2* 46.8* 50.6*   PLATELETS 10*3/mm3 247 261 373       I have reviewed the patient's laboratory results.    Radiology results:    No radiology results from the last 24 hrs    Medication Review:     I have reviewed the patient's active and prn medications.      Problem List:      Acute kidney injury superimposed on chronic kidney disease    Type 2 diabetes mellitus with nephropathy    Essential hypertension    Chronic kidney disease, stage III (moderate)    Assessment:    Acute gastroenteritis with suspected diabetic gastroparesis, POA.  Dehydration, POA.  Acute renal failure secondary to #2, POA.  Chronic kidney disease stage III.  Diabetes mellitus type 2 with nephropathy.  Essential hypertension.  Peripheral vascular disease.  Acquired hypothyroidism.    Plan:    Acute gastroenteritis.  - Patient does have history of GERD and does currently have epigastric tenderness.  - She may have underlying diabetic gastroparesis and we will place her on Reglan.  - Continue Protonix, Zofran, Phenergan.    Acute renal failure.  - Secondary to dehydration but has improved with IV hydration.  - She is being followed by Dr. Calzada and I have discussed the patient's treatment plan with him.    Diabetes mellitus type 2.  - Continue subcutaneous insulin protocol for Glucomander.  - Hemoglobin A1c 8.4 on 2024.    Discussed with nursing staff at the bedside.    I have reviewed the copied text and it is accurate as of 24    DVT Prophylaxis: Heparin  Code Status: Full  Diet: Cardiac diabetic  Discharge Plan: Hopefully, home in 1 to 2 days.    Gerald Zhao MD  24  16:12 EST    Dictated utilizing Dragon dictation.      Electronically signed by Gerald Zhao MD at 24 1614       Jerman Calzada MD, JUSTINO at 24 0724                [facility] Progress Note  [facility]. [place]        Patient Name: Viola Barlow  : 1955  MRN: [id number]   LOS: 0 days    Patient Care Team:  Jyoti Tomlinson APRN as PCP - General (Family Medicine)  Live Donnelly MD    Chief Complaint:    Chief Complaint   Patient presents with    Hyperglycemia     Primary Care Physician:  Jyoti Tomlinson APRN  Date of admission:  12/16/2024    Subjective     Interval History:   Follow-up Acute Kidney Injury on chronic kidney disease 3b and associated problems.   Patient still having nausea and dry heaves today.  She does feel little better than yesterday.  She says she has constipation.  Last bowel movement was 4 to 5 days ago.  She denies shortness of breath or chest pain.  She wants to go home.  Events noted from last 24 hours.  I reviewed the chart and other providers notes, labs and procedures done since my last note.    Review of Systems:   As noted above.    Objective     Vitals:   Temp:  [98.3 °F (36.8 °C)-99.3 °F (37.4 °C)] 98.3 °F (36.8 °C)  Heart Rate:  [85-92] 89  Resp:  [16-18] 18  BP: (141-196)/(71-88) 178/84    Intake/Output Summary (Last 24 hours) at 12/18/2024 1501  Last data filed at 12/18/2024 1212  Gross per 24 hour   Intake 1268.33 ml   Output --   Net 1268.33 ml       Physical Exam:    General Appearance: alert, oriented x 3, no acute distress   Skin: warm and dry  HEENT: oral mucosa normal, nonicteric sclera  Neck: supple, no JVD  Lungs: CTA  Heart: RRR, normal S1 and S2,+ murmur  Abdomen: obese, soft, nontender, non distended and positive bowel sounds.  : no palpable bladder  Extremities: Trace edema, legs are tender to palpation, no cyanosis or clubbing  Neuro: normal speech and mental status     Scheduled Meds:     Current Facility-Administered Medications   Medication Dose Route Frequency Provider Last Rate Last Admin    amLODIPine (NORVASC) tablet 5 mg  5 mg Oral Q24H Jerman Calzada MD, JUSTINO        aspirin EC tablet 81 mg  81 mg Oral Daily Gerald Zhao MD   81 mg at 12/17/24 1032    Calcium Replacement - Follow Nurse / BPA Driven Protocol   Not Applicable PRN Kerley, Brian Joseph, DO        clopidogrel (PLAVIX) tablet 75 mg  75 mg Oral Daily Gerald Zhao MD   75 mg at 12/17/24 1032    dextrose (D50W) (25 g/50 mL) IV injection 10-50 mL  10-50 mL Intravenous Q15 Min PRN Kerley, Brian Joseph, DO        dextrose  (GLUTOSE) oral gel 15 g  15 g Oral Q15 Min PRN Kerley, Brian Joseph, DO        Glucagon (GLUCAGEN) injection 1 mg  1 mg Intramuscular Q15 Min PRN Kerley, Brian Joseph, DO        heparin (porcine) 5000 UNIT/ML injection 5,000 Units  5,000 Units Subcutaneous Q12H Kerley, Brian Joseph, DO   5,000 Units at 12/18/24 0848    HYDROcodone-acetaminophen (NORCO) 5-325 MG per tablet 1 tablet  1 tablet Oral Q6H PRN Gerald Zhao MD        insulin glargine (LANTUS, SEMGLEE) injection 1-200 Units  1-200 Units Subcutaneous BID - Glucommander Kerley, Brian Joseph, DO   7 Units at 12/17/24 2028    insulin lispro (humaLOG) injection 1-200 Units  1-200 Units Subcutaneous 4x Daily With Meals & Nightly Kerley, Brian Joseph, DO   1 Units at 12/18/24 1232    insulin lispro (humaLOG) injection 1-200 Units  1-200 Units Subcutaneous PRN Kerley, Brian Joseph, DO        levothyroxine (SYNTHROID, LEVOTHROID) tablet 50 mcg  50 mcg Oral Daily Gerald Zhao MD   50 mcg at 12/17/24 1032    Magnesium Standard Dose Replacement - Follow Nurse / BPA Driven Protocol   Not Applicable PRN Kerley, Brian Joseph, DO        metoclopramide (REGLAN) injection 10 mg  10 mg Intravenous Q6H Gerald Zhao MD   10 mg at 12/18/24 1231    metoprolol succinate XL (TOPROL-XL) 24 hr tablet 50 mg  50 mg Oral Daily Gerald Zhao MD   50 mg at 12/17/24 1031    nitroglycerin (NITROSTAT) SL tablet 0.4 mg  0.4 mg Sublingual Q5 Min PRN Kerley, Brian Joseph, DO        ondansetron ODT (ZOFRAN-ODT) disintegrating tablet 4 mg  4 mg Translingual Q6H PRN Gerald Zhao MD   4 mg at 12/17/24 0947    pantoprazole (PROTONIX) EC tablet 40 mg  40 mg Oral Daily Gerald Zhao MD   40 mg at 12/17/24 1031    Phosphorus Replacement - Follow Nurse / BPA Driven Protocol   Not Applicable PRN Kerley, Brian Joseph, DO        prochlorperazine (COMPAZINE) injection 5 mg  5 mg Intravenous Q6H PRN William Velázquez DO   5 mg at 12/18/24 1132    Or    prochlorperazine (COMPAZINE) tablet 5 mg  5 mg Oral  Q6H PRN William Velázquez, DO   5 mg at 12/17/24 0626    Or    prochlorperazine (COMPAZINE) suppository 25 mg  25 mg Rectal Q12H PRN William Velázquez,         promethazine (PHENERGAN) 12.5 mg in sodium chloride 0.9 % 50 mL  12.5 mg Intravenous Q6H PRN Gerald Zhao MD   Stopped at 12/17/24 1645    sertraline (ZOLOFT) tablet 50 mg  50 mg Oral Daily Gerald Zhao MD   50 mg at 12/17/24 1032    sodium chloride 0.9 % flush 10 mL  10 mL Intravenous Q12H Kerley, Brian Joseph, DO   10 mL at 12/17/24 0805    sodium chloride 0.9 % flush 10 mL  10 mL Intravenous PRN Kerley, Brian Joseph, DO   10 mL at 12/17/24 0841    sodium chloride 0.9 % infusion 40 mL  40 mL Intravenous PRN Kerley, Brian Joseph,         sodium chloride 0.9 % infusion  100 mL/hr Intravenous Continuous Kerley, Brian Joseph,  mL/hr at 12/18/24 1438 100 mL/hr at 12/18/24 1438       amLODIPine, 5 mg, Oral, Q24H  aspirin, 81 mg, Oral, Daily  clopidogrel, 75 mg, Oral, Daily  heparin (porcine), 5,000 Units, Subcutaneous, Q12H  insulin glargine, 1-200 Units, Subcutaneous, BID - Glucommander  insulin lispro, 1-200 Units, Subcutaneous, 4x Daily With Meals & Nightly  levothyroxine, 50 mcg, Oral, Daily  metoclopramide, 10 mg, Intravenous, Q6H  metoprolol succinate XL, 50 mg, Oral, Daily  pantoprazole, 40 mg, Oral, Daily  sertraline, 50 mg, Oral, Daily  sodium chloride, 10 mL, Intravenous, Q12H        IV Meds:   sodium chloride, 100 mL/hr, Last Rate: 100 mL/hr (12/18/24 1438)        Results Reviewed:   I have personally reviewed the results from the time of this admission to 12/18/2024 15:01 EST     Results from last 7 days   Lab Units 12/18/24  0539 12/17/24  0533 12/16/24  1618 12/16/24  1223   SODIUM mmol/L 143 143 142 142   POTASSIUM mmol/L 3.8 4.3 4.6 4.3   CHLORIDE mmol/L 107 106 101 93*   CO2 mmol/L 22.0 21.6* 22.6 19.3*   BUN mg/dL 37* 50* 56* 54*   CREATININE mg/dL 1.46* 2.05* 2.74* 3.19*   CALCIUM mg/dL 9.7 9.2 9.8 10.9*   BILIRUBIN mg/dL 0.8  --   --   "0.7   ALK PHOS U/L 131*  --   --  148*   ALT (SGPT) U/L 10  --   --  16   AST (SGOT) U/L 19  --   --  22   GLUCOSE mg/dL 145* 218* 333* 363*       Estimated Creatinine Clearance: 27.7 mL/min (A) (by C-G formula based on SCr of 1.46 mg/dL (H)).                Results from last 7 days   Lab Units 12/18/24  0539 12/17/24  0533 12/16/24  1223   WBC 10*3/mm3 14.36* 17.08* 19.28*   HEMOGLOBIN g/dL 16.0* 14.5 16.6*   PLATELETS 10*3/mm3 247 261 373             Brief Urine Lab Results       None            No results found for: \"UTPCR\"    Imaging Results (Last 24 Hours)       ** No results found for the last 24 hours. **                Assessment / Plan     ASSESSMENT:    Acute kidney injury superimposed on chronic kidney disease    Type 2 diabetes mellitus with nephropathy    Essential hypertension    Chronic kidney disease, stage III (moderate)    JOSE ALBERTO on CKD 3B: Patient's baseline GFR is 37-43.  Patient came in with GFR 15.4, currently back back to baseline at 39.3.  Suspect dehydration due to nausea with vomiting, not eating or drinking x 5 days.  Electrolytes and bicarb appears to be stable.  Nausea and vomiting: Most likely has diabetic gastroparesis, will benefit from Reglan.  Hyperglycemia with type 2 diabetes: Last hemoglobin A1c 8.4% on 12/16/2024.  Hypertension: BP previously was 174/92.  Currently 141/71.  Patient dehydrated.  Normally in clinic she runs about 110 systolic and says she runs the same at home.  Continue watch closely if blood pressure stays high we will restart her home dose of amlodipine.      PLAN:  Patient currently on normal saline 100 mL/h.  We will go ahead and stop it after the current bag is done.  Renal function is almost getting close to baseline.  I will go ahead and start her on Reglan 5 mg p.o. 3 times daily.  Appears to be helping to some extent.  Blood pressure appears to be still running on the high side, amlodipine 5 mg have been started we will see if that will help with the blood " pressure, she is not getting any more fluids that likely will help as well.  Details were discussed with the patient no family in the room.    Details were also discussed with the hospitalist service and or other providers as needed.   Continue with rest of the current treatment plan, and monitor with surveillance labs.  Further recommendations will depend on clinical course of the patient during the current hospitalization.   I have reviewed the copied text to this note, it was edited and the changes made as needed.  It is accurate to the point, when the note was signed today.     Thank you for involving us in the care of Viola Barlow.  Please feel free to call with any questions.    Jerman Calzada MD, WHITNEYN  24  15:01 [facility]    [facility]  [phone]      Part of this note may be an electronic transcription/translation of spoken language to printed text using the Dragon Dictation System.                   Electronically signed by Jerman Calzada MD, JUSTINO at 24 1502       Gerald Zhao MD at 24 1727                [facility]    Name:  Viola Barlow   Age:  69 y.o.  Sex:  female  :  1955  MRN:  [id number]   Visit Number:  [id number]  Admission Date:  2024  Date Of Service:  24  Primary Care Physician:  Jyoti Tomlinson APRN     LOS: 0 days :    Chief Complaint:      Nausea and vomiting.    Subjective:    Viola Barlow patient was seen and examined this afternoon.  She was boarded at night in the emergency room due to lack of beds.  She is now on the floor floor.  She is currently sitting up by the side of the bed and states that she is nauseous.  Zofran and Compazine did not work.  We will place her on Phenergan.  She is currently on IV fluids and her creatinine level has improved.  Previous physician documentation, laboratory and imaging data have been reviewed.    [facility]  Course:    Viola Barlow is a 69-year-old woman with past medical history of type 2 diabetes, hypertension, CKD, nephrolithiasis, diverticulosis, DARELL, hyperlipidemia, peripheral vascular disease, migraines, arthritis.  Presented to [facility] ED on 12/16/2024 with concern nausea and nonbloody vomiting for 4 days, hyperglycemia in the 400s.  She has been taking insulin as prescribed, no missed doses.  Denied fevers or chills, chest pain, shortness of air, back pain, leg pain or swelling.       ED summary: Afebrile, tachycardic, tachypneic, hypertensive which improved, other vital signs stable room air.  ABG normal.  Chloride 93, CO2 19, anion gap 29, BUN 54, creatinine 3.19, EGFR 15, blood glucose 363, calcium 10, alk phos 148, total protein 9.4.  Lipase 31.  Leukocytosis 19.  Hemoglobin 16.  CT ab/pelvis bilateral nephrolithiasis without hydronephrosis, chronic diverticulosis.  She was provided Benadryl, Reglan, Zofran, 1 L NS bolus.    Review of Systems:     All systems were reviewed and negative except as mentioned in subjective, assessment and plan.    Vital Signs:    Temp:  [98.1 °F (36.7 °C)-99.1 °F (37.3 °C)] 99.1 °F (37.3 °C)  Heart Rate:  [] 91  Resp:  [16-18] 18  BP: (116-201)/(58-97) 175/78    Intake and output:    I/O last 3 completed shifts:  In: 75 [I.V.:75]  Out: 400 [Urine:400]  I/O this shift:  In: 2408.3 [I.V.:2358.3; IV Piggyback:50]  Out: 700 [Urine:700]    Physical Examination:    General Appearance:  Alert and cooperative.    Head:  Atraumatic and normocephalic.   Eyes: Conjunctivae and sclerae normal, no icterus. No pallor.   Throat: No oral lesions, no thrush, oral mucosa moist.   Neck: Supple, trachea midline, no thyromegaly.   Lungs:   Breath sounds heard bilaterally equally.  No wheezing or crackles. No Pleural rub or bronchial breathing.   Heart:  Normal S1 and S2, no murmur, no gallop, no rub. No JVD.   Abdomen:   Normal bowel sounds, no masses, no organomegaly. Soft, epigastric  tenderness noted, nondistended, no rebound tenderness.   Extremities: Supple, no edema, no cyanosis, no clubbing.   Skin: No bleeding or rash.   Neurologic: Alert and oriented x 3. No facial asymmetry. Moves all four limbs. No tremors.    Laboratory results:    Results from last 7 days   Lab Units 12/17/24  0533 12/16/24  1618 12/16/24  1223   SODIUM mmol/L 143 142 142   POTASSIUM mmol/L 4.3 4.6 4.3   CHLORIDE mmol/L 106 101 93*   CO2 mmol/L 21.6* 22.6 19.3*   BUN mg/dL 50* 56* 54*   CREATININE mg/dL 2.05* 2.74* 3.19*   CALCIUM mg/dL 9.2 9.8 10.9*   BILIRUBIN mg/dL  --   --  0.7   ALK PHOS U/L  --   --  148*   ALT (SGPT) U/L  --   --  16   AST (SGOT) U/L  --   --  22   GLUCOSE mg/dL 218* 333* 363*     Results from last 7 days   Lab Units 12/17/24  0533 12/16/24  1223   WBC 10*3/mm3 17.08* 19.28*   HEMOGLOBIN g/dL 14.5 16.6*   HEMATOCRIT % 46.8* 50.6*   PLATELETS 10*3/mm3 261 373       I have reviewed the patient's laboratory results.    Radiology results:    CT Abdomen Pelvis Without Contrast    Result Date: 12/16/2024  PROCEDURE: CT ABDOMEN PELVIS WO CONTRAST-  HISTORY: Nausea, vomiting, leukocytosis, JOSE ALBERTO  COMPARISON: June 2021.  PROCEDURE: Axial images were obtained from the lung bases through the pubic symphysis without intravenous contrast.  FINDINGS:  ABDOMEN: There is streak artifact from patient's arm position. The lung bases are clear. The heart size is normal. There are vascular calcifications. The limited noncontrast images of the liver are normal. Gallbladder is unremarkable. There is no CT evidence of gallstones. The spleen is normal. No adrenal masses are seen.  The pancreas has an unremarkable unenhanced appearance. The aorta is normal in caliber. There is no significant free fluid or adenopathy.  There is bilateral nephrolithiasis. There is no hydronephrosis. There is colonic diverticulosis.  PELVIS: The appendix is not identified. Uterus is diminutive or absent. The urinary bladder is mostly  collapsed. There is no significant fluid or adenopathy. There is no bony destructive lesion.      Impression: Bilateral nephrolithiasis without hydronephrosis.  Colonic diverticulosis.     CTDI: 9.5 mGy DLP: 486.92 mGy.cm   This study was performed with techniques to keep radiation doses as low as reasonably achievable (ALARA). Individualized dose reduction techniques using automated exposure control or adjustment of mA and/or kV according to the patient size were employed.     Images were reviewed, interpreted, and dictated by Dr. Nadia Coleman MD Transcribed by Chelo Diallo PA-C.  This report was signed and finalized on 12/16/2024 2:10 PM by Nadia Coleman MD.     I have reviewed the patient's radiology reports.    Medication Review:     I have reviewed the patient's active and prn medications.     Problem List:      Acute kidney injury superimposed on chronic kidney disease    Type 2 diabetes mellitus with nephropathy    Essential hypertension    Chronic kidney disease, stage III (moderate)    Assessment:    Acute gastroenteritis, POA.  Dehydration, POA.  Acute renal failure secondary to #2, POA.  Chronic kidney disease stage III.  Diabetes mellitus type 2 with nephropathy.  Essential hypertension.  Peripheral vascular disease.  Acquired hypothyroidism.    Plan:    Acute gastroenteritis.  - Patient does have history of GERD and does currently have epigastric tenderness.  - Continue IV fluids for hydration.  - Continue Protonix, Zofran, Phenergan.    Acute renal failure.  - Secondary to dehydration but has improved with IV hydration.  - Repeat renal function tomorrow.    Diabetes mellitus type 2.  - Continue subcutaneous insulin protocol for Glucomander.  - Hemoglobin A1c 8.4 on 12/16/2024.    Discussed with nursing staff and multidisciplinary team.    I have reviewed the copied text and it is accurate as of 12/17/24    DVT Prophylaxis: Heparin  Code Status: Full  Diet: Cardiac diabetic  Discharge Plan:  Hopefully, home in 2-3 days.    Gerald Zhao MD  12/17/24  17:32 EST    Dictated utilizing Dragon dictation.      Electronically signed by Gerald Zhao MD at 12/17/24 1732          Consult Notes (last 72 hours)        Jerman Calzada MD, FASN at 12/17/24 0758        Consult Orders    1. Inpatient Nephrology Consult [[id number]] ordered by Kerley, Brian Joseph, DO at 12/16/24 1528                         [facility]      Nephrology Consultation      Referring Provider:   No ref. provider found    Reason for Consultation:  Acute Kidney Injury on chronic kidney disease 3b and associated problems.      Subjective:  Chief complaint   Chief Complaint   Patient presents with    Hyperglycemia     History of present illness:    Viola Barlow is a 69-year-old woman with past medical history of type 2 diabetes, hypertension, CKD, nephrolithiasis, diverticulosis, DARELL, hyperlipidemia, peripheral vascular disease, migraines, arthritis.  Presented to [facility] ED on 12/16/2024 with concern nausea and nonbloody vomiting for 4 days, hyperglycemia in the 400s.  She has been taking insulin as prescribed, no missed doses.  Denied fevers or chills, chest pain, shortness of air, back pain, leg pain or swelling.    Patient currently is sitting up in the bed gagging herself with her finger trying to vomit.  She says she has really bad nausea and has been sick for 5 days at this point.  She is tearful while she talks about it.  She says she has not been eating or drinking for 5 days.  Patient was last seen in the nephrology clinic on 11/15/2024.  Labs were done on 11/18/2024, 3 days later.  GFR 43, glucose 194.  I have reviewed labs/imaging/records from this hospitalization, including ER staff and admitting/attending physicians H/P's and progress notes to establish a comprehensive understanding of this patient's clinical hospital course, as well as to establish plan of care appropriately.     Past Medical History:   Diagnosis Date  "   Arthritis     Cataract     Depression     Diabetes mellitus     Dysphagia     Patient reported that intermittently food feels like it gets lodged     Elevated cholesterol     Full dentures     Instructed no adhesives the DOS    GERD (gastroesophageal reflux disease)     Headache     History of nuclear stress test     Patient reported this was done with Dr. Douglas around 2017 and that she was started on Metoprolol after testing.     Hypertension     Kidney disease     Patient reported \"I have chronic kidney disease and they say my kidney function is at 33%\" and that she has routine care with Dr. Calzada     Migraines     PVD (peripheral vascular disease)     Seasonal allergies     Spinal headache     Wears glasses        Past Surgical History:   Procedure Laterality Date    ABDOMINAL SURGERY  08/1984    Patient reported after complete hysterectomy, she had another procedure to remove tumors in the abdominal area    APPENDECTOMY      Reported removed when hysterectomy was done    CATARACT EXTRACTION W/ INTRAOCULAR LENS IMPLANT Right 6/21/2019    Procedure: CATARACT PHACO EXTRACTION WITH INTRAOCULAR LENS IMPLANT RIGHT;  Surgeon: Tee Enrique MD;  Location: [facility] OR;  Service: Ophthalmology    CATARACT EXTRACTION W/ INTRAOCULAR LENS IMPLANT Left 7/5/2019    Procedure: CATARACT PHACO EXTRACTION WITH INTRAOCULAR LENS IMPLANT LEFT;  Surgeon: Tee Enrique MD;  Location: [facility] OR;  Service: Ophthalmology    COLONOSCOPY      ELBOW EPICONDYLECTOMY Left 09/1990    ENDOSCOPY      HYSTERECTOMY  1983    Partial    HYSTERECTOMY  1984    Complete     MOUTH SURGERY      Full mouth extraction    VASCULAR SURGERY Bilateral     For PAD - Reported the right leg was done first around 2014 and the left was done around 2015     History reviewed. No pertinent family history.  negative h/o ESRD     Social History     Tobacco Use    Smoking status: Former     Current packs/day: 0.00     Average packs/day: 1.5 packs/day for " 38.0 years (57.0 ttl pk-yrs)     Types: Cigarettes     Start date:      Quit date: 2008     Years since quittin.9    Smokeless tobacco: Never    Tobacco comments:     quit 13 yrs ago   Vaping Use    Vaping status: Never Used   Substance Use Topics    Alcohol use: No    Drug use: No     Home medications:   Prior to Admission Medications       Prescriptions Last Dose Informant Patient Reported? Taking?    aspirin  MG tablet 2024 Self Yes Yes    Take 1 tablet by mouth Daily.    atorvastatin (LIPITOR) 40 MG tablet 2024 Self Yes Yes    Take 1 tablet by mouth Daily.    clopidogrel (PLAVIX) 75 MG tablet 2024 Self Yes Yes    Take 1 tablet by mouth Daily.    gabapentin (NEURONTIN) 600 MG tablet 2024 Self Yes Yes    Take 1 tablet by mouth 3 (Three) Times a Day.    HYDROcodone-acetaminophen (NORCO) 5-325 MG per tablet Past Month  No Yes    Take 1 tablet by mouth Every 6 (Six) Hours As Needed for Moderate Pain.    insulin lispro protamine-insulin lispro (humaLOG 75-25) (75-25) 100 UNIT/ML suspension injection 12/15/2024  No Yes    Inject 60 Units under the skin into the appropriate area as directed 2 (Two) Times a Day With Meals.    levothyroxine (SYNTHROID, LEVOTHROID) 50 MCG tablet 2024 Self Yes Yes    Take 1 tablet by mouth Daily.    metoprolol succinate XL (TOPROL-XL) 50 MG 24 hr tablet 2024 Self Yes Yes    Take 1 tablet by mouth Daily.    ondansetron ODT (ZOFRAN-ODT) 4 MG disintegrating tablet Past Month  No Yes    Place 1 tablet on the tongue Every 8 (Eight) Hours As Needed for Nausea or Vomiting.    pantoprazole (PROTONIX) 40 MG EC tablet 2024 Self Yes Yes    Take 1 tablet by mouth Daily.    sertraline (ZOLOFT) 50 MG tablet 2024 Self Yes Yes    Take 1 tablet by mouth Daily.    amLODIPine (NORVASC) 5 MG tablet   No No    Take 1 tablet by mouth Daily.    Patient not taking:  Reported on 2023    Cholecalciferol (VITAMIN D-3) 1000 units capsule Unknown Self  Yes No    Take 1,000 Units by mouth Daily.    Dulaglutide (Trulicity) 1.5 MG/0.5ML solution pen-injector Unknown  Yes No    Inject 1.5 mg under the skin into the appropriate area as directed 1 (One) Time Per Week.    gabapentin (NEURONTIN) 600 MG tablet  Self Yes No    Take 600 mg by mouth 3 (Three) Times a Day.    glipiZIDE (GLUCOTROL) 10 MG tablet  Self Yes No    Take 20 mg by mouth Every Morning.    Patient not taking:  Reported on 2/17/2023    lidocaine (LIDODERM) 5 % Unknown  No No    Place 1 patch on the skin as directed by provider Daily. Remove & Discard patch within 12 hours or as directed by MD    metFORMIN ER (GLUCOPHAGE-XR) 750 MG 24 hr tablet Unknown Self Yes No    Take 1 tablet by mouth Daily With Breakfast.    montelukast (SINGULAIR) 10 MG tablet Unknown Self Yes No    Take 10 mg by mouth Every Night.    Patient not taking:  Reported on 2/17/2023    vitamin D3 125 MCG (5000 UT) capsule capsule Unknown  Yes No    Take 1 capsule by mouth Daily.          Emergency department medications:   Medications   sodium chloride 0.9 % flush 10 mL (10 mL Intravenous Given 12/16/24 2130)   sodium chloride 0.9 % flush 10 mL (has no administration in time range)   sodium chloride 0.9 % infusion 40 mL (has no administration in time range)   heparin (porcine) 5000 UNIT/ML injection 5,000 Units (5,000 Units Subcutaneous Given 12/16/24 2128)   nitroglycerin (NITROSTAT) SL tablet 0.4 mg (has no administration in time range)   Potassium Replacement - Follow Nurse / BPA Driven Protocol (has no administration in time range)   Magnesium Standard Dose Replacement - Follow Nurse / BPA Driven Protocol (has no administration in time range)   Phosphorus Replacement - Follow Nurse / BPA Driven Protocol (has no administration in time range)   Calcium Replacement - Follow Nurse / BPA Driven Protocol (has no administration in time range)   sodium chloride 0.9 % infusion (100 mL/hr Intravenous New Bag 12/17/24 9195)   insulin glargine  "(LANTUS, SEMGLEE) injection 1-200 Units (7 Units Subcutaneous Given 12/16/24 2129)   insulin lispro (humaLOG) injection 1-200 Units ( Subcutaneous Not Given 12/16/24 2130)   insulin lispro (humaLOG) injection 1-200 Units (has no administration in time range)   dextrose (GLUTOSE) oral gel 15 g (has no administration in time range)   dextrose (D50W) (25 g/50 mL) IV injection 10-50 mL (has no administration in time range)   Glucagon (GLUCAGEN) injection 1 mg (has no administration in time range)   sodium chloride 0.9 % infusion (0 mL/hr Intravenous Stopped 12/16/24 1927)   prochlorperazine (COMPAZINE) injection 5 mg ( Intravenous Not Given:  See Alt 12/17/24 0626)     Or   prochlorperazine (COMPAZINE) tablet 5 mg (5 mg Oral Given 12/17/24 0626)     Or   prochlorperazine (COMPAZINE) suppository 25 mg ( Rectal Not Given:  See Alt 12/17/24 0626)   sodium chloride 0.9 % bolus 1,000 mL (0 mL Intravenous Stopped 12/16/24 1505)   ondansetron (ZOFRAN) injection 4 mg (4 mg Intravenous Given 12/16/24 1231)   metoclopramide (REGLAN) injection 5 mg (5 mg Intravenous Given 12/16/24 1328)   diphenhydrAMINE (BENADRYL) injection 25 mg (25 mg Intravenous Given 12/16/24 1329)       Allergies:  Lisinopril    Review of Systems  14 point review of system were done and are negative except as mentioned in the history of present illness and assessment and plan.    Physical Exam:  Objective:  Vital Signs  /87   Pulse 89   Temp 98.3 °F (36.8 °C)   Resp 16   Ht 152.4 cm (60\")   Wt 52.2 kg (115 lb)   LMP  (LMP Unknown)   SpO2 95%   BMI 22.46 kg/m²   Objective    No intake/output data recorded.    Intake/Output Summary (Last 24 hours) at 12/17/2024 0733  Last data filed at 12/17/2024 0502  Gross per 24 hour   Intake 75 ml   Output 400 ml   Net -325 ml        Physical Exam     Constitutional: Awake, alert, tearful  Eyes: sclerae anicteric, no conjunctival injection  HEENT: mucous membranes dry  Neck: Supple, no thyromegaly, no " "lymphadenopathy, trachea midline, No JVD  Respiratory: Clear to auscultation bilaterally, nonlabored respirations   Cardiovascular: RRR, borderline tachycardic, + murmurs, rubs, or gallops.  Gastrointestinal: Positive bowel sounds, obese, soft, nontender, nondistended  Musculoskeletal: No edema, no clubbing or cyanosis  Psychiatric: Appropriate affect, cooperative  Neurologic: Oriented x 3, moving all extremities, Cranial Nerves grossly intact, speech clear  Skin: warm and dry, no rashes            Results Review:   Results from last 7 days   Lab Units 12/17/24  0533 12/16/24  1618 12/16/24  1223   SODIUM mmol/L 143 142 142   POTASSIUM mmol/L 4.3 4.6 4.3   CHLORIDE mmol/L 106 101 93*   CO2 mmol/L 21.6* 22.6 19.3*   BUN mg/dL 50* 56* 54*   CREATININE mg/dL 2.05* 2.74* 3.19*   CALCIUM mg/dL 9.2 9.8 10.9*   ALBUMIN g/dL  --   --  5.0   BILIRUBIN mg/dL  --   --  0.7   ALK PHOS U/L  --   --  148*   ALT (SGPT) U/L  --   --  16   AST (SGOT) U/L  --   --  22   GLUCOSE mg/dL 218* 333* 363*     Estimated Creatinine Clearance: 21.9 mL/min (A) (by C-G formula based on SCr of 2.05 mg/dL (H)).          Results from last 7 days   Lab Units 12/17/24  0533 12/16/24  1223   WBC 10*3/mm3 17.08* 19.28*   HEMOGLOBIN g/dL 14.5 16.6*   PLATELETS 10*3/mm3 261 373         Brief Urine Lab Results       None          No results found for: \"UTPCR\"  Imaging Results (Last 24 Hours)       Procedure Component Value Units Date/Time    CT Abdomen Pelvis Without Contrast [[id number]] Collected: 12/16/24 1409     Updated: 12/16/24 1412    Narrative:      PROCEDURE: CT ABDOMEN PELVIS WO CONTRAST-     HISTORY: Nausea, vomiting, leukocytosis, JOSE ALBERTO     COMPARISON: June 2021.     PROCEDURE: Axial images were obtained from the lung bases through the  pubic symphysis without intravenous contrast.     FINDINGS:     ABDOMEN: There is streak artifact from patient's arm position. The lung  bases are clear. The heart size is normal. There are " vascular  calcifications. The limited noncontrast images of the liver are normal.  Gallbladder is unremarkable. There is no CT evidence of gallstones. The  spleen is normal. No adrenal masses are seen.  The pancreas has an  unremarkable unenhanced appearance. The aorta is normal in caliber.  There is no significant free fluid or adenopathy.  There is bilateral  nephrolithiasis. There is no hydronephrosis. There is colonic  diverticulosis.     PELVIS: The appendix is not identified. Uterus is diminutive or absent.  The urinary bladder is mostly collapsed. There is no significant fluid  or adenopathy. There is no bony destructive lesion.       Impression:      Bilateral nephrolithiasis without hydronephrosis.     Colonic diverticulosis.              CTDI: 9.5 mGy  DLP: 486.92 mGy.cm        This study was performed with techniques to keep radiation doses as low  as reasonably achievable (ALARA). Individualized dose reduction  techniques using automated exposure control or adjustment of mA and/or  kV according to the patient size were employed.              Images were reviewed, interpreted, and dictated by Dr. Nadia Coleman MD  Transcribed by Chelo Diallo PA-C.     This report was signed and finalized on 12/16/2024 2:10 PM by Nadia Coleman MD.             heparin (porcine), 5,000 Units, Subcutaneous, Q12H  insulin glargine, 1-200 Units, Subcutaneous, BID - Glucommander  insulin lispro, 1-200 Units, Subcutaneous, 4x Daily With Meals & Nightly  sodium chloride, 10 mL, Intravenous, Q12H      sodium chloride, 100 mL/hr, Last Rate: 100 mL/hr (12/17/24 5479)  sodium chloride, 100 mL/hr, Last Rate: Stopped (12/16/24 1927)        Assessment/Plan:      Acute kidney injury superimposed on chronic kidney disease    JOSE ALBERTO on CKD 3B: Patient's baseline GFR is 37-43.  Patient came in with GFR 15.4, currently 26.1.  Suspect dehydration due to nausea with vomiting, not eating or drinking x 5 days.  Electrolytes and bicarb appears to  be stable.  Nausea and vomiting: Most likely has diabetic gastroparesis, will benefit from Reglan.  Hyperglycemia with type 2 diabetes: Last hemoglobin A1c 8.4% on 12/16/2024.  Hypertension: BP currently 174/92.  Patient dehydrated.  Normally in clinic she runs about 110 systolic and says she runs the same at home.  Continue watch closely if blood pressure stays high we will restart her home dose of amlodipine.      Risk and complexity: Moderate      Plan:  Patient currently on normal saline 100 mL/h.  I will go ahead and start her on Reglan 5 mg twice a day p.o.  Continue with rest of the current treatment plan and surveillance labs.  Details were discussed with the patient as well as family in the room.  Daughter at the bedside  Details were also discussed with the hospitalist service.   Further recommendations will depend on clinical course of the patient during the current hospitalization.    I also discussed the details with the nursing staff.  Rest as ordered.    In closing, I sincerely appreciate opportunity to participate in care of this patient. If I can be of any further assistance with the management of this patient, please don’t hesitate to contact me.    Jerman Calzada MD, JUSTINO    12/17/24  07:33 EST    Dictated using Dragon.              Electronically signed by Jerman Calzada MD, JUSTINO at 12/17/24 9529

## 2024-12-19 NOTE — PLAN OF CARE
Goal Outcome Evaluation:              Problem: Adult Inpatient Plan of Care  Goal: Plan of Care Review  12/19/2024 1047 by Mary Ellen Gorman RN  Outcome: Met  12/19/2024 1047 by Mary Ellen Gorman RN  Outcome: Met  Goal: Patient-Specific Goal (Individualized)  12/19/2024 1047 by Mary Ellen Gorman RN  Outcome: Met  12/19/2024 1047 by Mary Ellen Gorman RN  Outcome: Met  Goal: Absence of Hospital-Acquired Illness or Injury  12/19/2024 1047 by Mary Ellen Gorman RN  Outcome: Met  12/19/2024 1047 by Mary Ellen Gorman RN  Outcome: Met  Intervention: Prevent Skin Injury  Description: Perform a screening for skin injury risk, such as pressure or moisture-associated skin damage on admission and at regular intervals throughout hospital stay.  Keep all areas of skin (especially folds) clean and dry.  Maintain adequate skin hydration.  Relieve and redistribute pressure and protect bony prominences and skin at risk for injury; implement measures based on patient-specific risk factors.  Match turning and repositioning schedule to clinical condition.  Encourage weight shift frequently; assist with reposition if unable to complete independently.  Float heels off bed; avoid pressure on the Achilles tendon.  Keep skin free from extended contact with medical devices.  Optimize nutrition and hydration.  Encourage functional activity and mobility, as early as tolerated.  Use aids (e.g., slide boards, mechanical lift) during transfer.  Recent Flowsheet Documentation  Taken 12/19/2024 0805 by Mary Ellen Gorman RN  Body Position:   side-lying   left  Goal: Optimal Comfort and Wellbeing  12/19/2024 1047 by Mary Ellen Gorman RN  Outcome: Met  12/19/2024 1047 by Mary Ellen Gorman RN  Outcome: Met  Goal: Readiness for Transition of Care  12/19/2024 1047 by Mary Ellen Gorman RN  Outcome: Met  12/19/2024 1047 by Mary Ellen Gorman RN  Outcome: Met     Problem: Fall Injury Risk  Goal: Absence of Fall and Fall-Related  Injury  12/19/2024 1047 by Mary Ellen Gorman RN  Outcome: Met  12/19/2024 1047 by Mary Ellen Gorman RN  Outcome: Met     Problem: Electrolyte Imbalance  Goal: Electrolyte Balance  12/19/2024 1047 by Mary Ellen Gorman RN  Outcome: Met  12/19/2024 1047 by Mary Ellen Gorman RN  Outcome: Met     Problem: Comorbidity Management  Goal: Blood Glucose Level Within Target Range  12/19/2024 1047 by Mary Ellen Gorman RN  Outcome: Met  12/19/2024 1047 by Mary Ellen Gorman RN  Outcome: Met     Problem: Skin Injury Risk Increased  Goal: Skin Health and Integrity  12/19/2024 1047 by Mary Ellen Gorman RN  Outcome: Met  12/19/2024 1047 by Mary Ellen Gorman RN  Outcome: Met

## 2024-12-19 NOTE — PROGRESS NOTES
[facility] Progress Note  [facility]. [place]        Patient Name: Viola Barlow  : 1955  MRN: [id number]   LOS: 0 days    Patient Care Team:  Jyoti Tomlinson APRN as PCP - General (Family Medicine)  Live Donnelly MD    Chief Complaint:    Chief Complaint   Patient presents with    Hyperglycemia     Primary Care Physician:  Jyoti Tomlinson APRN  Date of admission: 2024    Subjective     Interval History:   Follow-up Acute Kidney Injury on chronic kidney disease 3b and associated problems.   Patient still having nausea.  Patient is afraid to take p.o. meds due to her nausea.  she denies shortness of breath or chest pain.  She wants to go home.  Events noted from last 24 hours.  I reviewed the chart and other providers notes, labs and procedures done since my last note.    Review of Systems:   As noted above.    Objective     Vitals:   Temp:  [98.3 °F (36.8 °C)-99 °F (37.2 °C)] 99 °F (37.2 °C)  Heart Rate:  [89] 89  Resp:  [16-18] 16  BP: (157-196)/(64-88) 181/81    Intake/Output Summary (Last 24 hours) at 2024 0642  Last data filed at 2024  Gross per 24 hour   Intake 450 ml   Output --   Net 450 ml       Physical Exam:    General Appearance: alert, oriented x 3, no acute distress   Skin: warm and dry  HEENT: oral mucosa normal, nonicteric sclera  Neck: supple, no JVD  Lungs: CTA  Heart: RRR, normal S1 and S2,+ murmur  Abdomen: obese, soft, nontender, non distended and positive bowel sounds.  : no palpable bladder  Extremities: No BLE edema, legs are tender to palpation, no cyanosis or clubbing  Neuro: normal speech and mental status     Scheduled Meds:     Current Facility-Administered Medications   Medication Dose Route Frequency Provider Last Rate Last Admin    amLODIPine (NORVASC) tablet 5 mg  5 mg Oral Q24H Jerman Calzada MD, FASN        aspirin EC tablet 81 mg  81 mg Oral Daily Gerald Zhao MD   81 mg at 24  1032    Calcium Replacement - Follow Nurse / BPA Driven Protocol   Not Applicable PRN Kerley, Brian Joseph,         clopidogrel (PLAVIX) tablet 75 mg  75 mg Oral Daily Gerald Zhao MD   75 mg at 12/17/24 1032    dextrose (D50W) (25 g/50 mL) IV injection 10-50 mL  10-50 mL Intravenous Q15 Min PRN Kerley, Brian Joseph,         dextrose (GLUTOSE) oral gel 15 g  15 g Oral Q15 Min PRN Kerley, Brian Joseph, DO        Glucagon (GLUCAGEN) injection 1 mg  1 mg Intramuscular Q15 Min PRN Kerley, Brian Joseph, DO        heparin (porcine) 5000 UNIT/ML injection 5,000 Units  5,000 Units Subcutaneous Q12H Kerley, Brian Joseph, DO   5,000 Units at 12/18/24 2110    HYDROcodone-acetaminophen (NORCO) 5-325 MG per tablet 1 tablet  1 tablet Oral Q6H PRN Gerald Zhao MD        insulin glargine (LANTUS, SEMGLEE) injection 1-200 Units  1-200 Units Subcutaneous BID - Glucommander Kerley, Brian Joseph, DO   7 Units at 12/18/24 2110    insulin lispro (humaLOG) injection 1-200 Units  1-200 Units Subcutaneous 4x Daily With Meals & Nightly Kerley, Brian Joseph, DO   1 Units at 12/18/24 2111    insulin lispro (humaLOG) injection 1-200 Units  1-200 Units Subcutaneous PRN Kerley, Brian Joseph, DO        levothyroxine (SYNTHROID, LEVOTHROID) tablet 50 mcg  50 mcg Oral Daily Gerald Zhao MD   50 mcg at 12/17/24 1032    Magnesium Standard Dose Replacement - Follow Nurse / BPA Driven Protocol   Not Applicable PRN Kerley, Brian Joseph, DO        metoclopramide (REGLAN) injection 10 mg  10 mg Intravenous Q6H Gerald Zhao MD   10 mg at 12/19/24 0631    metoprolol succinate XL (TOPROL-XL) 24 hr tablet 50 mg  50 mg Oral Daily Gerald Zhao MD   50 mg at 12/17/24 1031    nitroglycerin (NITROSTAT) SL tablet 0.4 mg  0.4 mg Sublingual Q5 Min PRN Kerley, Brian Joseph, DO        ondansetron ODT (ZOFRAN-ODT) disintegrating tablet 4 mg  4 mg Translingual Q6H PRN Gerald Zhao MD   4 mg at 12/17/24 0947    pantoprazole (PROTONIX) EC tablet 40 mg  40 mg Oral  Daily Gerald Zhao MD   40 mg at 12/17/24 1031    Phosphorus Replacement - Follow Nurse / BPA Driven Protocol   Not Applicable PRN Kerley, Brian Joseph, DO        prochlorperazine (COMPAZINE) injection 5 mg  5 mg Intravenous Q6H PRN William Velázquez DO   5 mg at 12/18/24 2115    Or    prochlorperazine (COMPAZINE) tablet 5 mg  5 mg Oral Q6H PRN William Velázquez, DO   5 mg at 12/17/24 0626    Or    prochlorperazine (COMPAZINE) suppository 25 mg  25 mg Rectal Q12H PRN William Velázquez DO        promethazine (PHENERGAN) 12.5 mg in sodium chloride 0.9 % 50 mL  12.5 mg Intravenous Q6H PRN Gerald Zhao MD   Stopped at 12/17/24 1645    sertraline (ZOLOFT) tablet 50 mg  50 mg Oral Daily Gerald Zhao MD   50 mg at 12/17/24 1032    sodium chloride 0.9 % flush 10 mL  10 mL Intravenous Q12H Kerley, Brian Joseph,    10 mL at 12/18/24 2111    sodium chloride 0.9 % flush 10 mL  10 mL Intravenous PRN Kerley, Brian Joseph,    10 mL at 12/17/24 0841    sodium chloride 0.9 % infusion 40 mL  40 mL Intravenous PRN Kerley, Brian Joseph, DO           amLODIPine, 5 mg, Oral, Q24H  aspirin, 81 mg, Oral, Daily  clopidogrel, 75 mg, Oral, Daily  heparin (porcine), 5,000 Units, Subcutaneous, Q12H  insulin glargine, 1-200 Units, Subcutaneous, BID - Glucommander  insulin lispro, 1-200 Units, Subcutaneous, 4x Daily With Meals & Nightly  levothyroxine, 50 mcg, Oral, Daily  metoclopramide, 10 mg, Intravenous, Q6H  metoprolol succinate XL, 50 mg, Oral, Daily  pantoprazole, 40 mg, Oral, Daily  sertraline, 50 mg, Oral, Daily  sodium chloride, 10 mL, Intravenous, Q12H        IV Meds:          Results Reviewed:   I have personally reviewed the results from the time of this admission to 12/19/2024 06:42 EST     Results from last 7 days   Lab Units 12/19/24  0603 12/18/24  0539 12/17/24  0533 12/16/24  1618 12/16/24  1223   SODIUM mmol/L 138 143 143   < > 142   POTASSIUM mmol/L 4.2 3.8 4.3   < > 4.3   CHLORIDE mmol/L 102 107 106   < > 93*   CO2 mmol/L  "24.4 22.0 21.6*   < > 19.3*   BUN mg/dL 34* 37* 50*   < > 54*   CREATININE mg/dL 1.39* 1.46* 2.05*   < > 3.19*   CALCIUM mg/dL 9.5 9.7 9.2   < > 10.9*   BILIRUBIN mg/dL  --  0.8  --   --  0.7   ALK PHOS U/L  --  131*  --   --  148*   ALT (SGPT) U/L  --  10  --   --  16   AST (SGOT) U/L  --  19  --   --  22   GLUCOSE mg/dL 334* 145* 218*   < > 363*    < > = values in this interval not displayed.       Estimated Creatinine Clearance: 28.5 mL/min (A) (by C-G formula based on SCr of 1.39 mg/dL (H)).                Results from last 7 days   Lab Units 12/18/24  0539 12/17/24  0533 12/16/24  1223   WBC 10*3/mm3 14.36* 17.08* 19.28*   HEMOGLOBIN g/dL 16.0* 14.5 16.6*   PLATELETS 10*3/mm3 247 261 373             Brief Urine Lab Results       None            No results found for: \"UTPCR\"    Imaging Results (Last 24 Hours)       ** No results found for the last 24 hours. **                Assessment / Plan     ASSESSMENT:    Acute kidney injury superimposed on chronic kidney disease    Type 2 diabetes mellitus with nephropathy    Essential hypertension    Chronic kidney disease, stage III (moderate)    Acute kidney failure, unspecified    JOSE ALBERTO on CKD 3B: Patient's baseline GFR is 37-43.  Patient came in with GFR 15.4, currently back back to baseline at 39.3.  Suspect dehydration due to nausea with vomiting, not eating or drinking x 5 days.  Electrolytes and bicarb appears to be stable.  Nausea and vomiting: Most likely has diabetic gastroparesis, will benefit from Reglan.  Hyperglycemia with type 2 diabetes: Last hemoglobin A1c 8.4% on 12/16/2024.  Hypertension: BP previously was 174/92.  Currently 181/81.  Patient dehydrated.  Normally in clinic she runs about 110 systolic and says she runs the same at home.  Continue watch closely if blood pressure stays high we will restart her home dose of amlodipine.      PLAN:  Renal function is back to baseline.  Patient not tolerating p.o. meds.  She is currently on Reglan 10 mg IV " every 6 hours.  Appears to be helping to some extent.  Blood pressure appears to be still running on the high side, I have increased amlodipine to 10 mg to see if that will help with the blood pressure, she is not getting any more fluids that likely will help as well. Patient not wanting to take her p.o. meds.  Will start clonidine patch, Catapres-TTS. 1  Details were discussed with the patient no family in the room.  We will schedule her follow-up within a week.  Details were also discussed with the hospitalist service and or other providers as needed.   Continue with rest of the current treatment plan, and monitor with surveillance labs.  Further recommendations will depend on clinical course of the patient during the current hospitalization.   I have reviewed the copied text to this note, it was edited and the changes made as needed.  It is accurate to the point, when the note was signed today.     Thank you for involving us in the care of Viola Barlow.  Please feel free to call with any questions.    Jerman Calzada MD, FASN  12/19/24  06:42 [facility]    [facility]  [phone]      Part of this note may be an electronic transcription/translation of spoken language to printed text using the Dragon Dictation System.

## 2024-12-19 NOTE — PAYOR COMM NOTE
"TO:BC  FROM:MARIANA GARCIA, RN PHONE [phone]  CLINICALS REF# [id number]    Viola Rodriguez (69 y.o. Female)       Date of Birth   1955    Social Security Number       Address   [address] [id number]    Home Phone   [phone]    MRN   [id number]       Confucianist   Synagogue    Marital Status   Single                            Admission Date   12/16/24    Admission Type   Emergency    Admitting Provider   Kerley, Brian Joseph, DO    Attending Provider   Gerald Zhao MD    Department, Room/Bed   [facility] TELEMETRY 4, 423/1       Discharge Date       Discharge Disposition       Discharge Destination                                 Attending Provider: Gerald Zhao MD    Allergies: Lisinopril    Isolation: None   Infection: None   Code Status: CPR    Ht: 152.4 cm (60\")   Wt: 46 kg (101 lb 6.6 oz)    Admission Cmt: None   Principal Problem: Acute kidney injury superimposed on chronic kidney disease [N17.9,N18.9]                   Active Insurance as of 12/16/2024       Primary Coverage       Payor Plan Insurance Group Employer/Plan Group    ANTHEM MEDICARE REPLACEMENT ANTHEM MEDICARE ADVANTAGE [id number]       Payor Plan Address Payor Plan Phone Number Payor Plan Fax Number Effective Dates    [address] [phone]  8/1/2022 - None Entered    [facility] [id number]         Subscriber Name Subscriber Birth Date Member ID       VIOLA RODRIGUEZ 1955 [id number]               Secondary Coverage       Payor Plan Insurance Group Employer/Plan Group    [place]        Payor Plan Address Payor Plan Phone Number Payor Plan Fax Number Effective Dates    [address] [phone]  1/23/2023 - None Entered    [facility] [id number]         Subscriber Name Subscriber Birth Date Member ID       VILOA RODRIGUEZ 1955 [id number]                     Emergency Contacts        (Rel.) Home Phone Work Phone Mobile Phone    " CainWilberLisa (Daughter) [phone] -- --    FIDEL STRONG (Son) -- -- [phone]                 History & Physical        Kerley, Brian Joseph, DO at 24 1523            [facility]   HISTORY AND PHYSICAL      Name:  Viola Barlow   Age:  69 y.o.  Sex:  female  :  1955  MRN:  [id number]   Visit Number:  [id number]  Admission Date:  2024  Date Of Service:  24  Primary Care Physician:  Jyoti Tomlinson APRN    Chief Complaint:     Nausea and vomiting, hyperglycemia    History Of Presenting Illness:      Viola Barlow is a 69-year-old woman with past medical history of type 2 diabetes, hypertension, CKD, nephrolithiasis, diverticulosis, DARELL, hyperlipidemia, peripheral vascular disease, migraines, arthritis.  Presented to [facility] ED on 2024 with concern nausea and nonbloody vomiting for 4 days, hyperglycemia in the 400s.  She has been taking insulin as prescribed, no missed doses.  Denied fevers or chills, chest pain, shortness of air, back pain, leg pain or swelling.      ED summary: Afebrile, tachycardic, tachypneic, hypertensive which improved, other vital signs stable room air.  ABG normal.  Chloride 93, CO2 19, anion gap 29, BUN 54, creatinine 3.19, EGFR 15, blood glucose 363, calcium 10, alk phos 148, total protein 9.4.  Lipase 31.  Leukocytosis 19.  Hemoglobin 16.  CT ab/pelvis bilateral nephrolithiasis without hydronephrosis, chronic diverticulosis.  She was provided Benadryl, Reglan, Zofran, 1 L NS bolus.    Review Of Systems:    All systems were reviewed and negative except as mentioned in history of presenting illness, assessment and plan.    Past Medical History: Patient  has a past medical history of Arthritis, Cataract, Depression, Diabetes mellitus, Dysphagia, Elevated cholesterol, Full dentures, GERD (gastroesophageal reflux disease), Headache, History of nuclear stress test, Hypertension, Kidney disease, Migraines, PVD (peripheral  vascular disease), Seasonal allergies, Spinal headache, and Wears glasses.    Past Surgical History: Patient  has a past surgical history that includes Mouth surgery; Hysterectomy (1983); Hysterectomy (1984); Abdominal surgery (08/1984); Elbow Epicondylectomy (Left, 09/1990); Vascular surgery (Bilateral); Colonoscopy; Esophagogastroduodenoscopy; Appendectomy; Cataract extraction w/ intraocular lens implant (Right, 6/21/2019); and Cataract extraction w/ intraocular lens implant (Left, 7/5/2019).    Social History: Patient  reports that she quit smoking about 16 years ago. Her smoking use included cigarettes. She started smoking about 54 years ago. She has a 57 pack-year smoking history. She has never used smokeless tobacco. She reports that she does not drink alcohol and does not use drugs.    Family History:  Patient's family history has been reviewed and found to be noncontributory.     Allergies:      Lisinopril    Home Medications:    Prior to Admission Medications       Prescriptions Last Dose Informant Patient Reported? Taking?    amLODIPine (NORVASC) 5 MG tablet   No No    Take 1 tablet by mouth Daily.    Patient not taking:  Reported on 2/17/2023    aspirin  MG tablet  Self Yes No    Take 325 mg by mouth Daily.    Patient not taking:  Reported on 2/17/2023    atorvastatin (LIPITOR) 40 MG tablet  Self Yes No    Take 40 mg by mouth Daily.    Cholecalciferol (VITAMIN D-3) 1000 units capsule  Self Yes No    Take 1,000 Units by mouth Daily.    clopidogrel (PLAVIX) 75 MG tablet  Self Yes No    Take 75 mg by mouth Daily.    Dulaglutide (Trulicity) 1.5 MG/0.5ML solution pen-injector   Yes No    Inject 1 each under the skin into the appropriate area as directed 1 (One) Time Per Week.    gabapentin (NEURONTIN) 600 MG tablet  Self Yes No    Take 600 mg by mouth 3 (Three) Times a Day.    gabapentin (NEURONTIN) 600 MG tablet  Self Yes No    Take 600 mg by mouth 3 (Three) Times a Day.    glipiZIDE (GLUCOTROL) 10 MG  tablet  Self Yes No    Take 20 mg by mouth Every Morning.    Patient not taking:  Reported on 2/17/2023    HYDROcodone-acetaminophen (NORCO) 5-325 MG per tablet   No No    Take 1 tablet by mouth Every 6 (Six) Hours As Needed for Moderate Pain.    insulin lispro protamine-insulin lispro (humaLOG 75-25) (75-25) 100 UNIT/ML suspension injection   No No    Inject 60 Units under the skin into the appropriate area as directed 2 (Two) Times a Day With Meals.    levothyroxine (SYNTHROID, LEVOTHROID) 50 MCG tablet  Self Yes No    Take 50 mcg by mouth Daily.    lidocaine (LIDODERM) 5 %   No No    Place 1 patch on the skin as directed by provider Daily. Remove & Discard patch within 12 hours or as directed by MD    metFORMIN ER (GLUCOPHAGE-XR) 750 MG 24 hr tablet  Self Yes No    Take 750 mg by mouth Daily With Breakfast.    metoprolol succinate XL (TOPROL-XL) 50 MG 24 hr tablet  Self Yes No    Take 50 mg by mouth Daily.    montelukast (SINGULAIR) 10 MG tablet  Self Yes No    Take 10 mg by mouth Every Night.    Patient not taking:  Reported on 2/17/2023    ondansetron ODT (ZOFRAN-ODT) 4 MG disintegrating tablet   No No    Place 1 tablet on the tongue Every 8 (Eight) Hours As Needed for Nausea or Vomiting.    pantoprazole (PROTONIX) 40 MG EC tablet  Self Yes No    Take 40 mg by mouth Daily.    sertraline (ZOLOFT) 50 MG tablet  Self Yes No    Take 50 mg by mouth Daily.    vitamin D3 125 MCG (5000 UT) capsule capsule   Yes No    Take 5,000 Units by mouth Daily.          ED Medications:    Medications   sodium chloride 0.9 % bolus 1,000 mL (0 mL Intravenous Stopped 12/16/24 1505)   ondansetron (ZOFRAN) injection 4 mg (4 mg Intravenous Given 12/16/24 1231)   metoclopramide (REGLAN) injection 5 mg (5 mg Intravenous Given 12/16/24 1328)   diphenhydrAMINE (BENADRYL) injection 25 mg (25 mg Intravenous Given 12/16/24 1329)     Vital Signs:  Temp:  [98.3 °F (36.8 °C)] 98.3 °F (36.8 °C)  Heart Rate:  [110-112] 110  Resp:  [22] 22  BP:  "(162-193)/(100-103) 162/100        12/16/24  1214   Weight: 52.2 kg (115 lb)     Body mass index is 22.46 kg/m².    Physical Exam:     Most recent vital Signs: /100 (BP Location: Right arm, Patient Position: Lying)   Pulse 110   Temp 98.3 °F (36.8 °C)   Resp 22   Ht 152.4 cm (60\")   Wt 52.2 kg (115 lb)   LMP  (LMP Unknown)   SpO2 95%   BMI 22.46 kg/m²     Physical Exam  Constitutional:       General: She is not in acute distress.     Appearance: She is not ill-appearing or toxic-appearing.   HENT:      Mouth/Throat:      Mouth: Mucous membranes are moist.   Eyes:      Extraocular Movements: Extraocular movements intact.   Cardiovascular:      Rate and Rhythm: Normal rate and regular rhythm.      Pulses: Normal pulses.      Heart sounds: Normal heart sounds.   Pulmonary:      Effort: Pulmonary effort is normal.      Breath sounds: Normal breath sounds.   Abdominal:      Palpations: Abdomen is soft.      Tenderness: There is no abdominal tenderness.   Musculoskeletal:      Right lower leg: No edema.      Left lower leg: No edema.   Skin:     General: Skin is warm.   Neurological:      General: No focal deficit present.      Mental Status: She is alert.   Psychiatric:         Mood and Affect: Mood normal.         Thought Content: Thought content normal.         Laboratory data:    I have reviewed the labs done in the emergency room.    Results from last 7 days   Lab Units 12/16/24  1223   SODIUM mmol/L 142   POTASSIUM mmol/L 4.3   CHLORIDE mmol/L 93*   CO2 mmol/L 19.3*   BUN mg/dL 54*   CREATININE mg/dL 3.19*   CALCIUM mg/dL 10.9*   BILIRUBIN mg/dL 0.7   ALK PHOS U/L 148*   ALT (SGPT) U/L 16   AST (SGOT) U/L 22   GLUCOSE mg/dL 363*     Results from last 7 days   Lab Units 12/16/24  1223   WBC 10*3/mm3 19.28*   HEMOGLOBIN g/dL 16.6*   HEMATOCRIT % 50.6*   PLATELETS 10*3/mm3 373                     Results from last 7 days   Lab Units 12/16/24  1223   LIPASE U/L 31               Invalid input(s): \"USDES\", " "\"NITRITITE\", \"BACT\", \"EP\"    Pain Management Panel  More data exists         Latest Ref Rng & Units 11/18/2022 8/16/2021   Pain Management Panel   Creatinine, Urine mg/dL - 86.7    Amphetamine, Urine Qual Negative Negative  -   Barbiturates Screen, Urine Negative Negative  -   Benzodiazepine Screen, Urine Negative Negative  -   Buprenorphine, Screen, Urine Negative Negative  -   Cocaine Screen, Urine Negative Negative  -   Methadone Screen , Urine Negative Negative  -   Methamphetamine, Ur Negative Negative  -      Details                     Radiology:    CT Abdomen Pelvis Without Contrast    Result Date: 12/16/2024  PROCEDURE: CT ABDOMEN PELVIS WO CONTRAST-  HISTORY: Nausea, vomiting, leukocytosis, JOSE ALBERTO  COMPARISON: June 2021.  PROCEDURE: Axial images were obtained from the lung bases through the pubic symphysis without intravenous contrast.  FINDINGS:  ABDOMEN: There is streak artifact from patient's arm position. The lung bases are clear. The heart size is normal. There are vascular calcifications. The limited noncontrast images of the liver are normal. Gallbladder is unremarkable. There is no CT evidence of gallstones. The spleen is normal. No adrenal masses are seen.  The pancreas has an unremarkable unenhanced appearance. The aorta is normal in caliber. There is no significant free fluid or adenopathy.  There is bilateral nephrolithiasis. There is no hydronephrosis. There is colonic diverticulosis.  PELVIS: The appendix is not identified. Uterus is diminutive or absent. The urinary bladder is mostly collapsed. There is no significant fluid or adenopathy. There is no bony destructive lesion.      Bilateral nephrolithiasis without hydronephrosis.  Colonic diverticulosis.     CTDI: 9.5 mGy DLP: 486.92 mGy.cm   This study was performed with techniques to keep radiation doses as low as reasonably achievable (ALARA). Individualized dose reduction techniques using automated exposure control or adjustment of mA and/or " kV according to the patient size were employed.     Images were reviewed, interpreted, and dictated by Dr. Nadia Coleman MD Transcribed by Chelo Diallo PA-C.  This report was signed and finalized on 2024 2:10 PM by Ndaia Coleman MD.       Assessment/Plan:    Observation general floor admission 2020 for nausea and vomiting, JOSE ALBERTO on CKD, hyperglycemia without metabolic acidosis.    Nausea and vomiting  JOSE ALBERTO on CKD  Nephrology consultation, recreations appreciated.  IVF.  At time of admission she was eating a sandwich, no difficulty with p.o.  Unclear trigger for her nausea and vomiting.  Said that she has been on Mounjaro for about a year and they reduced her dose recently.  Hyperglycemia  Subcutaneous insulin protocol.    Chronic: Type 2 diabetes, hypertension, CKD, nephrolithiasis, diverticulosis, DARELL, hyperlipidemia, peripheral vascular disease, migraines, arthritis.  Continue other home medications.    Risk Assessment: High  DVT Prophylaxis: Heparin  Code Status: Full code  Diet: Cardiac/diabetic    Advance Care Planning  ACP discussion was held with the patient during this visit. Patient does not have an advance directive, information provided.           Brian Joseph Kerley, DO  24  15:23 EST    Dictated utilizing Dragon dictation.    Electronically signed by Kerley, Brian Joseph, DO at 24 1756          Emergency Department Notes        Heriberto Dolan, Paramedic at 24 1704          Blood glucose 267mg/dl    Electronically signed by Heriberto Dolan, Paramedic at 24 1704       Tomas White DO at 24 1234        Procedure Orders    1. Critical Care [[id number]] ordered by Tomas White DO                        [facility]  Emergency Department Encounter  Emergency Medicine Physician Note       Pt Name: Viola Barlow  MRN: [id number]  Pt :   1955  Room Number:  10/10  Date of encounter:  2024  PCP: Jyoti Tomlinson APRN  ED  "Physician: Tomas White DO    HPI:  Viola Barlow is a 69 y.o. female who presents to the ED with chief complaint of HYPERGLYCEMIA.  Patient presents from home via EMS.  Reports that she has been experiencing nausea and vomiting for the past 4 days.  Also having elevated blood glucose levels in the 400s.  Past medical history of type 2 diabetes on chronic insulin.  States that she has been taking her insulin as prescribed.  No missed doses.  No fever, chills, chest pain, shortness of breath, back pain, problems with urination or bowel movements, leg pain or swelling.  Does report associated abdominal cramping.  POC glucose per .    PAST MEDICAL HISTORY  Past Medical History:   Diagnosis Date    Arthritis     Cataract     Depression     Diabetes mellitus     Dysphagia     Patient reported that intermittently food feels like it gets lodged     Elevated cholesterol     Full dentures     Instructed no adhesives the DOS    GERD (gastroesophageal reflux disease)     Headache     History of nuclear stress test     Patient reported this was done with Dr. Douglas around 2017 and that she was started on Metoprolol after testing.     Hypertension     Kidney disease     Patient reported \"I have chronic kidney disease and they say my kidney function is at 33%\" and that she has routine care with Dr. Calzada     Migraines     PVD (peripheral vascular disease)     Seasonal allergies     Spinal headache     Wears glasses      Current Outpatient Medications   Medication Instructions    amLODIPine (NORVASC) 5 mg, Oral, Daily    aspirin 325 mg, Daily    atorvastatin (LIPITOR) 40 mg, Oral, Daily    clopidogrel (PLAVIX) 75 mg, Oral, Daily    Dulaglutide (Trulicity) 1.5 MG/0.5ML solution pen-injector 1 each, Subcutaneous, Weekly    gabapentin (NEURONTIN) 600 mg, Oral, 3 Times Daily    gabapentin (NEURONTIN) 600 mg, Oral, 3 Times Daily    glipizide (GLUCOTROL) 20 mg, Every Morning    HYDROcodone-acetaminophen (NORCO) 5-325 MG " per tablet 1 tablet, Oral, Every 6 Hours PRN    insulin lispro protamine-insulin lispro (humaLOG 75-25) (75-25) 100 UNIT/ML suspension injection 60 Units, Subcutaneous, 2 Times Daily With Meals    levothyroxine (SYNTHROID, LEVOTHROID) 50 mcg, Oral, Daily    lidocaine (LIDODERM) 5 % 1 patch, Transdermal, Every 24 Hours, Remove & Discard patch within 12 hours or as directed by MD    metFORMIN ER (GLUCOPHAGE-XR) 750 mg, Oral, Daily With Breakfast    metoprolol succinate XL (TOPROL-XL) 50 mg, Oral, Daily    montelukast (SINGULAIR) 10 mg, Nightly    ondansetron ODT (ZOFRAN-ODT) 4 mg, Translingual, Every 8 Hours PRN    pantoprazole (PROTONIX) 40 mg, Oral, Daily    sertraline (ZOLOFT) 50 mg, Oral, Daily    Vitamin D-3 1,000 Units, Oral, Daily    vitamin D3 5,000 Units, Oral, Daily      PAST SURGICAL HISTORY  Past Surgical History:   Procedure Laterality Date    ABDOMINAL SURGERY  08/1984    Patient reported after complete hysterectomy, she had another procedure to remove tumors in the abdominal area    APPENDECTOMY      Reported removed when hysterectomy was done    CATARACT EXTRACTION W/ INTRAOCULAR LENS IMPLANT Right 6/21/2019    Procedure: CATARACT PHACO EXTRACTION WITH INTRAOCULAR LENS IMPLANT RIGHT;  Surgeon: Tee Enrique MD;  Location: [facility] OR;  Service: Ophthalmology    CATARACT EXTRACTION W/ INTRAOCULAR LENS IMPLANT Left 7/5/2019    Procedure: CATARACT PHACO EXTRACTION WITH INTRAOCULAR LENS IMPLANT LEFT;  Surgeon: Tee Enrique MD;  Location: [facility] OR;  Service: Ophthalmology    COLONOSCOPY      ELBOW EPICONDYLECTOMY Left 09/1990    ENDOSCOPY      HYSTERECTOMY  1983    Partial    HYSTERECTOMY  1984    Complete     MOUTH SURGERY      Full mouth extraction    VASCULAR SURGERY Bilateral     For PAD - Reported the right leg was done first around 2014 and the left was done around 2015       FAMILY HISTORY  History reviewed. No pertinent family history.    SOCIAL HISTORY  Social History      Socioeconomic History    Marital status: Single   Tobacco Use    Smoking status: Former     Current packs/day: 0.00     Average packs/day: 1.5 packs/day for 38.0 years (57.0 ttl pk-yrs)     Types: Cigarettes     Start date:      Quit date: 2008     Years since quittin.9    Smokeless tobacco: Never    Tobacco comments:     quit 13 yrs ago   Vaping Use    Vaping status: Never Used   Substance and Sexual Activity    Alcohol use: No    Drug use: No    Sexual activity: Defer     ALLERGIES  Lisinopril    REVIEW OF SYSTEMS  All systems reviewed and negative except for those discussed in HPI.     PHYSICAL EXAM  ED Triage Vitals   Temp Heart Rate Resp BP SpO2   24 1214 24 1214 24 1214 24 1216 24 1214   98.3 °F (36.8 °C) 112 22 (!) 193/103 98 %      Temp src Heart Rate Source Patient Position BP Location FiO2 (%)   -- -- -- -- --            I have reviewed the triage vital signs and nursing notes.    General: Alert.  Appears chronically ill, but nontoxic.  No acute distress.  Head: Normocephalic.  Atraumatic.  Eyes: No scleral icterus.  ENT: Dry mucous membranes.  Cardiovascular: Tachycardic.  Regular rhythm.  No murmurs.  No rubs.  2+ distal pulses bilaterally.  Respiratory: Equal breath sounds bilaterally. No wheezing. No rales.  No rhonchi.  GI: Abdomen is soft.  Nondistended.  Nontender to palpation.  No rebound.  No guarding.  No CVA tenderness.  MSK: Moves all 4 extremities.  Neurologic: Oriented x 3.  No focal deficits.  Skin: No edema. No erythema. No pallor. No cyanosis.  Psych: Normal mood and affect.    LAB RESULTS  Recent Results (from the past 24 hours)   Lipase    Collection Time: 24 12:23 PM    Specimen: Blood   Result Value Ref Range    Lipase 31 13 - 60 U/L   Comprehensive Metabolic Panel    Collection Time: 24 12:23 PM    Specimen: Blood   Result Value Ref Range    Glucose 363 (H) 65 - 99 mg/dL    BUN 54 (H) 8 - 23 mg/dL    Creatinine 3.19 (H) 0.57 -  1.00 mg/dL    Sodium 142 136 - 145 mmol/L    Potassium 4.3 3.5 - 5.2 mmol/L    Chloride 93 (L) 98 - 107 mmol/L    CO2 19.3 (L) 22.0 - 29.0 mmol/L    Calcium 10.9 (H) 8.6 - 10.5 mg/dL    Total Protein 9.4 (H) 6.0 - 8.5 g/dL    Albumin 5.0 3.5 - 5.2 g/dL    ALT (SGPT) 16 1 - 33 U/L    AST (SGOT) 22 1 - 32 U/L    Alkaline Phosphatase 148 (H) 39 - 117 U/L    Total Bilirubin 0.7 0.0 - 1.2 mg/dL    Globulin 4.4 gm/dL    A/G Ratio 1.1 g/dL    BUN/Creatinine Ratio 16.9 7.0 - 25.0    Anion Gap 29.7 (H) 5.0 - 15.0 mmol/L    eGFR 15.4 (L) >60.0 mL/min/1.73   Green Top (Gel)    Collection Time: 12/16/24 12:23 PM   Result Value Ref Range    Extra Tube Hold for add-ons.    Lavender Top    Collection Time: 12/16/24 12:23 PM   Result Value Ref Range    Extra Tube hold for add-on    Gold Top - SST    Collection Time: 12/16/24 12:23 PM   Result Value Ref Range    Extra Tube Hold for add-ons.    Light Blue Top    Collection Time: 12/16/24 12:23 PM   Result Value Ref Range    Extra Tube Hold for add-ons.    CBC Auto Differential    Collection Time: 12/16/24 12:23 PM    Specimen: Blood   Result Value Ref Range    WBC 19.28 (H) 3.40 - 10.80 10*3/mm3    RBC 5.45 (H) 3.77 - 5.28 10*6/mm3    Hemoglobin 16.6 (H) 12.0 - 15.9 g/dL    Hematocrit 50.6 (H) 34.0 - 46.6 %    MCV 92.8 79.0 - 97.0 fL    MCH 30.5 26.6 - 33.0 pg    MCHC 32.8 31.5 - 35.7 g/dL    RDW 13.9 12.3 - 15.4 %    RDW-SD 47.0 37.0 - 54.0 fl    MPV 10.5 6.0 - 12.0 fL    Platelets 373 140 - 450 10*3/mm3   Scan Slide    Collection Time: 12/16/24 12:23 PM    Specimen: Blood   Result Value Ref Range    Scan Slide     Manual Differential    Collection Time: 12/16/24 12:23 PM    Specimen: Blood   Result Value Ref Range    Neutrophil % 82.0 (H) 42.7 - 76.0 %    Lymphocyte % 16.0 (L) 19.6 - 45.3 %    Monocyte % 2.0 (L) 5.0 - 12.0 %    Neutrophils Absolute 15.81 (H) 1.70 - 7.00 10*3/mm3    Lymphocytes Absolute 3.08 0.70 - 3.10 10*3/mm3    Monocytes Absolute 0.39 0.10 - 0.90 10*3/mm3    RBC  Morphology Normal Normal    WBC Morphology Normal Normal    Platelet Estimate Adequate Normal   POC Glucose Once    Collection Time: 12/16/24 12:39 PM    Specimen: Blood   Result Value Ref Range    Glucose 333 (H) 70 - 130 mg/dL   Blood Gas, Venous With Co-Ox    Collection Time: 12/16/24  3:04 PM    Specimen: Venous Blood   Result Value Ref Range    Site IVC     pH, Venous 7.381 7.320 - 7.420 pH Units    pCO2, Venous 45.7 40.0 - 50.0 mm Hg    pO2, Venous 33.1 30.0 - 50.0 mm Hg    HCO3, Venous 27.1 22.0 - 28.0 mmol/L    Base Excess, Venous 1.4 0.0 - 2.0 mmol/L    O2 Saturation, Venous 66.0 45.0 - 75.0 %    Oxyhemoglobin Venous 64.9 40.0 - 70.0 %    Methemoglobin Venous 0.6 0.0 - 3.0 %    Carboxyhemoglobin Venous 1.0 0.0 - 5.0 %    Barometric Pressure for Blood Gas 736 mmHg    Modality Room Air     Ventilator Mode NA     Collected by [facility]        RADIOLOGY  CT Abdomen Pelvis Without Contrast    Result Date: 12/16/2024  PROCEDURE: CT ABDOMEN PELVIS WO CONTRAST-  HISTORY: Nausea, vomiting, leukocytosis, JOSE ALBERTO  COMPARISON: June 2021.  PROCEDURE: Axial images were obtained from the lung bases through the pubic symphysis without intravenous contrast.  FINDINGS:  ABDOMEN: There is streak artifact from patient's arm position. The lung bases are clear. The heart size is normal. There are vascular calcifications. The limited noncontrast images of the liver are normal. Gallbladder is unremarkable. There is no CT evidence of gallstones. The spleen is normal. No adrenal masses are seen.  The pancreas has an unremarkable unenhanced appearance. The aorta is normal in caliber. There is no significant free fluid or adenopathy.  There is bilateral nephrolithiasis. There is no hydronephrosis. There is colonic diverticulosis.  PELVIS: The appendix is not identified. Uterus is diminutive or absent. The urinary bladder is mostly collapsed. There is no significant fluid or adenopathy. There is no bony destructive lesion.      Bilateral  nephrolithiasis without hydronephrosis.  Colonic diverticulosis.     CTDI: 9.5 mGy DLP: 486.92 mGy.cm   This study was performed with techniques to keep radiation doses as low as reasonably achievable (ALARA). Individualized dose reduction techniques using automated exposure control or adjustment of mA and/or kV according to the patient size were employed.     Images were reviewed, interpreted, and dictated by Dr. Nadia Coleman MD Transcribed by Chelo Diallo PA-C.  This report was signed and finalized on 12/16/2024 2:10 PM by Nadia Coleman MD.       PROCEDURES  Critical Care    Performed by: Tomas White DO  Authorized by: Tomas White DO    Comments:      CRITICAL CARE PROCEDURE NOTE  Authorized and performed by: Dr. White    Total critical care time: Approximately 35 minutes.    Patient was critically ill due to: Acute kidney injury    Interventions: IV fluids, close airway/mental status/hemodynamic monitoring.    Due to a high probability of clinically significant, life threatening deterioration, the patient required my highest level of preparedness to intervene emergently and I personally spent this critical care time directly and personally managing the patient.  This critical care time included obtaining a history; examining the patient; pulse oximetry; ordering and review of studies; arranging urgent treatment with development of a management plan; evaluation of patient's response to treatment; frequent reassessment; and, discussions with other providers.    This critical care time was performed to assess and manage the high probability of imminent, life-threatening deterioration that could result in multiorgan failure.  It was exclusive of separately billable procedures, treating other patients and teaching time.    Please see MDM section and the rest of the note for further information on patient assessment and interventions.      RISK STRATIFICATION    MEDICAL DECISION MAKING  69 y.o. female  with past medical history listed above who presents with hyperglycemia, nausea, vomiting.    Patient arrives via EMS.  I have reviewed the EMS documentation/notes and included that information in my HPI.    Vital signs remarkable for tachycardia and hypertension, otherwise within normal limits.  Pulse ox 95% on room air.    Based on clinical presentation and physical exam, differential diagnosis includes, but is not limited to, metabolic derangement, DKA, dehydration, intra-abdominal infection/obstruction.    At least 3 different tests have been ordered on this patient.    Please see ED course below for my interpretation of the ED workup.  ED Course as of 12/16/24 1532   Mon Dec 16, 2024   1514 I reviewed the labs listed above. Notable findings are highlighted below.    Old laboratory data was reviewed from the medical records and compared to today's results.   [JS]   1514 CBC & Differential(!) [JS]   1514 WBC(!): 19.28 [JS]   1514 Hemoglobin(!): 16.6 [JS]   1514 Comprehensive Metabolic Panel(!) [JS]   1514 Glucose(!): 363 [JS]   1514 Creatinine(!): 3.19  Baseline 1.4. [JS]   1514 Anion Gap(!): 29.7 [JS]   1514 Blood Gas, Venous With Co-Ox [JS]   1514 pH, Venous: 7.381 [JS]   1514 pCO2, Venous: 45.7 [JS]   1514 HCO3, Venous: 27.1 [JS]   1514 CT Abdomen Pelvis Without Contrast  I have independently reviewed and interpreted the CT abdomen pelvis.  My interpretation is negative for small bowel obstruction.   [JS]      ED Course User Index  [JS] Tomas White DO     Medications administered in ED:  Medications   insulin glargine (LANTUS, SEMGLEE) injection 1-200 Units (has no administration in time range)   insulin lispro (humaLOG) injection 1-200 Units (has no administration in time range)   insulin lispro (humaLOG) injection 1-200 Units (has no administration in time range)   dextrose (GLUTOSE) oral gel 15 g (has no administration in time range)   dextrose (D50W) (25 g/50 mL) IV injection 10-50 mL (has no  administration in time range)   Glucagon (GLUCAGEN) injection 1 mg (has no administration in time range)   sodium chloride 0.9 % bolus 1,000 mL (0 mL Intravenous Stopped 12/16/24 1505)   ondansetron (ZOFRAN) injection 4 mg (4 mg Intravenous Given 12/16/24 1231)   metoclopramide (REGLAN) injection 5 mg (5 mg Intravenous Given 12/16/24 1328)   diphenhydrAMINE (BENADRYL) injection 25 mg (25 mg Intravenous Given 12/16/24 1329)     On re-evaluation, patient resting comfortably.  Vital signs remained stable on room air.  Patient will require medical admission for further workup and management.  I discussed the findings of the ED workup with the patient including my recommendation for admission.  Patient agreeable with plan and disposition.    Case discussed with Dr. Kerley (hospitalist) who agrees to evaluate and admit the patient.  We discussed the HPI, pertinent PMHx, ED course and workup.    Chronic conditions affecting care: Diabetes    Social determinants of health impacting treatment or disposition: None    REPEAT VITAL SIGNS  AS OF 15:32 EST VITALS:  BP - 162/100  HR - 110  TEMP - 98.3 °F (36.8 °C)  O2 SATS - 95%    DIAGNOSIS  Final diagnoses:   Acute kidney injury   Hyperglycemia   Dehydration     DISPOSITION  ED Disposition       ED Disposition   Decision to Admit    Condition   --    Comment   Level of Care: Telemetry [5]   Diagnosis: Acute kidney injury superimposed on chronic kidney disease [3436855]   Admitting Physician: KERLEY, BRIAN JOSEPH [[id number]]   Attending Physician: KERLEY, BRIAN JOSEPH [[id number]]               Please note that portions of this document were completed with voice recognition software.        Tomas White DO  12/17/24 0629      Electronically signed by Tomas White DO at 12/17/24 0629       Vital Signs (last day)       Date/Time Temp Temp src Pulse Resp BP Patient Position SpO2    12/19/24 0303 99 (37.2) Oral 89 16 181/81 Lying --    12/18/24 1856 99 (37.2) Oral 89 16 157/64 Lying  --    12/18/24 1236 -- -- -- -- 178/84 Lying --    12/18/24 1113 98.3 (36.8) Oral 89 18 196/88 Lying 98    12/18/24 0401 98.3 (36.8) Oral 85 18 141/71 Lying 94          Current Facility-Administered Medications   Medication Dose Route Frequency Provider Last Rate Last Admin    amLODIPine (NORVASC) tablet 5 mg  5 mg Oral Q24H Jerman Calzada MD, FASUMER        aspirin EC tablet 81 mg  81 mg Oral Daily Gerald Zhao MD   81 mg at 12/17/24 1032    Calcium Replacement - Follow Nurse / BPA Driven Protocol   Not Applicable PRN Kerley, Brian Joseph, DO        clopidogrel (PLAVIX) tablet 75 mg  75 mg Oral Daily Gerald hZao MD   75 mg at 12/17/24 1032    dextrose (D50W) (25 g/50 mL) IV injection 10-50 mL  10-50 mL Intravenous Q15 Min PRN Kerley, Brian Joseph, DO        dextrose (GLUTOSE) oral gel 15 g  15 g Oral Q15 Min PRN Kerley, Brian Joseph, DO        Glucagon (GLUCAGEN) injection 1 mg  1 mg Intramuscular Q15 Min PRN Kerley, Brian Joseph, DO        heparin (porcine) 5000 UNIT/ML injection 5,000 Units  5,000 Units Subcutaneous Q12H Kerley, Brian Joseph, DO   5,000 Units at 12/18/24 2110    HYDROcodone-acetaminophen (NORCO) 5-325 MG per tablet 1 tablet  1 tablet Oral Q6H PRN Gerald Zhao MD        insulin glargine (LANTUS, SEMGLEE) injection 1-200 Units  1-200 Units Subcutaneous BID - Glucommander Kerley, Brian Joseph, DO   7 Units at 12/18/24 2110    insulin lispro (humaLOG) injection 1-200 Units  1-200 Units Subcutaneous 4x Daily With Meals & Nightly Kerley, Brian Joseph, DO   1 Units at 12/18/24 2111    insulin lispro (humaLOG) injection 1-200 Units  1-200 Units Subcutaneous PRN Kerley, Brian Joseph, DO        levothyroxine (SYNTHROID, LEVOTHROID) tablet 50 mcg  50 mcg Oral Daily Gerald Zhao MD   50 mcg at 12/17/24 1032    Magnesium Standard Dose Replacement - Follow Nurse / BPA Driven Protocol   Not Applicable PRN Kerley, Brian Joseph, DO        metoclopramide (REGLAN) injection 10 mg  10 mg Intravenous Q6H Cece,  MD Gerald   10 mg at 12/19/24 0631    metoprolol succinate XL (TOPROL-XL) 24 hr tablet 50 mg  50 mg Oral Daily Gerald Zhao MD   50 mg at 12/17/24 1031    nitroglycerin (NITROSTAT) SL tablet 0.4 mg  0.4 mg Sublingual Q5 Min PRN Kerley, Brian Joseph, DO        ondansetron ODT (ZOFRAN-ODT) disintegrating tablet 4 mg  4 mg Translingual Q6H PRN Gerald Zhao MD   4 mg at 12/17/24 0947    pantoprazole (PROTONIX) EC tablet 40 mg  40 mg Oral Daily Gerald Zhao MD   40 mg at 12/17/24 1031    Phosphorus Replacement - Follow Nurse / BPA Driven Protocol   Not Applicable PRN Kerley, Brian Joseph, DO        prochlorperazine (COMPAZINE) injection 5 mg  5 mg Intravenous Q6H PRN William Velázquez DO   5 mg at 12/18/24 2115    Or    prochlorperazine (COMPAZINE) tablet 5 mg  5 mg Oral Q6H PRN William Velázquez DO   5 mg at 12/17/24 0626    Or    prochlorperazine (COMPAZINE) suppository 25 mg  25 mg Rectal Q12H PRN William Velázquez DO        promethazine (PHENERGAN) 12.5 mg in sodium chloride 0.9 % 50 mL  12.5 mg Intravenous Q6H PRN Gerald Zhao MD   Stopped at 12/17/24 1645    sertraline (ZOLOFT) tablet 50 mg  50 mg Oral Daily Gerald Zhao MD   50 mg at 12/17/24 1032    sodium chloride 0.9 % flush 10 mL  10 mL Intravenous Q12H Kerley, Brian Joseph, DO   10 mL at 12/18/24 2111    sodium chloride 0.9 % flush 10 mL  10 mL Intravenous PRN Kerley, Brian Joseph, DO   10 mL at 12/17/24 0841    sodium chloride 0.9 % infusion 40 mL  40 mL Intravenous PRN Kerley, Brian Joseph, DO         Lab Results (last 24 hours)       Procedure Component Value Units Date/Time    Basic Metabolic Panel [[id number]]  (Abnormal) Collected: 12/19/24 0603    Specimen: Blood Updated: 12/19/24 0633     Glucose 334 mg/dL      BUN 34 mg/dL      Creatinine 1.39 mg/dL      Sodium 138 mmol/L      Potassium 4.2 mmol/L      Chloride 102 mmol/L      CO2 24.4 mmol/L      Calcium 9.5 mg/dL      BUN/Creatinine Ratio 24.5     Anion Gap 11.6 mmol/L      eGFR 41.7 mL/min/1.73      Narrative:      GFR Categories in Chronic Kidney Disease (CKD)      GFR Category          GFR (mL/min/1.73)    Interpretation  G1                     90 or greater         Normal or high (1)  G2                      60-89                Mild decrease (1)  G3a                   45-59                Mild to moderate decrease  G3b                   30-44                Moderate to severe decrease  G4                    15-29                Severe decrease  G5                    14 or less           Kidney failure          (1)In the absence of evidence of kidney disease, neither GFR category G1 or G2 fulfill the criteria for CKD.    eGFR calculation  CKD-EPI creatinine equation, which does not include race as a factor    POC Glucose Once [[id number]]  (Abnormal) Collected: 24    Specimen: Blood Updated: 24 2053     Glucose 193 mg/dL      Comment: Serial Number: [id number]:  [id number]       POC Glucose Once [[id number]]  (Abnormal) Collected: 24 1639    Specimen: Blood Updated: 24 1649     Glucose 237 mg/dL      Comment: Serial Number: [id number]:  [id number]       POC Glucose Once [[id number]]  (Abnormal) Collected: 24 1124    Specimen: Blood Updated: 24 1134     Glucose 201 mg/dL      Comment: Serial Number: [id number]:  [id number]       POC Glucose 4x Daily Before Meals & at Bedtime [[id number]]  (Abnormal) Collected: 24 0712    Specimen: Blood Updated: 24 0725     Glucose 171 mg/dL      Comment: Serial Number: [id number]:  [id number]             Imaging Results (Last 24 Hours)       ** No results found for the last 24 hours. **             Physician Progress Notes (last 72 hours)        Gerald Zhao MD at 24 1612                [facility]    Name:  Viola Barlow   Age:  69 y.o.  Sex:  female  :  1955  MRN:  [id number]   Visit Number:  [id number]  Admission Date:  2024  Date Of  Service:  12/18/24  Primary Care Physician:  Jyoti Tomlinson APRN     LOS: 0 days :    Chief Complaint:      Nausea and vomiting.    Subjective:    Viola Barlow patient was seen and examined this morning.  She continues to have nausea and is unable to take any p.o. medications and diet.  Her renal function however has improved with IV hydration.  Denies any fevers, chest pain or shortness of breath.    Hospital Course:    Viola Barlow is a 69-year-old woman with past medical history of type 2 diabetes, hypertension, CKD, nephrolithiasis, diverticulosis, DARELL, hyperlipidemia, peripheral vascular disease, migraines, arthritis.  Presented to [facility] ED on 12/16/2024 with concern nausea and nonbloody vomiting for 4 days, hyperglycemia in the 400s.  She has been taking insulin as prescribed, no missed doses.  Denied fevers or chills, chest pain, shortness of air, back pain, leg pain or swelling.       ED summary: Afebrile, tachycardic, tachypneic, hypertensive which improved, other vital signs stable room air.  ABG normal.  Chloride 93, CO2 19, anion gap 29, BUN 54, creatinine 3.19, EGFR 15, blood glucose 363, calcium 10, alk phos 148, total protein 9.4.  Lipase 31.  Leukocytosis 19.  Hemoglobin 16.  CT ab/pelvis bilateral nephrolithiasis without hydronephrosis, chronic diverticulosis.  She was provided Benadryl, Reglan, Zofran, 1 L NS bolus.    Patient was continued on IV fluids and antiemetics.  She was also continued on Protonix.  Renal function improved with IV hydration.  Patient has been a long-term diabetic and likely has underlying diabetic gastroparesis which could be contributing to her nausea and vomiting symptoms.  She was initiated on Reglan therapy.    Review of Systems:     All systems were reviewed and negative except as mentioned in subjective, assessment and plan.    Vital Signs:    Temp:  [98.3 °F (36.8 °C)-99.3 °F (37.4 °C)] 98.3 °F (36.8 °C)  Heart Rate:  [85-92] 89  Resp:  [16-18] 18  BP:  (141-196)/(71-88) 178/84    Intake and output:    I/O last 3 completed shifts:  In: 2911.7 [I.V.:2861.7; IV Piggyback:50]  Out: 1100 [Urine:1100]  I/O this shift:  In: 240 [P.O.:240]  Out: -     Physical Examination:    General Appearance:  Alert and cooperative.    Head:  Atraumatic and normocephalic.   Eyes: Conjunctivae and sclerae normal, no icterus. No pallor.   Throat: No oral lesions, no thrush, oral mucosa moist.   Neck: Supple, trachea midline, no thyromegaly.   Lungs:   Breath sounds heard bilaterally equally.  No wheezing or crackles. No Pleural rub or bronchial breathing.   Heart:  Normal S1 and S2, no murmur, no gallop, no rub. No JVD.   Abdomen:   Normal bowel sounds, no masses, no organomegaly. Soft, epigastric tenderness noted, nondistended, no rebound tenderness.   Extremities: Supple, no edema, no cyanosis, no clubbing.   Skin: No bleeding or rash.   Neurologic: Alert and oriented x 3. No facial asymmetry. Moves all four limbs. No tremors.    Laboratory results:    Results from last 7 days   Lab Units 12/18/24  0539 12/17/24  0533 12/16/24  1618 12/16/24  1223   SODIUM mmol/L 143 143 142 142   POTASSIUM mmol/L 3.8 4.3 4.6 4.3   CHLORIDE mmol/L 107 106 101 93*   CO2 mmol/L 22.0 21.6* 22.6 19.3*   BUN mg/dL 37* 50* 56* 54*   CREATININE mg/dL 1.46* 2.05* 2.74* 3.19*   CALCIUM mg/dL 9.7 9.2 9.8 10.9*   BILIRUBIN mg/dL 0.8  --   --  0.7   ALK PHOS U/L 131*  --   --  148*   ALT (SGPT) U/L 10  --   --  16   AST (SGOT) U/L 19  --   --  22   GLUCOSE mg/dL 145* 218* 333* 363*     Results from last 7 days   Lab Units 12/18/24  0539 12/17/24  0533 12/16/24  1223   WBC 10*3/mm3 14.36* 17.08* 19.28*   HEMOGLOBIN g/dL 16.0* 14.5 16.6*   HEMATOCRIT % 49.2* 46.8* 50.6*   PLATELETS 10*3/mm3 247 261 373       I have reviewed the patient's laboratory results.    Radiology results:    No radiology results from the last 24 hrs    Medication Review:     I have reviewed the patient's active and prn medications.      Problem List:      Acute kidney injury superimposed on chronic kidney disease    Type 2 diabetes mellitus with nephropathy    Essential hypertension    Chronic kidney disease, stage III (moderate)    Assessment:    Acute gastroenteritis with suspected diabetic gastroparesis, POA.  Dehydration, POA.  Acute renal failure secondary to #2, POA.  Chronic kidney disease stage III.  Diabetes mellitus type 2 with nephropathy.  Essential hypertension.  Peripheral vascular disease.  Acquired hypothyroidism.    Plan:    Acute gastroenteritis.  - Patient does have history of GERD and does currently have epigastric tenderness.  - She may have underlying diabetic gastroparesis and we will place her on Reglan.  - Continue Protonix, Zofran, Phenergan.    Acute renal failure.  - Secondary to dehydration but has improved with IV hydration.  - She is being followed by Dr. Calzada and I have discussed the patient's treatment plan with him.    Diabetes mellitus type 2.  - Continue subcutaneous insulin protocol for Glucomander.  - Hemoglobin A1c 8.4 on 2024.    Discussed with nursing staff at the bedside.    I have reviewed the copied text and it is accurate as of 24    DVT Prophylaxis: Heparin  Code Status: Full  Diet: Cardiac diabetic  Discharge Plan: Hopefully, home in 1 to 2 days.    Gerald Zhao MD  24  16:12 EST    Dictated utilizing Dragon dictation.      Electronically signed by Gerald Zhao MD at 24 1614       Jerman Calzada MD, JUSTINO at 24 0724                [facility] Progress Note  [facility]. [place]        Patient Name: Viola Barlow  : 1955  MRN: [id number]   LOS: 0 days    Patient Care Team:  Jyoti Tomlinson APRN as PCP - General (Family Medicine)  Live Donnelly MD    Chief Complaint:    Chief Complaint   Patient presents with    Hyperglycemia     Primary Care Physician:  Jyoti Tomlinson APRN  Date of admission:  12/16/2024    Subjective     Interval History:   Follow-up Acute Kidney Injury on chronic kidney disease 3b and associated problems.   Patient still having nausea and dry heaves today.  She does feel little better than yesterday.  She says she has constipation.  Last bowel movement was 4 to 5 days ago.  She denies shortness of breath or chest pain.  She wants to go home.  Events noted from last 24 hours.  I reviewed the chart and other providers notes, labs and procedures done since my last note.    Review of Systems:   As noted above.    Objective     Vitals:   Temp:  [98.3 °F (36.8 °C)-99.3 °F (37.4 °C)] 98.3 °F (36.8 °C)  Heart Rate:  [85-92] 89  Resp:  [16-18] 18  BP: (141-196)/(71-88) 178/84    Intake/Output Summary (Last 24 hours) at 12/18/2024 1501  Last data filed at 12/18/2024 1212  Gross per 24 hour   Intake 1268.33 ml   Output --   Net 1268.33 ml       Physical Exam:    General Appearance: alert, oriented x 3, no acute distress   Skin: warm and dry  HEENT: oral mucosa normal, nonicteric sclera  Neck: supple, no JVD  Lungs: CTA  Heart: RRR, normal S1 and S2,+ murmur  Abdomen: obese, soft, nontender, non distended and positive bowel sounds.  : no palpable bladder  Extremities: Trace edema, legs are tender to palpation, no cyanosis or clubbing  Neuro: normal speech and mental status     Scheduled Meds:     Current Facility-Administered Medications   Medication Dose Route Frequency Provider Last Rate Last Admin    amLODIPine (NORVASC) tablet 5 mg  5 mg Oral Q24H Jerman Calzada MD, JUSTINO        aspirin EC tablet 81 mg  81 mg Oral Daily Gerald Zhao MD   81 mg at 12/17/24 1032    Calcium Replacement - Follow Nurse / BPA Driven Protocol   Not Applicable PRN Kerley, Brian Joseph, DO        clopidogrel (PLAVIX) tablet 75 mg  75 mg Oral Daily Gerald Zhao MD   75 mg at 12/17/24 1032    dextrose (D50W) (25 g/50 mL) IV injection 10-50 mL  10-50 mL Intravenous Q15 Min PRN Kerley, Brian Joseph, DO        dextrose  (GLUTOSE) oral gel 15 g  15 g Oral Q15 Min PRN Kerley, Brian Joseph, DO        Glucagon (GLUCAGEN) injection 1 mg  1 mg Intramuscular Q15 Min PRN Kerley, Brian Joseph, DO        heparin (porcine) 5000 UNIT/ML injection 5,000 Units  5,000 Units Subcutaneous Q12H Kerley, Brian Joseph, DO   5,000 Units at 12/18/24 0848    HYDROcodone-acetaminophen (NORCO) 5-325 MG per tablet 1 tablet  1 tablet Oral Q6H PRN Gerald Zhao MD        insulin glargine (LANTUS, SEMGLEE) injection 1-200 Units  1-200 Units Subcutaneous BID - Glucommander Kerley, Brian Joseph, DO   7 Units at 12/17/24 2028    insulin lispro (humaLOG) injection 1-200 Units  1-200 Units Subcutaneous 4x Daily With Meals & Nightly Kerley, Brian Joseph, DO   1 Units at 12/18/24 1232    insulin lispro (humaLOG) injection 1-200 Units  1-200 Units Subcutaneous PRN Kerley, Brian Joseph, DO        levothyroxine (SYNTHROID, LEVOTHROID) tablet 50 mcg  50 mcg Oral Daily Gerald Zhao MD   50 mcg at 12/17/24 1032    Magnesium Standard Dose Replacement - Follow Nurse / BPA Driven Protocol   Not Applicable PRN Kerley, Brian Joseph, DO        metoclopramide (REGLAN) injection 10 mg  10 mg Intravenous Q6H Gerald Zhao MD   10 mg at 12/18/24 1231    metoprolol succinate XL (TOPROL-XL) 24 hr tablet 50 mg  50 mg Oral Daily Gerald Zhao MD   50 mg at 12/17/24 1031    nitroglycerin (NITROSTAT) SL tablet 0.4 mg  0.4 mg Sublingual Q5 Min PRN Kerley, Brian Joseph, DO        ondansetron ODT (ZOFRAN-ODT) disintegrating tablet 4 mg  4 mg Translingual Q6H PRN Gerald Zhao MD   4 mg at 12/17/24 0947    pantoprazole (PROTONIX) EC tablet 40 mg  40 mg Oral Daily Gerald Zhao MD   40 mg at 12/17/24 1031    Phosphorus Replacement - Follow Nurse / BPA Driven Protocol   Not Applicable PRN Kerley, Brian Joseph, DO        prochlorperazine (COMPAZINE) injection 5 mg  5 mg Intravenous Q6H PRN William Velázquez DO   5 mg at 12/18/24 1132    Or    prochlorperazine (COMPAZINE) tablet 5 mg  5 mg Oral  Q6H PRN William Velázquez, DO   5 mg at 12/17/24 0626    Or    prochlorperazine (COMPAZINE) suppository 25 mg  25 mg Rectal Q12H PRN William Velázquez,         promethazine (PHENERGAN) 12.5 mg in sodium chloride 0.9 % 50 mL  12.5 mg Intravenous Q6H PRN Gerald Zhao MD   Stopped at 12/17/24 1645    sertraline (ZOLOFT) tablet 50 mg  50 mg Oral Daily Gerald Zhao MD   50 mg at 12/17/24 1032    sodium chloride 0.9 % flush 10 mL  10 mL Intravenous Q12H Kerley, Brian Joseph, DO   10 mL at 12/17/24 0805    sodium chloride 0.9 % flush 10 mL  10 mL Intravenous PRN Kerley, Brian Joseph, DO   10 mL at 12/17/24 0841    sodium chloride 0.9 % infusion 40 mL  40 mL Intravenous PRN Kerley, Brian Joseph,         sodium chloride 0.9 % infusion  100 mL/hr Intravenous Continuous Kerley, Brian Joseph,  mL/hr at 12/18/24 1438 100 mL/hr at 12/18/24 1438       amLODIPine, 5 mg, Oral, Q24H  aspirin, 81 mg, Oral, Daily  clopidogrel, 75 mg, Oral, Daily  heparin (porcine), 5,000 Units, Subcutaneous, Q12H  insulin glargine, 1-200 Units, Subcutaneous, BID - Glucommander  insulin lispro, 1-200 Units, Subcutaneous, 4x Daily With Meals & Nightly  levothyroxine, 50 mcg, Oral, Daily  metoclopramide, 10 mg, Intravenous, Q6H  metoprolol succinate XL, 50 mg, Oral, Daily  pantoprazole, 40 mg, Oral, Daily  sertraline, 50 mg, Oral, Daily  sodium chloride, 10 mL, Intravenous, Q12H        IV Meds:   sodium chloride, 100 mL/hr, Last Rate: 100 mL/hr (12/18/24 1438)        Results Reviewed:   I have personally reviewed the results from the time of this admission to 12/18/2024 15:01 EST     Results from last 7 days   Lab Units 12/18/24  0539 12/17/24  0533 12/16/24  1618 12/16/24  1223   SODIUM mmol/L 143 143 142 142   POTASSIUM mmol/L 3.8 4.3 4.6 4.3   CHLORIDE mmol/L 107 106 101 93*   CO2 mmol/L 22.0 21.6* 22.6 19.3*   BUN mg/dL 37* 50* 56* 54*   CREATININE mg/dL 1.46* 2.05* 2.74* 3.19*   CALCIUM mg/dL 9.7 9.2 9.8 10.9*   BILIRUBIN mg/dL 0.8  --   --   "0.7   ALK PHOS U/L 131*  --   --  148*   ALT (SGPT) U/L 10  --   --  16   AST (SGOT) U/L 19  --   --  22   GLUCOSE mg/dL 145* 218* 333* 363*       Estimated Creatinine Clearance: 27.7 mL/min (A) (by C-G formula based on SCr of 1.46 mg/dL (H)).                Results from last 7 days   Lab Units 12/18/24  0539 12/17/24  0533 12/16/24  1223   WBC 10*3/mm3 14.36* 17.08* 19.28*   HEMOGLOBIN g/dL 16.0* 14.5 16.6*   PLATELETS 10*3/mm3 247 261 373             Brief Urine Lab Results       None            No results found for: \"UTPCR\"    Imaging Results (Last 24 Hours)       ** No results found for the last 24 hours. **                Assessment / Plan     ASSESSMENT:    Acute kidney injury superimposed on chronic kidney disease    Type 2 diabetes mellitus with nephropathy    Essential hypertension    Chronic kidney disease, stage III (moderate)    JOSE ALBERTO on CKD 3B: Patient's baseline GFR is 37-43.  Patient came in with GFR 15.4, currently back back to baseline at 39.3.  Suspect dehydration due to nausea with vomiting, not eating or drinking x 5 days.  Electrolytes and bicarb appears to be stable.  Nausea and vomiting: Most likely has diabetic gastroparesis, will benefit from Reglan.  Hyperglycemia with type 2 diabetes: Last hemoglobin A1c 8.4% on 12/16/2024.  Hypertension: BP previously was 174/92.  Currently 141/71.  Patient dehydrated.  Normally in clinic she runs about 110 systolic and says she runs the same at home.  Continue watch closely if blood pressure stays high we will restart her home dose of amlodipine.      PLAN:  Patient currently on normal saline 100 mL/h.  We will go ahead and stop it after the current bag is done.  Renal function is almost getting close to baseline.  I will go ahead and start her on Reglan 5 mg p.o. 3 times daily.  Appears to be helping to some extent.  Blood pressure appears to be still running on the high side, amlodipine 5 mg have been started we will see if that will help with the blood " pressure, she is not getting any more fluids that likely will help as well.  Details were discussed with the patient no family in the room.    Details were also discussed with the hospitalist service and or other providers as needed.   Continue with rest of the current treatment plan, and monitor with surveillance labs.  Further recommendations will depend on clinical course of the patient during the current hospitalization.   I have reviewed the copied text to this note, it was edited and the changes made as needed.  It is accurate to the point, when the note was signed today.     Thank you for involving us in the care of Viola Barlow.  Please feel free to call with any questions.    Jerman Calzada MD, WHITNEYN  24  15:01 [facility]    [facility]  [phone]      Part of this note may be an electronic transcription/translation of spoken language to printed text using the Dragon Dictation System.                   Electronically signed by Jerman Calzada MD, JUSTINO at 24 1502       Gerald Zhao MD at 24 1727                [facility]    [facility]ESS NOTE    Name:  Viola Barlow   Age:  69 y.o.  Sex:  female  :  1955  MRN:  [id number]   Visit Number:  [id number]  Admission Date:  2024  Date Of Service:  24  Primary Care Physician:  Jyoti Tomlinson APRN     LOS: 0 days :    Chief Complaint:      Nausea and vomiting.    Subjective:    Viola Barlow patient was seen and examined this afternoon.  She was boarded at night in the emergency room due to lack of beds.  She is now on the floor floor.  She is currently sitting up by the side of the bed and states that she is nauseous.  Zofran and Compazine did not work.  We will place her on Phenergan.  She is currently on IV fluids and her creatinine level has improved.  Previous physician documentation, laboratory and imaging data have been reviewed.    [facility]  Course:    Viola Barlow is a 69-year-old woman with past medical history of type 2 diabetes, hypertension, CKD, nephrolithiasis, diverticulosis, DARELL, hyperlipidemia, peripheral vascular disease, migraines, arthritis.  Presented to [facility] ED on 12/16/2024 with concern nausea and nonbloody vomiting for 4 days, hyperglycemia in the 400s.  She has been taking insulin as prescribed, no missed doses.  Denied fevers or chills, chest pain, shortness of air, back pain, leg pain or swelling.       ED summary: Afebrile, tachycardic, tachypneic, hypertensive which improved, other vital signs stable room air.  ABG normal.  Chloride 93, CO2 19, anion gap 29, BUN 54, creatinine 3.19, EGFR 15, blood glucose 363, calcium 10, alk phos 148, total protein 9.4.  Lipase 31.  Leukocytosis 19.  Hemoglobin 16.  CT ab/pelvis bilateral nephrolithiasis without hydronephrosis, chronic diverticulosis.  She was provided Benadryl, Reglan, Zofran, 1 L NS bolus.    Review of Systems:     All systems were reviewed and negative except as mentioned in subjective, assessment and plan.    Vital Signs:    Temp:  [98.1 °F (36.7 °C)-99.1 °F (37.3 °C)] 99.1 °F (37.3 °C)  Heart Rate:  [] 91  Resp:  [16-18] 18  BP: (116-201)/(58-97) 175/78    Intake and output:    I/O last 3 completed shifts:  In: 75 [I.V.:75]  Out: 400 [Urine:400]  I/O this shift:  In: 2408.3 [I.V.:2358.3; IV Piggyback:50]  Out: 700 [Urine:700]    Physical Examination:    General Appearance:  Alert and cooperative.    Head:  Atraumatic and normocephalic.   Eyes: Conjunctivae and sclerae normal, no icterus. No pallor.   Throat: No oral lesions, no thrush, oral mucosa moist.   Neck: Supple, trachea midline, no thyromegaly.   Lungs:   Breath sounds heard bilaterally equally.  No wheezing or crackles. No Pleural rub or bronchial breathing.   Heart:  Normal S1 and S2, no murmur, no gallop, no rub. No JVD.   Abdomen:   Normal bowel sounds, no masses, no organomegaly. Soft, epigastric  tenderness noted, nondistended, no rebound tenderness.   Extremities: Supple, no edema, no cyanosis, no clubbing.   Skin: No bleeding or rash.   Neurologic: Alert and oriented x 3. No facial asymmetry. Moves all four limbs. No tremors.    Laboratory results:    Results from last 7 days   Lab Units 12/17/24  0533 12/16/24  1618 12/16/24  1223   SODIUM mmol/L 143 142 142   POTASSIUM mmol/L 4.3 4.6 4.3   CHLORIDE mmol/L 106 101 93*   CO2 mmol/L 21.6* 22.6 19.3*   BUN mg/dL 50* 56* 54*   CREATININE mg/dL 2.05* 2.74* 3.19*   CALCIUM mg/dL 9.2 9.8 10.9*   BILIRUBIN mg/dL  --   --  0.7   ALK PHOS U/L  --   --  148*   ALT (SGPT) U/L  --   --  16   AST (SGOT) U/L  --   --  22   GLUCOSE mg/dL 218* 333* 363*     Results from last 7 days   Lab Units 12/17/24  0533 12/16/24  1223   WBC 10*3/mm3 17.08* 19.28*   HEMOGLOBIN g/dL 14.5 16.6*   HEMATOCRIT % 46.8* 50.6*   PLATELETS 10*3/mm3 261 373       I have reviewed the patient's laboratory results.    Radiology results:    CT Abdomen Pelvis Without Contrast    Result Date: 12/16/2024  PROCEDURE: CT ABDOMEN PELVIS WO CONTRAST-  HISTORY: Nausea, vomiting, leukocytosis, JOSE ALBERTO  COMPARISON: June 2021.  PROCEDURE: Axial images were obtained from the lung bases through the pubic symphysis without intravenous contrast.  FINDINGS:  ABDOMEN: There is streak artifact from patient's arm position. The lung bases are clear. The heart size is normal. There are vascular calcifications. The limited noncontrast images of the liver are normal. Gallbladder is unremarkable. There is no CT evidence of gallstones. The spleen is normal. No adrenal masses are seen.  The pancreas has an unremarkable unenhanced appearance. The aorta is normal in caliber. There is no significant free fluid or adenopathy.  There is bilateral nephrolithiasis. There is no hydronephrosis. There is colonic diverticulosis.  PELVIS: The appendix is not identified. Uterus is diminutive or absent. The urinary bladder is mostly  collapsed. There is no significant fluid or adenopathy. There is no bony destructive lesion.      Impression: Bilateral nephrolithiasis without hydronephrosis.  Colonic diverticulosis.     CTDI: 9.5 mGy DLP: 486.92 mGy.cm   This study was performed with techniques to keep radiation doses as low as reasonably achievable (ALARA). Individualized dose reduction techniques using automated exposure control or adjustment of mA and/or kV according to the patient size were employed.     Images were reviewed, interpreted, and dictated by Dr. Nadia Coleman MD Transcribed by Chelo Diallo PA-C.  This report was signed and finalized on 12/16/2024 2:10 PM by Nadia Coleman MD.     I have reviewed the patient's radiology reports.    Medication Review:     I have reviewed the patient's active and prn medications.     Problem List:      Acute kidney injury superimposed on chronic kidney disease    Type 2 diabetes mellitus with nephropathy    Essential hypertension    Chronic kidney disease, stage III (moderate)    Assessment:    Acute gastroenteritis, POA.  Dehydration, POA.  Acute renal failure secondary to #2, POA.  Chronic kidney disease stage III.  Diabetes mellitus type 2 with nephropathy.  Essential hypertension.  Peripheral vascular disease.  Acquired hypothyroidism.    Plan:    Acute gastroenteritis.  - Patient does have history of GERD and does currently have epigastric tenderness.  - Continue IV fluids for hydration.  - Continue Protonix, Zofran, Phenergan.    Acute renal failure.  - Secondary to dehydration but has improved with IV hydration.  - Repeat renal function tomorrow.    Diabetes mellitus type 2.  - Continue subcutaneous insulin protocol for Glucomander.  - Hemoglobin A1c 8.4 on 12/16/2024.    Discussed with nursing staff and multidisciplinary team.    I have reviewed the copied text and it is accurate as of 12/17/24    DVT Prophylaxis: Heparin  Code Status: Full  Diet: Cardiac diabetic  Discharge Plan:  Hopefully, home in 2-3 days.    Gerald Zhao MD  12/17/24  17:32 EST    Dictated utilizing Dragon dictation.      Electronically signed by Gerald Zhao MD at 12/17/24 1732          Consult Notes (last 72 hours)        Jerman Calzada MD, FASN at 12/17/24 0798        Consult Orders    1. Inpatient Nephrology Consult [[id number]] ordered by Kerley, Brian Joseph, DO at 12/16/24 1528                         [facility]      Nephrology Consultation      Referring Provider:   No ref. provider found    Reason for Consultation:  Acute Kidney Injury on chronic kidney disease 3b and associated problems.      Subjective:  Chief complaint   Chief Complaint   Patient presents with    Hyperglycemia     History of present illness:    Viola Barlow is a 69-year-old woman with past medical history of type 2 diabetes, hypertension, CKD, nephrolithiasis, diverticulosis, DARELL, hyperlipidemia, peripheral vascular disease, migraines, arthritis.  Presented to [facility] ED on 12/16/2024 with concern nausea and nonbloody vomiting for 4 days, hyperglycemia in the 400s.  She has been taking insulin as prescribed, no missed doses.  Denied fevers or chills, chest pain, shortness of air, back pain, leg pain or swelling.    Patient currently is sitting up in the bed gagging herself with her finger trying to vomit.  She says she has really bad nausea and has been sick for 5 days at this point.  She is tearful while she talks about it.  She says she has not been eating or drinking for 5 days.  Patient was last seen in the nephrology clinic on 11/15/2024.  Labs were done on 11/18/2024, 3 days later.  GFR 43, glucose 194.  I have reviewed labs/imaging/records from this hospitalization, including ER staff and admitting/attending physicians H/P's and progress notes to establish a comprehensive understanding of this patient's clinical hospital course, as well as to establish plan of care appropriately.     Past Medical History:   Diagnosis Date  "   Arthritis     Cataract     Depression     Diabetes mellitus     Dysphagia     Patient reported that intermittently food feels like it gets lodged     Elevated cholesterol     Full dentures     Instructed no adhesives the DOS    GERD (gastroesophageal reflux disease)     Headache     History of nuclear stress test     Patient reported this was done with Dr. Douglas around 2017 and that she was started on Metoprolol after testing.     Hypertension     Kidney disease     Patient reported \"I have chronic kidney disease and they say my kidney function is at 33%\" and that she has routine care with Dr. Calzada     Migraines     PVD (peripheral vascular disease)     Seasonal allergies     Spinal headache     Wears glasses        Past Surgical History:   Procedure Laterality Date    ABDOMINAL SURGERY  08/1984    Patient reported after complete hysterectomy, she had another procedure to remove tumors in the abdominal area    APPENDECTOMY      Reported removed when hysterectomy was done    CATARACT EXTRACTION W/ INTRAOCULAR LENS IMPLANT Right 6/21/2019    Procedure: CATARACT PHACO EXTRACTION WITH INTRAOCULAR LENS IMPLANT RIGHT;  Surgeon: Tee Enrique MD;  Location: [facility] OR;  Service: Ophthalmology    CATARACT EXTRACTION W/ INTRAOCULAR LENS IMPLANT Left 7/5/2019    Procedure: CATARACT PHACO EXTRACTION WITH INTRAOCULAR LENS IMPLANT LEFT;  Surgeon: Tee Enrique MD;  Location: [facility] OR;  Service: Ophthalmology    COLONOSCOPY      ELBOW EPICONDYLECTOMY Left 09/1990    ENDOSCOPY      HYSTERECTOMY  1983    Partial    HYSTERECTOMY  1984    Complete     MOUTH SURGERY      Full mouth extraction    VASCULAR SURGERY Bilateral     For PAD - Reported the right leg was done first around 2014 and the left was done around 2015     History reviewed. No pertinent family history.  negative h/o ESRD     Social History     Tobacco Use    Smoking status: Former     Current packs/day: 0.00     Average packs/day: 1.5 packs/day for " 38.0 years (57.0 ttl pk-yrs)     Types: Cigarettes     Start date:      Quit date: 2008     Years since quittin.9    Smokeless tobacco: Never    Tobacco comments:     quit 13 yrs ago   Vaping Use    Vaping status: Never Used   Substance Use Topics    Alcohol use: No    Drug use: No     Home medications:   Prior to Admission Medications       Prescriptions Last Dose Informant Patient Reported? Taking?    aspirin  MG tablet 2024 Self Yes Yes    Take 1 tablet by mouth Daily.    atorvastatin (LIPITOR) 40 MG tablet 2024 Self Yes Yes    Take 1 tablet by mouth Daily.    clopidogrel (PLAVIX) 75 MG tablet 2024 Self Yes Yes    Take 1 tablet by mouth Daily.    gabapentin (NEURONTIN) 600 MG tablet 2024 Self Yes Yes    Take 1 tablet by mouth 3 (Three) Times a Day.    HYDROcodone-acetaminophen (NORCO) 5-325 MG per tablet Past Month  No Yes    Take 1 tablet by mouth Every 6 (Six) Hours As Needed for Moderate Pain.    insulin lispro protamine-insulin lispro (humaLOG 75-25) (75-25) 100 UNIT/ML suspension injection 12/15/2024  No Yes    Inject 60 Units under the skin into the appropriate area as directed 2 (Two) Times a Day With Meals.    levothyroxine (SYNTHROID, LEVOTHROID) 50 MCG tablet 2024 Self Yes Yes    Take 1 tablet by mouth Daily.    metoprolol succinate XL (TOPROL-XL) 50 MG 24 hr tablet 2024 Self Yes Yes    Take 1 tablet by mouth Daily.    ondansetron ODT (ZOFRAN-ODT) 4 MG disintegrating tablet Past Month  No Yes    Place 1 tablet on the tongue Every 8 (Eight) Hours As Needed for Nausea or Vomiting.    pantoprazole (PROTONIX) 40 MG EC tablet 2024 Self Yes Yes    Take 1 tablet by mouth Daily.    sertraline (ZOLOFT) 50 MG tablet 2024 Self Yes Yes    Take 1 tablet by mouth Daily.    amLODIPine (NORVASC) 5 MG tablet   No No    Take 1 tablet by mouth Daily.    Patient not taking:  Reported on 2023    Cholecalciferol (VITAMIN D-3) 1000 units capsule Unknown Self  Yes No    Take 1,000 Units by mouth Daily.    Dulaglutide (Trulicity) 1.5 MG/0.5ML solution pen-injector Unknown  Yes No    Inject 1.5 mg under the skin into the appropriate area as directed 1 (One) Time Per Week.    gabapentin (NEURONTIN) 600 MG tablet  Self Yes No    Take 600 mg by mouth 3 (Three) Times a Day.    glipiZIDE (GLUCOTROL) 10 MG tablet  Self Yes No    Take 20 mg by mouth Every Morning.    Patient not taking:  Reported on 2/17/2023    lidocaine (LIDODERM) 5 % Unknown  No No    Place 1 patch on the skin as directed by provider Daily. Remove & Discard patch within 12 hours or as directed by MD    metFORMIN ER (GLUCOPHAGE-XR) 750 MG 24 hr tablet Unknown Self Yes No    Take 1 tablet by mouth Daily With Breakfast.    montelukast (SINGULAIR) 10 MG tablet Unknown Self Yes No    Take 10 mg by mouth Every Night.    Patient not taking:  Reported on 2/17/2023    vitamin D3 125 MCG (5000 UT) capsule capsule Unknown  Yes No    Take 1 capsule by mouth Daily.          Emergency department medications:   Medications   sodium chloride 0.9 % flush 10 mL (10 mL Intravenous Given 12/16/24 2130)   sodium chloride 0.9 % flush 10 mL (has no administration in time range)   sodium chloride 0.9 % infusion 40 mL (has no administration in time range)   heparin (porcine) 5000 UNIT/ML injection 5,000 Units (5,000 Units Subcutaneous Given 12/16/24 2128)   nitroglycerin (NITROSTAT) SL tablet 0.4 mg (has no administration in time range)   Potassium Replacement - Follow Nurse / BPA Driven Protocol (has no administration in time range)   Magnesium Standard Dose Replacement - Follow Nurse / BPA Driven Protocol (has no administration in time range)   Phosphorus Replacement - Follow Nurse / BPA Driven Protocol (has no administration in time range)   Calcium Replacement - Follow Nurse / BPA Driven Protocol (has no administration in time range)   sodium chloride 0.9 % infusion (100 mL/hr Intravenous New Bag 12/17/24 3720)   insulin glargine  "(LANTUS, SEMGLEE) injection 1-200 Units (7 Units Subcutaneous Given 12/16/24 2129)   insulin lispro (humaLOG) injection 1-200 Units ( Subcutaneous Not Given 12/16/24 2130)   insulin lispro (humaLOG) injection 1-200 Units (has no administration in time range)   dextrose (GLUTOSE) oral gel 15 g (has no administration in time range)   dextrose (D50W) (25 g/50 mL) IV injection 10-50 mL (has no administration in time range)   Glucagon (GLUCAGEN) injection 1 mg (has no administration in time range)   sodium chloride 0.9 % infusion (0 mL/hr Intravenous Stopped 12/16/24 1927)   prochlorperazine (COMPAZINE) injection 5 mg ( Intravenous Not Given:  See Alt 12/17/24 0626)     Or   prochlorperazine (COMPAZINE) tablet 5 mg (5 mg Oral Given 12/17/24 0626)     Or   prochlorperazine (COMPAZINE) suppository 25 mg ( Rectal Not Given:  See Alt 12/17/24 0626)   sodium chloride 0.9 % bolus 1,000 mL (0 mL Intravenous Stopped 12/16/24 1505)   ondansetron (ZOFRAN) injection 4 mg (4 mg Intravenous Given 12/16/24 1231)   metoclopramide (REGLAN) injection 5 mg (5 mg Intravenous Given 12/16/24 1328)   diphenhydrAMINE (BENADRYL) injection 25 mg (25 mg Intravenous Given 12/16/24 1329)       Allergies:  Lisinopril    Review of Systems  14 point review of system were done and are negative except as mentioned in the history of present illness and assessment and plan.    Physical Exam:  Objective:  Vital Signs  /87   Pulse 89   Temp 98.3 °F (36.8 °C)   Resp 16   Ht 152.4 cm (60\")   Wt 52.2 kg (115 lb)   LMP  (LMP Unknown)   SpO2 95%   BMI 22.46 kg/m²   Objective    No intake/output data recorded.    Intake/Output Summary (Last 24 hours) at 12/17/2024 0733  Last data filed at 12/17/2024 0502  Gross per 24 hour   Intake 75 ml   Output 400 ml   Net -325 ml        Physical Exam     Constitutional: Awake, alert, tearful  Eyes: sclerae anicteric, no conjunctival injection  HEENT: mucous membranes dry  Neck: Supple, no thyromegaly, no " "lymphadenopathy, trachea midline, No JVD  Respiratory: Clear to auscultation bilaterally, nonlabored respirations   Cardiovascular: RRR, borderline tachycardic, + murmurs, rubs, or gallops.  Gastrointestinal: Positive bowel sounds, obese, soft, nontender, nondistended  Musculoskeletal: No edema, no clubbing or cyanosis  Psychiatric: Appropriate affect, cooperative  Neurologic: Oriented x 3, moving all extremities, Cranial Nerves grossly intact, speech clear  Skin: warm and dry, no rashes            Results Review:   Results from last 7 days   Lab Units 12/17/24  0533 12/16/24  1618 12/16/24  1223   SODIUM mmol/L 143 142 142   POTASSIUM mmol/L 4.3 4.6 4.3   CHLORIDE mmol/L 106 101 93*   CO2 mmol/L 21.6* 22.6 19.3*   BUN mg/dL 50* 56* 54*   CREATININE mg/dL 2.05* 2.74* 3.19*   CALCIUM mg/dL 9.2 9.8 10.9*   ALBUMIN g/dL  --   --  5.0   BILIRUBIN mg/dL  --   --  0.7   ALK PHOS U/L  --   --  148*   ALT (SGPT) U/L  --   --  16   AST (SGOT) U/L  --   --  22   GLUCOSE mg/dL 218* 333* 363*     Estimated Creatinine Clearance: 21.9 mL/min (A) (by C-G formula based on SCr of 2.05 mg/dL (H)).          Results from last 7 days   Lab Units 12/17/24  0533 12/16/24  1223   WBC 10*3/mm3 17.08* 19.28*   HEMOGLOBIN g/dL 14.5 16.6*   PLATELETS 10*3/mm3 261 373         Brief Urine Lab Results       None          No results found for: \"UTPCR\"  Imaging Results (Last 24 Hours)       Procedure Component Value Units Date/Time    CT Abdomen Pelvis Without Contrast [[id number]] Collected: 12/16/24 1409     Updated: 12/16/24 1412    Narrative:      PROCEDURE: CT ABDOMEN PELVIS WO CONTRAST-     HISTORY: Nausea, vomiting, leukocytosis, JOSE ALBERTO     COMPARISON: June 2021.     PROCEDURE: Axial images were obtained from the lung bases through the  pubic symphysis without intravenous contrast.     FINDINGS:     ABDOMEN: There is streak artifact from patient's arm position. The lung  bases are clear. The heart size is normal. There are " vascular  calcifications. The limited noncontrast images of the liver are normal.  Gallbladder is unremarkable. There is no CT evidence of gallstones. The  spleen is normal. No adrenal masses are seen.  The pancreas has an  unremarkable unenhanced appearance. The aorta is normal in caliber.  There is no significant free fluid or adenopathy.  There is bilateral  nephrolithiasis. There is no hydronephrosis. There is colonic  diverticulosis.     PELVIS: The appendix is not identified. Uterus is diminutive or absent.  The urinary bladder is mostly collapsed. There is no significant fluid  or adenopathy. There is no bony destructive lesion.       Impression:      Bilateral nephrolithiasis without hydronephrosis.     Colonic diverticulosis.              CTDI: 9.5 mGy  DLP: 486.92 mGy.cm        This study was performed with techniques to keep radiation doses as low  as reasonably achievable (ALARA). Individualized dose reduction  techniques using automated exposure control or adjustment of mA and/or  kV according to the patient size were employed.              Images were reviewed, interpreted, and dictated by Dr. Nadia Coleman MD  Transcribed by Chelo Diallo PA-C.     This report was signed and finalized on 12/16/2024 2:10 PM by Nadia Coleman MD.             heparin (porcine), 5,000 Units, Subcutaneous, Q12H  insulin glargine, 1-200 Units, Subcutaneous, BID - Glucommander  insulin lispro, 1-200 Units, Subcutaneous, 4x Daily With Meals & Nightly  sodium chloride, 10 mL, Intravenous, Q12H      sodium chloride, 100 mL/hr, Last Rate: 100 mL/hr (12/17/24 1429)  sodium chloride, 100 mL/hr, Last Rate: Stopped (12/16/24 1927)        Assessment/Plan:      Acute kidney injury superimposed on chronic kidney disease    JOSE ALBERTO on CKD 3B: Patient's baseline GFR is 37-43.  Patient came in with GFR 15.4, currently 26.1.  Suspect dehydration due to nausea with vomiting, not eating or drinking x 5 days.  Electrolytes and bicarb appears to  be stable.  Nausea and vomiting: Most likely has diabetic gastroparesis, will benefit from Reglan.  Hyperglycemia with type 2 diabetes: Last hemoglobin A1c 8.4% on 12/16/2024.  Hypertension: BP currently 174/92.  Patient dehydrated.  Normally in clinic she runs about 110 systolic and says she runs the same at home.  Continue watch closely if blood pressure stays high we will restart her home dose of amlodipine.      Risk and complexity: Moderate      Plan:  Patient currently on normal saline 100 mL/h.  I will go ahead and start her on Reglan 5 mg twice a day p.o.  Continue with rest of the current treatment plan and surveillance labs.  Details were discussed with the patient as well as family in the room.  Daughter at the bedside  Details were also discussed with the hospitalist service.   Further recommendations will depend on clinical course of the patient during the current hospitalization.    I also discussed the details with the nursing staff.  Rest as ordered.    In closing, I sincerely appreciate opportunity to participate in care of this patient. If I can be of any further assistance with the management of this patient, please don’t hesitate to contact me.    Jerman Calzada MD, JUSTINO    12/17/24  07:33 EST    Dictated using Dragon.              Electronically signed by Jerman Calzada MD, JUSTINO at 12/17/24 8719

## 2024-12-20 NOTE — OUTREACH NOTE
Prep Survey      Flowsheet Row Responses   Scient[facility] facility patient discharged from? [facility]   Is LACE score < 7 ? No   Eligibility Readm Mgmt   Discharge diagnosis Acute kidney injury superimposed on chronic kidney disease   Does the patient have one of the following disease processes/diagnoses(primary or secondary)? Other   Does the patient have Home health ordered? No   Is there a DME ordered? Yes   What DME was ordered? [facility]   Prep survey completed? Yes            Ann COBURN - Registered Nurse

## 2024-12-20 NOTE — PAYOR COMM NOTE
"To:  Ashly  From: Corie Alejo RN  Phone: [phone]  Fax: [phone]  NPI: [id number]  TIN: [id number]  Member ID: [id number]   MRN: [id number]    Viola Barlow (69 y.o. Female)       Date of Birth   1955    Social Security Number       Address   [address] [address]    Home Phone   [phone]    MRN   [id number]       Church   [employer]    Marital Status   Single                            Admission Date   12/16/24    Admission Type   Emergency    Admitting Provider   Kerley, Brian Joseph, DO    Attending Provider       [facility]       Discharge Date   12/19/2024    Discharge Disposition   Home or Self Care    Discharge Destination                                 Attending Provider: (none)   Allergies: Lisinopril    Isolation: None   Infection: None   Code Status: Prior    Ht: 152.4 cm (60\")   Wt: 46 kg (101 lb 6.6 oz)    Admission Cmt: None   Principal Problem: Acute kidney injury superimposed on chronic kidney disease [N17.9,N18.9]                   Active Insurance as of 12/16/2024       Primary Coverage       Payor Plan Insurance Group Employer/Plan Group    ANTHEM MEDICARE REPLACEMENT ANTHEM MEDICARE ADVANTAGE [id number]       Payor Plan Address Payor Plan Phone Number Payor Plan Fax Number Effective Dates    [address] [phone]  8/1/2022 - None Entered    [facility] [address]         Subscriber Name Subscriber Birth Date Member ID       VIOLA BARLOW 1955 [id number]               Secondary Coverage       Payor Plan Insurance Group Employer/Plan Group    [place]        Payor Plan Address Payor Plan Phone Number Payor Plan Fax Number Effective Dates    [address] [phone]  1/23/2023 - None Entered    [facility] [id number]         Subscriber Name Subscriber Birth Date Member ID       VIOLA BARLOW 1955 [id number]                     Emergency Contacts       Contact " Person (Rel.) Home Phone Work Phone Mobile Phone    Lisa Barlow (Daughter) [phone] -- --    FIDEL STRONG (Son) -- -- [phone]                 Discharge Summary        Gerald Zhao MD at 24 1002              [facility]   DISCHARGE SUMMARY      Name:  Viola Barlow   Age:  69 y.o.  Sex:  female  :  1955  MRN:  [id number]   Visit Number:  [id number]    Admission Date:  2024  Date of Discharge:  2024  Primary Care Physician:  Jyoti Tomlinson APRN    Important issues to note:    1.  Patient was admitted with intractable nausea and vomiting likely related to diabetic gastroparesis.  Unfortunately, due to this she had dehydration and acute renal failure.  She was treated with IV fluids and was initiated on Reglan.  She was continued on Protonix.  Her symptoms improved and her renal function improved back to baseline with IV hydration.  She was seen by Dr. Calzada during the hospitalization.  2.  Patient will be discharged home and will be arranged a bedside commode.  She declined home health services.  She lives with her daughter.  She already has a walker at home.  3.  Follow-up with Dr. Calzada in 3 to 4 weeks.  4.  Follow-up with primary care provider in 1 week.    Discharge Diagnoses:     Acute gastroenteritis with suspected diabetic gastroparesis, POA, improved.  Dehydration, POA, resolved.  Acute renal failure secondary to #2, POA, resolved.  Hypokalemia, improved.  Chronic kidney disease stage III.  Diabetes mellitus type 2 with nephropathy.  Essential hypertension.  Peripheral vascular disease.  Acquired hypothyroidism.  Bilateral nephrolithiasis.    Problem List:     Active Hospital Problems    Diagnosis  POA    **Acute kidney injury superimposed on chronic kidney disease [N17.9, N18.9]  Yes    Acute kidney failure, unspecified [N17.9]  Yes    Type 2 diabetes mellitus with nephropathy [E11.21]  Yes    Essential hypertension [I10]  Yes     Chronic kidney disease, stage III (moderate) [N18.30]  Yes      Resolved Hospital Problems   No resolved problems to display.     Presenting Problem:    Chief Complaint   Patient presents with    Hyperglycemia      Consults:     Consulting Physician(s)         Provider   Role Specialty     Jerman Calzada MD, JUSTINO      Consulting Physician Nephrology          Procedures Performed:    None.    History of presenting illness/Hospital Course:    Viola Barlow is a 69-year-old woman with past medical history of type 2 diabetes, hypertension, CKD, nephrolithiasis, diverticulosis, DARELL, hyperlipidemia, peripheral vascular disease, migraines, arthritis.  Presented to [facility] ED on 12/16/2024 with concern nausea and nonbloody vomiting for 4 days, hyperglycemia in the 400s.  She has been taking insulin as prescribed, no missed doses.  Denied fevers or chills, chest pain, shortness of air, back pain, leg pain or swelling.       ED summary: Afebrile, tachycardic, tachypneic, hypertensive which improved, other vital signs stable room air.  ABG normal.  Chloride 93, CO2 19, anion gap 29, BUN 54, creatinine 3.19, EGFR 15, blood glucose 363, calcium 10, alk phos 148, total protein 9.4.  Lipase 31.  Leukocytosis 19.  Hemoglobin 16.  CT ab/pelvis bilateral nephrolithiasis without hydronephrosis, chronic diverticulosis.  She was provided Benadryl, Reglan, Zofran, 1 L NS bolus.     Patient was continued on IV fluids and antiemetics.  She was also continued on Protonix.  Renal function improved with IV hydration.  Patient has been a long-term diabetic and likely has underlying diabetic gastroparesis which could be contributing to her nausea and vomiting symptoms.  She was initiated on Reglan therapy.  Patient improved significantly with her nausea and vomiting and her renal function is back to baseline.  She will be discharged home today.  I have discussed the discharge plan with Dr. Calzada.    The patient is physically incapable of  utilizing regular toilet facilities. Use of a bedside commode is necessary as they are confined to one level of home  with no toileting facilities available on that level.  Patient to follow-up with Dr. Calzada in 3 to 4 weeks and primary care provider in 1 week.    Acute gastroenteritis.  - Patient does have history of GERD and does currently have epigastric tenderness.  - She may have underlying diabetic gastroparesis and has been placed on Reglan.  - Continue Protonix, Zofran, Phenergan.     Acute renal failure.  - Secondary to dehydration but has improved with IV hydration.  - She is being followed by Dr. Calzada and I have discussed the patient's treatment plan with him.     Diabetes mellitus type 2.  - Continue subcutaneous insulin protocol for Glucomander.  - Hemoglobin A1c 8.4 on 12/16/2024.    Vital Signs:    Temp:  [98.3 °F (36.8 °C)-99 °F (37.2 °C)] 99 °F (37.2 °C)  Heart Rate:  [89] 89  Resp:  [16-18] 16  BP: (157-196)/(64-88) 181/81    Physical Exam:    General Appearance:  Alert and cooperative.    Head:  Atraumatic and normocephalic.   Eyes: Conjunctivae and sclerae normal, no icterus. No pallor.   Ears:  Ears with no abnormalities noted.   Throat: No oral lesions, no thrush, oral mucosa moist.  Edentulous.   Neck: Supple, trachea midline, no thyromegaly.   Back:   No kyphoscoliosis present. No tenderness to palpation.   Lungs:   Breath sounds heard bilaterally equally.  No crackles or wheezing. No Pleural rub or bronchial breathing.   Heart:  Normal S1 and S2, no murmur, no gallop, no rub. No JVD.   Abdomen:   Normal bowel sounds, no masses, no organomegaly. Soft, epigastric tenderness on deep palpation, nondistended, no rebound tenderness.   Extremities: Supple, no edema, no cyanosis, no clubbing.   Pulses: Pulses palpable bilaterally.   Skin: No bleeding or rash.   Neurologic: Alert and oriented x 3. No facial asymmetry. Moves all four limbs. No tremors.     Pertinent Lab Results:     Results from  last 7 days   Lab Units 12/19/24  0603 12/18/24  0539 12/17/24  0533 12/16/24  1618 12/16/24  1223   SODIUM mmol/L 138 143 143   < > 142   POTASSIUM mmol/L 4.2 3.8 4.3   < > 4.3   CHLORIDE mmol/L 102 107 106   < > 93*   CO2 mmol/L 24.4 22.0 21.6*   < > 19.3*   BUN mg/dL 34* 37* 50*   < > 54*   CREATININE mg/dL 1.39* 1.46* 2.05*   < > 3.19*   CALCIUM mg/dL 9.5 9.7 9.2   < > 10.9*   BILIRUBIN mg/dL  --  0.8  --   --  0.7   ALK PHOS U/L  --  131*  --   --  148*   ALT (SGPT) U/L  --  10  --   --  16   AST (SGOT) U/L  --  19  --   --  22   GLUCOSE mg/dL 334* 145* 218*   < > 363*    < > = values in this interval not displayed.     Results from last 7 days   Lab Units 12/18/24  0539 12/17/24  0533 12/16/24  1223   WBC 10*3/mm3 14.36* 17.08* 19.28*   HEMOGLOBIN g/dL 16.0* 14.5 16.6*   HEMATOCRIT % 49.2* 46.8* 50.6*   PLATELETS 10*3/mm3 247 261 373       Results from last 7 days   Lab Units 12/16/24  1223   LIPASE U/L 31     Pertinent Radiology Results:    Imaging Results (All)       Procedure Component Value Units Date/Time    CT Abdomen Pelvis Without Contrast [[id number]] Collected: 12/16/24 1409     Updated: 12/16/24 1412    Narrative:      PROCEDURE: CT ABDOMEN PELVIS WO CONTRAST-     HISTORY: Nausea, vomiting, leukocytosis, JOSE ALBERTO     COMPARISON: June 2021.     PROCEDURE: Axial images were obtained from the lung bases through the  pubic symphysis without intravenous contrast.     FINDINGS:     ABDOMEN: There is streak artifact from patient's arm position. The lung  bases are clear. The heart size is normal. There are vascular  calcifications. The limited noncontrast images of the liver are normal.  Gallbladder is unremarkable. There is no CT evidence of gallstones. The  spleen is normal. No adrenal masses are seen.  The pancreas has an  unremarkable unenhanced appearance. The aorta is normal in caliber.  There is no significant free fluid or adenopathy.  There is bilateral  nephrolithiasis. There is no hydronephrosis.  There is colonic  diverticulosis.     PELVIS: The appendix is not identified. Uterus is diminutive or absent.  The urinary bladder is mostly collapsed. There is no significant fluid  or adenopathy. There is no bony destructive lesion.       Impression:      Bilateral nephrolithiasis without hydronephrosis.     Colonic diverticulosis.     Images were reviewed, interpreted, and dictated by Dr. Nadia Coleman MD  Transcribed by Chleo Diallo PA-C.     This report was signed and finalized on 12/16/2024 2:10 PM by Nadia Coleman MD.        Echo:    Results for orders placed during the hospital encounter of 11/04/21    Adult Transthoracic Echo Complete W/ Cont if Necessary Per Protocol    Interpretation Summary  1.  Normal left ventricular size and systolic function, LVEF 55-60%.  2.  Mild concentric LVH.  3.  Normal LV diastolic filling pattern.  4.  Normal right ventricular size and systolic function.  5.  Normal left atrial volume index.  6.  No significant valvular abnormalities.    Condition on Discharge:      Stable.    Code status during the hospital stay:    Code Status and Medical Interventions: CPR (Attempt to Resuscitate); Full Support   Ordered at: 12/17/24 0918     Code Status (Patient has no pulse and is not breathing):    CPR (Attempt to Resuscitate)     Medical Interventions (Patient has pulse or is breathing):    Full Support     Discharge Disposition:    Home or Self Care    Discharge Medications:       Discharge Medications        New Medications        Instructions Start Date   metoclopramide 5 MG tablet  Commonly known as: Reglan   5 mg, Oral, 3 Times Daily Before Meals             Changes to Medications        Instructions Start Date   aspirin 81 MG EC tablet  Commonly known as: aspirin EC  What changed:   medication strength  how much to take   81 mg, Oral, Daily      gabapentin 600 MG tablet  Commonly known as: NEURONTIN  What changed: Another medication with the same name was removed. Continue  taking this medication, and follow the directions you see here.   600 mg, Oral, 3 Times Daily             Continue These Medications        Instructions Start Date   amLODIPine 5 MG tablet  Commonly known as: NORVASC   5 mg, Oral, Daily      atorvastatin 40 MG tablet  Commonly known as: LIPITOR   40 mg, Daily      clopidogrel 75 MG tablet  Commonly known as: PLAVIX   75 mg, Daily      HYDROcodone-acetaminophen 5-325 MG per tablet  Commonly known as: NORCO   1 tablet, Oral, Every 6 Hours PRN      insulin lispro protamine-insulin lispro (75-25) 100 UNIT/ML suspension injection  Commonly known as: humaLOG 75-25   60 Units, Subcutaneous, 2 Times Daily With Meals      levothyroxine 50 MCG tablet  Commonly known as: SYNTHROID, LEVOTHROID   50 mcg, Daily      lidocaine 5 %  Commonly known as: LIDODERM   1 patch, Transdermal, Every 24 Hours, Remove & Discard patch within 12 hours or as directed by MD      metFORMIN  MG 24 hr tablet  Commonly known as: GLUCOPHAGE-XR   750 mg, Oral, Daily With Breakfast      metoprolol succinate XL 50 MG 24 hr tablet  Commonly known as: TOPROL-XL   50 mg, Daily      ondansetron ODT 4 MG disintegrating tablet  Commonly known as: ZOFRAN-ODT   4 mg, Translingual, Every 8 Hours PRN      pantoprazole 40 MG EC tablet  Commonly known as: PROTONIX   40 mg, Daily      sertraline 50 MG tablet  Commonly known as: ZOLOFT   50 mg, Daily      Trulicity 1.5 MG/0.5ML solution pen-injector  Generic drug: Dulaglutide   1 each, Subcutaneous, Weekly      vitamin D3 125 MCG (5000 UT) capsule capsule   5,000 Units, Oral, Daily      Vitamin D-3 25 MCG (1000 UT) capsule   1,000 Units, Oral, Daily             Stop These Medications      glipizide 10 MG tablet  Commonly known as: GLUCOTROL     montelukast 10 MG tablet  Commonly known as: SINGULAIR            Discharge Diet:     Diet Instructions       Diet: Cardiac Diets, Diabetic Diets; Healthy Heart (2-3 Na+); Regular (IDDSI 7); Thin (IDDSI 0); Consistent  Carbohydrate      Discharge Diet:  Cardiac Diets  Diabetic Diets       Cardiac Diet: Healthy Heart (2-3 Na+)    Texture: Regular (IDDSI 7)    Fluid Consistency: Thin (IDDSI 0)    Diabetic Diet: Consistent Carbohydrate          Activity at Discharge:     Activity Instructions       Activity as Tolerated            Follow-up Appointments:     Follow-up Information       Jyoti Tomlinson APRN Follow up in 1 week(s).    Specialty: Family Medicine  Contact information:  [address] [place] [address]  [phone]               Jerman Calzada MD, FASN Follow up in 4 week(s).    Specialty: Nephrology  Contact information:  [address] [place] [address]  [phone]                           Test Results Pending at Discharge:    Pending Results       None             Gerald Zhao MD  12/19/24  10:05 EST    Time: I spent 25 minutes on this discharge activity which included: face-to-face encounter with the patient, reviewing the data in the system, coordination of the care with the nursing staff as well as consultants, documentation, and entering orders.     Dictated utilizing Dragon dictation.        Electronically signed by Gerald Zhao MD at 12/19/24 6298

## 2024-12-22 ENCOUNTER — APPOINTMENT (OUTPATIENT)
Dept: CT IMAGING | Facility: HOSPITAL | Age: 69
End: 2024-12-22
Payer: MEDICARE

## 2024-12-22 ENCOUNTER — APPOINTMENT (OUTPATIENT)
Dept: GENERAL RADIOLOGY | Facility: HOSPITAL | Age: 69
End: 2024-12-22
Payer: MEDICARE

## 2024-12-22 ENCOUNTER — HOSPITAL ENCOUNTER (INPATIENT)
Facility: HOSPITAL | Age: 69
LOS: 1 days | Discharge: HOME HEALTH | End: 2024-12-24
Attending: EMERGENCY MEDICINE | Admitting: INTERNAL MEDICINE
Payer: MEDICARE

## 2024-12-22 DIAGNOSIS — E86.1 HYPOTENSION DUE TO HYPOVOLEMIA: ICD-10-CM

## 2024-12-22 DIAGNOSIS — N17.9 AKI (ACUTE KIDNEY INJURY): ICD-10-CM

## 2024-12-22 DIAGNOSIS — E16.2 HYPOGLYCEMIA: Primary | ICD-10-CM

## 2024-12-22 DIAGNOSIS — N18.30 STAGE 3 CHRONIC KIDNEY DISEASE, UNSPECIFIED WHETHER STAGE 3A OR 3B CKD: ICD-10-CM

## 2024-12-22 LAB
BASOPHILS # BLD AUTO: 0.03 10*3/MM3 (ref 0–0.2)
BASOPHILS NFR BLD AUTO: 0.4 % (ref 0–1.5)
DEPRECATED RDW RBC AUTO: 44.7 FL (ref 37–54)
EOSINOPHIL # BLD AUTO: 0.04 10*3/MM3 (ref 0–0.4)
EOSINOPHIL NFR BLD AUTO: 0.6 % (ref 0.3–6.2)
ERYTHROCYTE [DISTWIDTH] IN BLOOD BY AUTOMATED COUNT: 12.9 % (ref 12.3–15.4)
GLUCOSE BLDC GLUCOMTR-MCNC: 47 MG/DL (ref 70–130)
HCT VFR BLD AUTO: 42.7 % (ref 34–46.6)
HGB BLD-MCNC: 13.9 G/DL (ref 12–15.9)
IMM GRANULOCYTES # BLD AUTO: 0.03 10*3/MM3 (ref 0–0.05)
IMM GRANULOCYTES NFR BLD AUTO: 0.4 % (ref 0–0.5)
LYMPHOCYTES # BLD AUTO: 2.45 10*3/MM3 (ref 0.7–3.1)
LYMPHOCYTES NFR BLD AUTO: 34.4 % (ref 19.6–45.3)
MCH RBC QN AUTO: 30.6 PG (ref 26.6–33)
MCHC RBC AUTO-ENTMCNC: 32.6 G/DL (ref 31.5–35.7)
MCV RBC AUTO: 94.1 FL (ref 79–97)
MONOCYTES # BLD AUTO: 0.68 10*3/MM3 (ref 0.1–0.9)
MONOCYTES NFR BLD AUTO: 9.5 % (ref 5–12)
NEUTROPHILS NFR BLD AUTO: 3.9 10*3/MM3 (ref 1.7–7)
NEUTROPHILS NFR BLD AUTO: 54.7 % (ref 42.7–76)
NRBC BLD AUTO-RTO: 0 /100 WBC (ref 0–0.2)
PLATELET # BLD AUTO: 109 10*3/MM3 (ref 140–450)
PMV BLD AUTO: 12.3 FL (ref 6–12)
RBC # BLD AUTO: 4.54 10*6/MM3 (ref 3.77–5.28)
WBC NRBC COR # BLD AUTO: 7.13 10*3/MM3 (ref 3.4–10.8)

## 2024-12-22 PROCEDURE — 51702 INSERT TEMP BLADDER CATH: CPT

## 2024-12-22 PROCEDURE — 82948 REAGENT STRIP/BLOOD GLUCOSE: CPT

## 2024-12-22 PROCEDURE — C1751 CATH, INF, PER/CENT/MIDLINE: HCPCS

## 2024-12-22 PROCEDURE — 99285 EMERGENCY DEPT VISIT HI MDM: CPT | Performed by: EMERGENCY MEDICINE

## 2024-12-22 PROCEDURE — 70450 CT HEAD/BRAIN W/O DYE: CPT

## 2024-12-22 PROCEDURE — 83735 ASSAY OF MAGNESIUM: CPT | Performed by: PHYSICIAN ASSISTANT

## 2024-12-22 PROCEDURE — 80053 COMPREHEN METABOLIC PANEL: CPT | Performed by: PHYSICIAN ASSISTANT

## 2024-12-22 PROCEDURE — 84484 ASSAY OF TROPONIN QUANT: CPT | Performed by: PHYSICIAN ASSISTANT

## 2024-12-22 PROCEDURE — 71045 X-RAY EXAM CHEST 1 VIEW: CPT

## 2024-12-22 PROCEDURE — 85025 COMPLETE CBC W/AUTO DIFF WBC: CPT | Performed by: PHYSICIAN ASSISTANT

## 2024-12-22 PROCEDURE — 72125 CT NECK SPINE W/O DYE: CPT

## 2024-12-22 PROCEDURE — 84443 ASSAY THYROID STIM HORMONE: CPT | Performed by: PHYSICIAN ASSISTANT

## 2024-12-22 PROCEDURE — 83605 ASSAY OF LACTIC ACID: CPT | Performed by: EMERGENCY MEDICINE

## 2024-12-22 PROCEDURE — 93005 ELECTROCARDIOGRAM TRACING: CPT | Performed by: PHYSICIAN ASSISTANT

## 2024-12-22 RX ORDER — DEXTROSE MONOHYDRATE 25 G/50ML
50 INJECTION, SOLUTION INTRAVENOUS
Status: DISCONTINUED | OUTPATIENT
Start: 2024-12-22 | End: 2024-12-24 | Stop reason: HOSPADM

## 2024-12-22 RX ORDER — SODIUM CHLORIDE 0.9 % (FLUSH) 0.9 %
10 SYRINGE (ML) INJECTION AS NEEDED
Status: DISCONTINUED | OUTPATIENT
Start: 2024-12-22 | End: 2024-12-24 | Stop reason: HOSPADM

## 2024-12-22 RX ORDER — DEXTROSE MONOHYDRATE 25 G/50ML
INJECTION, SOLUTION INTRAVENOUS
Status: COMPLETED
Start: 2024-12-22 | End: 2024-12-22

## 2024-12-22 RX ORDER — DEXTROSE MONOHYDRATE 25 G/50ML
50 INJECTION, SOLUTION INTRAVENOUS
Status: DISCONTINUED | OUTPATIENT
Start: 2024-12-22 | End: 2024-12-22

## 2024-12-22 RX ADMIN — DEXTROSE MONOHYDRATE 50 ML: 25 INJECTION, SOLUTION INTRAVENOUS at 23:27

## 2024-12-22 NOTE — Clinical Note
Level of Care: Critical Care [6]   Diagnosis: Hypotension [064890]   Admitting Physician: JUAN FLORES [[id number]]   Certification: I Certify That Inpatient Hospital Services Are Medically Necessary For Greater Than 2 Midnights

## 2024-12-23 ENCOUNTER — READMISSION MANAGEMENT (OUTPATIENT)
Dept: CALL CENTER | Facility: HOSPITAL | Age: 69
End: 2024-12-23
Payer: MEDICARE

## 2024-12-23 ENCOUNTER — APPOINTMENT (OUTPATIENT)
Dept: GENERAL RADIOLOGY | Facility: HOSPITAL | Age: 69
End: 2024-12-23
Payer: MEDICARE

## 2024-12-23 PROBLEM — K31.84 GASTROPARESIS: Status: ACTIVE | Noted: 2024-12-23

## 2024-12-23 PROBLEM — I95.9 HYPOTENSION: Status: ACTIVE | Noted: 2024-12-23

## 2024-12-23 LAB
ALBUMIN SERPL-MCNC: 3.5 G/DL (ref 3.5–5.2)
ALBUMIN SERPL-MCNC: 4.1 G/DL (ref 3.5–5.2)
ALBUMIN/GLOB SERPL: 1.2 G/DL
ALBUMIN/GLOB SERPL: 1.3 G/DL
ALP SERPL-CCNC: 100 U/L (ref 39–117)
ALP SERPL-CCNC: 96 U/L (ref 39–117)
ALT SERPL W P-5'-P-CCNC: 12 U/L (ref 1–33)
ALT SERPL W P-5'-P-CCNC: 12 U/L (ref 1–33)
ANION GAP SERPL CALCULATED.3IONS-SCNC: 11.9 MMOL/L (ref 5–15)
ANION GAP SERPL CALCULATED.3IONS-SCNC: 14.4 MMOL/L (ref 5–15)
AST SERPL-CCNC: 23 U/L (ref 1–32)
AST SERPL-CCNC: 25 U/L (ref 1–32)
B PARAPERT DNA SPEC QL NAA+PROBE: NOT DETECTED
B PERT DNA SPEC QL NAA+PROBE: NOT DETECTED
BACTERIA UR QL AUTO: ABNORMAL /HPF
BILIRUB SERPL-MCNC: 0.3 MG/DL (ref 0–1.2)
BILIRUB SERPL-MCNC: 0.4 MG/DL (ref 0–1.2)
BILIRUB UR QL STRIP: NEGATIVE
BUN SERPL-MCNC: 29 MG/DL (ref 8–23)
BUN SERPL-MCNC: 38 MG/DL (ref 8–23)
BUN/CREAT SERPL: 18.5 (ref 7–25)
BUN/CREAT SERPL: 21.5 (ref 7–25)
C PNEUM DNA NPH QL NAA+NON-PROBE: NOT DETECTED
CALCIUM SPEC-SCNC: 8.1 MG/DL (ref 8.6–10.5)
CALCIUM SPEC-SCNC: 9.1 MG/DL (ref 8.6–10.5)
CHLORIDE SERPL-SCNC: 100 MMOL/L (ref 98–107)
CHLORIDE SERPL-SCNC: 95 MMOL/L (ref 98–107)
CLARITY UR: CLEAR
CO2 SERPL-SCNC: 22.1 MMOL/L (ref 22–29)
CO2 SERPL-SCNC: 22.6 MMOL/L (ref 22–29)
COLOR UR: YELLOW
CREAT SERPL-MCNC: 1.35 MG/DL (ref 0.57–1)
CREAT SERPL-MCNC: 2.05 MG/DL (ref 0.57–1)
D-LACTATE SERPL-SCNC: 1.5 MMOL/L (ref 0.5–2)
D-LACTATE SERPL-SCNC: 2.8 MMOL/L (ref 0.5–2)
DEPRECATED RDW RBC AUTO: 43.8 FL (ref 37–54)
EGFRCR SERPLBLD CKD-EPI 2021: 26.1 ML/MIN/1.73
EGFRCR SERPLBLD CKD-EPI 2021: 43.2 ML/MIN/1.73
ERYTHROCYTE [DISTWIDTH] IN BLOOD BY AUTOMATED COUNT: 12.8 % (ref 12.3–15.4)
FLUAV SUBTYP SPEC NAA+PROBE: NOT DETECTED
FLUBV RNA ISLT QL NAA+PROBE: NOT DETECTED
GEN 5 1HR TROPONIN T REFLEX: 10 NG/L
GLOBULIN UR ELPH-MCNC: 2.9 GM/DL
GLOBULIN UR ELPH-MCNC: 3.1 GM/DL
GLUCOSE BLDC GLUCOMTR-MCNC: 123 MG/DL (ref 70–130)
GLUCOSE BLDC GLUCOMTR-MCNC: 132 MG/DL (ref 70–130)
GLUCOSE BLDC GLUCOMTR-MCNC: 141 MG/DL (ref 70–130)
GLUCOSE BLDC GLUCOMTR-MCNC: 146 MG/DL (ref 70–130)
GLUCOSE BLDC GLUCOMTR-MCNC: 242 MG/DL (ref 70–130)
GLUCOSE BLDC GLUCOMTR-MCNC: 276 MG/DL (ref 70–130)
GLUCOSE BLDC GLUCOMTR-MCNC: 339 MG/DL (ref 70–130)
GLUCOSE BLDC GLUCOMTR-MCNC: 57 MG/DL (ref 70–130)
GLUCOSE BLDC GLUCOMTR-MCNC: 95 MG/DL (ref 70–130)
GLUCOSE SERPL-MCNC: 210 MG/DL (ref 65–99)
GLUCOSE SERPL-MCNC: 222 MG/DL (ref 65–99)
GLUCOSE UR STRIP-MCNC: ABNORMAL MG/DL
HADV DNA SPEC NAA+PROBE: NOT DETECTED
HCOV 229E RNA SPEC QL NAA+PROBE: NOT DETECTED
HCOV HKU1 RNA SPEC QL NAA+PROBE: NOT DETECTED
HCOV NL63 RNA SPEC QL NAA+PROBE: NOT DETECTED
HCOV OC43 RNA SPEC QL NAA+PROBE: NOT DETECTED
HCT VFR BLD AUTO: 43.1 % (ref 34–46.6)
HGB BLD-MCNC: 14.2 G/DL (ref 12–15.9)
HGB UR QL STRIP.AUTO: NEGATIVE
HMPV RNA NPH QL NAA+NON-PROBE: NOT DETECTED
HPIV1 RNA ISLT QL NAA+PROBE: NOT DETECTED
HPIV2 RNA SPEC QL NAA+PROBE: NOT DETECTED
HPIV3 RNA NPH QL NAA+PROBE: NOT DETECTED
HPIV4 P GENE NPH QL NAA+PROBE: NOT DETECTED
HYALINE CASTS UR QL AUTO: ABNORMAL /LPF
KETONES UR QL STRIP: NEGATIVE
LEUKOCYTE ESTERASE UR QL STRIP.AUTO: NEGATIVE
M PNEUMO IGG SER IA-ACNC: NOT DETECTED
MAGNESIUM SERPL-MCNC: 1.7 MG/DL (ref 1.6–2.4)
MCH RBC QN AUTO: 30.7 PG (ref 26.6–33)
MCHC RBC AUTO-ENTMCNC: 32.9 G/DL (ref 31.5–35.7)
MCV RBC AUTO: 93.3 FL (ref 79–97)
NITRITE UR QL STRIP: POSITIVE
PH UR STRIP.AUTO: 5.5 [PH] (ref 5–8)
PLATELET # BLD AUTO: 141 10*3/MM3 (ref 140–450)
PMV BLD AUTO: 11.8 FL (ref 6–12)
POTASSIUM SERPL-SCNC: 3.1 MMOL/L (ref 3.5–5.2)
POTASSIUM SERPL-SCNC: 3.6 MMOL/L (ref 3.5–5.2)
POTASSIUM SERPL-SCNC: 4.2 MMOL/L (ref 3.5–5.2)
POTASSIUM SERPL-SCNC: 5.5 MMOL/L (ref 3.5–5.2)
PROCALCITONIN SERPL-MCNC: 0.14 NG/ML (ref 0–0.25)
PROT SERPL-MCNC: 6.4 G/DL (ref 6–8.5)
PROT SERPL-MCNC: 7.2 G/DL (ref 6–8.5)
PROT UR QL STRIP: NEGATIVE
RBC # BLD AUTO: 4.62 10*6/MM3 (ref 3.77–5.28)
RBC # UR STRIP: ABNORMAL /HPF
REF LAB TEST METHOD: ABNORMAL
RHINOVIRUS RNA SPEC NAA+PROBE: NOT DETECTED
RSV RNA NPH QL NAA+NON-PROBE: NOT DETECTED
SARS-COV-2 RNA NPH QL NAA+NON-PROBE: DETECTED
SODIUM SERPL-SCNC: 132 MMOL/L (ref 136–145)
SODIUM SERPL-SCNC: 134 MMOL/L (ref 136–145)
SP GR UR STRIP: 1.01 (ref 1–1.03)
SQUAMOUS #/AREA URNS HPF: ABNORMAL /HPF
TROPONIN T NUMERIC DELTA: -2 NG/L
TROPONIN T SERPL HS-MCNC: 12 NG/L
TSH SERPL DL<=0.05 MIU/L-ACNC: 1.65 UIU/ML (ref 0.27–4.2)
UROBILINOGEN UR QL STRIP: ABNORMAL
WBC # UR STRIP: ABNORMAL /HPF
WBC NRBC COR # BLD AUTO: 12.5 10*3/MM3 (ref 3.4–10.8)

## 2024-12-23 PROCEDURE — 97166 OT EVAL MOD COMPLEX 45 MIN: CPT

## 2024-12-23 PROCEDURE — 80053 COMPREHEN METABOLIC PANEL: CPT | Performed by: INTERNAL MEDICINE

## 2024-12-23 PROCEDURE — 25010000002 ENOXAPARIN PER 10 MG: Performed by: INTERNAL MEDICINE

## 2024-12-23 PROCEDURE — 84484 ASSAY OF TROPONIN QUANT: CPT | Performed by: PHYSICIAN ASSISTANT

## 2024-12-23 PROCEDURE — 84132 ASSAY OF SERUM POTASSIUM: CPT | Performed by: INTERNAL MEDICINE

## 2024-12-23 PROCEDURE — 83605 ASSAY OF LACTIC ACID: CPT | Performed by: EMERGENCY MEDICINE

## 2024-12-23 PROCEDURE — 97162 PT EVAL MOD COMPLEX 30 MIN: CPT

## 2024-12-23 PROCEDURE — 0202U NFCT DS 22 TRGT SARS-COV-2: CPT | Performed by: INTERNAL MEDICINE

## 2024-12-23 PROCEDURE — 85027 COMPLETE CBC AUTOMATED: CPT | Performed by: INTERNAL MEDICINE

## 2024-12-23 PROCEDURE — 63710000001 DEXAMETHASONE PER 0.25 MG: Performed by: INTERNAL MEDICINE

## 2024-12-23 PROCEDURE — 99222 1ST HOSP IP/OBS MODERATE 55: CPT | Performed by: PSYCHIATRY & NEUROLOGY

## 2024-12-23 PROCEDURE — 07HT33Z INSERTION OF INFUSION DEVICE INTO BONE MARROW, PERCUTANEOUS APPROACH: ICD-10-PCS | Performed by: EMERGENCY MEDICINE

## 2024-12-23 PROCEDURE — 81001 URINALYSIS AUTO W/SCOPE: CPT | Performed by: PHYSICIAN ASSISTANT

## 2024-12-23 PROCEDURE — 02HV33Z INSERTION OF INFUSION DEVICE INTO SUPERIOR VENA CAVA, PERCUTANEOUS APPROACH: ICD-10-PCS | Performed by: EMERGENCY MEDICINE

## 2024-12-23 PROCEDURE — 25010000002 POTASSIUM CHLORIDE PER 2 MEQ OF POTASSIUM: Performed by: INTERNAL MEDICINE

## 2024-12-23 PROCEDURE — 84145 PROCALCITONIN (PCT): CPT | Performed by: PHYSICIAN ASSISTANT

## 2024-12-23 PROCEDURE — 82948 REAGENT STRIP/BLOOD GLUCOSE: CPT

## 2024-12-23 PROCEDURE — 82948 REAGENT STRIP/BLOOD GLUCOSE: CPT | Performed by: INTERNAL MEDICINE

## 2024-12-23 PROCEDURE — 87040 BLOOD CULTURE FOR BACTERIA: CPT | Performed by: PHYSICIAN ASSISTANT

## 2024-12-23 PROCEDURE — 25810000003 SODIUM CHLORIDE 0.9 % SOLUTION: Performed by: PHYSICIAN ASSISTANT

## 2024-12-23 PROCEDURE — 63710000001 INSULIN LISPRO (HUMAN) PER 5 UNITS: Performed by: INTERNAL MEDICINE

## 2024-12-23 PROCEDURE — 25810000003 SODIUM CHLORIDE 0.9 % SOLUTION: Performed by: INTERNAL MEDICINE

## 2024-12-23 PROCEDURE — 71045 X-RAY EXAM CHEST 1 VIEW: CPT

## 2024-12-23 PROCEDURE — 99223 1ST HOSP IP/OBS HIGH 75: CPT | Performed by: INTERNAL MEDICINE

## 2024-12-23 PROCEDURE — 25010000002 CEFEPIME PER 500 MG: Performed by: PHYSICIAN ASSISTANT

## 2024-12-23 RX ORDER — LIDOCAINE 4 G/G
1 PATCH TOPICAL EVERY 24 HOURS
Status: DISCONTINUED | OUTPATIENT
Start: 2024-12-23 | End: 2024-12-24 | Stop reason: HOSPADM

## 2024-12-23 RX ORDER — AMOXICILLIN 250 MG
2 CAPSULE ORAL 2 TIMES DAILY PRN
Status: DISCONTINUED | OUTPATIENT
Start: 2024-12-23 | End: 2024-12-24 | Stop reason: HOSPADM

## 2024-12-23 RX ORDER — ONDANSETRON 4 MG/1
4 TABLET, ORALLY DISINTEGRATING ORAL EVERY 6 HOURS PRN
Status: DISCONTINUED | OUTPATIENT
Start: 2024-12-23 | End: 2024-12-24 | Stop reason: HOSPADM

## 2024-12-23 RX ORDER — POLYETHYLENE GLYCOL 3350 17 G/17G
17 POWDER, FOR SOLUTION ORAL DAILY PRN
Status: DISCONTINUED | OUTPATIENT
Start: 2024-12-23 | End: 2024-12-24 | Stop reason: HOSPADM

## 2024-12-23 RX ORDER — BISACODYL 5 MG/1
5 TABLET, DELAYED RELEASE ORAL DAILY PRN
Status: DISCONTINUED | OUTPATIENT
Start: 2024-12-23 | End: 2024-12-24 | Stop reason: HOSPADM

## 2024-12-23 RX ORDER — CALCIUM CARBONATE 500 MG/1
2 TABLET, CHEWABLE ORAL 2 TIMES DAILY PRN
Status: DISCONTINUED | OUTPATIENT
Start: 2024-12-23 | End: 2024-12-24 | Stop reason: HOSPADM

## 2024-12-23 RX ORDER — INSULIN LISPRO 100 [IU]/ML
2-9 INJECTION, SOLUTION INTRAVENOUS; SUBCUTANEOUS
Status: DISCONTINUED | OUTPATIENT
Start: 2024-12-23 | End: 2024-12-24 | Stop reason: HOSPADM

## 2024-12-23 RX ORDER — CLOPIDOGREL BISULFATE 75 MG/1
75 TABLET ORAL DAILY
Status: DISCONTINUED | OUTPATIENT
Start: 2024-12-23 | End: 2024-12-24 | Stop reason: HOSPADM

## 2024-12-23 RX ORDER — BISACODYL 10 MG
10 SUPPOSITORY, RECTAL RECTAL DAILY PRN
Status: DISCONTINUED | OUTPATIENT
Start: 2024-12-23 | End: 2024-12-24 | Stop reason: HOSPADM

## 2024-12-23 RX ORDER — PANTOPRAZOLE SODIUM 40 MG/1
40 TABLET, DELAYED RELEASE ORAL DAILY
Status: DISCONTINUED | OUTPATIENT
Start: 2024-12-23 | End: 2024-12-24 | Stop reason: HOSPADM

## 2024-12-23 RX ORDER — POTASSIUM CHLORIDE 1500 MG/1
40 TABLET, EXTENDED RELEASE ORAL ONCE
Status: COMPLETED | OUTPATIENT
Start: 2024-12-23 | End: 2024-12-23

## 2024-12-23 RX ORDER — DEXTROSE MONOHYDRATE, SODIUM CHLORIDE, AND POTASSIUM CHLORIDE 50; 1.49; 4.5 G/1000ML; G/1000ML; G/1000ML
100 INJECTION, SOLUTION INTRAVENOUS CONTINUOUS
Status: DISCONTINUED | OUTPATIENT
Start: 2024-12-23 | End: 2024-12-23

## 2024-12-23 RX ORDER — SODIUM CHLORIDE 9 MG/ML
125 INJECTION, SOLUTION INTRAVENOUS CONTINUOUS
Status: ACTIVE | OUTPATIENT
Start: 2024-12-23 | End: 2024-12-24

## 2024-12-23 RX ORDER — SODIUM CHLORIDE 9 MG/ML
INJECTION, SOLUTION INTRAVENOUS
Status: COMPLETED
Start: 2024-12-23 | End: 2024-12-24

## 2024-12-23 RX ORDER — AMOXICILLIN 250 MG
2 CAPSULE ORAL 2 TIMES DAILY
Status: DISCONTINUED | OUTPATIENT
Start: 2024-12-23 | End: 2024-12-24 | Stop reason: HOSPADM

## 2024-12-23 RX ORDER — NOREPINEPHRINE BITARTRATE/D5W 8 MG/250ML
.02-.3 PLASTIC BAG, INJECTION (ML) INTRAVENOUS
Status: DISCONTINUED | OUTPATIENT
Start: 2024-12-23 | End: 2024-12-24 | Stop reason: HOSPADM

## 2024-12-23 RX ORDER — LEVOTHYROXINE SODIUM 50 UG/1
50 TABLET ORAL DAILY
Status: DISCONTINUED | OUTPATIENT
Start: 2024-12-23 | End: 2024-12-24 | Stop reason: HOSPADM

## 2024-12-23 RX ORDER — ONDANSETRON 2 MG/ML
4 INJECTION INTRAMUSCULAR; INTRAVENOUS EVERY 6 HOURS PRN
Status: DISCONTINUED | OUTPATIENT
Start: 2024-12-23 | End: 2024-12-24 | Stop reason: HOSPADM

## 2024-12-23 RX ORDER — ACETAMINOPHEN 325 MG/1
650 TABLET ORAL EVERY 4 HOURS PRN
Status: DISCONTINUED | OUTPATIENT
Start: 2024-12-23 | End: 2024-12-24 | Stop reason: HOSPADM

## 2024-12-23 RX ORDER — ALPRAZOLAM 0.5 MG
0.5 TABLET ORAL ONCE
Status: COMPLETED | OUTPATIENT
Start: 2024-12-23 | End: 2024-12-23

## 2024-12-23 RX ORDER — DEXTROSE MONOHYDRATE 25 G/50ML
25 INJECTION, SOLUTION INTRAVENOUS
Status: DISCONTINUED | OUTPATIENT
Start: 2024-12-23 | End: 2024-12-24 | Stop reason: HOSPADM

## 2024-12-23 RX ORDER — ENOXAPARIN SODIUM 100 MG/ML
30 INJECTION SUBCUTANEOUS EVERY 24 HOURS
Status: DISCONTINUED | OUTPATIENT
Start: 2024-12-23 | End: 2024-12-24 | Stop reason: HOSPADM

## 2024-12-23 RX ORDER — NITROGLYCERIN 0.4 MG/1
0.4 TABLET SUBLINGUAL
Status: DISCONTINUED | OUTPATIENT
Start: 2024-12-23 | End: 2024-12-24 | Stop reason: HOSPADM

## 2024-12-23 RX ORDER — ONDANSETRON 4 MG/1
4 TABLET, ORALLY DISINTEGRATING ORAL EVERY 8 HOURS PRN
Status: DISCONTINUED | OUTPATIENT
Start: 2024-12-23 | End: 2024-12-24 | Stop reason: HOSPADM

## 2024-12-23 RX ORDER — POTASSIUM CHLORIDE 1500 MG/1
40 TABLET, EXTENDED RELEASE ORAL EVERY 4 HOURS
Status: COMPLETED | OUTPATIENT
Start: 2024-12-23 | End: 2024-12-23

## 2024-12-23 RX ORDER — ASPIRIN 81 MG/1
81 TABLET ORAL DAILY
Status: DISCONTINUED | OUTPATIENT
Start: 2024-12-23 | End: 2024-12-24 | Stop reason: HOSPADM

## 2024-12-23 RX ORDER — ATORVASTATIN CALCIUM 40 MG/1
40 TABLET, FILM COATED ORAL DAILY
Status: DISCONTINUED | OUTPATIENT
Start: 2024-12-23 | End: 2024-12-24 | Stop reason: HOSPADM

## 2024-12-23 RX ORDER — NICOTINE POLACRILEX 4 MG
15 LOZENGE BUCCAL
Status: DISCONTINUED | OUTPATIENT
Start: 2024-12-23 | End: 2024-12-24 | Stop reason: HOSPADM

## 2024-12-23 RX ORDER — DEXTROSE MONOHYDRATE 100 MG/ML
250 INJECTION, SOLUTION INTRAVENOUS CONTINUOUS
Status: DISCONTINUED | OUTPATIENT
Start: 2024-12-23 | End: 2024-12-23

## 2024-12-23 RX ORDER — METOCLOPRAMIDE 5 MG/1
5 TABLET ORAL
Status: DISCONTINUED | OUTPATIENT
Start: 2024-12-23 | End: 2024-12-24 | Stop reason: HOSPADM

## 2024-12-23 RX ADMIN — POTASSIUM CHLORIDE 40 MEQ: 20 TABLET, EXTENDED RELEASE ORAL at 15:10

## 2024-12-23 RX ADMIN — POTASSIUM CHLORIDE: 2 INJECTION, SOLUTION, CONCENTRATE INTRAVENOUS at 05:06

## 2024-12-23 RX ADMIN — SENNOSIDES AND DOCUSATE SODIUM 2 TABLET: 50; 8.6 TABLET ORAL at 08:33

## 2024-12-23 RX ADMIN — CEFEPIME 1000 MG: 1 INJECTION, POWDER, FOR SOLUTION INTRAMUSCULAR; INTRAVENOUS at 00:55

## 2024-12-23 RX ADMIN — CLOPIDOGREL BISULFATE 75 MG: 75 TABLET ORAL at 08:32

## 2024-12-23 RX ADMIN — Medication 10 ML: at 08:33

## 2024-12-23 RX ADMIN — ATORVASTATIN CALCIUM 40 MG: 40 TABLET, FILM COATED ORAL at 08:32

## 2024-12-23 RX ADMIN — Medication 5 MG: at 20:29

## 2024-12-23 RX ADMIN — SODIUM CHLORIDE 1000 ML: 9 INJECTION, SOLUTION INTRAVENOUS at 00:21

## 2024-12-23 RX ADMIN — ENOXAPARIN SODIUM 30 MG: 100 INJECTION SUBCUTANEOUS at 08:33

## 2024-12-23 RX ADMIN — NOREPINEPHRINE BITARTRATE 0.02 MCG/KG/MIN: 8 INJECTION, SOLUTION INTRAVENOUS at 02:35

## 2024-12-23 RX ADMIN — MUPIROCIN 1 APPLICATION: 20 OINTMENT TOPICAL at 08:58

## 2024-12-23 RX ADMIN — METOCLOPRAMIDE 5 MG: 5 TABLET ORAL at 11:20

## 2024-12-23 RX ADMIN — SODIUM CHLORIDE 100 ML/HR: 9 INJECTION, SOLUTION INTRAVENOUS at 08:59

## 2024-12-23 RX ADMIN — LEVOTHYROXINE SODIUM 50 MCG: 50 TABLET ORAL at 08:31

## 2024-12-23 RX ADMIN — PANTOPRAZOLE SODIUM 40 MG: 40 TABLET, DELAYED RELEASE ORAL at 08:31

## 2024-12-23 RX ADMIN — INSULIN LISPRO 6 UNITS: 100 INJECTION, SOLUTION INTRAVENOUS; SUBCUTANEOUS at 11:20

## 2024-12-23 RX ADMIN — INSULIN LISPRO 7 UNITS: 100 INJECTION, SOLUTION INTRAVENOUS; SUBCUTANEOUS at 21:02

## 2024-12-23 RX ADMIN — INSULIN LISPRO 4 UNITS: 100 INJECTION, SOLUTION INTRAVENOUS; SUBCUTANEOUS at 08:31

## 2024-12-23 RX ADMIN — DEXTROSE MONOHYDRATE 50 ML: 25 INJECTION, SOLUTION INTRAVENOUS at 03:00

## 2024-12-23 RX ADMIN — MUPIROCIN 1 APPLICATION: 20 OINTMENT TOPICAL at 20:29

## 2024-12-23 RX ADMIN — METOCLOPRAMIDE 5 MG: 5 TABLET ORAL at 17:51

## 2024-12-23 RX ADMIN — ALPRAZOLAM 0.5 MG: 0.5 TABLET ORAL at 18:55

## 2024-12-23 RX ADMIN — SODIUM CHLORIDE 1000 ML: 9 INJECTION, SOLUTION INTRAVENOUS at 01:07

## 2024-12-23 RX ADMIN — ACETAMINOPHEN 650 MG: 325 TABLET, FILM COATED ORAL at 10:22

## 2024-12-23 RX ADMIN — ASPIRIN 81 MG: 81 TABLET, COATED ORAL at 08:33

## 2024-12-23 RX ADMIN — METOCLOPRAMIDE 5 MG: 5 TABLET ORAL at 08:32

## 2024-12-23 RX ADMIN — POTASSIUM CHLORIDE 40 MEQ: 20 TABLET, EXTENDED RELEASE ORAL at 10:22

## 2024-12-23 RX ADMIN — POTASSIUM CHLORIDE 40 MEQ: 20 TABLET, EXTENDED RELEASE ORAL at 05:13

## 2024-12-23 RX ADMIN — DEXAMETHASONE 6 MG: 2 TABLET ORAL at 13:22

## 2024-12-23 RX ADMIN — SODIUM CHLORIDE 125 ML/HR: 9 INJECTION, SOLUTION INTRAVENOUS at 17:51

## 2024-12-23 RX ADMIN — DEXTROSE MONOHYDRATE 250 ML: 100 INJECTION, SOLUTION INTRAVENOUS at 01:15

## 2024-12-23 RX ADMIN — SERTRALINE HYDROCHLORIDE 50 MG: 50 TABLET ORAL at 08:32

## 2024-12-23 RX ADMIN — EMPAGLIFLOZIN 10 MG: 10 TABLET, FILM COATED ORAL at 10:02

## 2024-12-23 NOTE — THERAPY EVALUATION
"Patient Name: Viola Barlow  : 1955    MRN: [id number]                              Today's Date: 2024       Admit Date: 2024    Visit Dx:     ICD-10-CM ICD-9-CM   1. Hypoglycemia  E16.2 251.2   2. JOSE ALBERTO (acute kidney injury)  N17.9 584.9     Patient Active Problem List   Diagnosis    Age-related nuclear cataract of right eye    Hypoglycemia    Acute respiratory failure with hypoxia and hypercapnia    Type 2 diabetes mellitus with nephropathy    Obstructive sleep apnea    Essential hypertension    Chronic kidney disease, stage III (moderate)    Hyperkalemia    Acute kidney injury superimposed on chronic kidney disease    Acute kidney failure, unspecified    Hypotension    Gastroparesis     Past Medical History:   Diagnosis Date    Arthritis     Cataract     Depression     Diabetes mellitus     Dysphagia     Patient reported that intermittently food feels like it gets lodged     Elevated cholesterol     Full dentures     Instructed no adhesives the DOS    GERD (gastroesophageal reflux disease)     Headache     History of nuclear stress test     Patient reported this was done with Dr. Douglas around  and that she was started on Metoprolol after testing.     Hypertension     Impaired mobility     Kidney disease     Patient reported \"I have chronic kidney disease and they say my kidney function is at 33%\" and that she has routine care with Dr. Calzada     Migraines     PVD (peripheral vascular disease)     Seasonal allergies     Spinal headache     Wears glasses      Past Surgical History:   Procedure Laterality Date    ABDOMINAL SURGERY  1984    Patient reported after complete hysterectomy, she had another procedure to remove tumors in the abdominal area    APPENDECTOMY      Reported removed when hysterectomy was done    CATARACT EXTRACTION W/ INTRAOCULAR LENS IMPLANT Right 2019    Procedure: CATARACT PHACO EXTRACTION WITH INTRAOCULAR LENS IMPLANT RIGHT;  Surgeon: Tee Enrique, " MD;  Location: [facility] OR;  Service: Ophthalmology    CATARACT EXTRACTION W/ INTRAOCULAR LENS IMPLANT Left 7/5/2019    Procedure: CATARACT PHACO EXTRACTION WITH INTRAOCULAR LENS IMPLANT LEFT;  Surgeon: Tee Enrique MD;  Location: [facility];  Service: Ophthalmology    COLONOSCOPY      ELBOW EPICONDYLECTOMY Left 09/1990    ENDOSCOPY      HYSTERECTOMY  1983    Partial    HYSTERECTOMY  1984    Complete     MOUTH SURGERY      Full mouth extraction    VASCULAR SURGERY Bilateral     For PAD - Reported the right leg was done first around 2014 and the left was done around 2015      General Information       Row Name 12/23/24 [facility]          Physical Therapy Time and Intention    Document Type evaluation  -     Mode of Treatment physical therapy  -       Row Name 12/23/24 [facility]          General Information    Patient Profile Reviewed yes  -     Prior Level of Function mod assist:;ADL's;all household mobility  -     Existing Precautions/Restrictions fall  -     Barriers to Rehab medically complex;previous functional deficit  -       Row Name 12/23/24 1117          Living Environment    People in Home other (see comments)  lives in duplex with family surrounding her  -       Row Name 12/23/24 1117          Home Main Entrance    Number of Stairs, Main Entrance none  -       Row Name 12/23/24 1117          Stairs Within Home, Primary    Number of Stairs, Within Home, Primary none  -       Row Name 12/23/24 1117          Cognition    Orientation Status (Cognition) oriented x 4  -       Row Name 12/23/24 1117          Safety Issues/Impairments Affecting Functional Mobility    Safety Issues Affecting Function (Mobility) awareness of need for assistance;insight into deficits/self-awareness;positioning of assistive device;safety precaution awareness;safety precautions follow-through/compliance;sequencing abilities  -     Impairments Affecting Function (Mobility) endurance/activity tolerance  -                User Key  (r) = Recorded By, (t) = Taken By, (c) = Cosigned By      Initials Name Provider Type    William Lock, PT Physical Therapist                   Mobility       Row Name 12/23/24 1119          Sit-Stand Transfer    Sit-Stand [place] (Transfers) contact guard  -     Assistive Device (Sit-Stand Transfers) walker, front-wheeled  -       Row Name 12/23/24 1119          Gait/Stairs (Locomotion)    [place] Level (Gait) contact guard  -     Assistive Device (Gait) walker, front-wheeled  -     Patient was able to Ambulate yes  -MK     Distance in Feet (Gait) 50  -MK     Deviations/Abnormal Patterns (Gait) festinating/shuffling;base of support, wide  -MK     Bilateral Gait Deviations forward flexed posture  -               User Key  (r) = Recorded By, (t) = Taken By, (c) = Cosigned By      Initials Name Provider Type    William Lock PT Physical Therapist                   Obj/Interventions       Row Name 12/23/24 1119          Range of Motion Comprehensive    General Range of Motion no range of motion deficits identified  -[facility] Name 12/23/24 1119          Strength Comprehensive (MMT)    General Manual Muscle Testing (MMT) Assessment lower extremity strength deficits identified  -     Comment, General Manual Muscle Testing (MMT) Assessment B LE 4/5  -MK       Row Name 12/23/24 1119          Balance    Balance Assessment sitting static balance;sitting dynamic balance;standing static balance;standing dynamic balance  -     Static Sitting Balance modified independence  -     Dynamic Sitting Balance modified independence  -     Position, Sitting Balance supported;sitting in chair  -     Static Standing Balance contact guard  -     Dynamic Standing Balance contact guard  -     Position/Device Used, Standing Balance walker, front-wheeled  -     Balance Interventions sitting;standing;sit to stand  -               User Key  (r) = Recorded By, (t) = Taken By, (c) = Cosigned  By      Initials Name Provider Type    William Lock, PT Physical Therapist                   Goals/Plan       Row Name 12/23/24 1126          Bed Mobility Goal 1 (PT)    Activity/Assistive Device (Bed Mobility Goal 1, PT) bed mobility activities, all  -MK     [place] Level/Cues Needed (Bed Mobility Goal 1, PT) independent  -MK     Time Frame (Bed Mobility Goal 1, PT) long term goal (LTG);10 days  -MK     Progress/Outcomes (Bed Mobility Goal 1, PT) new goal  -       Row Name 12/23/24 1126          Transfer Goal 1 (PT)    Activity/Assistive Device (Transfer Goal 1, PT) sit-to-stand/stand-to-sit  -MK     [place] Level/Cues Needed (Transfer Goal 1, PT) independent  -MK     Time Frame (Transfer Goal 1, PT) short term goal (STG);5 days  -MK     Progress/Outcome (Transfer Goal 1, PT) new goal  -       Row Name 12/23/24 1126          Balance Goal 1 (PT)    Activity/Assistive Device (Balance Goal) standing dynamic balance;with functional activities/occupations;walker, rolling  -     [place] Level/Cues Needed (Balance Goal 1, PT) supervision required  -     Time Frame (Balance Goal 1, PT) long-term goal (LTG);5-7 days  -MK     Progress/Outcomes (Balance Goal 1, PT) goal ongoing  -       Row Name 12/23/24 1126          Patient Education Goal (PT)    Activity (Patient Education Goal, PT) Pt to participate in B LE ther ex at least 3x per week  -     [place]/Cues/Accuracy (Memory Goal 2, PT) demonstrates adequately  -MK     Time Frame (Patient Education Goal, PT) long term goal (LTG);10 days  -MK     Progress/Outcome (Patient Education Goal, PT) new goal  -       Row Name 12/23/24 1126          Therapy Assessment/Plan (PT)    Planned Therapy Interventions (PT) balance training;bed mobility training;gait training;home exercise program;motor coordination training;neuromuscular re-education;patient/family education;strengthening;transfer training  -               User Key  (r) = Recorded By,  (t) = Taken By, (c) = Cosigned By      Initials Name Provider Type    William Lock, PT Physical Therapist                   Clinical Impression       Row Name 12/23/24 1120          Pain    Pretreatment Pain Rating 0/10 - no pain  -     Posttreatment Pain Rating 0/10 - no pain  -       Row Name 12/23/24 1120          Plan of Care Review    Plan of Care Reviewed With patient  -     Progress no change  -     Outcome Evaluation Pt participated well in PT evaluation this date. Pt reports that she lives in a duplex with multiple family members living in the duplex with her. Pt reports that she has a rollator, BSC, shower chair and a cane. Pt does require assist when bathing from family members. Pt reports that she falls often with the most being 8x in one day. Pt completed STS from bedside chair CGA using RW. BP 86/57 once standing. Pt ambulated 50 ft x2 using RW CGA with cues for steering the RW. Pt returned to room and was seated in bedside chair, BP 73/58, RN notified. Pt left with all needs met. Pt is expected to benefit from skilled PT during this inpatient stay to maximize strength and functional independence. Pt would further benefit from HH upon dc.  -       Row Name 12/23/24 1120          Therapy Assessment/Plan (PT)    Patient/Family Therapy Goals Statement (PT) to go home with family  -     Rehab Potential (PT) good  -     Criteria for Skilled Interventions Met (PT) yes;meets criteria  -     Therapy Frequency (PT) 5 times/wk  -     Predicted Duration of Therapy Intervention (PT) 2 weeks  -       Row Name 12/23/24 1120          Vital Signs    Intra Systolic BP Rehab 86  -MK     Intra Treatment Diastolic BP 57  -MK     Post Systolic BP Rehab 73  -MK     Post Treatment Diastolic BP 58  -MK     O2 Delivery Pre Treatment room air  -MK     O2 Delivery Intra Treatment room air  -MK     O2 Delivery Post Treatment room air  -MK     Pre Patient Position Sitting  -MK     Intra Patient Position  Standing  -     Post Patient Position Sitting  -       Row Name 12/23/24 1120          Positioning and Restraints    Pre-Treatment Position sitting in chair/recliner  -     Post Treatment Position chair  -MK     In Chair notified nsg;reclined;call light within reach;encouraged to call for assist  -               User Key  (r) = Recorded By, (t) = Taken By, (c) = Cosigned By      Initials Name Provider Type    William Lock, PT Physical Therapist                   Outcome Measures       Row Name 12/23/24 1127 12/23/24 0800       How much help from another person do you currently need...    Turning from your back to your side while in flat bed without using bedrails? 3  -MK 3  -HS    Moving from lying on back to sitting on the side of a flat bed without bedrails? 3  -MK 3  -HS    Moving to and from a bed to a chair (including a wheelchair)? 3  -MK 3  -HS    Standing up from a chair using your arms (e.g., wheelchair, bedside chair)? 3  -MK 3  -HS    Climbing 3-5 steps with a railing? 3  -MK 3  -HS    To walk in hospital room? 3  -MK 3  -HS    AM-PAC 6 Clicks Score (PT) 18  -MK 18  -HS    Highest Level of Mobility Goal 6 --> Walk 10 steps or more  -MK 6 --> Walk 10 steps or more  -HS      Row Name 12/23/24 0445          How much help from another person do you currently need...    Turning from your back to your side while in flat bed without using bedrails? 3  -BS     Moving from lying on back to sitting on the side of a flat bed without bedrails? 3  -BS     Moving to and from a bed to a chair (including a wheelchair)? 3  -BS     Standing up from a chair using your arms (e.g., wheelchair, bedside chair)? 3  -BS     Climbing 3-5 steps with a railing? 3  -BS     To walk in hospital room? 3  -BS     AM-PAC 6 Clicks Score (PT) 18  -BS     Highest Level of Mobility Goal 6 --> Walk 10 steps or more  -BS       Row Name 12/23/24 1127          Functional Assessment    Outcome Measure Options AM-PAC 6 Clicks Basic  Mobility (PT)  -               User Key  (r) = Recorded By, (t) = Taken By, (c) = Cosigned By      Initials Name Provider Type    Kami Lynn RN Registered Nurse    Deepti Perez RN Registered Nurse    William Lock PT Physical Therapist                                 Physical Therapy Education       Title: PT OT SLP Therapies (In Progress)       Topic: Physical Therapy (In Progress)       Point: Mobility training (Done)       Learning Progress Summary            Patient Acceptance, E,D, DU,VU by  at 12/23/2024 1127                      Point: Home exercise program (Not Started)       Learner Progress:  Not documented in this visit.              Point: Body mechanics (Done)       Learning Progress Summary            Patient Acceptance, E,D, DU,VU by  at 12/23/2024 1127                      Point: Precautions (Not Started)       Learner Progress:  Not documented in this visit.                              User Key       Initials Effective Dates Name Provider Type Discipline     06/13/23 -  William Landon PT Physical Therapist PT                  PT Recommendation and Plan  Planned Therapy Interventions (PT): balance training, bed mobility training, gait training, home exercise program, motor coordination training, neuromuscular re-education, patient/family education, strengthening, transfer training  Progress: no change  Outcome Evaluation: Pt participated well in PT evaluation this date. Pt reports that she lives in a duplex with multiple family members living in the duplex with her. Pt reports that she has a rollator, BSC, shower chair and a cane. Pt does require assist when bathing from family members. Pt reports that she falls often with the most being 8x in one day. Pt completed STS from bedside chair CGA using RW. BP 86/57 once standing. Pt ambulated 50 ft x2 using RW CGA with cues for steering the RW. Pt returned to room and was seated in bedside chair, BP 73/58, RN notified. Pt left  with all needs met. Pt is expected to benefit from skilled PT during this inpatient stay to maximize strength and functional independence. Pt would further benefit from HH upon dc.     Time Calculation:   PT Evaluation Complexity  History, PT Evaluation Complexity: 1-2 personal factors and/or comorbidities  Examination of Body Systems (PT Eval Complexity): total of 3 or more elements  Clinical Presentation (PT Evaluation Complexity): evolving  Clinical Decision Making (PT Evaluation Complexity): moderate complexity  Overall Complexity (PT Evaluation Complexity): moderate complexity     PT Charges       Row Name 12/23/24 1037             Time Calculation    Start Time 1037  -MK      PT Received On 12/23/24  -MK      PT Goal Re-Cert Due Date 01/02/25  -MK         Untimed Charges    PT Eval/Re-eval Minutes 45  -MK         Total Minutes    Untimed Charges Total Minutes 45  -MK       Total Minutes 45  -MK                User Key  (r) = Recorded By, (t) = Taken By, (c) = Cosigned By      Initials Name Provider Type    William Lock, PT Physical Therapist                  Therapy Charges for Today       Code Description Service Date Service Provider Modifiers Qty    [id number] HC PT EVAL MOD COMPLEXITY 3 12/23/2024 William Landon, PT GP 1            PT G-Codes  Outcome Measure Options: AM-PAC 6 Clicks Basic Mobility (PT)  AM-PAC 6 Clicks Score (PT): 18  PT Discharge Summary  Anticipated Discharge Disposition (PT): home with home health    William Landon PT  12/23/2024

## 2024-12-23 NOTE — PAYOR COMM NOTE
"TO:MK  FROM:MARIANA AGRCIA, RN PHONE [phone]  CLINICALS REF# [id number]      Viola Rodriguez (69 y.o. Female)       Date of Birth   1955    Social Security Number       Address   [address] [id number]    Home Phone   [phone]    MRN   [id number]       Nondenominational   Oriental orthodox    Marital Status   Single                            Admission Date   12/22/24    Admission Type   Emergency    Admitting Provider   Ricardo Esteban DO    Attending Provider   William Velázquez DO    Department, Room/Bed   [facility] INTENSIVE CARE, I08/1       Discharge Date       Discharge Disposition       Discharge Destination                                 Attending Provider: William Velázquez DO    Allergies: Lisinopril    Isolation: None   Infection: None   Code Status: CPR    Ht: 152.4 cm (60\")   Wt: 49.6 kg (109 lb 5.6 oz)    Admission Cmt: None   Principal Problem: Hypotension [I95.9]                   Active Insurance as of 12/22/2024       Primary Coverage       Payor Plan Insurance Group Employer/Plan Group    ANTHEM MEDICARE REPLACEMENT ANTHEM MED ADV SNP [id number]       Payor Plan Address Payor Plan Phone Number Payor Plan Fax Number Effective Dates    [id number] [phone]  1/1/2024 - None Entered    [facility] [address]         Subscriber Name Subscriber Birth Date Member ID       VIOLA RODRIGUEZ 1955 [id number]               Secondary Coverage       Payor Plan Insurance Group Employer/Plan Group    [place]        Payor Plan Address Payor Plan Phone Number Payor Plan Fax Number Effective Dates    [address] [phone]  1/23/2023 - None Entered    [facility] [id number]         Subscriber Name Subscriber Birth Date Member ID       VIOLA RODRIGUEZ 1955 [id number]                     Emergency Contacts        (Rel.) Home Phone Work Phone Mobile Phone    Lisa Rodriguez (Daughter) [phone] -- --    " FIDEL STRONG (Son) -- -- [phone]                 History & Physical        Ricardo Esteban, DO at [phone]            [facility]   HISTORY AND PHYSICAL      Name:  Viola Barlow   Age:  69 y.o.  Sex:  female  :  1955  MRN:  [id number]   Visit Number:  [id number]  Admission Date:  2024  Date Of Service:  24  Primary Care Physician:  Jyoti Tomlinson APRN    Chief Complaint:     Passed out    History Of Presenting Illness:      69-year-old female past medical history CKD stage III, diabetes mellitus type 2, hypertension, peripheral vascular disease, nephrolithiasis, hypothyroidism, Chief complaint unresponsive. Had recently been admitted 2024 to 2024 at this facility with gastroenteritis, JOSE ALBERTO.  She was started on Reglan and her renal function and symptoms had improved.  Reports she was doing well to her knowledge at home but then one of her Family found her in the kitchen floor about 2100 hrs.  She had a fall from the couch to the floor without injury.  Was noted to be hypoglycemic blood sugar of 36.  Was given glucagon improved to 47.  IV could not be obtained IO accessed and D50 given.  Patient was hypotensive and hypothermic warming blanket received Levophed and was started on D10.  Full code.    Pertinent findings: Highly sensitive troponin 10, blood glucose have been 47 and then improved to 210 and it trended back down to 57.  Lactic acid 1.5, potassium 3.1, creatinine 2.05 which appears slightly higher than baseline, CBC 7.13, blood cultures pending, chest x-ray showed bibasilar atelectasis without effusion, CT head shows moderate atrophy heterogeneous density in the left malissa similar to previous may reflect chronic infarct, low-density 5 mm area within the central medulla not identified previously could reflect acute or chronic ischemic change. EKG NSR no ischemic change.    ED Medications:    Medications   sodium chloride 0.9 %  flush 10 mL (has no administration in time range)   dextrose (D50W) (25 g/50 mL) IV injection 50 mL (50 mL Intravenous Given 12/23/24 0300)   sodium chloride 0.9 % infusion  - ADS Override Pull (  Not Given 12/23/24 0055)   dextrose 10 % infusion (0 mL Intravenous Stopped 12/23/24 0151)   norepinephrine (LEVOPHED) 8 mg in 250mL D5W infusion (0.1 mcg/kg/min × 47.6 kg Intravenous Rate/Dose Change 12/23/24 0306)   sodium chloride 0.9 % bolus 1,000 mL (0 mL Intravenous Stopped 12/23/24 0055)   cefepime 1000 mg IVPB in 100 mL NS (VTB) (0 mg Intravenous Stopped 12/23/24 0118)   sodium chloride 0.9 % bolus 1,000 mL (0 mL Intravenous Stopped 12/23/24 0151)           Edited by: Ricardo Esteban DO at 12/23/2024 0454     Review Of Systems:    All systems were reviewed and negative except as mentioned in history of presenting illness, assessment and plan.    Past Medical History: Patient  has a past medical history of Arthritis, Cataract, Depression, Diabetes mellitus, Dysphagia, Elevated cholesterol, Full dentures, GERD (gastroesophageal reflux disease), Headache, History of nuclear stress test, Hypertension, Impaired mobility, Kidney disease, Migraines, PVD (peripheral vascular disease), Seasonal allergies, Spinal headache, and Wears glasses.    Past Surgical History: Patient  has a past surgical history that includes Mouth surgery; Hysterectomy (1983); Hysterectomy (1984); Abdominal surgery (08/1984); Elbow Epicondylectomy (Left, 09/1990); Vascular surgery (Bilateral); Colonoscopy; Esophagogastroduodenoscopy; Appendectomy; Cataract extraction w/ intraocular lens implant (Right, 6/21/2019); and Cataract extraction w/ intraocular lens implant (Left, 7/5/2019).    Social History: Patient  reports that she quit smoking about 16 years ago. Her smoking use included cigarettes. She started smoking about 55 years ago. She has a 57 pack-year smoking history. She has never used smokeless tobacco. She reports that she does not  drink alcohol and does not use drugs.    Family History:  Patient's family history has been reviewed and found to be noncontributory.     Allergies:      Lisinopril    Home Medications:    Prior to Admission Medications       Prescriptions Last Dose Informant Patient Reported? Taking?    amLODIPine (NORVASC) 5 MG tablet   No No    Take 1 tablet by mouth Daily.    aspirin (aspirin EC) 81 MG EC tablet   No No    Take 1 tablet by mouth Daily.    atorvastatin (LIPITOR) 40 MG tablet  Self Yes No    Take 1 tablet by mouth Daily.    Cholecalciferol (VITAMIN D-3) 1000 units capsule  Self Yes No    Take 1,000 Units by mouth Daily.    clopidogrel (PLAVIX) 75 MG tablet  Self Yes No    Take 1 tablet by mouth Daily.    Dulaglutide (Trulicity) 1.5 MG/0.5ML solution pen-injector   Yes No    Inject 1.5 mg under the skin into the appropriate area as directed 1 (One) Time Per Week.    gabapentin (NEURONTIN) 600 MG tablet  Self Yes No    Take 600 mg by mouth 3 (Three) Times a Day.    HYDROcodone-acetaminophen (NORCO) 5-325 MG per tablet   No No    Take 1 tablet by mouth Every 6 (Six) Hours As Needed for Moderate Pain.    insulin lispro protamine-insulin lispro (humaLOG 75-25) (75-25) 100 UNIT/ML suspension injection   No No    Inject 60 Units under the skin into the appropriate area as directed 2 (Two) Times a Day With Meals.    levothyroxine (SYNTHROID, LEVOTHROID) 50 MCG tablet  Self Yes No    Take 1 tablet by mouth Daily.    lidocaine (LIDODERM) 5 %   No No    Place 1 patch on the skin as directed by provider Daily. Remove & Discard patch within 12 hours or as directed by MD    metFORMIN ER (GLUCOPHAGE-XR) 750 MG 24 hr tablet  Self Yes No    Take 1 tablet by mouth Daily With Breakfast.    metoclopramide (Reglan) 5 MG tablet   No No    Take 1 tablet by mouth 3 (Three) Times a Day Before Meals.    metoprolol succinate XL (TOPROL-XL) 50 MG 24 hr tablet  Self Yes No    Take 1 tablet by mouth Daily.    ondansetron ODT (ZOFRAN-ODT) 4 MG  "disintegrating tablet   No No    Place 1 tablet on the tongue Every 8 (Eight) Hours As Needed for Nausea or Vomiting.    pantoprazole (PROTONIX) 40 MG EC tablet  Self Yes No    Take 1 tablet by mouth Daily.    sertraline (ZOLOFT) 50 MG tablet  Self Yes No    Take 1 tablet by mouth Daily.    vitamin D3 125 MCG (5000 UT) capsule capsule   Yes No    Take 1 capsule by mouth Daily.              Vital Signs:  Temp:  [96 °F (35.6 °C)-99.3 °F (37.4 °C)] 99.3 °F (37.4 °C)  Heart Rate:  [63-79] 79  Resp:  [20] 20  BP: ()/(33-95) 102/55        12/22/24  2329   Weight: 47.6 kg (105 lb)     Body mass index is 20.35 kg/m².    Physical Exam:     Most recent vital Signs: /55   Pulse 79   Temp 99.3 °F (37.4 °C)   Resp 20   Ht 153 cm (60.24\")   Wt 47.6 kg (105 lb)   LMP  (LMP Unknown)   SpO2 97%   BMI 20.35 kg/m²     Constitutional: Awake, alert  Eyes: PERRLA, sclerae anicteric, no conjunctival injection  HENT: NCAT, mucous membranes moist  Neck: Supple, no thyromegaly, no lymphadenopathy, trachea midline  Respiratory: Clear to auscultation bilaterally, nonlabored respirations   Cardiovascular: RRR, no murmurs, rubs, or gallops, palpable pedal pulses bilaterally, RIJ CVC noted  Gastrointestinal: Positive bowel sounds, soft, nontender, nondistended  Musculoskeletal: No bilateral ankle edema, no clubbing or cyanosis to extremities  Psychiatric: Appropriate affect, cooperative  Neurologic: Oriented x 3, speech clear  Skin: No rashes  Edited by: Ricardo Esteban DO at 12/23/2024 5211      Laboratory data:    I have reviewed the labs done in the emergency room.    Results from last 7 days   Lab Units 12/22/24  2333 12/19/24  0603 12/18/24  0539 12/16/24  1618 12/16/24  1223   SODIUM mmol/L 132* 138 143   < > 142   POTASSIUM mmol/L 3.1* 4.2 3.8   < > 4.3   CHLORIDE mmol/L 95* 102 107   < > 93*   CO2 mmol/L 22.6 24.4 22.0   < > 19.3*   BUN mg/dL 38* 34* 37*   < > 54*   CREATININE mg/dL 2.05* 1.39* 1.46*   < > 3.19* "   CALCIUM mg/dL 9.1 9.5 9.7   < > 10.9*   BILIRUBIN mg/dL 0.3  --  0.8  --  0.7   ALK PHOS U/L 100  --  131*  --  148*   ALT (SGPT) U/L 12  --  10  --  16   AST (SGOT) U/L 25  --  19  --  22   GLUCOSE mg/dL 210* 334* 145*   < > 363*    < > = values in this interval not displayed.     Results from last 7 days   Lab Units 12/22/24  2333 12/18/24  0539 12/17/24  0533   WBC 10*3/mm3 7.13 14.36* 17.08*   HEMOGLOBIN g/dL 13.9 16.0* 14.5   HEMATOCRIT % 42.7 49.2* 46.8*   PLATELETS 10*3/mm3 109* 247 261         Results from last 7 days   Lab Units 12/23/24  0030 12/22/24  2333   HSTROP T ng/L 10 12             Results from last 7 days   Lab Units 12/16/24  1223   LIPASE U/L 31         Results from last 7 days   Lab Units 12/23/24  0340   COLOR UA  Yellow   GLUCOSE UA  500 mg/dL (2+)*   KETONES UA  Negative   BLOOD UA  Negative   LEUKOCYTES UA  Negative   PH, URINE  5.5   BILIRUBIN UA  Negative   UROBILINOGEN UA  0.2 E.U./dL   RBC UA /HPF None Seen   WBC UA /HPF 0-2       Pain Management Panel  More data exists         Latest Ref Rng & Units 11/18/2022 8/16/2021   Pain Management Panel   Creatinine, Urine mg/dL - 86.7    Amphetamine, Urine Qual Negative Negative  -   Barbiturates Screen, Urine Negative Negative  -   Benzodiazepine Screen, Urine Negative Negative  -   Buprenorphine, Screen, Urine Negative Negative  -   Cocaine Screen, Urine Negative Negative  -   Methadone Screen , Urine Negative Negative  -   Methamphetamine, Ur Negative Negative  -      Details                   EKG:       EKG NSR no ischemic change    Radiology:    CT Head Without Contrast    Addendum Date: 12/23/2024    ADDENDUM REPORT ADDENDUM: Receipt of this report by the clinical staff was confirmed with Baldo Henson on Dec 23, 2024 01:09:00 EST. Authenticated and Electronically Signed by Aleksandar العراقي MD on 12/23/2024 01:09:39 AM    Result Date: 12/23/2024  FINAL REPORT TECHNIQUE: null CLINICAL HISTORY: unresponsive episode, low blood sugar,  weakness COMPARISON: null FINDINGS: CT head without contrast Comparison: CT/SR - CT HEAD WO CONTRAST - 11/18/2024 07:15 PM EST Findings: Moderate diffuse cerebral atrophy and nonspecific white matter hypodensity. No intracranial mass, midline shift, hydrocephalus, or acute hemorrhage/infarct. Possible heterogeneity of the left aspect of the malissa and low-density centrally involving the medulla oblongata, series 2 image 11 and 7 respectively. Moderate calcific ASVD of the carotid siphons. Visualized paranasal sinuses are clear. Mastoids are well developed and clear. Orbits are unremarkable. No skull fracture. No significant scalp soft tissue swelling.     Impression: 1. Moderate atrophy. 2. Heterogeneous density of the left malissa, similar to previous. This may reflect chronic infarct. 3. Low-density 5 mm area within the central medulla oblongata, not identified previously. This could reflect acute or chronic ischemic change. Consider MRI if not contraindicated. Authenticated and Electronically Signed by Aleksandar العراقي MD on 12/23/2024 12:53:16 AM    CT Cervical Spine Without Contrast    Result Date: 12/23/2024  FINAL REPORT TECHNIQUE: null CLINICAL HISTORY: unresponsive, low blood sugar, weakness COMPARISON: null FINDINGS: CT cervical spine without contrast: Comparison: CT/SR - CT CERVICAL SPINE WO CONTRAST - 11/18/2024 07:15 PM EST Findings: No acute fracture or traumatic listhesis. No significant prevertebral soft tissue swelling. Flattening of cervical lordosis with mild apex left scoliosis. Severe degenerative disc disease at C5-6. Moderate facet arthropathy on the right. No significant spinal canal or neural foraminal stenosis. Craniocervical junction and C1-C2 articulations are without abnormal alignment/asymmetry. Dens is intact. Severe degenerative narrowing of the atlantodens interval. Normal limited view of the intracranial contents. Neck soft tissues are unremarkable for age. Lung apices without  consolidation, pneumothorax, or mass.     Impression: 1. No acute fracture. 2. Multilevel degenerative changes. Authenticated and Electronically Signed by Aleksandar العراقي MD on 12/23/2024 12:52:23 AM     Assessment/Plan:      Hypotension    Acute kidney injury superimposed on chronic kidney disease  -- Suspect hypotension and JOSE ALBERTO due to hypovolemia, certainly polypharmacy could be playing a role as well. RIJ CVC, montoya, warming blanket placed 12/23/24  -- Active Treatments: IV fluids dextrose, hold antihypertensives, hold pain meds  -- Pending Results: Nephrology      Hypoglycemia    Type 2 diabetes mellitus with nephropathy    Gastroparesis  -- Suspect inadequate oral intake relative to insulin use.  -- Active Treatments: Subcutaneous insulin, Reglan  -- Pending Results: Every 1 hr Accu-Cheks      Medulla/Brain stem abnormality  -- Neuro consult.MRI pending.          DVT Prophylaxis: Lovenox  Code Status: Full code  Diet: Diabetic renal  Risk assessment: High anticipate greater than two night stay          Edited by: Ricardo Esteban DO at 12/23/2024 0455           Advance Care Planning  ACP discussion was held with the patient during this visit. Patient does not have an advance directive, declines further assistance.           Ricardo Esteban DO  12/23/24  04:56 EST    Dictated utilizing Dragon dictation.      Electronically signed by Ricardo Esteban DO at 12/23/24 0459          Emergency Department Notes        Federico Lancaster MD at 12/23/24 0337        Procedure Orders    1. Central Line At Bedside [[id number]] ordered by Federico Lancaster MD    2. IO Line Insertion [[id number]] ordered by Federico Lancaster MD                 I evaluated this patient in conjunction with the TYLER.  I personally performed a history and physical examination.  I agree with TYLER's documented history, physical and medical decision making.    Patient with past history significant for type 2 diabetes, hypertension, chronic kidney disease,  nephrolithiasis, diverticulosis, obstructive sleep apnea, hyperlipidemia, peripheral vascular disease.    Patient presents with hypoglycemia.  Patient said intermittent episodes of recurrent hypoglycemia despite intermittent amps of D50.  Also noted to be hypothermic and hypotensive.  Blood cultures have been obtained and she is given broad-spectrum IV antibiotics.  Labs show acute kidney injury.    Upon arrival patient had no IV access.  IO was placed and was given amp of D50.    Despite IV fluid resuscitation she remains hypotensive.  Vasopressors initiated.  Right IJ central line placed.    Dr. Esteban excepted patient for hospitalization.     Diagnosis Plan   1. Hypoglycemia        2. JOSE ALBERTO (acute kidney injury)            ED Disposition       ED Disposition   Decision to Admit    Condition   --    Comment   Level of Care: Critical Care [6]   Diagnosis: Hypotension [090697]                     Central Line At Bedside    Date/Time: 12/23/2024 7:14 AM    Performed by: Federico Lancaster MD  Authorized by: William Velázquez DO    Consent:     Consent obtained:  Written    Consent given by:  Patient    Risks, benefits, and alternatives were discussed: yes      Risks discussed:  Arterial puncture, incorrect placement, nerve damage, pneumothorax, infection and bleeding  Universal protocol:     Patient identity confirmed:  Arm band  Pre-procedure details:     Indication(s): central venous access      Hand hygiene: Hand hygiene performed prior to insertion      Sterile barrier technique: All elements of maximal sterile technique followed      Skin preparation:  Chlorhexidine    Skin preparation agent: Skin preparation agent completely dried prior to procedure    Sedation:     Sedation type:  None  Anesthesia:     Anesthesia method:  Local infiltration    Local anesthetic:  Lidocaine 1% w/o epi  Procedure details:     Location:  R internal jugular    Patient position:  Trendelenburg    Procedural supplies:  Triple lumen     Catheter size:  7 Fr    Landmarks identified: yes      Ultrasound guidance: yes      Ultrasound guidance timing: real time      Sterile ultrasound techniques: Sterile gel and sterile probe covers were used      Number of attempts:  1    Successful placement: yes    Post-procedure details:     Post-procedure:  Dressing applied and line sutured    Assessment:  Blood return through all ports    Procedure completion:  Tolerated well, no immediate complications  IO Line Insertion    Date/Time: 12/23/2024 7:16 AM    Performed by: Federico Lancaster MD  Authorized by: William Velázquez DO    Consent:     Consent obtained:  Emergent situation  Pre-procedure details:     Site preparation:  Alcohol  Anesthesia:     Anesthesia method:  None  Procedure details:     Insertion site:  R proximal tibia    Insertion device:  Drill device    Insertion: Needle was inserted through the bony cortex      Number of attempts:  1    Insertion confirmation:  Aspiration of blood/marrow, easy infusion of fluids and stability of the needle  Post-procedure details:     Secured with:  Elastic bandage    Procedure completion:  Tolerated well, no immediate complications        Federico Lancaster MD  12/23/24 0716      Electronically signed by Federico Lancaster MD at 12/23/24 0716       Sade Banerjee RN at 12/23/24 0246          Central line supplies at bedside      Electronically signed by Sade Banerjee RN at 12/23/24 0247       Sade Banerjee RN at 12/23/24 0156          Pt attempted to give urine specimen but had some stool in specimen.  Will attempt again    Electronically signed by Sade Banerjee RN at 12/23/24 0158       Sade Banerjee, JACOBY at 12/23/24 0002          Glucose 146    Electronically signed by Sade Banerjee RN at 12/23/24 0002       Vital Signs (last day)       Date/Time Temp Temp src Pulse Resp BP Patient Position SpO2    12/23/24 0900 100.8 (38.2) Bladder 82 -- 108/52 -- 98    12/23/24 0800 100.9 (38.3) Bladder 82 -- 116/79 Lying 97    12/23/24  0615 100 (37.8) -- 80 -- 90/45 -- 97    12/23/24 0607 -- -- 80 -- 87/53 -- 97    12/23/24 0600 99.9 (37.7) Bladder 81 20 56/34 Lying 98    12/23/24 0545 99.9 (37.7) -- 80 -- 108/73 -- 96    12/23/24 0530 99.9 (37.7) -- 80 -- 112/62 -- 97    12/23/24 0515 99.7 (37.6) -- 80 -- 98/64 -- 98    12/23/24 0500 99.5 (37.5) -- 80 -- 92/65 -- 99    12/23/24 0445 99.3 (37.4) Bladder 79 20 102/55 Lying 97    12/23/24 0439 -- -- -- -- 83/70 -- --    12/23/24 0430 98.5 (36.9) Oral -- -- -- -- --    12/23/24 0415 98.2 (36.8) -- 79 -- -- -- 93    12/23/24 0410 -- -- 78 -- 103/70 -- 98    12/23/24 0400 97.9 (36.6) -- 77 -- 119/75 -- 95    12/23/24 0357 97.7 (36.5) -- 76 -- -- -- 95    12/23/24 0350 -- -- 76 -- 117/74 -- 96    12/23/24 0320 -- -- 74 -- 136/77 -- 100    12/23/24 0310 -- -- 73 -- 118/71 -- 100    12/23/24 0300 -- -- 71 -- 59/48 -- 98    12/23/24 0240 -- -- 69 -- 64/50 -- 95    12/23/24 0230 -- -- 68 -- 65/44 -- 90    12/23/24 0227 -- -- 68 -- -- -- 87    12/23/24 0215 -- -- 68 -- 87/54 -- 91    12/23/24 0200 -- -- 67 -- 85/58 -- 96    12/23/24 0145 -- -- 67 -- 80/54 -- 99    12/23/24 0130 -- -- 67 -- 86/51 -- 100    12/23/24 0115 -- -- 68 -- 77/53 -- 100    12/23/24 0100 -- -- 66 -- 82/39 -- 100    12/23/24 0045 -- -- 66 -- 93/55 -- 100    12/23/24 0030 -- -- 64 -- 86/52 -- 100    12/23/24 0019 -- -- 64 -- 81/49 -- 100    12/23/24 0017 -- -- 63 -- 58/33 -- 96    12/23/24 0012 -- -- 64 -- 75/47 -- 100    12/22/24 2359 -- -- 64 -- 78/47 -- 99    12/22/24 2325 96 (35.6) Rectal -- -- -- -- --    12/22/24 2310 -- -- 70 20 115/95 -- 100          Current Facility-Administered Medications   Medication Dose Route Frequency Provider Last Rate Last Admin    acetaminophen (TYLENOL) tablet 650 mg  650 mg Oral Q4H PRN Ricardo Esteban DO        aspirin EC tablet 81 mg  81 mg Oral Daily Ricardo Esteban DO   81 mg at 12/23/24 0833    atorvastatin (LIPITOR) tablet 40 mg  40 mg Oral Daily Ricardo Esteban DO   40 mg at  12/23/24 0832    sennosides-docusate (PERICOLACE) 8.6-50 MG per tablet 2 tablet  2 tablet Oral BID PRN Eulalia, Ricardo Whittaker, DO        And    polyethylene glycol (MIRALAX) packet 17 g  17 g Oral Daily PRN Eulalia, Ricardo Panfilo, DO        And    bisacodyl (DULCOLAX) EC tablet 5 mg  5 mg Oral Daily PRN Ricardo Esteban, DO        And    bisacodyl (DULCOLAX) suppository 10 mg  10 mg Rectal Daily PRN Ricardo Esteban, DO        sennosides-docusate (PERICOLACE) 8.6-50 MG per tablet 2 tablet  2 tablet Oral BID Eulalia, Ricardo Whittaker, DO   2 tablet at 12/23/24 0833    And    polyethylene glycol (MIRALAX) packet 17 g  17 g Oral Daily PRN Eulalia, Ricardo Whittaker, DO        And    bisacodyl (DULCOLAX) EC tablet 5 mg  5 mg Oral Daily PRN Eulalia, Ricardo Whittaker, DO        And    bisacodyl (DULCOLAX) suppository 10 mg  10 mg Rectal Daily PRN Ricardo Esteban, DO        calcium carbonate (TUMS) chewable tablet 500 mg (200 mg elemental)  2 tablet Oral BID PRN Ricardo Esteban, DO        Calcium Replacement - Follow Nurse / BPA Driven Protocol   Not Applicable PRN Ricardo Esteban, DO        clopidogrel (PLAVIX) tablet 75 mg  75 mg Oral Daily Ricardo Esteban, DO   75 mg at 12/23/24 0832    dextrose (D50W) (25 g/50 mL) IV injection 25 g  25 g Intravenous Q15 Min PRN Ricardo Esteban, DO        dextrose (D50W) (25 g/50 mL) IV injection 50 mL  50 mL Intravenous Q1H PRN Ricardo Esteban, DO   50 mL at 12/23/24 0300    dextrose (GLUTOSE) oral gel 15 g  15 g Oral Q15 Min PRN Ricardo Esteban, DO        empagliflozin (JARDIANCE) tablet 10 mg  10 mg Oral Daily William Velázquez DO        Enoxaparin Sodium (LOVENOX) syringe 30 mg  30 mg Subcutaneous Q24H Ricardo Esteban, DO   30 mg at 12/23/24 0833    glucagon (GLUCAGEN) injection 1 mg  1 mg Intramuscular Q15 Min PRN Ricardo Esteban DO        Insulin Lispro (humaLOG) injection 2-9 Units  2-9 Units Subcutaneous 4x Daily AC & at Bedtime Ricardo Esteban DO   4 Units  at 12/23/24 0831    levothyroxine (SYNTHROID, LEVOTHROID) tablet 50 mcg  50 mcg Oral Daily Ricardo Esteban, DO   50 mcg at 12/23/24 0831    Lidocaine 4 % 1 patch  1 patch Transdermal Q24H Ricardo Esteban,         Magnesium Standard Dose Replacement - Follow Nurse / BPA Driven Protocol   Not Applicable PRN Ricardo Esteban, DO        melatonin tablet 5 mg  5 mg Oral Nightly Ricardo Esteban DO        metoclopramide (REGLAN) tablet 5 mg  5 mg Oral TID AC Ricardo Esteban, DO   5 mg at 12/23/24 0832    mupirocin (BACTROBAN) 2 % nasal ointment 1 Application  1 Application Each Nare BID Ricardo Esteban DO   1 Application at 12/23/24 0858    nitroglycerin (NITROSTAT) SL tablet 0.4 mg  0.4 mg Sublingual Q5 Min PRN Ricardo Esteban DO        norepinephrine (LEVOPHED) 8 mg in 250mL D5W infusion  0.02-0.3 mcg/kg/min Intravenous Titrated Ricardo Esteban DO 8.93 mL/hr at 12/23/24 0900 0.1 mcg/kg/min at 12/23/24 0900    ondansetron ODT (ZOFRAN-ODT) disintegrating tablet 4 mg  4 mg Oral Q6H PRN Ricardo Esteban DO        Or    ondansetron (ZOFRAN) injection 4 mg  4 mg Intravenous Q6H PRN Ricardo Esteban,         ondansetron ODT (ZOFRAN-ODT) disintegrating tablet 4 mg  4 mg Translingual Q8H PRN Ricardo Esteban DO        pantoprazole (PROTONIX) EC tablet 40 mg  40 mg Oral Daily Ricardo Esteban DO   40 mg at 12/23/24 0831    Pharmacy to Dose enoxaparin (LOVENOX)   Not Applicable Continuous PRN Ricardo Esteban DO        Phosphorus Replacement - Follow Nurse / BPA Driven Protocol   Not Applicable PRN Ricardo Esteban DO        Potassium Replacement - Follow Nurse / BPA Driven Protocol   Not Applicable PRN Ricardo Esteban,         sertraline (ZOLOFT) tablet 50 mg  50 mg Oral Daily Ricardo Esteban, DO   50 mg at 12/23/24 0832    sodium chloride 0.9 % flush 10 mL  10 mL Intravenous PRN Ricardo Esteban,    10 mL at 12/23/24 0833    sodium chloride 0.9 %  infusion  - ADS Override Pull             sodium chloride 0.9 % infusion  100 mL/hr Intravenous Continuous William Velázquez  mL/hr at 12/23/24 0859 100 mL/hr at 12/23/24 0859     Lab Results (last 24 hours)       Procedure Component Value Units Date/Time    Respiratory Panel PCR w/COVID-19(SARS-CoV-2) [facility]/CONSTANCE/STACIE/PAD/COR/ALEJANDRA In-House, NP Swab in UTM/VTM, 2 HR TAT - Swab, Nasopharynx [[id number]] Collected: 12/23/24 0859    Specimen: Swab from Nasopharynx Updated: 12/23/24 0927    POC Glucose 4x Daily Before Meals & at Bedtime [[id number]]  (Abnormal) Collected: 12/23/24 0736    Specimen: Blood Updated: 12/23/24 0741     Glucose 242 mg/dL      Comment: Serial Number: [id number]:  [id number]       Comprehensive Metabolic Panel [[id number]]  (Abnormal) Collected: 12/23/24 0636    Specimen: Blood Updated: 12/23/24 0735     Glucose 222 mg/dL      Comment: Glucose >180, Hemoglobin A1C recommended.        BUN 29 mg/dL      Creatinine 1.35 mg/dL      Sodium 134 mmol/L      Potassium 3.6 mmol/L      Chloride 100 mmol/L      CO2 22.1 mmol/L      Calcium 8.1 mg/dL      Total Protein 6.4 g/dL      Albumin 3.5 g/dL      ALT (SGPT) 12 U/L      AST (SGOT) 23 U/L      Alkaline Phosphatase 96 U/L      Total Bilirubin 0.4 mg/dL      Globulin 2.9 gm/dL      A/G Ratio 1.2 g/dL      BUN/Creatinine Ratio 21.5     Anion Gap 11.9 mmol/L      eGFR 43.2 mL/min/1.73     Narrative:      GFR Categories in Chronic Kidney Disease (CKD)      GFR Category          GFR (mL/min/1.73)    Interpretation  G1                     90 or greater         Normal or high (1)  G2                      60-89                Mild decrease (1)  G3a                   45-59                Mild to moderate decrease  G3b                   30-44                Moderate to severe decrease  G4                    15-29                Severe decrease  G5                    14 or less           Kidney failure          (1)In the absence of evidence of kidney disease,  neither GFR category G1 or G2 fulfill the criteria for CKD.    eGFR calculation 2021 CKD-EPI creatinine equation, which does not include race as a factor    CBC (No Diff) [[id number]]  (Abnormal) Collected: 12/23/24 0635    Specimen: Blood Updated: 12/23/24 0715     WBC 12.50 10*3/mm3      RBC 4.62 10*6/mm3      Hemoglobin 14.2 g/dL      Hematocrit 43.1 %      MCV 93.3 fL      MCH 30.7 pg      MCHC 32.9 g/dL      RDW 12.8 %      RDW-SD 43.8 fl      MPV 11.8 fL      Platelets 141 10*3/mm3     POC Glucose Once [[id number]]  (Abnormal) Collected: 12/23/24 0448    Specimen: Blood Updated: 12/23/24 0450     Glucose 141 mg/dL      Comment: Serial Number: [id number]:  [id number]       POC Glucose Once [[id number]]  (Abnormal) Collected: 12/23/24 0410    Specimen: Blood Updated: 12/23/24 0412     Glucose 132 mg/dL      Comment: Serial Number: [id number]:  [id number]       Urinalysis, Microscopic Only - Urine, Clean Catch [[id number]]  (Abnormal) Collected: 12/23/24 0340    Specimen: Urine, Clean Catch Updated: 12/23/24 0409     RBC, UA None Seen /HPF      WBC, UA 0-2 /HPF      Comment: Urine culture not indicated.        Bacteria, UA Trace /HPF      Squamous Epithelial Cells, UA 0-2 /HPF      Hyaline Casts, UA None Seen /LPF      Methodology Manual Light Microscopy    Urinalysis With Culture If Indicated - Urine, Clean Catch [[id number]]  (Abnormal) Collected: 12/23/24 0340    Specimen: Urine, Clean Catch Updated: 12/23/24 0400     Color, UA Yellow     Appearance, UA Clear     pH, UA 5.5     Specific Gravity, UA 1.006     Glucose,  mg/dL (2+)     Ketones, UA Negative     Bilirubin, UA Negative     Blood, UA Negative     Protein, UA Negative     Leuk Esterase, UA Negative     Nitrite, UA Positive     Urobilinogen, UA 0.2 E.U./dL    Narrative:      In absence of clinical symptoms, the presence of pyuria, bacteria, and/or nitrites on the urinalysis result does not correlate with infection.    POC Glucose Once  "[[id number]]  (Abnormal) Collected: 12/23/24 0257    Specimen: Blood Updated: 12/23/24 0303     Glucose 57 mg/dL      Comment: Serial Number: [id number]:  [id number]       STAT Lactic Acid, Reflex [[id number]]  (Normal) Collected: 12/23/24 0206    Specimen: Blood Updated: 12/23/24 0223     Lactate 1.5 mmol/L     POC Glucose Once [[id number]]  (Normal) Collected: 12/23/24 0205    Specimen: Blood Updated: 12/23/24 0208     Glucose 95 mg/dL      Comment: Serial Number: [id number]:  [id number]       Procalcitonin [[id number]]  (Normal) Collected: 12/23/24 0030    Specimen: Blood Updated: 12/23/24 0117     Procalcitonin 0.14 ng/mL     Narrative:      As a Marker for Sepsis (Non-Neonates):    1. <0.5 ng/mL represents a low risk of severe sepsis and/or septic shock.  2. >2 ng/mL represents a high risk of severe sepsis and/or septic shock.    As a Marker for Lower Respiratory Tract Infections that require antibiotic therapy:    PCT on Admission    Antibiotic Therapy       6-12 Hrs later    >0.5                Strongly Recommended  >0.25 - <0.5        Recommended   0.1 - 0.25          Discouraged              Remeasure/reassess PCT  <0.1                Strongly Discouraged     Remeasure/reassess PCT    As 28 day mortality risk marker: \"Change in Procalcitonin Result\" (>80% or <=80%) if Day 0 (or Day 1) and Day 4 values are available. Refer to http://www.[employer]-pct-calculator.com    Change in PCT <=80%  A decrease of PCT levels below or equal to 80% defines a positive change in PCT test result representing a higher risk for 28-day all-cause mortality of patients diagnosed with severe sepsis for septic shock.    Change in PCT >80%  A decrease of PCT levels of more than 80% defines a negative change in PCT result representing a lower risk for 28-day all-cause mortality of patients diagnosed with severe sepsis or septic shock.       High Sensitivity Troponin T 1Hr [[id number]]  (Normal) Collected: 12/23/24 0030    Specimen: " Blood Updated: 12/23/24 0110     HS Troponin T 10 ng/L      Troponin T Numeric Delta -2 ng/L     Narrative:      High Sensitive Troponin T Reference Range:  <14.0 ng/L- Negative Female for AMI  <22.0 ng/L- Negative Male for AMI  >=14 - Abnormal Female indicating possible myocardial injury.  >=22 - Abnormal Male indicating possible myocardial injury.   Clinicians would have to utilize clinical acumen, EKG, Troponin, and serial changes to determine if it is an Acute Myocardial Infarction or myocardial injury due to an underlying chronic condition.         Blood Culture With [place] - Blood, Hand, Left [[id number]] Collected: 12/23/24 0030    Specimen: Blood from Hand, Left Updated: 12/23/24 0050    Blood Culture With [place] - Blood, Arm, Right [[id number]] Collected: 12/23/24 0038    Specimen: Blood from Arm, Right Updated: 12/23/24 0050    Lactic Acid, Plasma [[id number]]  (Abnormal) Collected: 12/22/24 2333    Specimen: Blood Updated: 12/23/24 0006     Lactate 2.8 mmol/L     Comprehensive Metabolic Panel [[id number]]  (Abnormal) Collected: 12/22/24 2333    Specimen: Blood Updated: 12/23/24 0005     Glucose 210 mg/dL      Comment: Glucose >180, Hemoglobin A1C recommended.        BUN 38 mg/dL      Creatinine 2.05 mg/dL      Sodium 132 mmol/L      Potassium 3.1 mmol/L      Comment: Slight hemolysis detected by analyzer. Result may be falsely elevated.        Chloride 95 mmol/L      CO2 22.6 mmol/L      Calcium 9.1 mg/dL      Total Protein 7.2 g/dL      Albumin 4.1 g/dL      ALT (SGPT) 12 U/L      AST (SGOT) 25 U/L      Comment: Slight hemolysis detected by analyzer. Result may be falsely elevated.        Alkaline Phosphatase 100 U/L      Total Bilirubin 0.3 mg/dL      Globulin 3.1 gm/dL      A/G Ratio 1.3 g/dL      BUN/Creatinine Ratio 18.5     Anion Gap 14.4 mmol/L      eGFR 26.1 mL/min/1.73     Narrative:      GFR Categories in Chronic Kidney Disease (CKD)      GFR Category          GFR (mL/min/1.73)    Interpretation  G1                      90 or greater         Normal or high (1)  G2                      60-89                Mild decrease (1)  G3a                   45-59                Mild to moderate decrease  G3b                   30-44                Moderate to severe decrease  G4                    15-29                Severe decrease  G5                    14 or less           Kidney failure          (1)In the absence of evidence of kidney disease, neither GFR category G1 or G2 fulfill the criteria for CKD.    eGFR calculation 2021 CKD-EPI creatinine equation, which does not include race as a factor    Magnesium [[id number]]  (Normal) Collected: 12/22/24 2333    Specimen: Blood Updated: 12/23/24 0005     Magnesium 1.7 mg/dL     TSH [[id number]]  (Normal) Collected: 12/22/24 2333    Specimen: Blood Updated: 12/23/24 0004     TSH 1.650 uIU/mL     High Sensitivity Troponin T [[id number]]  (Normal) Collected: 12/22/24 2333    Specimen: Blood Updated: 12/23/24 0004     HS Troponin T 12 ng/L     Narrative:      High Sensitive Troponin T Reference Range:  <14.0 ng/L- Negative Female for AMI  <22.0 ng/L- Negative Male for AMI  >=14 - Abnormal Female indicating possible myocardial injury.  >=22 - Abnormal Male indicating possible myocardial injury.   Clinicians would have to utilize clinical acumen, EKG, Troponin, and serial changes to determine if it is an Acute Myocardial Infarction or myocardial injury due to an underlying chronic condition.         POC Glucose Once [[id number]]  (Abnormal) Collected: 12/23/24 0001    Specimen: Blood Updated: 12/23/24 0003     Glucose 146 mg/dL      Comment: Serial Number: [id number]:  [id number]       CBC & Differential [[id number]]  (Abnormal) Collected: 12/22/24 2333    Specimen: Blood Updated: 12/22/24 2338    Narrative:      The following orders were created for panel order CBC & Differential.  Procedure                               Abnormality         Status                     ---------                                -----------         ------                     CBC Auto Differential[[id number]]        Abnormal            Final result                 Please view results for these tests on the individual orders.    CBC Auto Differential [[id number]]  (Abnormal) Collected: 12/22/24 2333    Specimen: Blood Updated: 12/22/24 2338     WBC 7.13 10*3/mm3      RBC 4.54 10*6/mm3      Hemoglobin 13.9 g/dL      Hematocrit 42.7 %      MCV 94.1 fL      MCH 30.6 pg      MCHC 32.6 g/dL      RDW 12.9 %      RDW-SD 44.7 fl      MPV 12.3 fL      Platelets 109 10*3/mm3      Neutrophil % 54.7 %      Lymphocyte % 34.4 %      Monocyte % 9.5 %      Eosinophil % 0.6 %      Basophil % 0.4 %      Immature Grans % 0.4 %      Neutrophils, Absolute 3.90 10*3/mm3      Lymphocytes, Absolute 2.45 10*3/mm3      Monocytes, Absolute 0.68 10*3/mm3      Eosinophils, Absolute 0.04 10*3/mm3      Basophils, Absolute 0.03 10*3/mm3      Immature Grans, Absolute 0.03 10*3/mm3      nRBC 0.0 /100 WBC     POC Glucose Once [[id number]]  (Abnormal) Collected: 12/22/24 2309    Specimen: Blood Updated: 12/22/24 2312     Glucose 47 mg/dL      Comment: Serial Number: [id number]:  [id number]             Imaging Results (Last 24 Hours)       Procedure Component Value Units Date/Time    XR Chest 1 View [[id number]] Collected: 12/23/24 0744     Updated: 12/23/24 0749    Narrative:      CLINICAL INDICATION:    Encephalopathy     EXAMINATION TECHNIQUE:  XR CHEST 1 VW-     COMPARISON:  Radiographs 11/18/2024.     FINDINGS:  No focal airspace consolidation. Prior granulomatous disease. Chronic  appearing bibasilar minimal scarring/atelectasis. No pneumothorax or  large pleural effusion. Heart and mediastinal contours are within normal  limits except aortic atherosclerosis and left subclavian arterial stent.  No acute osseous or soft tissue abnormality. No free air under  hemidiaphragms.       Impression:      No acute cardiopulmonary findings.     Images personally  reviewed, interpreted and dictated by CLAIRE Wilson.              This report was signed and finalized on 12/23/2024 7:47 AM by CLAIRE iWlson.       XR Chest 1 View [[id number]] Collected: 12/23/24 0605     Updated: 12/23/24 0606    Narrative:      FINAL REPORT    TECHNIQUE:  null    CLINICAL HISTORY:  central line placement    COMPARISON:  null    FINDINGS:  Exam: 1V chest    Comparison: CR - XR RIBS LEFT W PA CHEST - 11/18/24 17:31 EST    Findings:    Right IJ line tip over the proximal SVC.    No cardiac silhouette enlargement.    No infiltrate or mass.    No pneumothorax or significant effusion.    No acute osseous abnormality.      Impression:      Impression:    Right IJ line tip over the proximal SVC.    No acute pathology.    Authenticated and Electronically Signed by Gray Sommer MD  on 12/23/2024 06:05:20 AM    CT Head Without Contrast [[id number]] Collected: 12/23/24 0053     Updated: 12/23/24 0111    Addenda:        ADDENDUM REPORT    ADDENDUM:  Receipt of this report by the clinical staff was confirmed with [facility] on Dec 23, 2024 01:09:00 EST.    Authenticated and Electronically Signed by Aleksandar العراقي MD on  12/23/2024 01:09:39 AM  Signed: 12/23/24 0109 by Aleksandar العراقي MD    Narrative:      FINAL REPORT    TECHNIQUE:  null    CLINICAL HISTORY:  unresponsive episode, low blood sugar, weakness    COMPARISON:  null    FINDINGS:  CT head without contrast    Comparison: CT/SR - CT HEAD WO CONTRAST - 11/18/2024 07:15 PM EST    Findings:    Moderate diffuse cerebral atrophy and nonspecific white matter hypodensity.    No intracranial mass, midline shift, hydrocephalus, or acute hemorrhage/infarct.    Possible heterogeneity of the left aspect of the malissa and low-density centrally involving the medulla oblongata, series 2 image 11 and 7 respectively.    Moderate calcific ASVD of the carotid siphons.    Visualized paranasal sinuses are clear.    Mastoids are well developed  and clear.    Orbits are unremarkable.    No skull fracture. No significant scalp soft tissue swelling.      Impression:      Impression:    1. Moderate atrophy.    2. Heterogeneous density of the left malissa, similar to previous. This may reflect chronic infarct.    3. Low-density 5 mm area within the central medulla oblongata, not identified previously. This could reflect acute or chronic ischemic change. Consider MRI if not contraindicated.    Authenticated and Electronically Signed by Aleksandar العراقي MD on  12/23/2024 12:53:16 AM    CT Cervical Spine Without Contrast [[id number]] Collected: 12/23/24 0052     Updated: 12/23/24 0053    Narrative:      FINAL REPORT    TECHNIQUE:  null    CLINICAL HISTORY:  unresponsive, low blood sugar, weakness    COMPARISON:  null    FINDINGS:  CT cervical spine without contrast:    Comparison: CT/SR - CT CERVICAL SPINE WO CONTRAST - 11/18/2024 07:15 PM EST    Findings:    No acute fracture or traumatic listhesis.    No significant prevertebral soft tissue swelling.    Flattening of cervical lordosis with mild apex left scoliosis.    Severe degenerative disc disease at C5-6. Moderate facet arthropathy on the right.    No significant spinal canal or neural foraminal stenosis.    Craniocervical junction and C1-C2 articulations are without abnormal alignment/asymmetry.    Dens is intact. Severe degenerative narrowing of the atlantodens interval.    Normal limited view of the intracranial contents.    Neck soft tissues are unremarkable for age.    Lung apices without consolidation, pneumothorax, or mass.      Impression:      Impression:    1. No acute fracture.    2. Multilevel degenerative changes.    Authenticated and Electronically Signed by Aleksandar العراقي MD on  12/23/2024 12:52:23 AM          Physician Progress Notes (last 24 hours)  Notes from 12/22/24 0950 through 12/23/24 0950   No notes of this type exist for this encounter.       Consult Notes (last 24 hours)  Notes from  12/22/24 0950 through 12/23/24 0950   No notes of this type exist for this encounter.

## 2024-12-23 NOTE — OUTREACH NOTE
Medical Week 1 Survey      Flowsheet Row Responses   [facility] patient discharged from? [facility]   Does the patient have one of the following disease processes/diagnoses(primary or secondary)? Other   Week 1 attempt successful? No   Unsuccessful attempts Attempt 1   Revoke Readmitted            Anali NUNEZ - Registered Nurse

## 2024-12-23 NOTE — PLAN OF CARE
Goal Outcome Evaluation:  Plan of Care Reviewed With: patient        Progress: no change  Outcome Evaluation: OT eval completed. Patient is sitting in chair and agrees to participate. Patient is Ox4, denies pain. Patient reports she lives with her daughter, walks with a rollator, also has a BSC, shower chair and SPC. Patient requires min A for ADLs. Patient reports increased falls at home. Patient's BP sitting in chair is 107/79, performed sit to stand CGA and BP 86/57 with patient assymptomatic. Patient walked 50' x 2 with CGA using RW. BP following mobility is 73/58, RN notified. Requires min-mod A for bathing, dressing and toileting. Patient is expected to benefit from continued OT services prior to DC to address deficits in strength, endurance, balance, mobility and ADL performance. Patient to return home with family assistance and  services, patient is declining STR despite increased falls.    Anticipated Discharge Disposition (OT): home with home health, inpatient rehabilitation facility

## 2024-12-23 NOTE — H&P
[facility]   HISTORY AND PHYSICAL      Name:  Viola Barlow   Age:  69 y.o.  Sex:  female  :  1955  MRN:  [id number]   Visit Number:  [id number]  Admission Date:  2024  Date Of Service:  24  Primary Care Physician:  Jyoti Tomlinson APRN    Chief Complaint:     Passed out    History Of Presenting Illness:      69-year-old female past medical history CKD stage III, diabetes mellitus type 2, hypertension, peripheral vascular disease, nephrolithiasis, hypothyroidism, Chief complaint unresponsive. Had recently been admitted 2024 to 2024 at this facility with gastroenteritis, JOSE ALBERTO.  She was started on Reglan and her renal function and symptoms had improved.  Reports she was doing well to her knowledge at home but then one of her Family found her in the kitchen floor about 2100 hrs.  She had a fall from the couch to the floor without injury.  Was noted to be hypoglycemic blood sugar of 36.  Was given glucagon improved to 47.  IV could not be obtained IO accessed and D50 given.  Patient was hypotensive and hypothermic warming blanket received Levophed and was started on D10.  Full code.    Pertinent findings: Highly sensitive troponin 10, blood glucose have been 47 and then improved to 210 and it trended back down to 57.  Lactic acid 1.5, potassium 3.1, creatinine 2.05 which appears slightly higher than baseline, CBC 7.13, blood cultures pending, chest x-ray showed bibasilar atelectasis without effusion, CT head shows moderate atrophy heterogeneous density in the left malissa similar to previous may reflect chronic infarct, low-density 5 mm area within the central medulla not identified previously could reflect acute or chronic ischemic change. EKG NSR no ischemic change.    ED Medications:    Medications   sodium chloride 0.9 % flush 10 mL (has no administration in time range)   dextrose (D50W) (25 g/50 mL) IV injection 50 mL (50 mL Intravenous Given  12/23/24 0300)   sodium chloride 0.9 % infusion  - ADS Override Pull (  Not Given 12/23/24 0055)   dextrose 10 % infusion (0 mL Intravenous Stopped 12/23/24 0151)   norepinephrine (LEVOPHED) 8 mg in 250mL D5W infusion (0.1 mcg/kg/min × 47.6 kg Intravenous Rate/Dose Change 12/23/24 0306)   sodium chloride 0.9 % bolus 1,000 mL (0 mL Intravenous Stopped 12/23/24 0055)   cefepime 1000 mg IVPB in 100 mL NS (VTB) (0 mg Intravenous Stopped 12/23/24 0118)   sodium chloride 0.9 % bolus 1,000 mL (0 mL Intravenous Stopped 12/23/24 0151)           Edited by: Ricardo Esteban DO at 12/23/2024 0457     Review Of Systems:    All systems were reviewed and negative except as mentioned in history of presenting illness, assessment and plan.    Past Medical History: Patient  has a past medical history of Arthritis, Cataract, Depression, Diabetes mellitus, Dysphagia, Elevated cholesterol, Full dentures, GERD (gastroesophageal reflux disease), Headache, History of nuclear stress test, Hypertension, Impaired mobility, Kidney disease, Migraines, PVD (peripheral vascular disease), Seasonal allergies, Spinal headache, and Wears glasses.    Past Surgical History: Patient  has a past surgical history that includes Mouth surgery; Hysterectomy (1983); Hysterectomy (1984); Abdominal surgery (08/1984); Elbow Epicondylectomy (Left, 09/1990); Vascular surgery (Bilateral); Colonoscopy; Esophagogastroduodenoscopy; Appendectomy; Cataract extraction w/ intraocular lens implant (Right, 6/21/2019); and Cataract extraction w/ intraocular lens implant (Left, 7/5/2019).    Social History: Patient  reports that she quit smoking about 16 years ago. Her smoking use included cigarettes. She started smoking about 55 years ago. She has a 57 pack-year smoking history. She has never used smokeless tobacco. She reports that she does not drink alcohol and does not use drugs.    Family History:  Patient's family history has been reviewed and found to be  noncontributory.     Allergies:      Lisinopril    Home Medications:    Prior to Admission Medications       Prescriptions Last Dose Informant Patient Reported? Taking?    amLODIPine (NORVASC) 5 MG tablet   No No    Take 1 tablet by mouth Daily.    aspirin (aspirin EC) 81 MG EC tablet   No No    Take 1 tablet by mouth Daily.    atorvastatin (LIPITOR) 40 MG tablet  Self Yes No    Take 1 tablet by mouth Daily.    Cholecalciferol (VITAMIN D-3) 1000 units capsule  Self Yes No    Take 1,000 Units by mouth Daily.    clopidogrel (PLAVIX) 75 MG tablet  Self Yes No    Take 1 tablet by mouth Daily.    Dulaglutide (Trulicity) 1.5 MG/0.5ML solution pen-injector   Yes No    Inject 1.5 mg under the skin into the appropriate area as directed 1 (One) Time Per Week.    gabapentin (NEURONTIN) 600 MG tablet  Self Yes No    Take 600 mg by mouth 3 (Three) Times a Day.    HYDROcodone-acetaminophen (NORCO) 5-325 MG per tablet   No No    Take 1 tablet by mouth Every 6 (Six) Hours As Needed for Moderate Pain.    insulin lispro protamine-insulin lispro (humaLOG 75-25) (75-25) 100 UNIT/ML suspension injection   No No    Inject 60 Units under the skin into the appropriate area as directed 2 (Two) Times a Day With Meals.    levothyroxine (SYNTHROID, LEVOTHROID) 50 MCG tablet  Self Yes No    Take 1 tablet by mouth Daily.    lidocaine (LIDODERM) 5 %   No No    Place 1 patch on the skin as directed by provider Daily. Remove & Discard patch within 12 hours or as directed by MD    metFORMIN ER (GLUCOPHAGE-XR) 750 MG 24 hr tablet  Self Yes No    Take 1 tablet by mouth Daily With Breakfast.    metoclopramide (Reglan) 5 MG tablet   No No    Take 1 tablet by mouth 3 (Three) Times a Day Before Meals.    metoprolol succinate XL (TOPROL-XL) 50 MG 24 hr tablet  Self Yes No    Take 1 tablet by mouth Daily.    ondansetron ODT (ZOFRAN-ODT) 4 MG disintegrating tablet   No No    Place 1 tablet on the tongue Every 8 (Eight) Hours As Needed for Nausea or Vomiting.  "   pantoprazole (PROTONIX) 40 MG EC tablet  Self Yes No    Take 1 tablet by mouth Daily.    sertraline (ZOLOFT) 50 MG tablet  Self Yes No    Take 1 tablet by mouth Daily.    vitamin D3 125 MCG (5000 UT) capsule capsule   Yes No    Take 1 capsule by mouth Daily.              Vital Signs:  Temp:  [96 °F (35.6 °C)-99.3 °F (37.4 °C)] 99.3 °F (37.4 °C)  Heart Rate:  [63-79] 79  Resp:  [20] 20  BP: ()/(33-95) 102/55        12/22/24  2329   Weight: 47.6 kg (105 lb)     Body mass index is 20.35 kg/m².    Physical Exam:     Most recent vital Signs: /55   Pulse 79   Temp 99.3 °F (37.4 °C)   Resp 20   Ht 153 cm (60.24\")   Wt 47.6 kg (105 lb)   LMP  (LMP Unknown)   SpO2 97%   BMI 20.35 kg/m²     Constitutional: Awake, alert  Eyes: PERRLA, sclerae anicteric, no conjunctival injection  HENT: NCAT, mucous membranes moist  Neck: Supple, no thyromegaly, no lymphadenopathy, trachea midline  Respiratory: Clear to auscultation bilaterally, nonlabored respirations   Cardiovascular: RRR, no murmurs, rubs, or gallops, palpable pedal pulses bilaterally, RIJ CVC noted  Gastrointestinal: Positive bowel sounds, soft, nontender, nondistended  Musculoskeletal: No bilateral ankle edema, no clubbing or cyanosis to extremities  Psychiatric: Appropriate affect, cooperative  Neurologic: Oriented x 3, speech clear  Skin: No rashes  Edited by: Ricardo Esteban DO at 12/23/2024 2182      Laboratory data:    I have reviewed the labs done in the emergency room.    Results from last 7 days   Lab Units 12/22/24  2333 12/19/24  0603 12/18/24  0539 12/16/24  1618 12/16/24  1223   SODIUM mmol/L 132* 138 143   < > 142   POTASSIUM mmol/L 3.1* 4.2 3.8   < > 4.3   CHLORIDE mmol/L 95* 102 107   < > 93*   CO2 mmol/L 22.6 24.4 22.0   < > 19.3*   BUN mg/dL 38* 34* 37*   < > 54*   CREATININE mg/dL 2.05* 1.39* 1.46*   < > 3.19*   CALCIUM mg/dL 9.1 9.5 9.7   < > 10.9*   BILIRUBIN mg/dL 0.3  --  0.8  --  0.7   ALK PHOS U/L 100  --  131*  --  148* "   ALT (SGPT) U/L 12  --  10  --  16   AST (SGOT) U/L 25  --  19  --  22   GLUCOSE mg/dL 210* 334* 145*   < > 363*    < > = values in this interval not displayed.     Results from last 7 days   Lab Units 12/22/24  2333 12/18/24  0539 12/17/24  0533   WBC 10*3/mm3 7.13 14.36* 17.08*   HEMOGLOBIN g/dL 13.9 16.0* 14.5   HEMATOCRIT % 42.7 49.2* 46.8*   PLATELETS 10*3/mm3 109* 247 261         Results from last 7 days   Lab Units 12/23/24  0030 12/22/24  2333   HSTROP T ng/L 10 12             Results from last 7 days   Lab Units 12/16/24  1223   LIPASE U/L 31         Results from last 7 days   Lab Units 12/23/24  0340   COLOR UA  Yellow   GLUCOSE UA  500 mg/dL (2+)*   KETONES UA  Negative   BLOOD UA  Negative   LEUKOCYTES UA  Negative   PH, URINE  5.5   BILIRUBIN UA  Negative   UROBILINOGEN UA  0.2 E.U./dL   RBC UA /HPF None Seen   WBC UA /HPF 0-2       Pain Management Panel  More data exists         Latest Ref Rng & Units 11/18/2022 8/16/2021   Pain Management Panel   Creatinine, Urine mg/dL - 86.7    Amphetamine, Urine Qual Negative Negative  -   Barbiturates Screen, Urine Negative Negative  -   Benzodiazepine Screen, Urine Negative Negative  -   Buprenorphine, Screen, Urine Negative Negative  -   Cocaine Screen, Urine Negative Negative  -   Methadone Screen , Urine Negative Negative  -   Methamphetamine, Ur Negative Negative  -      Details                   EKG:       EKG NSR no ischemic change    Radiology:    CT Head Without Contrast    Addendum Date: 12/23/2024    ADDENDUM REPORT ADDENDUM: Receipt of this report by the clinical staff was confirmed with Baldo Henson on Dec 23, 2024 01:09:00 EST. Authenticated and Electronically Signed by Aleksandar العراقي MD on 12/23/2024 01:09:39 AM    Result Date: 12/23/2024  FINAL REPORT TECHNIQUE: null CLINICAL HISTORY: unresponsive episode, low blood sugar, weakness COMPARISON: null FINDINGS: CT head without contrast Comparison: CT/SR - CT HEAD WO CONTRAST - 11/18/2024 07:15 PM  EST Findings: Moderate diffuse cerebral atrophy and nonspecific white matter hypodensity. No intracranial mass, midline shift, hydrocephalus, or acute hemorrhage/infarct. Possible heterogeneity of the left aspect of the malissa and low-density centrally involving the medulla oblongata, series 2 image 11 and 7 respectively. Moderate calcific ASVD of the carotid siphons. Visualized paranasal sinuses are clear. Mastoids are well developed and clear. Orbits are unremarkable. No skull fracture. No significant scalp soft tissue swelling.     Impression: 1. Moderate atrophy. 2. Heterogeneous density of the left malissa, similar to previous. This may reflect chronic infarct. 3. Low-density 5 mm area within the central medulla oblongata, not identified previously. This could reflect acute or chronic ischemic change. Consider MRI if not contraindicated. Authenticated and Electronically Signed by Aleksandar العراقي MD on 12/23/2024 12:53:16 AM    CT Cervical Spine Without Contrast    Result Date: 12/23/2024  FINAL REPORT TECHNIQUE: null CLINICAL HISTORY: unresponsive, low blood sugar, weakness COMPARISON: null FINDINGS: CT cervical spine without contrast: Comparison: CT/SR - CT CERVICAL SPINE WO CONTRAST - 11/18/2024 07:15 PM EST Findings: No acute fracture or traumatic listhesis. No significant prevertebral soft tissue swelling. Flattening of cervical lordosis with mild apex left scoliosis. Severe degenerative disc disease at C5-6. Moderate facet arthropathy on the right. No significant spinal canal or neural foraminal stenosis. Craniocervical junction and C1-C2 articulations are without abnormal alignment/asymmetry. Dens is intact. Severe degenerative narrowing of the atlantodens interval. Normal limited view of the intracranial contents. Neck soft tissues are unremarkable for age. Lung apices without consolidation, pneumothorax, or mass.     Impression: 1. No acute fracture. 2. Multilevel degenerative changes. Authenticated and  Electronically Signed by Aleksandar العراقي MD on 12/23/2024 12:52:23 AM     Assessment/Plan:      Hypotension    Acute kidney injury superimposed on chronic kidney disease  -- Suspect hypotension and JOSE ALBERTO due to hypovolemia, certainly polypharmacy could be playing a role as well. RIJ CVC, montoya, warming blanket placed 12/23/24  -- Active Treatments: IV fluids dextrose, hold antihypertensives, hold pain meds  -- Pending Results: Nephrology      Hypoglycemia    Type 2 diabetes mellitus with nephropathy    Gastroparesis  -- Suspect inadequate oral intake relative to insulin use.  -- Active Treatments: Subcutaneous insulin, Reglan  -- Pending Results: Every 1 hr Accu-Cheks      Medulla/Brain stem abnormality  -- Neuro consult.MRI pending.          DVT Prophylaxis: Lovenox  Code Status: Full code  Diet: Diabetic renal  Risk assessment: High anticipate greater than two night stay          Edited by: Ricardo Esteban DO at 12/23/2024 2709           Advance Care Planning   ACP discussion was held with the patient during this visit. Patient does not have an advance directive, declines further assistance.           Ricardo Esteban DO  12/23/24  04:56 EST    Dictated utilizing Dragon dictation.

## 2024-12-23 NOTE — PLAN OF CARE
Goal Outcome Evaluation:  Plan of Care Reviewed With: patient        Progress: no change  Outcome Evaluation: Patient admitted to ICU with hypoglycemia and hypotension, on levo, patient AOx3, needs MRI

## 2024-12-23 NOTE — ED PROVIDER NOTES
" EMERGENCY DEPARTMENT ENCOUNTER    Pt Name: Viola Barlow  MRN: [id number]  Pt :   1955  Room Number:  I08/1  Date of encounter:  2024  PCP: Jyoti Tomlinson APRN  ED Provider: Hermilo Molina PA-C    Historian: Patient      HPI:  Chief Complaint   Patient presents with    Hypoglycemia          Context: Viola Barlow is a 69 y.o. female who presents to the ED c/o episode of unresponsiveness.  According to EMS family noted patient to slump over on the couch around 9:00 p.m.  EMS was summoned, on scene fingerstick blood glucose was 36, IV access was unable to be obtained therefore IM glucagon was given repeat blood glucose 47 and brought to the ED.  Upon arrival patient not responsive to sternal rub although then moan.  Per EMS patiently recently discharged from this facility \"for kidney issues.\"  Any further history unable to be obtained given patient's unresponsive state.  Upon arrival is protecting airway with stable vital sign of 100% nation on room air with a pulse rate in the 70s.      PAST MEDICAL HISTORY  Past Medical History:   Diagnosis Date    Arthritis     Cataract     Depression     Diabetes mellitus     Dysphagia     Patient reported that intermittently food feels like it gets lodged     Elevated cholesterol     Full dentures     Instructed no adhesives the DOS    GERD (gastroesophageal reflux disease)     Headache     History of nuclear stress test     Patient reported this was done with Dr. Douglas around  and that she was started on Metoprolol after testing.     Hypertension     Impaired mobility     Kidney disease     Patient reported \"I have chronic kidney disease and they say my kidney function is at 33%\" and that she has routine care with Dr. Calzada     Migraines     PVD (peripheral vascular disease)     Seasonal allergies     Spinal headache     Wears glasses          PAST SURGICAL HISTORY  Past Surgical History:   Procedure Laterality Date    ABDOMINAL " SURGERY  1984    Patient reported after complete hysterectomy, she had another procedure to remove tumors in the abdominal area    APPENDECTOMY      Reported removed when hysterectomy was done    CATARACT EXTRACTION W/ INTRAOCULAR LENS IMPLANT Right 2019    Procedure: CATARACT PHACO EXTRACTION WITH INTRAOCULAR LENS IMPLANT RIGHT;  Surgeon: Tee Enrique MD;  Location: [facility] OR;  Service: Ophthalmology    CATARACT EXTRACTION W/ INTRAOCULAR LENS IMPLANT Left 2019    Procedure: CATARACT PHACO EXTRACTION WITH INTRAOCULAR LENS IMPLANT LEFT;  Surgeon: Tee Enrique MD;  Location: [facility] OR;  Service: Ophthalmology    COLONOSCOPY      ELBOW EPICONDYLECTOMY Left 1990    ENDOSCOPY      HYSTERECTOMY      Partial    HYSTERECTOMY      Complete     MOUTH SURGERY      Full mouth extraction    VASCULAR SURGERY Bilateral     For PAD - Reported the right leg was done first around  and the left was done around          FAMILY HISTORY  History reviewed. No pertinent family history.      SOCIAL HISTORY  Social History     Socioeconomic History    Marital status: Single   Tobacco Use    Smoking status: Former     Current packs/day: 0.00     Average packs/day: 1.5 packs/day for 38.0 years (57.0 ttl pk-yrs)     Types: Cigarettes     Start date:      Quit date:      Years since quittin.9    Smokeless tobacco: Never    Tobacco comments:     quit 13 yrs ago   Vaping Use    Vaping status: Never Used   Substance and Sexual Activity    Alcohol use: No    Drug use: No    Sexual activity: Defer         ALLERGIES  Lisinopril        REVIEW OF SYSTEMS  Review of Systems   Constitutional: Negative.    HENT: Negative.     Eyes: Negative.    Respiratory: Negative.     Cardiovascular: Negative.    Gastrointestinal: Negative.    Genitourinary: Negative.    Musculoskeletal: Negative.    Skin: Negative.    Neurological:         Unresponsiveness   Psychiatric/Behavioral: Negative.          All  systems reviewed and negative except for those discussed in HPI.       PHYSICAL EXAM    I have reviewed the triage vital signs and nursing notes.    ED Triage Vitals [12/22/24 2310]   Temp Heart Rate Resp BP SpO2   -- 70 20 115/95 100 %      Temp src Heart Rate Source Patient Position BP Location FiO2 (%)   -- -- -- -- --       Physical Exam  Vitals and nursing note reviewed.   Constitutional:       Appearance: Normal appearance. She is normal weight.      Comments: Unresponsive   Eyes:      Extraocular Movements: Extraocular movements intact.      Pupils: Pupils are equal, round, and reactive to light.   Cardiovascular:      Rate and Rhythm: Normal rate and regular rhythm.      Heart sounds: Normal heart sounds.   Pulmonary:      Effort: Pulmonary effort is normal.      Breath sounds: Normal breath sounds.   Abdominal:      General: Abdomen is flat. There is no distension.      Palpations: Abdomen is soft.   Musculoskeletal:         General: Normal range of motion.      Cervical back: Normal range of motion.   Skin:     General: Skin is warm and dry.   Neurological:      Mental Status: She is alert.            LAB RESULTS  Recent Results (from the past 24 hours)   POC Glucose Once    Collection Time: 12/22/24 11:09 PM    Specimen: Blood   Result Value Ref Range    Glucose 47 (C) 70 - 130 mg/dL   Comprehensive Metabolic Panel    Collection Time: 12/22/24 11:33 PM    Specimen: Blood   Result Value Ref Range    Glucose 210 (H) 65 - 99 mg/dL    BUN 38 (H) 8 - 23 mg/dL    Creatinine 2.05 (H) 0.57 - 1.00 mg/dL    Sodium 132 (L) 136 - 145 mmol/L    Potassium 3.1 (L) 3.5 - 5.2 mmol/L    Chloride 95 (L) 98 - 107 mmol/L    CO2 22.6 22.0 - 29.0 mmol/L    Calcium 9.1 8.6 - 10.5 mg/dL    Total Protein 7.2 6.0 - 8.5 g/dL    Albumin 4.1 3.5 - 5.2 g/dL    ALT (SGPT) 12 1 - 33 U/L    AST (SGOT) 25 1 - 32 U/L    Alkaline Phosphatase 100 39 - 117 U/L    Total Bilirubin 0.3 0.0 - 1.2 mg/dL    Globulin 3.1 gm/dL    A/G Ratio 1.3 g/dL     BUN/Creatinine Ratio 18.5 7.0 - 25.0    Anion Gap 14.4 5.0 - 15.0 mmol/L    eGFR 26.1 (L) >60.0 mL/min/1.73   Magnesium    Collection Time: 12/22/24 11:33 PM    Specimen: Blood   Result Value Ref Range    Magnesium 1.7 1.6 - 2.4 mg/dL   TSH    Collection Time: 12/22/24 11:33 PM    Specimen: Blood   Result Value Ref Range    TSH 1.650 0.270 - 4.200 uIU/mL   CBC Auto Differential    Collection Time: 12/22/24 11:33 PM    Specimen: Blood   Result Value Ref Range    WBC 7.13 3.40 - 10.80 10*3/mm3    RBC 4.54 3.77 - 5.28 10*6/mm3    Hemoglobin 13.9 12.0 - 15.9 g/dL    Hematocrit 42.7 34.0 - 46.6 %    MCV 94.1 79.0 - 97.0 fL    MCH 30.6 26.6 - 33.0 pg    MCHC 32.6 31.5 - 35.7 g/dL    RDW 12.9 12.3 - 15.4 %    RDW-SD 44.7 37.0 - 54.0 fl    MPV 12.3 (H) 6.0 - 12.0 fL    Platelets 109 (L) 140 - 450 10*3/mm3    Neutrophil % 54.7 42.7 - 76.0 %    Lymphocyte % 34.4 19.6 - 45.3 %    Monocyte % 9.5 5.0 - 12.0 %    Eosinophil % 0.6 0.3 - 6.2 %    Basophil % 0.4 0.0 - 1.5 %    Immature Grans % 0.4 0.0 - 0.5 %    Neutrophils, Absolute 3.90 1.70 - 7.00 10*3/mm3    Lymphocytes, Absolute 2.45 0.70 - 3.10 10*3/mm3    Monocytes, Absolute 0.68 0.10 - 0.90 10*3/mm3    Eosinophils, Absolute 0.04 0.00 - 0.40 10*3/mm3    Basophils, Absolute 0.03 0.00 - 0.20 10*3/mm3    Immature Grans, Absolute 0.03 0.00 - 0.05 10*3/mm3    nRBC 0.0 0.0 - 0.2 /100 WBC   High Sensitivity Troponin T    Collection Time: 12/22/24 11:33 PM    Specimen: Blood   Result Value Ref Range    HS Troponin T 12 <14 ng/L   Lactic Acid, Plasma    Collection Time: 12/22/24 11:33 PM    Specimen: Blood   Result Value Ref Range    Lactate 2.8 (C) 0.5 - 2.0 mmol/L   POC Glucose Once    Collection Time: 12/23/24 12:01 AM    Specimen: Blood   Result Value Ref Range    Glucose 146 (H) 70 - 130 mg/dL   High Sensitivity Troponin T 1Hr    Collection Time: 12/23/24 12:30 AM    Specimen: Blood   Result Value Ref Range    HS Troponin T 10 <14 ng/L    Troponin T Numeric Delta -2 Abnormal  if >/=3 ng/L   Procalcitonin    Collection Time: 12/23/24 12:30 AM    Specimen: Blood   Result Value Ref Range    Procalcitonin 0.14 0.00 - 0.25 ng/mL   Blood Culture With [place] - Blood, Hand, Left    Collection Time: 12/23/24 12:30 AM    Specimen: Hand, Left; Blood   Result Value Ref Range    Blood Culture No growth at less than 24 hours    Blood Culture With [place] - Blood, Arm, Right    Collection Time: 12/23/24 12:38 AM    Specimen: Arm, Right; Blood   Result Value Ref Range    Blood Culture No growth at less than 24 hours    POC Glucose Once    Collection Time: 12/23/24  2:05 AM    Specimen: Blood   Result Value Ref Range    Glucose 95 70 - 130 mg/dL   STAT Lactic Acid, Reflex    Collection Time: 12/23/24  2:06 AM    Specimen: Blood   Result Value Ref Range    Lactate 1.5 0.5 - 2.0 mmol/L   POC Glucose Once    Collection Time: 12/23/24  2:57 AM    Specimen: Blood   Result Value Ref Range    Glucose 57 (L) 70 - 130 mg/dL   Urinalysis With Culture If Indicated - Urine, Clean Catch    Collection Time: 12/23/24  3:40 AM    Specimen: Urine, Clean Catch   Result Value Ref Range    Color, UA Yellow Yellow, Straw    Appearance, UA Clear Clear    pH, UA 5.5 5.0 - 8.0    Specific Gravity, UA 1.006 1.005 - 1.030    Glucose,  mg/dL (2+) (A) Negative    Ketones, UA Negative Negative    Bilirubin, UA Negative Negative    Blood, UA Negative Negative    Protein, UA Negative Negative    Leuk Esterase, UA Negative Negative    Nitrite, UA Positive (A) Negative    Urobilinogen, UA 0.2 E.U./dL 0.2 - 1.0 E.U./dL   Urinalysis, Microscopic Only - Urine, Clean Catch    Collection Time: 12/23/24  3:40 AM    Specimen: Urine, Clean Catch   Result Value Ref Range    RBC, UA None Seen None Seen, 0-2 /HPF    WBC, UA 0-2 None Seen, 0-2 /HPF    Bacteria, UA Trace (A) None Seen /HPF    Squamous Epithelial Cells, UA 0-2 None Seen, 0-2 /HPF    Hyaline Casts, UA None Seen None Seen /LPF    Methodology Manual Light Microscopy    POC Glucose Once     Collection Time: 12/23/24  4:10 AM    Specimen: Blood   Result Value Ref Range    Glucose 132 (H) 70 - 130 mg/dL   POC Glucose Once    Collection Time: 12/23/24  4:48 AM    Specimen: Blood   Result Value Ref Range    Glucose 141 (H) 70 - 130 mg/dL   CBC (No Diff)    Collection Time: 12/23/24  6:35 AM    Specimen: Blood   Result Value Ref Range    WBC 12.50 (H) 3.40 - 10.80 10*3/mm3    RBC 4.62 3.77 - 5.28 10*6/mm3    Hemoglobin 14.2 12.0 - 15.9 g/dL    Hematocrit 43.1 34.0 - 46.6 %    MCV 93.3 79.0 - 97.0 fL    MCH 30.7 26.6 - 33.0 pg    MCHC 32.9 31.5 - 35.7 g/dL    RDW 12.8 12.3 - 15.4 %    RDW-SD 43.8 37.0 - 54.0 fl    MPV 11.8 6.0 - 12.0 fL    Platelets 141 140 - 450 10*3/mm3   Comprehensive Metabolic Panel    Collection Time: 12/23/24  6:36 AM    Specimen: Blood   Result Value Ref Range    Glucose 222 (H) 65 - 99 mg/dL    BUN 29 (H) 8 - 23 mg/dL    Creatinine 1.35 (H) 0.57 - 1.00 mg/dL    Sodium 134 (L) 136 - 145 mmol/L    Potassium 3.6 3.5 - 5.2 mmol/L    Chloride 100 98 - 107 mmol/L    CO2 22.1 22.0 - 29.0 mmol/L    Calcium 8.1 (L) 8.6 - 10.5 mg/dL    Total Protein 6.4 6.0 - 8.5 g/dL    Albumin 3.5 3.5 - 5.2 g/dL    ALT (SGPT) 12 1 - 33 U/L    AST (SGOT) 23 1 - 32 U/L    Alkaline Phosphatase 96 39 - 117 U/L    Total Bilirubin 0.4 0.0 - 1.2 mg/dL    Globulin 2.9 gm/dL    A/G Ratio 1.2 g/dL    BUN/Creatinine Ratio 21.5 7.0 - 25.0    Anion Gap 11.9 5.0 - 15.0 mmol/L    eGFR 43.2 (L) >60.0 mL/min/1.73   POC Glucose 4x Daily Before Meals & at Bedtime    Collection Time: 12/23/24  7:36 AM    Specimen: Blood   Result Value Ref Range    Glucose 242 (H) 70 - 130 mg/dL   Respiratory Panel PCR w/COVID-19(SARS-CoV-2) [facility]HA/[facility]/STACIE/[facility]JANDRA In-House, NP Swab in UTM/VTM, 2 HR TAT - Swab, Nasopharynx    Collection Time: 12/23/24  8:59 AM    Specimen: Nasopharynx; Swab   Result Value Ref Range    ADENOVIRUS, PCR Not Detected Not Detected    Coronavirus 229E Not Detected Not Detected    Coronavirus HKU1 Not Detected Not  Detected    Coronavirus NL63 Not Detected Not Detected    Coronavirus OC43 Not Detected Not Detected    COVID19 Detected (C) Not Detected - Ref. Range    Human Metapneumovirus Not Detected Not Detected    Human Rhinovirus/Enterovirus Not Detected Not Detected    Influenza A PCR Not Detected Not Detected    Influenza B PCR Not Detected Not Detected    Parainfluenza Virus 1 Not Detected Not Detected    Parainfluenza Virus 2 Not Detected Not Detected    Parainfluenza Virus 3 Not Detected Not Detected    Parainfluenza Virus 4 Not Detected Not Detected    RSV, PCR Not Detected Not Detected    Bordetella pertussis pcr Not Detected Not Detected    Bordetella parapertussis PCR Not Detected Not Detected    Chlamydophila pneumoniae PCR Not Detected Not Detected    Mycoplasma pneumo by PCR Not Detected Not Detected   Potassium    Collection Time: 12/23/24 11:13 AM    Specimen: Blood   Result Value Ref Range    Potassium 4.2 3.5 - 5.2 mmol/L   POC Glucose Once    Collection Time: 12/23/24 11:16 AM    Specimen: Blood   Result Value Ref Range    Glucose 276 (H) 70 - 130 mg/dL   POC Glucose Once    Collection Time: 12/23/24  4:10 PM    Specimen: Blood   Result Value Ref Range    Glucose 123 70 - 130 mg/dL       If labs were ordered, I independently reviewed the results and considered them in treating the patient.        RADIOLOGY  XR Chest 1 View    Result Date: 12/23/2024  CLINICAL INDICATION:  Encephalopathy  EXAMINATION TECHNIQUE: XR CHEST 1 VW-  COMPARISON: Radiographs 11/18/2024.  FINDINGS: No focal airspace consolidation. Prior granulomatous disease. Chronic appearing bibasilar minimal scarring/atelectasis. No pneumothorax or large pleural effusion. Heart and mediastinal contours are within normal limits except aortic atherosclerosis and left subclavian arterial stent. No acute osseous or soft tissue abnormality. No free air under hemidiaphragms.      No acute cardiopulmonary findings.  Images personally reviewed,  interpreted and dictated by CLAIRE Wilson.     This report was signed and finalized on 12/23/2024 7:47 AM by CLAIRE Wilson.      XR Chest 1 View    Result Date: 12/23/2024  FINAL REPORT TECHNIQUE: null CLINICAL HISTORY: central line placement COMPARISON: null FINDINGS: Exam: 1V chest Comparison: CR - XR RIBS LEFT W PA CHEST - 11/18/24 17:31 EST Findings: Right IJ line tip over the proximal SVC. No cardiac silhouette enlargement. No infiltrate or mass. No pneumothorax or significant effusion. No acute osseous abnormality.     Impression: Right IJ line tip over the proximal SVC. No acute pathology. Authenticated and Electronically Signed by Gray Sommer MD on 12/23/2024 06:05:20 AM    CT Head Without Contrast    Addendum Date: 12/23/2024    ADDENDUM REPORT ADDENDUM: Receipt of this report by the clinical staff was confirmed with Helijia on Dec 23, 2024 01:09:00 EST. Authenticated and Electronically Signed by Aleksandar العراقي MD on 12/23/2024 01:09:39 AM    Result Date: 12/23/2024  FINAL REPORT TECHNIQUE: null CLINICAL HISTORY: unresponsive episode, low blood sugar, weakness COMPARISON: null FINDINGS: CT head without contrast Comparison: CT/SR - CT HEAD WO CONTRAST - 11/18/2024 07:15 PM EST Findings: Moderate diffuse cerebral atrophy and nonspecific white matter hypodensity. No intracranial mass, midline shift, hydrocephalus, or acute hemorrhage/infarct. Possible heterogeneity of the left aspect of the malissa and low-density centrally involving the medulla oblongata, series 2 image 11 and 7 respectively. Moderate calcific ASVD of the carotid siphons. Visualized paranasal sinuses are clear. Mastoids are well developed and clear. Orbits are unremarkable. No skull fracture. No significant scalp soft tissue swelling.     Impression: 1. Moderate atrophy. 2. Heterogeneous density of the left malissa, similar to previous. This may reflect chronic infarct. 3. Low-density 5 mm area within the central medulla  oblongata, not identified previously. This could reflect acute or chronic ischemic change. Consider MRI if not contraindicated. Authenticated and Electronically Signed by Aleksandar العراقي MD on 12/23/2024 12:53:16 AM    CT Cervical Spine Without Contrast    Result Date: 12/23/2024  FINAL REPORT TECHNIQUE: null CLINICAL HISTORY: unresponsive, low blood sugar, weakness COMPARISON: null FINDINGS: CT cervical spine without contrast: Comparison: CT/SR - CT CERVICAL SPINE WO CONTRAST - 11/18/2024 07:15 PM EST Findings: No acute fracture or traumatic listhesis. No significant prevertebral soft tissue swelling. Flattening of cervical lordosis with mild apex left scoliosis. Severe degenerative disc disease at C5-6. Moderate facet arthropathy on the right. No significant spinal canal or neural foraminal stenosis. Craniocervical junction and C1-C2 articulations are without abnormal alignment/asymmetry. Dens is intact. Severe degenerative narrowing of the atlantodens interval. Normal limited view of the intracranial contents. Neck soft tissues are unremarkable for age. Lung apices without consolidation, pneumothorax, or mass.     Impression: 1. No acute fracture. 2. Multilevel degenerative changes. Authenticated and Electronically Signed by Aleksandar العراقي MD on 12/23/2024 12:52:23 AM         PROCEDURES    Procedures    Interpretations    O2 Sat: The patients oxygen saturation was 100% on Room Air.  This was independently interpreted by me as Normal    EKG: I reviewed and independently interpreted the EKG as sinus rhythm with a rate of 65 bpm,    Cardiac Monitoring: I reviewed and independently interpreted the Rhythm Strip as Normal Sinus rhythm rate of 65 bpm.  Prolonged QTc at 485    Radiology: I ordered and independently reviewed the above noted radiographic studies.  I viewed images of Chest Xray which showed No pulmonary process per my independent interpretation. See radiologist's dictation for official interpretation.      MEDICATIONS GIVEN IN ER    Medications   sodium chloride 0.9 % flush 10 mL (10 mL Intravenous Given 12/23/24 0833)   dextrose (D50W) (25 g/50 mL) IV injection 50 mL (50 mL Intravenous Given 12/23/24 0300)   sodium chloride 0.9 % infusion  - ADS Override Pull (  Not Given 12/23/24 0055)   norepinephrine (LEVOPHED) 8 mg in 250mL D5W infusion (0.16 mcg/kg/min × 47.6 kg Intravenous Rate/Dose Change 12/23/24 1245)   aspirin EC tablet 81 mg (81 mg Oral Given 12/23/24 0833)   atorvastatin (LIPITOR) tablet 40 mg (40 mg Oral Given 12/23/24 0832)   clopidogrel (PLAVIX) tablet 75 mg (75 mg Oral Given 12/23/24 0832)   levothyroxine (SYNTHROID, LEVOTHROID) tablet 50 mcg (50 mcg Oral Given 12/23/24 0831)   Lidocaine 4 % 1 patch (1 patch Transdermal Not Given 12/23/24 0834)   metoclopramide (REGLAN) tablet 5 mg (5 mg Oral Given 12/23/24 1751)   ondansetron ODT (ZOFRAN-ODT) disintegrating tablet 4 mg (has no administration in time range)   pantoprazole (PROTONIX) EC tablet 40 mg (40 mg Oral Given 12/23/24 0831)   sertraline (ZOLOFT) tablet 50 mg (50 mg Oral Given 12/23/24 0832)   acetaminophen (TYLENOL) tablet 650 mg (650 mg Oral Given 12/23/24 1022)   calcium carbonate (TUMS) chewable tablet 500 mg (200 mg elemental) (has no administration in time range)   sennosides-docusate (PERICOLACE) 8.6-50 MG per tablet 2 tablet (has no administration in time range)     And   polyethylene glycol (MIRALAX) packet 17 g (has no administration in time range)     And   bisacodyl (DULCOLAX) EC tablet 5 mg (has no administration in time range)     And   bisacodyl (DULCOLAX) suppository 10 mg (has no administration in time range)   ondansetron ODT (ZOFRAN-ODT) disintegrating tablet 4 mg (has no administration in time range)     Or   ondansetron (ZOFRAN) injection 4 mg (has no administration in time range)   mupirocin (BACTROBAN) 2 % nasal ointment 1 Application (1 Application Each Nare Given 12/23/24 8958)   sennosides-docusate (PERICOLACE)  8.6-50 MG per tablet 2 tablet (2 tablets Oral Given 12/23/24 0833)     And   polyethylene glycol (MIRALAX) packet 17 g (has no administration in time range)     And   bisacodyl (DULCOLAX) EC tablet 5 mg (has no administration in time range)     And   bisacodyl (DULCOLAX) suppository 10 mg (has no administration in time range)   melatonin tablet 5 mg (has no administration in time range)   dextrose (GLUTOSE) oral gel 15 g (has no administration in time range)   dextrose (D50W) (25 g/50 mL) IV injection 25 g (has no administration in time range)   glucagon (GLUCAGEN) injection 1 mg (has no administration in time range)   Insulin Lispro (humaLOG) injection 2-9 Units ( Subcutaneous Not Given 12/23/24 1632)   Pharmacy to Dose enoxaparin (LOVENOX) (has no administration in time range)   nitroglycerin (NITROSTAT) SL tablet 0.4 mg (has no administration in time range)   Potassium Replacement - Follow Nurse / BPA Driven Protocol (has no administration in time range)   Magnesium Standard Dose Replacement - Follow Nurse / BPA Driven Protocol (has no administration in time range)   Phosphorus Replacement - Follow Nurse / BPA Driven Protocol (has no administration in time range)   Calcium Replacement - Follow Nurse / BPA Driven Protocol (has no administration in time range)   Enoxaparin Sodium (LOVENOX) syringe 30 mg (30 mg Subcutaneous Given 12/23/24 0833)   sodium chloride 0.9 % infusion (125 mL/hr Intravenous New Bag 12/23/24 1751)   empagliflozin (JARDIANCE) tablet 10 mg (10 mg Oral Given 12/23/24 1002)   dexAMETHasone (DECADRON) tablet 6 mg (6 mg Oral Given 12/23/24 1322)   sodium chloride 0.9 % bolus 1,000 mL (0 mL Intravenous Stopped 12/23/24 0055)   cefepime 1000 mg IVPB in 100 mL NS (VTB) (0 mg Intravenous Stopped 12/23/24 0118)   sodium chloride 0.9 % bolus 1,000 mL (0 mL Intravenous Stopped 12/23/24 0151)   potassium chloride (KLOR-CON M20) CR tablet 40 mEq (40 mEq Oral Given 12/23/24 0513)   potassium chloride  (KLOR-CON M20) CR tablet 40 mEq (40 mEq Oral Given 12/23/24 1510)         MEDICAL DECISION MAKING, PROGRESS, and CONSULTS    All labs, if obtained, have been independently reviewed by me.  All radiology studies, if obtained, have been reviewed by me and the radiologist dictating the report.  All EKG's, if obtained, have been independently viewed and interpreted by me      Discussion below represents my analysis of pertinent findings related to patient's condition, differential diagnosis, treatment plan and final disposition.    35 minutes of critical care provided. This time excludes other billable procedures. Time does include preparation of documents, medical consultations, review of old records, and direct bedside care. Patient is at high risk for life-threatening deterioration due to persistent hypoglycemia, persistent hypotension requiring placement of central line and vasopressors, hypothermia, presumed sepsis and admission.     Differential diagnosis:      Sepsis, UTI, insulin overdose, electrolyte abnormality, JOSE ALBERTO, CVA    Additional Sources:  External (non-ED) record review:  Recent discharge from this facility December 16, 2024 revealing JOSE ALBERTO, evaluated by nephrology and ultimately discharged home.       Orders placed during this visit:  Orders Placed This Encounter   Procedures    Insert Central Line At Bedside    IO Line Insertion    Blood Culture With [place] - Blood,    Blood Culture With [place] - Blood,    Respiratory Panel PCR w/COVID-19(SARS-CoV-2) [facility] In-House, NP Swab in UTM/VTM, 2 HR TAT - Swab, Nasopharynx    CT Head Without Contrast    CT Cervical Spine Without Contrast    XR Chest 1 View    XR Chest 1 View    MRI Brain Without Contrast    Comprehensive Metabolic Panel    Magnesium    TSH    Urinalysis With Culture If Indicated - Urine, Clean Catch    CBC Auto Differential    High Sensitivity Troponin T    Lactic Acid, Plasma    High Sensitivity Troponin T 1Hr    STAT Lactic Acid,  Reflex    Procalcitonin    CBC (No Diff)    Comprehensive Metabolic Panel    Urinalysis, Microscopic Only - Urine, Clean Catch    Potassium    Potassium    CBC (No Diff)    Basic Metabolic Panel    Diet: Diabetic, Renal; Consistent Carbohydrate; Low Sodium (2-3g), Low Potassium, Low Phosphorus; Fluid Consistency: Thin (IDDSI 0)    Straight cath    Apply warming blanket    Intake & Output    Weigh Patient    Oral Care    Height & Weight    Daily Weights    Intake & Output    Oral Care - Patient Not on NPPV & Not Intubated    Target Arousal Level RASS 0 to -2    Use Mobility Guidelines for Advancement of Activity    Daily [facility] While in Critical Care    Maintain IV Access    Telemetry - Place Orders & Notify Provider of Results When Patient Experiences Acute Chest Pain, Dysrhythmia or Respiratory Distress    May Be Off Telemetry for Tests    Discontinue Indwelling Urinary Catheter    Bladder Scan if Patient Unable to Void 4-6 Hours After Catheter Removal    If Bladder Scan Volume is Less Than 500mL & Patient is Without Symptoms of Bladder Discomfort / Distention Monitor Every 1-2 Hours for Spontaneous Void    Straight Cath Every 4-6 Hours As Needed If Patient is Unable to Void After 4-6 Hours, Bladder Scan Volume is Greater Than 500mL & Patient Has Symptoms of Bladder Discomfort / Distention    Notify Provider if Bladder Distention Continues    Consult Pharmacist For Review of Medications That May Cause Urinary Retention - RN To Place Order for Consult it Needed    Schedule / Prompt Voiding For Patients With Urinary Incontinence    Code Status and Medical Interventions: CPR (Attempt to Resuscitate); Full Support    Inpatient Neurology Consult General    Inpatient Diabetes Educator Consult    OT Consult: Eval & Treat ADL Performance Below Baseline, Discharge Placement Assessment    PT Consult: Eval & Treat Discharge Placement Assessment, Functional Mobility Below Baseline    POC Glucose Once    POC Glucose Q1H     POC Glucose Once    POC Glucose Once    POC Glucose Once    POC Glucose 4x Daily Before Meals & at Bedtime    POC Glucose Once    POC Glucose Once    POC Glucose Once    POC Glucose Once    ECG 12 Lead Altered Mental Status    Telemetry Scan    Insert Peripheral IV    Inpatient Admission    ED Bed Request    CBC & Differential         Additional orders considered but not ordered:  None    ED Course:    Consultants:  None    ED Course as of 12/23/24 1754   Sun Dec 22, 2024   2324 After I have administration of an amp of D50 patient alert and oriented x 3.  Repeat blood glucose 265 [TM]   2326 IO access obtained by Dr. Lancaster at bedside.   [TM]   2339 WBC: 7.13 [TM]   2339 Hemoglobin: 13.9 [TM]   2339 Hematocrit: 42.7 [TM]   2353 Patient unable to tell me if she has been taking her insulin today or when the last time she ate was as she does not recall. [TM]   Mon Dec 23, 2024   0006 EKG obtained and based on my independent reading shows normal sinus rhythm.  No acute ischemic changes.  Intra-ventricular conduction delay. [WP]   0025 Patient's blood pressure noted to be 81/49.  A liter of normal saline ordered.  Patient reassessed awake alert conversant states she just feels tired.  Rectal temp 96 degrees, deyvi hugger applied.  Blood cultures ordered and cefepime ordered [TM]   0054 CT Cervical Spine Without Contrast [TM]   0121 Procalcitonin: 0.14 [TM]      ED Course User Index  [TM] Hermilo Molina PA-C  [WP] Federico Lancaster MD           After my consideration of clinical presentation and any laboratory/radiology studies obtained, I discussed the findings with the patient/patient representative who is in agreement with the treatment plan and the final disposition. Risks and benefits of admission was discussed     AS OF 17:54 EST VITALS:    BP - 114/65  HR - 81  TEMP - 98.9 °F (37.2 °C) (Oral)  O2 SATS - 99%    I reviewed the patients prescription monitoring report if available prior to  discharge    DIAGNOSIS  Final diagnoses:   Hypoglycemia   JOSE ALBERTO (acute kidney injury)         DISPOSITION  ED Disposition       ED Disposition   Decision to Admit    Condition   --    Comment   Level of Care: Critical Care [6]   Diagnosis: Hypotension [935871]                     Please note that portions of this document were completed with voice recognition software.        Hermilo Molina PA-C  12/23/24 3942

## 2024-12-23 NOTE — ED PROVIDER NOTES
I evaluated this patient in conjunction with the TYLER.  I personally performed a history and physical examination.  I agree with TYLER's documented history, physical and medical decision making.    Patient with past history significant for type 2 diabetes, hypertension, chronic kidney disease, nephrolithiasis, diverticulosis, obstructive sleep apnea, hyperlipidemia, peripheral vascular disease.    Patient presents with hypoglycemia.  Patient said intermittent episodes of recurrent hypoglycemia despite intermittent amps of D50.  Also noted to be hypothermic and hypotensive.  Blood cultures have been obtained and she is given broad-spectrum IV antibiotics.  Labs show acute kidney injury.    Upon arrival patient had no IV access.  IO was placed and was given amp of D50.    Despite IV fluid resuscitation she remains hypotensive.  Vasopressors initiated.  Right IJ central line placed.    Dr. Esteban excepted patient for hospitalization.     Diagnosis Plan   1. Hypoglycemia        2. JOSE ALBERTO (acute kidney injury)            ED Disposition       ED Disposition   Decision to Admit    Condition   --    Comment   Level of Care: Critical Care [6]   Diagnosis: Hypotension [862255]                     Central Line At Bedside    Date/Time: 12/23/2024 7:14 AM    Performed by: Federico Lancaster MD  Authorized by: William Velázquez DO    Consent:     Consent obtained:  Written    Consent given by:  Patient    Risks, benefits, and alternatives were discussed: yes      Risks discussed:  Arterial puncture, incorrect placement, nerve damage, pneumothorax, infection and bleeding  Universal protocol:     Patient identity confirmed:  Arm band  Pre-procedure details:     Indication(s): central venous access      Hand hygiene: Hand hygiene performed prior to insertion      Sterile barrier technique: All elements of maximal sterile technique followed      Skin preparation:  Chlorhexidine    Skin preparation agent: Skin preparation agent completely dried  prior to procedure    Sedation:     Sedation type:  None  Anesthesia:     Anesthesia method:  Local infiltration    Local anesthetic:  Lidocaine 1% w/o epi  Procedure details:     Location:  R internal jugular    Patient position:  Trendelenburg    Procedural supplies:  Triple lumen    Catheter size:  7 Fr    Landmarks identified: yes      Ultrasound guidance: yes      Ultrasound guidance timing: real time      Sterile ultrasound techniques: Sterile gel and sterile probe covers were used      Number of attempts:  1    Successful placement: yes    Post-procedure details:     Post-procedure:  Dressing applied and line sutured    Assessment:  Blood return through all ports    Procedure completion:  Tolerated well, no immediate complications  IO Line Insertion    Date/Time: 12/23/2024 7:16 AM    Performed by: Federico Lancaster MD  Authorized by: William Velázquez DO    Consent:     Consent obtained:  Emergent situation  Pre-procedure details:     Site preparation:  Alcohol  Anesthesia:     Anesthesia method:  None  Procedure details:     Insertion site:  R proximal tibia    Insertion device:  Drill device    Insertion: Needle was inserted through the bony cortex      Number of attempts:  1    Insertion confirmation:  Aspiration of blood/marrow, easy infusion of fluids and stability of the needle  Post-procedure details:     Secured with:  Elastic bandage    Procedure completion:  Tolerated well, no immediate complications        Federico Lancaster MD  12/23/24 0716

## 2024-12-23 NOTE — CONSULTS
"DOS: 2024  NAME: Viola Barlow   : 1955  PCP: Jyoti Tomlinson APRN  CC: Altered mentation  Referring MD: Federico Lancaster MD, William Velázquez MD    Neurological Problem and Interval History:  69 y.o. right-handed white female with a Hx of hypertension, migraines, chronic renal disease was admitted with acute renal insufficiency and was found to be unresponsive.  When I saw her today through the telemedicine device she is awake and alert and oriented x 4 and answering all my questions very appropriately.  She is following one-step, two-step and three-step commands and she is sitting up in the chair without any help.  She was able to stand up independently and the Romberg is negative.  No facial asymmetry noted and no speech impediment and no visual impairment noted.  There is no drift noticeable in the upper and lower extremities and the finger-to-nose coordination is normal.  She has a Rodriguez catheter in place which I discussed with the registered nurse about discontinuing and to ambulate her as much as possible.  If medically stable she can be discharged home.    Past Medical/Surgical Hx:  Past Medical History:   Diagnosis Date    Arthritis     Cataract     Depression     Diabetes mellitus     Dysphagia     Patient reported that intermittently food feels like it gets lodged     Elevated cholesterol     Full dentures     Instructed no adhesives the DOS    GERD (gastroesophageal reflux disease)     Headache     History of nuclear stress test     Patient reported this was done with Dr. Douglas around  and that she was started on Metoprolol after testing.     Hypertension     Impaired mobility     Kidney disease     Patient reported \"I have chronic kidney disease and they say my kidney function is at 33%\" and that she has routine care with Dr. Calzada     Migraines     PVD (peripheral vascular disease)     Seasonal allergies     Spinal headache     Wears glasses      Past Surgical History: "   Procedure Laterality Date    ABDOMINAL SURGERY  08/1984    Patient reported after complete hysterectomy, she had another procedure to remove tumors in the abdominal area    APPENDECTOMY      Reported removed when hysterectomy was done    CATARACT EXTRACTION W/ INTRAOCULAR LENS IMPLANT Right 6/21/2019    Procedure: CATARACT PHACO EXTRACTION WITH INTRAOCULAR LENS IMPLANT RIGHT;  Surgeon: Tee Enrique MD;  Location: [facility] OR;  Service: Ophthalmology    CATARACT EXTRACTION W/ INTRAOCULAR LENS IMPLANT Left 7/5/2019    Procedure: CATARACT PHACO EXTRACTION WITH INTRAOCULAR LENS IMPLANT LEFT;  Surgeon: Tee Enrique MD;  Location: [facility] OR;  Service: Ophthalmology    COLONOSCOPY      ELBOW EPICONDYLECTOMY Left 09/1990    ENDOSCOPY      HYSTERECTOMY  1983    Partial    HYSTERECTOMY  1984    Complete     MOUTH SURGERY      Full mouth extraction    VASCULAR SURGERY Bilateral     For PAD - Reported the right leg was done first around 2014 and the left was done around 2015       Review of Systems:    Constitutional: Pleasant lady currently sitting very comfortably in the recliner in no distress.  Cardiovascular: No chest pain or palpitations noted.  Respiratory: No shortness of breath noted.  Gastrointestinal: No nausea and vomiting noted.  Genitourinary: Has a Rodriguez catheter in place.  Musculoskeletal: Had previous injury to the right shoulder where she had surgery and so has limited range of motion of the right upper extremity.  Dermatological: No skin breakdown noted.  Neurological: No focal neurological deficits.  Psychiatric: No major anxiety or depression noted.  Ophthalmological: No vision changes noted.          Medications On Admission  Medications Prior to Admission   Medication Sig Dispense Refill Last Dose/Taking    amLODIPine (NORVASC) 5 MG tablet Take 1 tablet by mouth Daily. 30 tablet 0     aspirin (aspirin EC) 81 MG EC tablet Take 1 tablet by mouth Daily. 30 tablet 0     atorvastatin (LIPITOR)  40 MG tablet Take 1 tablet by mouth Daily.       Cholecalciferol (VITAMIN D-3) 1000 units capsule Take 1,000 Units by mouth Daily.       clopidogrel (PLAVIX) 75 MG tablet Take 1 tablet by mouth Daily.       Dulaglutide (Trulicity) 1.5 MG/0.5ML solution pen-injector Inject 1.5 mg under the skin into the appropriate area as directed 1 (One) Time Per Week.       gabapentin (NEURONTIN) 600 MG tablet Take 600 mg by mouth 3 (Three) Times a Day.       HYDROcodone-acetaminophen (NORCO) 5-325 MG per tablet Take 1 tablet by mouth Every 6 (Six) Hours As Needed for Moderate Pain. 12 tablet 0     insulin lispro protamine-insulin lispro (humaLOG 75-25) (75-25) 100 UNIT/ML suspension injection Inject 60 Units under the skin into the appropriate area as directed 2 (Two) Times a Day With Meals.  12     levothyroxine (SYNTHROID, LEVOTHROID) 50 MCG tablet Take 1 tablet by mouth Daily.       lidocaine (LIDODERM) 5 % Place 1 patch on the skin as directed by provider Daily. Remove & Discard patch within 12 hours or as directed by MD 15 each 0     metFORMIN ER (GLUCOPHAGE-XR) 750 MG 24 hr tablet Take 1 tablet by mouth Daily With Breakfast.       metoclopramide (Reglan) 5 MG tablet Take 1 tablet by mouth 3 (Three) Times a Day Before Meals. 60 tablet 0     metoprolol succinate XL (TOPROL-XL) 50 MG 24 hr tablet Take 1 tablet by mouth Daily.       ondansetron ODT (ZOFRAN-ODT) 4 MG disintegrating tablet Place 1 tablet on the tongue Every 8 (Eight) Hours As Needed for Nausea or Vomiting. 20 tablet 0     pantoprazole (PROTONIX) 40 MG EC tablet Take 1 tablet by mouth Daily.       sertraline (ZOLOFT) 50 MG tablet Take 1 tablet by mouth Daily.       vitamin D3 125 MCG (5000 UT) capsule capsule Take 1 capsule by mouth Daily.          Prior to Admission medications    Medication Sig Start Date End Date Taking? Authorizing Provider   amLODIPine (NORVASC) 5 MG tablet Take 1 tablet by mouth Daily. 12/19/24   Gerald Zhao MD   aspirin (aspirin EC) 81 MG  EC tablet Take 1 tablet by mouth Daily. 12/19/24   Gerald Zhao MD   atorvastatin (LIPITOR) 40 MG tablet Take 1 tablet by mouth Daily.    Nikolas Katz MD   Cholecalciferol (VITAMIN D-3) 1000 units capsule Take 1,000 Units by mouth Daily.    Nikolas Katz MD   clopidogrel (PLAVIX) 75 MG tablet Take 1 tablet by mouth Daily.    Nikolas Katz MD   Dulaglutide (Trulicity) 1.5 MG/0.5ML solution pen-injector Inject 1.5 mg under the skin into the appropriate area as directed 1 (One) Time Per Week.    Nikolas Katz MD   gabapentin (NEURONTIN) 600 MG tablet Take 600 mg by mouth 3 (Three) Times a Day.    Nikolas Katz MD   HYDROcodone-acetaminophen (NORCO) 5-325 MG per tablet Take 1 tablet by mouth Every 6 (Six) Hours As Needed for Moderate Pain. 11/18/24   Fernie Castellanos MD   insulin lispro protamine-insulin lispro (humaLOG 75-25) (75-25) 100 UNIT/ML suspension injection Inject 60 Units under the skin into the appropriate area as directed 2 (Two) Times a Day With Meals. 11/9/21   Gerald Zhao MD   levothyroxine (SYNTHROID, LEVOTHROID) 50 MCG tablet Take 1 tablet by mouth Daily.    Nikolas Katz MD   lidocaine (LIDODERM) 5 % Place 1 patch on the skin as directed by provider Daily. Remove & Discard patch within 12 hours or as directed by MD 11/18/24   Fernie Castellanos MD   metFORMIN ER (GLUCOPHAGE-XR) 750 MG 24 hr tablet Take 1 tablet by mouth Daily With Breakfast.    Nikolas Katz MD   metoclopramide (Reglan) 5 MG tablet Take 1 tablet by mouth 3 (Three) Times a Day Before Meals. 12/19/24   Gerald Zhao MD   metoprolol succinate XL (TOPROL-XL) 50 MG 24 hr tablet Take 1 tablet by mouth Daily.    Nikolas Katz MD   ondansetron ODT (ZOFRAN-ODT) 4 MG disintegrating tablet Place 1 tablet on the tongue Every 8 (Eight) Hours As Needed for Nausea or Vomiting. 2/7/23   Clement Leavitt MD   pantoprazole (PROTONIX) 40 MG EC tablet Take 1 tablet by  mouth Daily.    Provider, MD Nikolas   sertraline (ZOLOFT) 50 MG tablet Take 1 tablet by mouth Daily.    Provider, MD Nikolas   vitamin D3 125 MCG (5000 UT) capsule capsule Take 1 capsule by mouth Daily.    ProviderNikolas MD        Allergies:  Allergies   Allergen Reactions    Lisinopril Cough       Social Hx:  Social History     Socioeconomic History    Marital status: Single   Tobacco Use    Smoking status: Former     Current packs/day: 0.00     Average packs/day: 1.5 packs/day for 38.0 years (57.0 ttl pk-yrs)     Types: Cigarettes     Start date:      Quit date:      Years since quittin.9    Smokeless tobacco: Never    Tobacco comments:     quit 13 yrs ago   Vaping Use    Vaping status: Never Used   Substance and Sexual Activity    Alcohol use: No    Drug use: No    Sexual activity: Defer       Family Hx:  History reviewed. No pertinent family history.    Review of Imaging (Interpretation of images not reports): CT of the cervical spine showed the following:    CT CERVICAL SPINE WO CONTRAST - 2024 07:15 PM EST     Findings:     No acute fracture or traumatic listhesis.     No significant prevertebral soft tissue swelling.     Flattening of cervical lordosis with mild apex left scoliosis.     Severe degenerative disc disease at C5-6. Moderate facet arthropathy on the right.     No significant spinal canal or neural foraminal stenosis.     Craniocervical junction and C1-C2 articulations are without abnormal alignment/asymmetry.     Dens is intact. Severe degenerative narrowing of the atlantodens interval.     Normal limited view of the intracranial contents.     Neck soft tissues are unremarkable for age.     Lung apices without consolidation, pneumothorax, or mass.     IMPRESSION:  Impression:     1. No acute fracture.     2. Multilevel degenerative changes.       CT Head Without Contrast    Addendum Date: 2024    ADDENDUM REPORT ADDENDUM: Receipt of this report by the  clinical staff was confirmed with Baldo Henson on Dec 23, 2024 01:09:00 EST. Authenticated and Electronically Signed by Aleksandar العراقي MD on 12/23/2024 01:09:39 AM    Result Date: 12/23/2024  FINAL REPORT TECHNIQUE: null CLINICAL HISTORY: unresponsive episode, low blood sugar, weakness COMPARISON: null FINDINGS: CT head without contrast Comparison: CT/SR - CT HEAD WO CONTRAST - 11/18/2024 07:15 PM EST Findings: Moderate diffuse cerebral atrophy and nonspecific white matter hypodensity. No intracranial mass, midline shift, hydrocephalus, or acute hemorrhage/infarct. Possible heterogeneity of the left aspect of the malissa and low-density centrally involving the medulla oblongata, series 2 image 11 and 7 respectively. Moderate calcific ASVD of the carotid siphons. Visualized paranasal sinuses are clear. Mastoids are well developed and clear. Orbits are unremarkable. No skull fracture. No significant scalp soft tissue swelling.     Impression: Impression: 1. Moderate atrophy. 2. Heterogeneous density of the left malissa, similar to previous. This may reflect chronic infarct. 3. Low-density 5 mm area within the central medulla oblongata, not identified previously. This could reflect acute or chronic ischemic change. Consider MRI if not contraindicated. Authenticated and Electronically Signed by Aleksandar العراقي MD on 12/23/2024 12:53:16 AM            Additional Tests Performed: Duplex Carotid Ultrasound CAR  Order: [id number]  Impression    Right: Mild, heterogenous irregular plaque with acoustic shadowing is demonstrated within the proximal ECA and ICA.  Flow is present in the CCA, ICA, and ECA.  ICA velocities do not demonstrate evidence of a hemodynamically significant stenosis (less than 50%).    Left: Mild calcified plaque with acoustic shadowing is demonstrated within the distal CCA and proximal ICA..  Flow is present in the CCA, ICA, and ECA.  ICA velocities demonstrate evidence of a hemodynamically significant stenosis  50-69%. ECA velocities are elevated and turbulent consistent with hemodynamically significant stenosis.      The right vertebral artery flow is bidirectional. The left vertebral artery flow is antegrade.    The right subclavian artery demonstrates evidence of post stenotic turbulence and demonstrates monophasic waveforms, consistent with more proximal disease. The left subclavian artery flow is multiphasic.      COMMUNICATION:  Per this written report.        Preliminary report signed by REY Lezama on 12/5/2024 4:34 PM    By electronically signing this report, I, the attending physician, attest that I have personally reviewed the images/data for the above examination(s) and I agree with the final edited report.    Drafted by REY Lezama on 12/5/2024 4:22 PM  Final report signed by Hermilo Azul on 12/5/2024 5:38 PM  Narrative    CLINICAL INDICATION:  Cervical Bruit    TECHNIQUE:  Non-invasive, real time duplex exam of the extracranial carotid circulation with Doppler ultrasonic waveform and spectral analysis was performed.    COMPARISON:  None.    FINDINGS:  Right:  CCA: 54 cm/s  ECA:  121 cm/s  ICA:  58/21 cm/s, ICA/CCA ratio: 1.0  Vertebral A:  18 cm/s, bidirectional flow  Subclavian A:  135 cm/s    Left:  CCA: 67 cm/s  ECA:  149 cm/s  ICA:  135/32 cm/s, ICA/CCA ratio: 2.2  Vertebral A:  67 cm/s  Subclavian A:  214 cm/s  Exam End: 12/05/24 16:22 Last Resulted: 12/05/24 17:38   Received From: [facility]  Result Received: 12/16/24 12:05        Results for orders placed during the hospital encounter of 11/04/21    Adult Transthoracic Echo Complete W/ Cont if Necessary Per Protocol    Interpretation Summary  1.  Normal left ventricular size and systolic function, LVEF 55-60%.  2.  Mild concentric LVH.  3.  Normal LV diastolic filling pattern.  4.  Normal right ventricular size and systolic function.  5.  Normal left atrial volume index.  6.  No significant valvular abnormalities.         "    Laboratory Results:   Lab Results   Component Value Date    GLUCOSE 222 (H) 12/23/2024    CALCIUM 8.1 (L) 12/23/2024     (L) 12/23/2024    K 3.6 12/23/2024    CO2 22.1 12/23/2024     12/23/2024    BUN 29 (H) 12/23/2024    CREATININE 1.35 (H) 12/23/2024    EGFRIFAFRI 52 (L) 09/13/2021    EGFRIFNONA 38 (L) 11/09/2021    BCR 21.5 12/23/2024    ANIONGAP 11.9 12/23/2024     Lab Results   Component Value Date    WBC 12.50 (H) 12/23/2024    HGB 14.2 12/23/2024    HCT 43.1 12/23/2024    MCV 93.3 12/23/2024     12/23/2024       Lab Results   Component Value Date    HGBA1C 8.40 (H) 12/16/2024     Lab Results   Component Value Date    INR 0.97 05/21/2015    PROTIME 10.2 05/21/2015       TSH          12/22/2024    23:33   TSH   TSH 1.650           Physical Examination:  /52   Pulse 82   Temp (!) 100.8 °F (38.2 °C) (Bladder)   Resp 20   Ht 152.4 cm (60\")   Wt 49.6 kg (109 lb 5.6 oz)   LMP  (LMP Unknown)   SpO2 98%   BMI 21.36 kg/m²   General Appearance:   Well developed, well nourished, well groomed, alert, and cooperative.  HEENT: Normocephalic.    Neck and Spine: Normal range of motion.  Normal alignment. No mass or tenderness. No bruits.    Extremities:    No edema or deformities. Normal joint ROM.   Skin:    No rashes or birth marks.    Neurological examination:  Higher Integrative  Function: Oriented to time, place and person. Normal registration, recall, attention span and concentration. Normal language including comprehension, spontaneous speech, repetition, reading, writing, naming and vocabulary. No neglect with normal visual-spatial function and construction. Normal fund of knowledge and higher integrative function.  CN II:  Pupils are equal, round, and reactive to light. Normal visual acuity and visual fields.    CN III IV VI: Extraocular movements are full without nystagmus.   CN V:  Normal facial sensation and strength of muscles of mastication.  CN VII:  Facial movements are " symmetric. No weakness.  CN VIII: Auditory acuity is normal.  CN IX & X: Symmetric palatal movement.  CN XI:  Sternocleidomastoid and trapezius are normal.  No weakness.  CN XII:  The tongue is midline.  No atrophy or fasciculations.  Motor:  Normal muscle strength, bulk and tone in upper and lower extremities.  No fasciculations, rigidity, spasticity, or abnormal movements.  Sensation: Normal to light touch, pinprick, vibration, temperature, and proprioception in arms and legs. Normal graphesthesia and no extinction on DSS.  Station and Gait: Normal gait and station.  Able to get up and ambulate independently and the Romberg is negative.  Coordination:  Finger to nose test shows no dysmetria.  .  Heel to shin normal.    NIHSS:    Baseline  0-->Alert: keenly responsive  0-->Answers both questions correctly  0-->Performs both tasks correctly  0=normal  0=No visual loss  0=Normal symmetric movement  0-->No drift: limb holds 90 (or 45) degrees for full 10 secs  0-->No drift: limb holds 90 (or 45) degrees for full 10 secs  0-->No drift: limb holds 90 (or 45) degrees for full 10 secs  0-->No drift: limb holds 90 (or 45) degrees for full 10 secs  0=Absent  0=Normal; no sensory loss  0=No aphasia, normal  0=Normal  0=No abnormality    Total score: 0    Diagnoses / Discussion:  69 y.o. who presents with Sx of acute metabolic encephalopathy with low blood sugar and elevated creatinine which is corrected and she is completely back to baseline.  Incidentally noted she recently had left internal carotid artery checked which was showing moderate grade stenosis.    Plan:  Aspirin 81 mg  Plavix 75 mg  Lipitor 40 mg  Non-pharmacological DVT prophylaxis  Smoking cessation protocol  Blood pressure control : Keep the systolic blood pressure between 1 20-1 40 and the diastolic between 70-80  Goal LDL <70-recommend high dose statins-   Serum glucose < 140  Body mass index: 21.4 kg/m² which puts her at a very low risk for underlying  obstructive sleep apnea       I have discussed the above with the patient and Dr. William Velázquez her hospitalist and if medically stable can be discharged home.  To follow-up at [facility] stroke clinic for follow-up with the moderate grade stenosis of the left internal carotid artery.  Time spent with patient: 70 minutes in face-to-face evaluation and management of the patient using the dedicated telemedicine device without any interruption with the help of Michael madison rounding nurse with the patient located at the [facility] and myself at a remote location.    Electronically signed by Fan Irby MD, 12/23/24, 9:53 AM EST.    Dictated using Dragon dictation.

## 2024-12-23 NOTE — PLAN OF CARE
Goal Outcome Evaluation:  Plan of Care Reviewed With: patient        Progress: no change  Outcome Evaluation: Pt participated well in PT evaluation this date. Pt reports that she lives in a duplex with multiple family members living in the duplex with her. Pt reports that she has a rollator, BSC, shower chair and a cane. Pt does require assist when bathing from family members. Pt reports that she falls often with the most being 8x in one day. Pt completed STS from bedside chair CGA using RW. BP 86/57 once standing. Pt ambulated 50 ft x2 using RW CGA with cues for steering the RW. Pt returned to room and was seated in bedside chair, BP 73/58, RN notified. Pt left with all needs met. Pt is expected to benefit from skilled PT during this inpatient stay to maximize strength and functional independence. Pt would further benefit from HH upon dc.    Anticipated Discharge Disposition (PT): home with home health

## 2024-12-23 NOTE — CONSULTS
Diabetes Education    Patient Name:  Viola Barlow  YOB: 1955  MRN: [id number]  Admit Date:  12/22/2024        Diabetes Education referral received for hypoglycemia.  Attempted to see pt. Last week while she was inpt. And she refused.  Attempted again today and she refused .  I asked pt. If she knew of what may have caused her hypotension.  Pt. States that she was prescribed 60 units of insulin BID.  Pt. States that she has never done over 30 units in toital a day and she had hypoglycemia after her first home injection.  Pt. Was receptive to receiving written information on hypoglycemia.        Electronically signed by:  Opal Antonio RN  12/23/24 12:08 EST

## 2024-12-23 NOTE — PAYOR COMM NOTE
"TO:BC  FROM:MARIANA GARCIA, RN PHONE [phone]  REF# [id number]    Viola Rodriguez (69 y.o. Female)       Date of Birth   1955    Social Security Number       Address   [address] [place], [place] [address]    Home Phone   [phone]    MRN   [id number]       Judaism   Adventism    Marital Status   Single                            Admission Date   12/22/24    Admission Type   Emergency    Admitting Provider   Ricardo Esteban DO    Attending Provider   William Velázquez DO    Department, Room/Bed   [facility] INTENSIVE [facility], I08/1       Discharge Date       Discharge Disposition       Discharge Destination                                 Attending Provider: William Velázquez DO    Allergies: Lisinopril    Isolation: None   Infection: None   Code Status: CPR    Ht: 152.4 cm (60\")   Wt: 49.6 kg (109 lb 5.6 oz)    Admission Cmt: None   Principal Problem: Hypotension [I95.9]                   Active Insurance as of 12/22/2024       Primary Coverage       Payor Plan Insurance Group Employer/Plan Group    ANTHEM MEDICARE REPLACEMENT ANTHEM MED ADV SNP [id number]       Payor Plan Address Payor Plan Phone Number Payor Plan Fax Number Effective Dates    [address] [phone]  1/1/2024 - None Entered    [facility] [id number]         Subscriber Name Subscriber Birth Date Member ID       VIOLA RODRIGUEZ 1955 [id number]               Secondary Coverage       Payor Plan Insurance Group Employer/Plan Group    [place]        Payor Plan Address Payor Plan Phone Number Payor Plan Fax Number Effective Dates    [address] [phone]  1/23/2023 - None Entered    [facility] [id number]         Subscriber Name Subscriber Birth Date Member ID       VIOLA RODRIGUEZ 1955 [id number]                     Emergency Contacts        (Rel.) Home Phone Work Phone Mobile Phone    CainLisa (Daughter) [phone] -- --    TAEFIDEL (Son) -- " -- [phone]                 History & Physical        Ricardo Esteban, DO at [phone]            [facility]   HISTORY AND PHYSICAL      Name:  Viola Barlow   Age:  69 y.o.  Sex:  female  :  1955  MRN:  [id number]   Visit Number:  [id number]  Admission Date:  2024  Date Of Service:  24  Primary Care Physician:  Jyoti Tomlinson APRN    Chief Complaint:     Passed out    History Of Presenting Illness:      69-year-old female past medical history CKD stage III, diabetes mellitus type 2, hypertension, peripheral vascular disease, nephrolithiasis, hypothyroidism, Chief complaint unresponsive. Had recently been admitted 2024 to 2024 at this facility with gastroenteritis, JOSE ALBERTO.  She was started on Reglan and her renal function and symptoms had improved.  Reports she was doing well to her knowledge at home but then one of her Family found her in the kitchen floor about 2100 hrs.  She had a fall from the couch to the floor without injury.  Was noted to be hypoglycemic blood sugar of 36.  Was given glucagon improved to 47.  IV could not be obtained IO accessed and D50 given.  Patient was hypotensive and hypothermic warming blanket received Levophed and was started on D10.  Full code.    Pertinent findings: Highly sensitive troponin 10, blood glucose have been 47 and then improved to 210 and it trended back down to 57.  Lactic acid 1.5, potassium 3.1, creatinine 2.05 which appears slightly higher than baseline, CBC 7.13, blood cultures pending, chest x-ray showed bibasilar atelectasis without effusion, CT head shows moderate atrophy heterogeneous density in the left malissa similar to previous may reflect chronic infarct, low-density 5 mm area within the central medulla not identified previously could reflect acute or chronic ischemic change. EKG NSR no ischemic change.    ED Medications:    Medications   sodium chloride 0.9 % flush 10 mL (has no  administration in time range)   dextrose (D50W) (25 g/50 mL) IV injection 50 mL (50 mL Intravenous Given 12/23/24 0300)   sodium chloride 0.9 % infusion  - ADS Override Pull (  Not Given 12/23/24 0055)   dextrose 10 % infusion (0 mL Intravenous Stopped 12/23/24 0151)   norepinephrine (LEVOPHED) 8 mg in 250mL D5W infusion (0.1 mcg/kg/min × 47.6 kg Intravenous Rate/Dose Change 12/23/24 0306)   sodium chloride 0.9 % bolus 1,000 mL (0 mL Intravenous Stopped 12/23/24 0055)   cefepime 1000 mg IVPB in 100 mL NS (VTB) (0 mg Intravenous Stopped 12/23/24 0118)   sodium chloride 0.9 % bolus 1,000 mL (0 mL Intravenous Stopped 12/23/24 0151)           Edited by: Ricardo Esteban DO at 12/23/2024 4951     Review Of Systems:    All systems were reviewed and negative except as mentioned in history of presenting illness, assessment and plan.    Past Medical History: Patient  has a past medical history of Arthritis, Cataract, Depression, Diabetes mellitus, Dysphagia, Elevated cholesterol, Full dentures, GERD (gastroesophageal reflux disease), Headache, History of nuclear stress test, Hypertension, Impaired mobility, Kidney disease, Migraines, PVD (peripheral vascular disease), Seasonal allergies, Spinal headache, and Wears glasses.    Past Surgical History: Patient  has a past surgical history that includes Mouth surgery; Hysterectomy (1983); Hysterectomy (1984); Abdominal surgery (08/1984); Elbow Epicondylectomy (Left, 09/1990); Vascular surgery (Bilateral); Colonoscopy; Esophagogastroduodenoscopy; Appendectomy; Cataract extraction w/ intraocular lens implant (Right, 6/21/2019); and Cataract extraction w/ intraocular lens implant (Left, 7/5/2019).    Social History: Patient  reports that she quit smoking about 16 years ago. Her smoking use included cigarettes. She started smoking about 55 years ago. She has a 57 pack-year smoking history. She has never used smokeless tobacco. She reports that she does not drink alcohol and does  not use drugs.    Family History:  Patient's family history has been reviewed and found to be noncontributory.     Allergies:      Lisinopril    Home Medications:    Prior to Admission Medications       Prescriptions Last Dose Informant Patient Reported? Taking?    amLODIPine (NORVASC) 5 MG tablet   No No    Take 1 tablet by mouth Daily.    aspirin (aspirin EC) 81 MG EC tablet   No No    Take 1 tablet by mouth Daily.    atorvastatin (LIPITOR) 40 MG tablet  Self Yes No    Take 1 tablet by mouth Daily.    Cholecalciferol (VITAMIN D-3) 1000 units capsule  Self Yes No    Take 1,000 Units by mouth Daily.    clopidogrel (PLAVIX) 75 MG tablet  Self Yes No    Take 1 tablet by mouth Daily.    Dulaglutide (Trulicity) 1.5 MG/0.5ML solution pen-injector   Yes No    Inject 1.5 mg under the skin into the appropriate area as directed 1 (One) Time Per Week.    gabapentin (NEURONTIN) 600 MG tablet  Self Yes No    Take 600 mg by mouth 3 (Three) Times a Day.    HYDROcodone-acetaminophen (NORCO) 5-325 MG per tablet   No No    Take 1 tablet by mouth Every 6 (Six) Hours As Needed for Moderate Pain.    insulin lispro protamine-insulin lispro (humaLOG 75-25) (75-25) 100 UNIT/ML suspension injection   No No    Inject 60 Units under the skin into the appropriate area as directed 2 (Two) Times a Day With Meals.    levothyroxine (SYNTHROID, LEVOTHROID) 50 MCG tablet  Self Yes No    Take 1 tablet by mouth Daily.    lidocaine (LIDODERM) 5 %   No No    Place 1 patch on the skin as directed by provider Daily. Remove & Discard patch within 12 hours or as directed by MD    metFORMIN ER (GLUCOPHAGE-XR) 750 MG 24 hr tablet  Self Yes No    Take 1 tablet by mouth Daily With Breakfast.    metoclopramide (Reglan) 5 MG tablet   No No    Take 1 tablet by mouth 3 (Three) Times a Day Before Meals.    metoprolol succinate XL (TOPROL-XL) 50 MG 24 hr tablet  Self Yes No    Take 1 tablet by mouth Daily.    ondansetron ODT (ZOFRAN-ODT) 4 MG disintegrating tablet    "No No    Place 1 tablet on the tongue Every 8 (Eight) Hours As Needed for Nausea or Vomiting.    pantoprazole (PROTONIX) 40 MG EC tablet  Self Yes No    Take 1 tablet by mouth Daily.    sertraline (ZOLOFT) 50 MG tablet  Self Yes No    Take 1 tablet by mouth Daily.    vitamin D3 125 MCG (5000 UT) capsule capsule   Yes No    Take 1 capsule by mouth Daily.              Vital Signs:  Temp:  [96 °F (35.6 °C)-99.3 °F (37.4 °C)] 99.3 °F (37.4 °C)  Heart Rate:  [63-79] 79  Resp:  [20] 20  BP: ()/(33-95) 102/55        12/22/24  2329   Weight: 47.6 kg (105 lb)     Body mass index is 20.35 kg/m².    Physical Exam:     Most recent vital Signs: /55   Pulse 79   Temp 99.3 °F (37.4 °C)   Resp 20   Ht 153 cm (60.24\")   Wt 47.6 kg (105 lb)   LMP  (LMP Unknown)   SpO2 97%   BMI 20.35 kg/m²     Constitutional: Awake, alert  Eyes: PERRLA, sclerae anicteric, no conjunctival injection  HENT: NCAT, mucous membranes moist  Neck: Supple, no thyromegaly, no lymphadenopathy, trachea midline  Respiratory: Clear to auscultation bilaterally, nonlabored respirations   Cardiovascular: RRR, no murmurs, rubs, or gallops, palpable pedal pulses bilaterally, RIJ CVC noted  Gastrointestinal: Positive bowel sounds, soft, nontender, nondistended  Musculoskeletal: No bilateral ankle edema, no clubbing or cyanosis to extremities  Psychiatric: Appropriate affect, cooperative  Neurologic: Oriented x 3, speech clear  Skin: No rashes  Edited by: Ricardo Esteban DO at 12/23/2024 4781      Laboratory data:    I have reviewed the labs done in the emergency room.    Results from last 7 days   Lab Units 12/22/24  2333 12/19/24  0603 12/18/24  0539 12/16/24  1618 12/16/24  1223   SODIUM mmol/L 132* 138 143   < > 142   POTASSIUM mmol/L 3.1* 4.2 3.8   < > 4.3   CHLORIDE mmol/L 95* 102 107   < > 93*   CO2 mmol/L 22.6 24.4 22.0   < > 19.3*   BUN mg/dL 38* 34* 37*   < > 54*   CREATININE mg/dL 2.05* 1.39* 1.46*   < > 3.19*   CALCIUM mg/dL 9.1 9.5 " 9.7   < > 10.9*   BILIRUBIN mg/dL 0.3  --  0.8  --  0.7   ALK PHOS U/L 100  --  131*  --  148*   ALT (SGPT) U/L 12  --  10  --  16   AST (SGOT) U/L 25  --  19  --  22   GLUCOSE mg/dL 210* 334* 145*   < > 363*    < > = values in this interval not displayed.     Results from last 7 days   Lab Units 12/22/24  2333 12/18/24  0539 12/17/24  0533   WBC 10*3/mm3 7.13 14.36* 17.08*   HEMOGLOBIN g/dL 13.9 16.0* 14.5   HEMATOCRIT % 42.7 49.2* 46.8*   PLATELETS 10*3/mm3 109* 247 261         Results from last 7 days   Lab Units 12/23/24  0030 12/22/24  2333   HSTROP T ng/L 10 12             Results from last 7 days   Lab Units 12/16/24  1223   LIPASE U/L 31         Results from last 7 days   Lab Units 12/23/24  0340   COLOR UA  Yellow   GLUCOSE UA  500 mg/dL (2+)*   KETONES UA  Negative   BLOOD UA  Negative   LEUKOCYTES UA  Negative   PH, URINE  5.5   BILIRUBIN UA  Negative   UROBILINOGEN UA  0.2 E.U./dL   RBC UA /HPF None Seen   WBC UA /HPF 0-2       Pain Management Panel  More data exists         Latest Ref Rng & Units 11/18/2022 8/16/2021   Pain Management Panel   Creatinine, Urine mg/dL - 86.7    Amphetamine, Urine Qual Negative Negative  -   Barbiturates Screen, Urine Negative Negative  -   Benzodiazepine Screen, Urine Negative Negative  -   Buprenorphine, Screen, Urine Negative Negative  -   Cocaine Screen, Urine Negative Negative  -   Methadone Screen , Urine Negative Negative  -   Methamphetamine, Ur Negative Negative  -      Details                   EKG:       EKG NSR no ischemic change    Radiology:    CT Head Without Contrast    Addendum Date: 12/23/2024    ADDENDUM REPORT ADDENDUM: Receipt of this report by the clinical staff was confirmed with Baldo Henson on Dec 23, 2024 01:09:00 EST. Authenticated and Electronically Signed by Aleksandar العراقي MD on 12/23/2024 01:09:39 AM    Result Date: 12/23/2024  FINAL REPORT TECHNIQUE: null CLINICAL HISTORY: unresponsive episode, low blood sugar, weakness COMPARISON: null  FINDINGS: CT head without contrast Comparison: CT/SR - CT HEAD WO CONTRAST - 11/18/2024 07:15 PM EST Findings: Moderate diffuse cerebral atrophy and nonspecific white matter hypodensity. No intracranial mass, midline shift, hydrocephalus, or acute hemorrhage/infarct. Possible heterogeneity of the left aspect of the malissa and low-density centrally involving the medulla oblongata, series 2 image 11 and 7 respectively. Moderate calcific ASVD of the carotid siphons. Visualized paranasal sinuses are clear. Mastoids are well developed and clear. Orbits are unremarkable. No skull fracture. No significant scalp soft tissue swelling.     Impression: 1. Moderate atrophy. 2. Heterogeneous density of the left malissa, similar to previous. This may reflect chronic infarct. 3. Low-density 5 mm area within the central medulla oblongata, not identified previously. This could reflect acute or chronic ischemic change. Consider MRI if not contraindicated. Authenticated and Electronically Signed by Aleksandar العراقي MD on 12/23/2024 12:53:16 AM    CT Cervical Spine Without Contrast    Result Date: 12/23/2024  FINAL REPORT TECHNIQUE: null CLINICAL HISTORY: unresponsive, low blood sugar, weakness COMPARISON: null FINDINGS: CT cervical spine without contrast: Comparison: CT/SR - CT CERVICAL SPINE WO CONTRAST - 11/18/2024 07:15 PM EST Findings: No acute fracture or traumatic listhesis. No significant prevertebral soft tissue swelling. Flattening of cervical lordosis with mild apex left scoliosis. Severe degenerative disc disease at C5-6. Moderate facet arthropathy on the right. No significant spinal canal or neural foraminal stenosis. Craniocervical junction and C1-C2 articulations are without abnormal alignment/asymmetry. Dens is intact. Severe degenerative narrowing of the atlantodens interval. Normal limited view of the intracranial contents. Neck soft tissues are unremarkable for age. Lung apices without consolidation, pneumothorax, or mass.      Impression: 1. No acute fracture. 2. Multilevel degenerative changes. Authenticated and Electronically Signed by Aleksandar العراقي MD on 12/23/2024 12:52:23 AM     Assessment/Plan:      Hypotension    Acute kidney injury superimposed on chronic kidney disease  -- Suspect hypotension and JOSE ALBERTO due to hypovolemia, certainly polypharmacy could be playing a role as well. RIJ CVC, montoya, warming blanket placed 12/23/24  -- Active Treatments: IV fluids dextrose, hold antihypertensives, hold pain meds  -- Pending Results: Nephrology      Hypoglycemia    Type 2 diabetes mellitus with nephropathy    Gastroparesis  -- Suspect inadequate oral intake relative to insulin use.  -- Active Treatments: Subcutaneous insulin, Reglan  -- Pending Results: Every 1 hr Accu-Cheks      Medulla/Brain stem abnormality  -- Neuro consult.MRI pending.          DVT Prophylaxis: Lovenox  Code Status: Full code  Diet: Diabetic renal  Risk assessment: High anticipate greater than two night stay          Edited by: Ricardo Esteban DO at 12/23/2024 0455           Advance Care Planning  ACP discussion was held with the patient during this visit. Patient does not have an advance directive, declines further assistance.           Ricardo Esteban DO  12/23/24  04:56 EST    Dictated utilizing Dragon dictation.      Electronically signed by Ricardo Esteban DO at 12/23/24 0459          Emergency Department Notes        Federico Lancaster MD at 12/23/24 0337        Procedure Orders    1. Central Line At Bedside [[id number]] ordered by Federico Lancaster MD    2. IO Line Insertion [[id number]] ordered by Federico Lancaster MD                 I evaluated this patient in conjunction with the TYLER.  I personally performed a history and physical examination.  I agree with TYLER's documented history, physical and medical decision making.    Patient with past history significant for type 2 diabetes, hypertension, chronic kidney disease, nephrolithiasis, diverticulosis,  obstructive sleep apnea, hyperlipidemia, peripheral vascular disease.    Patient presents with hypoglycemia.  Patient said intermittent episodes of recurrent hypoglycemia despite intermittent amps of D50.  Also noted to be hypothermic and hypotensive.  Blood cultures have been obtained and she is given broad-spectrum IV antibiotics.  Labs show acute kidney injury.    Upon arrival patient had no IV access.  IO was placed and was given amp of D50.    Despite IV fluid resuscitation she remains hypotensive.  Vasopressors initiated.  Right IJ central line placed.    Dr. Esteban excepted patient for hospitalization.     Diagnosis Plan   1. Hypoglycemia        2. JOSE ALBERTO (acute kidney injury)            ED Disposition       ED Disposition   Decision to Admit    Condition   --    Comment   Level of Care: Critical Care [6]   Diagnosis: Hypotension [238872]                     Central Line At Bedside    Date/Time: 12/23/2024 7:14 AM    Performed by: Federico Lancaster MD  Authorized by: William Velázquez DO    Consent:     Consent obtained:  Written    Consent given by:  Patient    Risks, benefits, and alternatives were discussed: yes      Risks discussed:  Arterial puncture, incorrect placement, nerve damage, pneumothorax, infection and bleeding  Universal protocol:     Patient identity confirmed:  Arm band  Pre-procedure details:     Indication(s): central venous access      Hand hygiene: Hand hygiene performed prior to insertion      Sterile barrier technique: All elements of maximal sterile technique followed      Skin preparation:  Chlorhexidine    Skin preparation agent: Skin preparation agent completely dried prior to procedure    Sedation:     Sedation type:  None  Anesthesia:     Anesthesia method:  Local infiltration    Local anesthetic:  Lidocaine 1% w/o epi  Procedure details:     Location:  R internal jugular    Patient position:  Trendelenburg    Procedural supplies:  Triple lumen    Catheter size:  7 Fr    Landmarks  identified: yes      Ultrasound guidance: yes      Ultrasound guidance timing: real time      Sterile ultrasound techniques: Sterile gel and sterile probe covers were used      Number of attempts:  1    Successful placement: yes    Post-procedure details:     Post-procedure:  Dressing applied and line sutured    Assessment:  Blood return through all ports    Procedure completion:  Tolerated well, no immediate complications  IO Line Insertion    Date/Time: 12/23/2024 7:16 AM    Performed by: Federico Lancaster MD  Authorized by: William Velázquez DO    Consent:     Consent obtained:  Emergent situation  Pre-procedure details:     Site preparation:  Alcohol  Anesthesia:     Anesthesia method:  None  Procedure details:     Insertion site:  R proximal tibia    Insertion device:  Drill device    Insertion: Needle was inserted through the bony cortex      Number of attempts:  1    Insertion confirmation:  Aspiration of blood/marrow, easy infusion of fluids and stability of the needle  Post-procedure details:     Secured with:  Elastic bandage    Procedure completion:  Tolerated well, no immediate complications        Federico Lancaster MD  12/23/24 0716      Electronically signed by Federico Lancaster MD at 12/23/24 0716       Sade Banerjee RN at 12/23/24 0246          Central line supplies at bedside      Electronically signed by Sade Banerjee RN at 12/23/24 0247       Sade Banerjee RN at 12/23/24 0156          Pt attempted to give urine specimen but had some stool in specimen.  Will attempt again    Electronically signed by Sade Banerjee RN at 12/23/24 0158       Sade Bnaerjee RN at 12/23/24 0002          Glucose 146    Electronically signed by Sade Banerjee RN at 12/23/24 0002       Vital Signs (last day)       Date/Time Temp Temp src Pulse Resp BP Patient Position SpO2    12/23/24 0900 100.8 (38.2) Bladder 82 -- 108/52 -- 98    12/23/24 0800 100.9 (38.3) Bladder 82 -- 116/79 Lying 97    12/23/24 0615 100 (37.8) -- 80 -- 90/45 --  97    12/23/24 0607 -- -- 80 -- 87/53 -- 97    12/23/24 0600 99.9 (37.7) Bladder 81 20 56/34 Lying 98    12/23/24 0545 99.9 (37.7) -- 80 -- 108/73 -- 96    12/23/24 0530 99.9 (37.7) -- 80 -- 112/62 -- 97    12/23/24 0515 99.7 (37.6) -- 80 -- 98/64 -- 98    12/23/24 0500 99.5 (37.5) -- 80 -- 92/65 -- 99    12/23/24 0445 99.3 (37.4) Bladder 79 20 102/55 Lying 97    12/23/24 0439 -- -- -- -- 83/70 -- --    12/23/24 0430 98.5 (36.9) Oral -- -- -- -- --    12/23/24 0415 98.2 (36.8) -- 79 -- -- -- 93    12/23/24 0410 -- -- 78 -- 103/70 -- 98    12/23/24 0400 97.9 (36.6) -- 77 -- 119/75 -- 95    12/23/24 0357 97.7 (36.5) -- 76 -- -- -- 95    12/23/24 0350 -- -- 76 -- 117/74 -- 96    12/23/24 0320 -- -- 74 -- 136/77 -- 100    12/23/24 0310 -- -- 73 -- 118/71 -- 100    12/23/24 0300 -- -- 71 -- 59/48 -- 98    12/23/24 0240 -- -- 69 -- 64/50 -- 95    12/23/24 0230 -- -- 68 -- 65/44 -- 90    12/23/24 0227 -- -- 68 -- -- -- 87    12/23/24 0215 -- -- 68 -- 87/54 -- 91    12/23/24 0200 -- -- 67 -- 85/58 -- 96    12/23/24 0145 -- -- 67 -- 80/54 -- 99    12/23/24 0130 -- -- 67 -- 86/51 -- 100    12/23/24 0115 -- -- 68 -- 77/53 -- 100    12/23/24 0100 -- -- 66 -- 82/39 -- 100    12/23/24 0045 -- -- 66 -- 93/55 -- 100    12/23/24 0030 -- -- 64 -- 86/52 -- 100    12/23/24 0019 -- -- 64 -- 81/49 -- 100    12/23/24 0017 -- -- 63 -- 58/33 -- 96    12/23/24 0012 -- -- 64 -- 75/47 -- 100    12/22/24 2359 -- -- 64 -- 78/47 -- 99    12/22/24 2325 96 (35.6) Rectal -- -- -- -- --    12/22/24 2310 -- -- 70 20 115/95 -- 100          Current Facility-Administered Medications   Medication Dose Route Frequency Provider Last Rate Last Admin    acetaminophen (TYLENOL) tablet 650 mg  650 mg Oral Q4H PRN Ricardo Esteban DO        aspirin EC tablet 81 mg  81 mg Oral Daily Ricardo Esteban DO   81 mg at 12/23/24 0833    atorvastatin (LIPITOR) tablet 40 mg  40 mg Oral Daily Ricardo Esteban DO   40 mg at 12/23/24 0832    sennosides-docusate  (PERICOLACE) 8.6-50 MG per tablet 2 tablet  2 tablet Oral BID PRN Eulalia, Ricardo Whittaker, DO        And    polyethylene glycol (MIRALAX) packet 17 g  17 g Oral Daily PRN Ricardo Esteban, DO        And    bisacodyl (DULCOLAX) EC tablet 5 mg  5 mg Oral Daily PRN Ricardo Esteban, DO        And    bisacodyl (DULCOLAX) suppository 10 mg  10 mg Rectal Daily PRN Ricardo Esteban, DO        sennosides-docusate (PERICOLACE) 8.6-50 MG per tablet 2 tablet  2 tablet Oral BID Ricardo Esteban, DO   2 tablet at 12/23/24 0833    And    polyethylene glycol (MIRALAX) packet 17 g  17 g Oral Daily PRN Ricardo Esteban, DO        And    bisacodyl (DULCOLAX) EC tablet 5 mg  5 mg Oral Daily PRN Ricardo Esteban, DO        And    bisacodyl (DULCOLAX) suppository 10 mg  10 mg Rectal Daily PRN Ricardo Esteban, DO        calcium carbonate (TUMS) chewable tablet 500 mg (200 mg elemental)  2 tablet Oral BID PRN Ricardo Esteban, DO        Calcium Replacement - Follow Nurse / BPA Driven Protocol   Not Applicable PRN Ricardo Esteban, DO        clopidogrel (PLAVIX) tablet 75 mg  75 mg Oral Daily Ricardo Esteban, DO   75 mg at 12/23/24 0832    dextrose (D50W) (25 g/50 mL) IV injection 25 g  25 g Intravenous Q15 Min PRN Ricardo Esteban, DO        dextrose (D50W) (25 g/50 mL) IV injection 50 mL  50 mL Intravenous Q1H PRN Ricardo Esteban, DO   50 mL at 12/23/24 0300    dextrose (GLUTOSE) oral gel 15 g  15 g Oral Q15 Min PRN Ricardo Esteban, DO        empagliflozin (JARDIANCE) tablet 10 mg  10 mg Oral Daily William Velázquez DO        Enoxaparin Sodium (LOVENOX) syringe 30 mg  30 mg Subcutaneous Q24H Ricardo Esteban, DO   30 mg at 12/23/24 0833    glucagon (GLUCAGEN) injection 1 mg  1 mg Intramuscular Q15 Min PRN Ricardo Esteban,         Insulin Lispro (humaLOG) injection 2-9 Units  2-9 Units Subcutaneous 4x Daily AC & at Bedtime Ricardo Esteban DO   4 Units at 12/23/24 0831    levothyroxine  (SYNTHROID, LEVOTHROID) tablet 50 mcg  50 mcg Oral Daily Ricardo Esteban, DO   50 mcg at 12/23/24 0831    Lidocaine 4 % 1 patch  1 patch Transdermal Q24H Ricardo Esteban,         Magnesium Standard Dose Replacement - Follow Nurse / BPA Driven Protocol   Not Applicable PRN Ricardo Esteban, DO        melatonin tablet 5 mg  5 mg Oral Nightly Ricardo Esteban, DO        metoclopramide (REGLAN) tablet 5 mg  5 mg Oral TID AC Ricardo Esteban, DO   5 mg at 12/23/24 0832    mupirocin (BACTROBAN) 2 % nasal ointment 1 Application  1 Application Each Nare BID Ricardo Esteban DO   1 Application at 12/23/24 0858    nitroglycerin (NITROSTAT) SL tablet 0.4 mg  0.4 mg Sublingual Q5 Min PRN Ricardo Esteban DO        norepinephrine (LEVOPHED) 8 mg in 250mL D5W infusion  0.02-0.3 mcg/kg/min Intravenous Titrated Ricardo Esteban DO 8.93 mL/hr at 12/23/24 0900 0.1 mcg/kg/min at 12/23/24 0900    ondansetron ODT (ZOFRAN-ODT) disintegrating tablet 4 mg  4 mg Oral Q6H PRN Ricardo Esteban DO        Or    ondansetron (ZOFRAN) injection 4 mg  4 mg Intravenous Q6H PRN Ricardo Esteban DO        ondansetron ODT (ZOFRAN-ODT) disintegrating tablet 4 mg  4 mg Translingual Q8H PRN Ricardo Esteban DO        pantoprazole (PROTONIX) EC tablet 40 mg  40 mg Oral Daily Ricardo Esteban, DO   40 mg at 12/23/24 0831    Pharmacy to Dose enoxaparin (LOVENOX)   Not Applicable Continuous PRN Ricardo Esteban DO        Phosphorus Replacement - Follow Nurse / BPA Driven Protocol   Not Applicable PRN Ricardo Esteban DO        Potassium Replacement - Follow Nurse / BPA Driven Protocol   Not Applicable PRN Ricardo Esteban, DO        sertraline (ZOLOFT) tablet 50 mg  50 mg Oral Daily Ricardo Esteban, DO   50 mg at 12/23/24 0832    sodium chloride 0.9 % flush 10 mL  10 mL Intravenous PRN Ricardo Esteban DO   10 mL at 12/23/24 1040    sodium chloride 0.9 % infusion  - ADS Override Pull              sodium chloride 0.9 % infusion  100 mL/hr Intravenous Continuous William Velázquez  mL/hr at 12/23/24 0859 100 mL/hr at 12/23/24 0859     Lab Results (last 24 hours)       Procedure Component Value Units Date/Time    Respiratory Panel PCR w/COVID-19(SARS-CoV-2) ALISHA/CONSTANCE/STACIE/PAD/COR/ALEJANDRA In-House, NP Swab in UTM/VTM, 2 HR TAT - Swab, Nasopharynx [[id number]] Collected: 12/23/24 0859    Specimen: Swab from Nasopharynx Updated: 12/23/24 0927    POC Glucose 4x Daily Before Meals & at Bedtime [[id number]]  (Abnormal) Collected: 12/23/24 0736    Specimen: Blood Updated: 12/23/24 0741     Glucose 242 mg/dL      Comment: Serial Number: [id number]:  [id number]       Comprehensive Metabolic Panel [[id number]]  (Abnormal) Collected: 12/23/24 0636    Specimen: Blood Updated: 12/23/24 0735     Glucose 222 mg/dL      Comment: Glucose >180, Hemoglobin A1C recommended.        BUN 29 mg/dL      Creatinine 1.35 mg/dL      Sodium 134 mmol/L      Potassium 3.6 mmol/L      Chloride 100 mmol/L      CO2 22.1 mmol/L      Calcium 8.1 mg/dL      Total Protein 6.4 g/dL      Albumin 3.5 g/dL      ALT (SGPT) 12 U/L      AST (SGOT) 23 U/L      Alkaline Phosphatase 96 U/L      Total Bilirubin 0.4 mg/dL      Globulin 2.9 gm/dL      A/G Ratio 1.2 g/dL      BUN/Creatinine Ratio 21.5     Anion Gap 11.9 mmol/L      eGFR 43.2 mL/min/1.73     Narrative:      GFR Categories in Chronic Kidney Disease (CKD)      GFR Category          GFR (mL/min/1.73)    Interpretation  G1                     90 or greater         Normal or high (1)  G2                      60-89                Mild decrease (1)  G3a                   45-59                Mild to moderate decrease  G3b                   30-44                Moderate to severe decrease  G4                    15-29                Severe decrease  G5                    14 or less           Kidney failure          (1)In the absence of evidence of kidney disease, neither GFR category G1 or G2 fulfill the  criteria for CKD.    eGFR calculation 2021 CKD-EPI creatinine equation, which does not include race as a factor    CBC (No Diff) [[id number]]  (Abnormal) Collected: 12/23/24 0635    Specimen: Blood Updated: 12/23/24 0715     WBC 12.50 10*3/mm3      RBC 4.62 10*6/mm3      Hemoglobin 14.2 g/dL      Hematocrit 43.1 %      MCV 93.3 fL      MCH 30.7 pg      MCHC 32.9 g/dL      RDW 12.8 %      RDW-SD 43.8 fl      MPV 11.8 fL      Platelets 141 10*3/mm3     POC Glucose Once [[id number]]  (Abnormal) Collected: 12/23/24 0448    Specimen: Blood Updated: 12/23/24 0450     Glucose 141 mg/dL      Comment: Serial Number: [id number]:  [id number]       POC Glucose Once [[id number]]  (Abnormal) Collected: 12/23/24 0410    Specimen: Blood Updated: 12/23/24 0412     Glucose 132 mg/dL      Comment: Serial Number: [id number]:  [id number]       Urinalysis, Microscopic Only - Urine, Clean Catch [[id number]]  (Abnormal) Collected: 12/23/24 0340    Specimen: Urine, Clean Catch Updated: 12/23/24 0409     RBC, UA None Seen /HPF      WBC, UA 0-2 /HPF      Comment: Urine culture not indicated.        Bacteria, UA Trace /HPF      Squamous Epithelial Cells, UA 0-2 /HPF      Hyaline Casts, UA None Seen /LPF      Methodology Manual Light Microscopy    Urinalysis With Culture If Indicated - Urine, Clean Catch [[id number]]  (Abnormal) Collected: 12/23/24 0340    Specimen: Urine, Clean Catch Updated: 12/23/24 0400     Color, UA Yellow     Appearance, UA Clear     pH, UA 5.5     Specific Gravity, UA 1.006     Glucose,  mg/dL (2+)     Ketones, UA Negative     Bilirubin, UA Negative     Blood, UA Negative     Protein, UA Negative     Leuk Esterase, UA Negative     Nitrite, UA Positive     Urobilinogen, UA 0.2 E.U./dL    Narrative:      In absence of clinical symptoms, the presence of pyuria, bacteria, and/or nitrites on the urinalysis result does not correlate with infection.    POC Glucose Once [[id number]]  (Abnormal) Collected: 12/23/24 0257  "   Specimen: Blood Updated: 12/23/24 0303     Glucose 57 mg/dL      Comment: Serial Number: [id number]:  [id number]       STAT Lactic Acid, Reflex [[id number]]  (Normal) Collected: 12/23/24 0206    Specimen: Blood Updated: 12/23/24 0223     Lactate 1.5 mmol/L     POC Glucose Once [[id number]]  (Normal) Collected: 12/23/24 0205    Specimen: Blood Updated: 12/23/24 0208     Glucose 95 mg/dL      Comment: Serial Number: [id number]:  [email or web address]       Procalcitonin [[id number]]  (Normal) Collected: 12/23/24 0030    Specimen: Blood Updated: 12/23/24 0117     Procalcitonin 0.14 ng/mL     Narrative:      As a Marker for Sepsis (Non-Neonates):    1. <0.5 ng/mL represents a low risk of severe sepsis and/or septic shock.  2. >2 ng/mL represents a high risk of severe sepsis and/or septic shock.    As a Marker for Lower Respiratory Tract Infections that require antibiotic therapy:    PCT on Admission    Antibiotic Therapy       6-12 Hrs later    >0.5                Strongly Recommended  >0.25 - <0.5        Recommended   0.1 - 0.25          Discouraged              Remeasure/reassess PCT  <0.1                Strongly Discouraged     Remeasure/reassess PCT    As 28 day mortality risk marker: \"Change in Procalcitonin Result\" (>80% or <=80%) if Day 0 (or Day 1) and Day 4 values are available. Refer to http://www.[facility]-pct-calculator.com    Change in PCT <=80%  A decrease of PCT levels below or equal to 80% defines a positive change in PCT test result representing a higher risk for 28-day all-cause mortality of patients diagnosed with severe sepsis for septic shock.    Change in PCT >80%  A decrease of PCT levels of more than 80% defines a negative change in PCT result representing a lower risk for 28-day all-cause mortality of patients diagnosed with severe sepsis or septic shock.       High Sensitivity Troponin T 1Hr [[id number]]  (Normal) Collected: 12/23/24 0030    Specimen: Blood Updated: 12/23/24 0110     HS Troponin T " 10 ng/L      Troponin T Numeric Delta -2 ng/L     Narrative:      High Sensitive Troponin T Reference Range:  <14.0 ng/L- Negative Female for AMI  <22.0 ng/L- Negative Male for AMI  >=14 - Abnormal Female indicating possible myocardial injury.  >=22 - Abnormal Male indicating possible myocardial injury.   Clinicians would have to utilize clinical acumen, EKG, Troponin, and serial changes to determine if it is an Acute Myocardial Infarction or myocardial injury due to an underlying chronic condition.         Blood Culture With [place] - Blood, Hand, Left [[id number]] Collected: 12/23/24 0030    Specimen: Blood from Hand, Left Updated: 12/23/24 0050    Blood Culture With [place] - Blood, Arm, Right [[id number]] Collected: 12/23/24 0038    Specimen: Blood from Arm, Right Updated: 12/23/24 0050    Lactic Acid, Plasma [[id number]]  (Abnormal) Collected: 12/22/24 2333    Specimen: Blood Updated: 12/23/24 0006     Lactate 2.8 mmol/L     Comprehensive Metabolic Panel [[id number]]  (Abnormal) Collected: 12/22/24 2333    Specimen: Blood Updated: 12/23/24 0005     Glucose 210 mg/dL      Comment: Glucose >180, Hemoglobin A1C recommended.        BUN 38 mg/dL      Creatinine 2.05 mg/dL      Sodium 132 mmol/L      Potassium 3.1 mmol/L      Comment: Slight hemolysis detected by analyzer. Result may be falsely elevated.        Chloride 95 mmol/L      CO2 22.6 mmol/L      Calcium 9.1 mg/dL      Total Protein 7.2 g/dL      Albumin 4.1 g/dL      ALT (SGPT) 12 U/L      AST (SGOT) 25 U/L      Comment: Slight hemolysis detected by analyzer. Result may be falsely elevated.        Alkaline Phosphatase 100 U/L      Total Bilirubin 0.3 mg/dL      Globulin 3.1 gm/dL      A/G Ratio 1.3 g/dL      BUN/Creatinine Ratio 18.5     Anion Gap 14.4 mmol/L      eGFR 26.1 mL/min/1.73     Narrative:      GFR Categories in Chronic Kidney Disease (CKD)      GFR Category          GFR (mL/min/1.73)    Interpretation  G1                     90 or greater         Normal or  high (1)  G2                      60-89                Mild decrease (1)  G3a                   45-59                Mild to moderate decrease  G3b                   30-44                Moderate to severe decrease  G4                    15-29                Severe decrease  G5                    14 or less           Kidney failure          (1)In the absence of evidence of kidney disease, neither GFR category G1 or G2 fulfill the criteria for CKD.    eGFR calculation 2021 CKD-EPI creatinine equation, which does not include race as a factor    Magnesium [[id number]]  (Normal) Collected: 12/22/24 2333    Specimen: Blood Updated: 12/23/24 0005     Magnesium 1.7 mg/dL     TSH [[id number]]  (Normal) Collected: 12/22/24 2333    Specimen: Blood Updated: 12/23/24 0004     TSH 1.650 uIU/mL     High Sensitivity Troponin T [[id number]]  (Normal) Collected: 12/22/24 2333    Specimen: Blood Updated: 12/23/24 0004     HS Troponin T 12 ng/L     Narrative:      High Sensitive Troponin T Reference Range:  <14.0 ng/L- Negative Female for AMI  <22.0 ng/L- Negative Male for AMI  >=14 - Abnormal Female indicating possible myocardial injury.  >=22 - Abnormal Male indicating possible myocardial injury.   Clinicians would have to utilize clinical acumen, EKG, Troponin, and serial changes to determine if it is an Acute Myocardial Infarction or myocardial injury due to an underlying chronic condition.         POC Glucose Once [[id number]]  (Abnormal) Collected: 12/23/24 0001    Specimen: Blood Updated: 12/23/24 0003     Glucose 146 mg/dL      Comment: Serial Number: [id number]:  [email or web address]       CBC & Differential [[id number]]  (Abnormal) Collected: 12/22/24 2333    Specimen: Blood Updated: 12/22/24 2338    Narrative:      The following orders were created for panel order CBC & Differential.  Procedure                               Abnormality         Status                     ---------                               -----------          ------                     CBC Auto Differential[[id number]]        Abnormal            Final result                 Please view results for these tests on the individual orders.    CBC Auto Differential [[id number]]  (Abnormal) Collected: 12/22/24 2333    Specimen: Blood Updated: 12/22/24 2338     WBC 7.13 10*3/mm3      RBC 4.54 10*6/mm3      Hemoglobin 13.9 g/dL      Hematocrit 42.7 %      MCV 94.1 fL      MCH 30.6 pg      MCHC 32.6 g/dL      RDW 12.9 %      RDW-SD 44.7 fl      MPV 12.3 fL      Platelets 109 10*3/mm3      Neutrophil % 54.7 %      Lymphocyte % 34.4 %      Monocyte % 9.5 %      Eosinophil % 0.6 %      Basophil % 0.4 %      Immature Grans % 0.4 %      Neutrophils, Absolute 3.90 10*3/mm3      Lymphocytes, Absolute 2.45 10*3/mm3      Monocytes, Absolute 0.68 10*3/mm3      Eosinophils, Absolute 0.04 10*3/mm3      Basophils, Absolute 0.03 10*3/mm3      Immature Grans, Absolute 0.03 10*3/mm3      nRBC 0.0 /100 WBC     POC Glucose Once [[id number]]  (Abnormal) Collected: 12/22/24 2309    Specimen: Blood Updated: 12/22/24 2312     Glucose 47 mg/dL      Comment: Serial Number: [id number]:  [id number]             Imaging Results (Last 24 Hours)       Procedure Component Value Units Date/Time    XR Chest 1 View [[id number]] Collected: 12/23/24 0744     Updated: 12/23/24 0749    Narrative:      CLINICAL INDICATION:    Encephalopathy     EXAMINATION TECHNIQUE:  XR CHEST 1 VW-     COMPARISON:  Radiographs 11/18/2024.     FINDINGS:  No focal airspace consolidation. Prior granulomatous disease. Chronic  appearing bibasilar minimal scarring/atelectasis. No pneumothorax or  large pleural effusion. Heart and mediastinal contours are within normal  limits except aortic atherosclerosis and left subclavian arterial stent.  No acute osseous or soft tissue abnormality. No free air under  hemidiaphragms.       Impression:      No acute cardiopulmonary findings.     Images personally reviewed, interpreted and dictated by  CLAIRE Wilson.              This report was signed and finalized on 12/23/2024 7:47 AM by CLAIRE Wilson.       XR Chest 1 View [[id number]] Collected: 12/23/24 0605     Updated: 12/23/24 0606    Narrative:      FINAL REPORT    TECHNIQUE:  null    CLINICAL HISTORY:  central line placement    COMPARISON:  null    FINDINGS:  Exam: 1V chest    Comparison: CR - XR RIBS LEFT W PA CHEST - 11/18/24 17:31 EST    Findings:    Right IJ line tip over the proximal SVC.    No cardiac silhouette enlargement.    No infiltrate or mass.    No pneumothorax or significant effusion.    No acute osseous abnormality.      Impression:      Impression:    Right IJ line tip over the proximal SVC.    No acute pathology.    Authenticated and Electronically Signed by Gray Sommer MD  on 12/23/2024 06:05:20 AM    CT Head Without Contrast [[id number]] Collected: 12/23/24 0053     Updated: 12/23/24 0111    Addenda:        ADDENDUM REPORT    ADDENDUM:  Receipt of this report by the clinical staff was confirmed with [employer] on Dec 23, 2024 01:09:00 EST.    Authenticated and Electronically Signed by Aleksandar العراقي MD on  12/23/2024 01:09:39 AM  Signed: 12/23/24 0109 by Aleksandar العراقي MD    Narrative:      FINAL REPORT    TECHNIQUE:  null    CLINICAL HISTORY:  unresponsive episode, low blood sugar, weakness    COMPARISON:  null    FINDINGS:  CT head without contrast    Comparison: CT/SR - CT HEAD WO CONTRAST - 11/18/2024 07:15 PM EST    Findings:    Moderate diffuse cerebral atrophy and nonspecific white matter hypodensity.    No intracranial mass, midline shift, hydrocephalus, or acute hemorrhage/infarct.    Possible heterogeneity of the left aspect of the malissa and low-density centrally involving the medulla oblongata, series 2 image 11 and 7 respectively.    Moderate calcific ASVD of the carotid siphons.    Visualized paranasal sinuses are clear.    Mastoids are well developed and clear.    Orbits are  unremarkable.    No skull fracture. No significant scalp soft tissue swelling.      Impression:      Impression:    1. Moderate atrophy.    2. Heterogeneous density of the left malissa, similar to previous. This may reflect chronic infarct.    3. Low-density 5 mm area within the central medulla oblongata, not identified previously. This could reflect acute or chronic ischemic change. Consider MRI if not contraindicated.    Authenticated and Electronically Signed by Aleksandar العراقي MD on  12/23/2024 12:53:16 AM    CT Cervical Spine Without Contrast [[id number]] Collected: 12/23/24 0052     Updated: 12/23/24 0053    Narrative:      FINAL REPORT    TECHNIQUE:  null    CLINICAL HISTORY:  unresponsive, low blood sugar, weakness    COMPARISON:  null    FINDINGS:  CT cervical spine without contrast:    Comparison: CT/SR - CT CERVICAL SPINE WO CONTRAST - 11/18/2024 07:15 PM EST    Findings:    No acute fracture or traumatic listhesis.    No significant prevertebral soft tissue swelling.    Flattening of cervical lordosis with mild apex left scoliosis.    Severe degenerative disc disease at C5-6. Moderate facet arthropathy on the right.    No significant spinal canal or neural foraminal stenosis.    Craniocervical junction and C1-C2 articulations are without abnormal alignment/asymmetry.    Dens is intact. Severe degenerative narrowing of the atlantodens interval.    Normal limited view of the intracranial contents.    Neck soft tissues are unremarkable for age.    Lung apices without consolidation, pneumothorax, or mass.      Impression:      Impression:    1. No acute fracture.    2. Multilevel degenerative changes.    Authenticated and Electronically Signed by Aleksandar العراقي MD on  12/23/2024 12:52:23 AM          Physician Progress Notes (last 24 hours)  Notes from 12/22/24 0946 through 12/23/24 0946   No notes of this type exist for this encounter.       Consult Notes (last 24 hours)  Notes from 12/22/24 0946 through 12/23/24  0946   No notes of this type exist for this encounter.        Resulted

## 2024-12-23 NOTE — CASE MANAGEMENT/SOCIAL WORK
"Discharge Planning Assessment  [facility]     Patient Name: Viola Barlow  MRN: [id number]  Today's Date: 12/23/2024    Admit Date: 12/22/2024    Plan: DCP Wants to return home. Will consider Home Health.   Discharge Needs Assessment       Row Name 12/23/24 1440       Living Environment    People in Home alone    Current Living Arrangements apartment    Duration at Residence \"few months\"    Potentially Unsafe Housing Conditions unable to assess    In the past 12 months has the electric, gas, oil, or water company threatened to shut off services in your home? No    Primary Care Provided by self    Provides Primary Care For no one, unable/limited ability to care for self    Family Caregiver if Needed child(melissa), adult    Family Caregiver Names Lisa/Daughter,  Granddaughter    Quality of Family Relationships unable to assess    Able to Return to Prior Arrangements yes       Resource/Environmental Concerns    Resource/Environmental Concerns none    Transportation Concerns none       Transportation Needs    In the past 12 months, has lack of transportation kept you from medical appointments or from getting medications? no    In the past 12 months, has lack of transportation kept you from meetings, work, or from getting things needed for daily living? No       Food Insecurity    Within the past 12 months, you worried that your food would run out before you got the money to buy more. Never true    Within the past 12 months, the food you bought just didn't last and you didn't have money to get more. Never true       Transition Planning    Patient/Family Anticipates Transition to home with family    Patient/Family Anticipated Services at Transition     Transportation Anticipated family or friend will provide       Discharge Needs Assessment    Readmission Within the Last 30 Days current reason for admission unrelated to previous admission    Equipment Currently Used at Home cane, straight;rollator;other " "(see comments);bp cuff;scales  Dexcom 6.    Concerns to be Addressed discharge planning    Do you want help finding or keeping work or a job? I do not need or want help    Do you want help with school or training? For example, starting or completing job training or getting a high school diploma, GED or equivalent No    Anticipated Changes Related to Illness inability to care for self    Equipment Needed After Discharge other (see comments)  To be determined.    Discharge Facility/Level of Care Needs home with home health                   Discharge Plan       Row Name 24 1444       Plan    Plan DCP Wants to return home. Will consider Home Health.    Plan Comments CM spoke with pt at bedside. Permission given to discuss DCP. Pt confirmed name,,address and insurance. No LW, No POA, and No  services. PCP confirmed as CAREY Tomlinson, last seen Dec. 3rd,2024. [facility] Agreed to meds to bed. DME listed above. Also has a BSC and Shower Chair. Currently Lives alone in a duplex in [place]. [address]. \"Daughter lives in duplex beside her.\" Has 0 steps into and 0 steps inside. States (I) with all ADL's and (I) with Mobility. Drives some, but daughter or granddaughter assist with transportation. DCP wants to return home. States will consider home health if needed.                  Continued Care and Services - Admitted Since 2024    No active coordination exists for this encounter.       Selected Continued Care - Prior Encounters Includes continued care and service providers with selected services from prior encounters from 2024 to 2024      Discharged on 2024 Admission date: 2024 - Discharge disposition: Home or Self Care      Durable Medical Equipment       Service Provider Services Address Phone Fax Patient Preferred    [facility] [address] [place] [id number] [phone] --       " Internal Comment last updated by Brayden Miller, RN 12/19/2024 1041    [facility] [place]                                        Demographic Summary       Row Name 12/23/24 1435       General Information    Admission Type inpatient    Arrived From emergency department    Required Notices Provided Important Message from Medicare    Referral Source admission list    Reason for Consult discharge planning    Preferred Language English       Contact Information    Permission Granted to Share Info With                    Functional Status       Row Name 12/23/24 1435       Functional Status    Usual Activity Tolerance good    Current Activity Tolerance moderate       Physical Activity    On average, how many days per week do you engage in moderate to strenuous exercise (like a brisk walk)? 0 days    On average, how many minutes do you engage in exercise at this level? 0 min    Number of minutes of exercise per week 0       Assessment of Health Literacy    How often do you have someone help you read hospital materials? Never    How often do you have problems learning about your medical condition because of difficulty understanding written information? Never    How often do you have a problem understanding what is told to you about your medical condition? Never    Health Literacy Excellent       Functional Status, IADL    Medications independent    Meal Preparation independent    Housekeeping independent    Laundry independent    Shopping assistive person    If for any reason you need help with day-to-day activities such as bathing, preparing meals, shopping, managing finances, etc., do you get the help you need? I get all the help I need    IADL Comments Daughter or Grand daughter assist with groceries shopping.       Mental Status    General Appearance WDL WDL       Mental Status Summary    Recent Changes in Mental Status/Cognitive Functioning no changes       Employment/    Employment Status retired     Employment/ Comments [employer]       Row Name 12/23/24 1288       Developmental Stage (Eriksson's Stages of Development)    Developmental Stage Stage 8 (67 years-death/Late Adulthood) Integrity vs. Despair                   Abuse/Neglect    No documentation.                  Legal    No documentation.                  Substance Abuse    No documentation.                  Patient Forms    No documentation.                     Anny Reeder RN

## 2024-12-24 ENCOUNTER — READMISSION MANAGEMENT (OUTPATIENT)
Dept: CALL CENTER | Facility: HOSPITAL | Age: 69
End: 2024-12-24
Payer: MEDICARE

## 2024-12-24 ENCOUNTER — APPOINTMENT (OUTPATIENT)
Dept: MRI IMAGING | Facility: HOSPITAL | Age: 69
End: 2024-12-24
Payer: MEDICARE

## 2024-12-24 VITALS
WEIGHT: 108.47 LBS | OXYGEN SATURATION: 93 % | DIASTOLIC BLOOD PRESSURE: 63 MMHG | HEIGHT: 60 IN | RESPIRATION RATE: 20 BRPM | TEMPERATURE: 98.6 F | SYSTOLIC BLOOD PRESSURE: 134 MMHG | BODY MASS INDEX: 21.3 KG/M2 | HEART RATE: 79 BPM

## 2024-12-24 LAB
ANION GAP SERPL CALCULATED.3IONS-SCNC: 8.8 MMOL/L (ref 5–15)
BUN SERPL-MCNC: 22 MG/DL (ref 8–23)
BUN/CREAT SERPL: 17.3 (ref 7–25)
CALCIUM SPEC-SCNC: 8.7 MG/DL (ref 8.6–10.5)
CHLORIDE SERPL-SCNC: 103 MMOL/L (ref 98–107)
CO2 SERPL-SCNC: 23.2 MMOL/L (ref 22–29)
CREAT SERPL-MCNC: 1.27 MG/DL (ref 0.57–1)
DEPRECATED RDW RBC AUTO: 43.5 FL (ref 37–54)
EGFRCR SERPLBLD CKD-EPI 2021: 46.4 ML/MIN/1.73
ERYTHROCYTE [DISTWIDTH] IN BLOOD BY AUTOMATED COUNT: 12.8 % (ref 12.3–15.4)
GLUCOSE BLDC GLUCOMTR-MCNC: 130 MG/DL (ref 70–130)
GLUCOSE BLDC GLUCOMTR-MCNC: 192 MG/DL (ref 70–130)
GLUCOSE SERPL-MCNC: 191 MG/DL (ref 65–99)
HCT VFR BLD AUTO: 39.8 % (ref 34–46.6)
HGB BLD-MCNC: 13.2 G/DL (ref 12–15.9)
MCH RBC QN AUTO: 30.5 PG (ref 26.6–33)
MCHC RBC AUTO-ENTMCNC: 33.2 G/DL (ref 31.5–35.7)
MCV RBC AUTO: 91.9 FL (ref 79–97)
PLATELET # BLD AUTO: 142 10*3/MM3 (ref 140–450)
PMV BLD AUTO: 12.1 FL (ref 6–12)
POTASSIUM SERPL-SCNC: 5.3 MMOL/L (ref 3.5–5.2)
RBC # BLD AUTO: 4.33 10*6/MM3 (ref 3.77–5.28)
SODIUM SERPL-SCNC: 135 MMOL/L (ref 136–145)
WBC NRBC COR # BLD AUTO: 9.5 10*3/MM3 (ref 3.4–10.8)

## 2024-12-24 PROCEDURE — 82948 REAGENT STRIP/BLOOD GLUCOSE: CPT | Performed by: INTERNAL MEDICINE

## 2024-12-24 PROCEDURE — 99239 HOSP IP/OBS DSCHRG MGMT >30: CPT | Performed by: INTERNAL MEDICINE

## 2024-12-24 PROCEDURE — 25810000003 SODIUM CHLORIDE 0.9 % SOLUTION

## 2024-12-24 PROCEDURE — 70551 MRI BRAIN STEM W/O DYE: CPT

## 2024-12-24 PROCEDURE — 63710000001 DEXAMETHASONE PER 0.25 MG: Performed by: INTERNAL MEDICINE

## 2024-12-24 PROCEDURE — 80048 BASIC METABOLIC PNL TOTAL CA: CPT | Performed by: INTERNAL MEDICINE

## 2024-12-24 PROCEDURE — 82948 REAGENT STRIP/BLOOD GLUCOSE: CPT

## 2024-12-24 PROCEDURE — 63710000001 INSULIN LISPRO (HUMAN) PER 5 UNITS: Performed by: INTERNAL MEDICINE

## 2024-12-24 PROCEDURE — 85027 COMPLETE CBC AUTOMATED: CPT | Performed by: INTERNAL MEDICINE

## 2024-12-24 PROCEDURE — 25010000002 ENOXAPARIN PER 10 MG: Performed by: INTERNAL MEDICINE

## 2024-12-24 RX ORDER — MIDODRINE HYDROCHLORIDE 5 MG/1
2.5 TABLET ORAL
Status: DISCONTINUED | OUTPATIENT
Start: 2024-12-24 | End: 2024-12-24 | Stop reason: HOSPADM

## 2024-12-24 RX ORDER — MIDODRINE HYDROCHLORIDE 2.5 MG/1
2.5 TABLET ORAL
Qty: 90 TABLET | Refills: 0 | Status: SHIPPED | OUTPATIENT
Start: 2024-12-24 | End: 2025-01-23

## 2024-12-24 RX ORDER — DEXAMETHASONE 6 MG/1
6 TABLET ORAL
Qty: 3 TABLET | Refills: 0 | Status: SHIPPED | OUTPATIENT
Start: 2024-12-25 | End: 2024-12-28

## 2024-12-24 RX ADMIN — ENOXAPARIN SODIUM 30 MG: 100 INJECTION SUBCUTANEOUS at 05:32

## 2024-12-24 RX ADMIN — METOCLOPRAMIDE 5 MG: 5 TABLET ORAL at 06:56

## 2024-12-24 RX ADMIN — CLOPIDOGREL BISULFATE 75 MG: 75 TABLET ORAL at 08:17

## 2024-12-24 RX ADMIN — SERTRALINE HYDROCHLORIDE 50 MG: 50 TABLET ORAL at 08:18

## 2024-12-24 RX ADMIN — INSULIN LISPRO 2 UNITS: 100 INJECTION, SOLUTION INTRAVENOUS; SUBCUTANEOUS at 08:18

## 2024-12-24 RX ADMIN — EMPAGLIFLOZIN 10 MG: 10 TABLET, FILM COATED ORAL at 08:17

## 2024-12-24 RX ADMIN — LEVOTHYROXINE SODIUM 50 MCG: 50 TABLET ORAL at 08:18

## 2024-12-24 RX ADMIN — NOREPINEPHRINE BITARTRATE 0.02 MCG/KG/MIN: 8 INJECTION, SOLUTION INTRAVENOUS at 07:11

## 2024-12-24 RX ADMIN — METOCLOPRAMIDE 5 MG: 5 TABLET ORAL at 11:47

## 2024-12-24 RX ADMIN — SODIUM CHLORIDE 1000 ML: 9 INJECTION, SOLUTION INTRAVENOUS at 11:11

## 2024-12-24 RX ADMIN — PANTOPRAZOLE SODIUM 40 MG: 40 TABLET, DELAYED RELEASE ORAL at 08:17

## 2024-12-24 RX ADMIN — DEXAMETHASONE 6 MG: 2 TABLET ORAL at 08:18

## 2024-12-24 RX ADMIN — MIDODRINE HYDROCHLORIDE 2.5 MG: 5 TABLET ORAL at 08:17

## 2024-12-24 RX ADMIN — LIDOCAINE 1 PATCH: 4 PATCH TOPICAL at 05:31

## 2024-12-24 RX ADMIN — MIDODRINE HYDROCHLORIDE 2.5 MG: 5 TABLET ORAL at 11:46

## 2024-12-24 RX ADMIN — MUPIROCIN 1 APPLICATION: 20 OINTMENT TOPICAL at 08:18

## 2024-12-24 RX ADMIN — ASPIRIN 81 MG: 81 TABLET, COATED ORAL at 08:17

## 2024-12-24 RX ADMIN — ATORVASTATIN CALCIUM 40 MG: 40 TABLET, FILM COATED ORAL at 08:18

## 2024-12-24 NOTE — DISCHARGE PLACEMENT REQUEST
"Home Health Referral. Contact CM to accept or deny.    Viola Barlow (69 y.o. Female)       Date of Birth   1955    Social Security Number       Address   [address] [facility] [facility] [address]    Home Phone   [phone]    MRN   [id number]       Congregation   Mandaeism    Marital Status   Single                            Admission Date   12/22/24    Admission Type   Emergency    Admitting Provider   Ricardo Esteban DO    Attending Provider   William Velázquez DO    Department, Room/Bed   [facility] INTENSIVE [facility], I08/1       Discharge Date       Discharge Disposition   Home-Health Care [facility]    Discharge Destination                                 Attending Provider: William Velázquez DO    Allergies: Lisinopril    Isolation: Enh Drop/Con   Infection: COVID (confirmed) (12/23/24)   Code Status: CPR    Ht: 152.4 cm (60\")   Wt: 49.2 kg (108 lb 7.5 oz)    Admission Cmt: None   Principal Problem: Hypotension [I95.9]                   Active Insurance as of 12/22/2024       Primary Coverage       Payor Plan Insurance Group Employer/Plan Group    ANTHEM MEDICARE REPLACEMENT ANTHEM MED ADV SNP [id number]       Payor Plan Address Payor Plan Phone Number Payor Plan Fax Number Effective Dates    [id number] [phone]  1/1/2024 - None Entered    [facility] [address]         Subscriber Name Subscriber Birth Date Member ID       VIOLA BARLOW 1955 [id number]               Secondary Coverage       Payor Plan Insurance Group Employer/Plan Group    [place]        Payor Plan Address Payor Plan Phone Number Payor Plan Fax Number Effective Dates    [address] [phone]  1/23/2023 - None Entered    [facility] [id number]         Subscriber Name Subscriber Birth Date Member ID       VIOLA BARLOW 1955 [id number]                     Emergency Contacts        (Rel.) Home Phone Work Phone Mobile Phone    Wilber Barlowina (Daughter) " [phone] -- --    FIDEL STRONG (Son) -- -- [phone]     Ambulatory Referral to Home Health [[id number]] (Order [id number])  Order  Date: 12/24/2024 Department: [facility] INTENSIVE CARE Ordering/Authorizing: William Velázquez DO     Order History  Outpatient  Date/Time Action Taken User Additional Information   12/24/24 1242 Sign William Velázquez DO      Order Details    Frequency Duration Priority Order Class   None None Routine Internal Referral     Start Date/Time    Start Date   12/24/24     Order Information    Order Date Service Start Date Start Time   12/24/24 Medicine 12/24/24      Reference Links    Associated Reports External References   View Encounter Current Health Referral Information     Order Questions    Question Answer   Face to Face Visit Date: 12/24/2024   Follow-up provider for Plan of Care? I treated the patient in an acute care facility and will not continue treatment after discharge.   Follow-up provider: YINA CORTEZ   Reason/Clinical Findings debility, DM, covid   Describe mobility limitations that make leaving home difficult: debility, DM, covid   Nursing/Therapeutic Services Requested Skilled Nursing    Physical Therapy    Occupational Therapy   Skilled nursing orders: Mini-nebs    Medication education   PT orders: Total joint pathway    Strengthening    Electrical stimulation    Therapeutic exercise    Gait Training    Home safety assessment    Ultrasound    Transfer training   Weight Bearing Status As Tolerated   Occupational orders: Home safety assessment    Activities of daily living    Energy conservation    Strengthening    Cognition    Fine motor   Frequency: 1 Week 1            Source Order Set / Preference List    Order Set   [facility] GEN EXPRESS DISCHARGE [[id number]]           Clinical Indications     ICD-10-CM ICD-9-CM   Stage 3 chronic kidney disease, unspecified whether stage 3a or 3b CKD    N18.30 585.3   Hypotension due to hypovolemia    E86.1  458.8  276.52                             Reprint Order Requisition    Ambulatory Referral to Home Health (Order #[id number]) on 24         Encounter    View Encounter                Order Provider Info        Office phone Pager E-mail   Ordering User  William Velázquez DO  [phone] -- --   Authorizing Provider  William Velázquez DO  [phone] -- --   Attending Provider  William Velázquez DO  [phone] -- --     Tracking Reports    Cosign Tracking Order Transmittal Tracking     Authorized by:  William Velázquez DO  (NPI: [id number])                Lab Component SmartPhrase Guide    Ambulatory Referral to Home Health (Order #[id number]) on 24               History & Physical        Ricardo Esteban DO at 24 0456            [facility]   HISTORY AND PHYSICAL      Name:  Viola Barlow   Age:  69 y.o.  Sex:  female  :  1955  MRN:  [id number]   Visit Number:  [id number]  Admission Date:  2024  Date Of Service:  24  Primary Care Physician:  Jyoti Tomlinson APRN    Chief Complaint:     Passed out    History Of Presenting Illness:      69-year-old female past medical history CKD stage III, diabetes mellitus type 2, hypertension, peripheral vascular disease, nephrolithiasis, hypothyroidism, Chief complaint unresponsive. Had recently been admitted 2024 to 2024 at this facility with gastroenteritis, JOSE ALBERTO.  She was started on Reglan and her renal function and symptoms had improved.  Reports she was doing well to her knowledge at home but then one of her Family found her in the kitchen floor about 2100 hrs.  She had a fall from the couch to the floor without injury.  Was noted to be hypoglycemic blood sugar of 36.  Was given glucagon improved to 47.  IV could not be obtained IO accessed and D50 given.  Patient was hypotensive and hypothermic warming blanket received Levophed and was started on D10.  Full code.    Pertinent findings: Highly  sensitive troponin 10, blood glucose have been 47 and then improved to 210 and it trended back down to 57.  Lactic acid 1.5, potassium 3.1, creatinine 2.05 which appears slightly higher than baseline, CBC 7.13, blood cultures pending, chest x-ray showed bibasilar atelectasis without effusion, CT head shows moderate atrophy heterogeneous density in the left malissa similar to previous may reflect chronic infarct, low-density 5 mm area within the central medulla not identified previously could reflect acute or chronic ischemic change. EKG NSR no ischemic change.    ED Medications:    Medications   sodium chloride 0.9 % flush 10 mL (has no administration in time range)   dextrose (D50W) (25 g/50 mL) IV injection 50 mL (50 mL Intravenous Given 12/23/24 0300)   sodium chloride 0.9 % infusion  - ADS Override Pull (  Not Given 12/23/24 0055)   dextrose 10 % infusion (0 mL Intravenous Stopped 12/23/24 0151)   norepinephrine (LEVOPHED) 8 mg in 250mL D5W infusion (0.1 mcg/kg/min × 47.6 kg Intravenous Rate/Dose Change 12/23/24 0306)   sodium chloride 0.9 % bolus 1,000 mL (0 mL Intravenous Stopped 12/23/24 0055)   cefepime 1000 mg IVPB in 100 mL NS (VTB) (0 mg Intravenous Stopped 12/23/24 0118)   sodium chloride 0.9 % bolus 1,000 mL (0 mL Intravenous Stopped 12/23/24 0151)           Edited by: Ricardo Esteban DO at 12/23/2024 0459     Review Of Systems:    All systems were reviewed and negative except as mentioned in history of presenting illness, assessment and plan.    Past Medical History: Patient  has a past medical history of Arthritis, Cataract, Depression, Diabetes mellitus, Dysphagia, Elevated cholesterol, Full dentures, GERD (gastroesophageal reflux disease), Headache, History of nuclear stress test, Hypertension, Impaired mobility, Kidney disease, Migraines, PVD (peripheral vascular disease), Seasonal allergies, Spinal headache, and Wears glasses.    Past Surgical History: Patient  has a past surgical history that  includes Mouth surgery; Hysterectomy (1983); Hysterectomy (1984); Abdominal surgery (08/1984); Elbow Epicondylectomy (Left, 09/1990); Vascular surgery (Bilateral); Colonoscopy; Esophagogastroduodenoscopy; Appendectomy; Cataract extraction w/ intraocular lens implant (Right, 6/21/2019); and Cataract extraction w/ intraocular lens implant (Left, 7/5/2019).    Social History: Patient  reports that she quit smoking about 16 years ago. Her smoking use included cigarettes. She started smoking about 55 years ago. She has a 57 pack-year smoking history. She has never used smokeless tobacco. She reports that she does not drink alcohol and does not use drugs.    Family History:  Patient's family history has been reviewed and found to be noncontributory.     Allergies:      Lisinopril    Home Medications:    Prior to Admission Medications       Prescriptions Last Dose Informant Patient Reported? Taking?    amLODIPine (NORVASC) 5 MG tablet   No No    Take 1 tablet by mouth Daily.    aspirin (aspirin EC) 81 MG EC tablet   No No    Take 1 tablet by mouth Daily.    atorvastatin (LIPITOR) 40 MG tablet  Self Yes No    Take 1 tablet by mouth Daily.    Cholecalciferol (VITAMIN D-3) 1000 units capsule  Self Yes No    Take 1,000 Units by mouth Daily.    clopidogrel (PLAVIX) 75 MG tablet  Self Yes No    Take 1 tablet by mouth Daily.    Dulaglutide (Trulicity) 1.5 MG/0.5ML solution pen-injector   Yes No    Inject 1.5 mg under the skin into the appropriate area as directed 1 (One) Time Per Week.    gabapentin (NEURONTIN) 600 MG tablet  Self Yes No    Take 600 mg by mouth 3 (Three) Times a Day.    HYDROcodone-acetaminophen (NORCO) 5-325 MG per tablet   No No    Take 1 tablet by mouth Every 6 (Six) Hours As Needed for Moderate Pain.    insulin lispro protamine-insulin lispro (humaLOG 75-25) (75-25) 100 UNIT/ML suspension injection   No No    Inject 60 Units under the skin into the appropriate area as directed 2 (Two) Times a Day With Meals.  "   levothyroxine (SYNTHROID, LEVOTHROID) 50 MCG tablet  Self Yes No    Take 1 tablet by mouth Daily.    lidocaine (LIDODERM) 5 %   No No    Place 1 patch on the skin as directed by provider Daily. Remove & Discard patch within 12 hours or as directed by MD    metFORMIN ER (GLUCOPHAGE-XR) 750 MG 24 hr tablet  Self Yes No    Take 1 tablet by mouth Daily With Breakfast.    metoclopramide (Reglan) 5 MG tablet   No No    Take 1 tablet by mouth 3 (Three) Times a Day Before Meals.    metoprolol succinate XL (TOPROL-XL) 50 MG 24 hr tablet  Self Yes No    Take 1 tablet by mouth Daily.    ondansetron ODT (ZOFRAN-ODT) 4 MG disintegrating tablet   No No    Place 1 tablet on the tongue Every 8 (Eight) Hours As Needed for Nausea or Vomiting.    pantoprazole (PROTONIX) 40 MG EC tablet  Self Yes No    Take 1 tablet by mouth Daily.    sertraline (ZOLOFT) 50 MG tablet  Self Yes No    Take 1 tablet by mouth Daily.    vitamin D3 125 MCG (5000 UT) capsule capsule   Yes No    Take 1 capsule by mouth Daily.              Vital Signs:  Temp:  [96 °F (35.6 °C)-99.3 °F (37.4 °C)] 99.3 °F (37.4 °C)  Heart Rate:  [63-79] 79  Resp:  [20] 20  BP: ()/(33-95) 102/55        12/22/24  2329   Weight: 47.6 kg (105 lb)     Body mass index is 20.35 kg/m².    Physical Exam:     Most recent vital Signs: /55   Pulse 79   Temp 99.3 °F (37.4 °C)   Resp 20   Ht 153 cm (60.24\")   Wt 47.6 kg (105 lb)   LMP  (LMP Unknown)   SpO2 97%   BMI 20.35 kg/m²     Constitutional: Awake, alert  Eyes: PERRLA, sclerae anicteric, no conjunctival injection  HENT: NCAT, mucous membranes moist  Neck: Supple, no thyromegaly, no lymphadenopathy, trachea midline  Respiratory: Clear to auscultation bilaterally, nonlabored respirations   Cardiovascular: RRR, no murmurs, rubs, or gallops, palpable pedal pulses bilaterally, RIJ CVC noted  Gastrointestinal: Positive bowel sounds, soft, nontender, nondistended  Musculoskeletal: No bilateral ankle edema, no clubbing or " cyanosis to extremities  Psychiatric: Appropriate affect, cooperative  Neurologic: Oriented x 3, speech clear  Skin: No rashes  Edited by: Ricardo Esteban,  at 12/23/2024 4576      Laboratory data:    I have reviewed the labs done in the emergency room.    Results from last 7 days   Lab Units 12/22/24  2333 12/19/24  0603 12/18/24  0539 12/16/24  1618 12/16/24  1223   SODIUM mmol/L 132* 138 143   < > 142   POTASSIUM mmol/L 3.1* 4.2 3.8   < > 4.3   CHLORIDE mmol/L 95* 102 107   < > 93*   CO2 mmol/L 22.6 24.4 22.0   < > 19.3*   BUN mg/dL 38* 34* 37*   < > 54*   CREATININE mg/dL 2.05* 1.39* 1.46*   < > 3.19*   CALCIUM mg/dL 9.1 9.5 9.7   < > 10.9*   BILIRUBIN mg/dL 0.3  --  0.8  --  0.7   ALK PHOS U/L 100  --  131*  --  148*   ALT (SGPT) U/L 12  --  10  --  16   AST (SGOT) U/L 25  --  19  --  22   GLUCOSE mg/dL 210* 334* 145*   < > 363*    < > = values in this interval not displayed.     Results from last 7 days   Lab Units 12/22/24  2333 12/18/24  0539 12/17/24  0533   WBC 10*3/mm3 7.13 14.36* 17.08*   HEMOGLOBIN g/dL 13.9 16.0* 14.5   HEMATOCRIT % 42.7 49.2* 46.8*   PLATELETS 10*3/mm3 109* 247 261         Results from last 7 days   Lab Units 12/23/24  0030 12/22/24  2333   HSTROP T ng/L 10 12             Results from last 7 days   Lab Units 12/16/24  1223   LIPASE U/L 31         Results from last 7 days   Lab Units 12/23/24  0340   COLOR UA  Yellow   GLUCOSE UA  500 mg/dL (2+)*   KETONES UA  Negative   BLOOD UA  Negative   LEUKOCYTES UA  Negative   PH, URINE  5.5   BILIRUBIN UA  Negative   UROBILINOGEN UA  0.2 E.U./dL   RBC UA /HPF None Seen   WBC UA /HPF 0-2       Pain Management Panel  More data exists         Latest Ref Rng & Units 11/18/2022 8/16/2021   Pain Management Panel   Creatinine, Urine mg/dL - 86.7    Amphetamine, Urine Qual Negative Negative  -   Barbiturates Screen, Urine Negative Negative  -   Benzodiazepine Screen, Urine Negative Negative  -   Buprenorphine, Screen, Urine Negative Negative  -    Cocaine Screen, Urine Negative Negative  -   Methadone Screen , Urine Negative Negative  -   Methamphetamine, Ur Negative Negative  -      Details                   EKG:       EKG NSR no ischemic change    Radiology:    CT Head Without Contrast    Addendum Date: 12/23/2024    ADDENDUM REPORT ADDENDUM: Receipt of this report by the clinical staff was confirmed with Baldo Hensno on Dec 23, 2024 01:09:00 EST. Authenticated and Electronically Signed by Aleksandar العراقي MD on 12/23/2024 01:09:39 AM    Result Date: 12/23/2024  FINAL REPORT TECHNIQUE: null CLINICAL HISTORY: unresponsive episode, low blood sugar, weakness COMPARISON: null FINDINGS: CT head without contrast Comparison: CT/SR - CT HEAD WO CONTRAST - 11/18/2024 07:15 PM EST Findings: Moderate diffuse cerebral atrophy and nonspecific white matter hypodensity. No intracranial mass, midline shift, hydrocephalus, or acute hemorrhage/infarct. Possible heterogeneity of the left aspect of the malissa and low-density centrally involving the medulla oblongata, series 2 image 11 and 7 respectively. Moderate calcific ASVD of the carotid siphons. Visualized paranasal sinuses are clear. Mastoids are well developed and clear. Orbits are unremarkable. No skull fracture. No significant scalp soft tissue swelling.     Impression: 1. Moderate atrophy. 2. Heterogeneous density of the left malissa, similar to previous. This may reflect chronic infarct. 3. Low-density 5 mm area within the central medulla oblongata, not identified previously. This could reflect acute or chronic ischemic change. Consider MRI if not contraindicated. Authenticated and Electronically Signed by Aleksandar العراقي MD on 12/23/2024 12:53:16 AM    CT Cervical Spine Without Contrast    Result Date: 12/23/2024  FINAL REPORT TECHNIQUE: null CLINICAL HISTORY: unresponsive, low blood sugar, weakness COMPARISON: null FINDINGS: CT cervical spine without contrast: Comparison: CT/SR - CT CERVICAL SPINE WO CONTRAST -  11/18/2024 07:15 PM EST Findings: No acute fracture or traumatic listhesis. No significant prevertebral soft tissue swelling. Flattening of cervical lordosis with mild apex left scoliosis. Severe degenerative disc disease at C5-6. Moderate facet arthropathy on the right. No significant spinal canal or neural foraminal stenosis. Craniocervical junction and C1-C2 articulations are without abnormal alignment/asymmetry. Dens is intact. Severe degenerative narrowing of the atlantodens interval. Normal limited view of the intracranial contents. Neck soft tissues are unremarkable for age. Lung apices without consolidation, pneumothorax, or mass.     Impression: 1. No acute fracture. 2. Multilevel degenerative changes. Authenticated and Electronically Signed by Aleksandar العراقي MD on 12/23/2024 12:52:23 AM     Assessment/Plan:      Hypotension    Acute kidney injury superimposed on chronic kidney disease  -- Suspect hypotension and JOSE ALBERTO due to hypovolemia, certainly polypharmacy could be playing a role as well. RIJ CVC, montoya, warming blanket placed 12/23/24  -- Active Treatments: IV fluids dextrose, hold antihypertensives, hold pain meds  -- Pending Results: Nephrology      Hypoglycemia    Type 2 diabetes mellitus with nephropathy    Gastroparesis  -- Suspect inadequate oral intake relative to insulin use.  -- Active Treatments: Subcutaneous insulin, Reglan  -- Pending Results: Every 1 hr Accu-Cheks      Medulla/Brain stem abnormality  -- Neuro consult.MRI pending.          DVT Prophylaxis: Lovenox  Code Status: Full code  Diet: Diabetic renal  Risk assessment: High anticipate greater than two night stay          Edited by: Ricardo Esteban DO at 12/23/2024 8779           Advance Care Planning  ACP discussion was held with the patient during this visit. Patient does not have an advance directive, declines further assistance.           Ricardo Esteban DO  12/23/24  04:56 EST    Dictated utilizing Dragon  dictation.      Electronically signed by Ricardo Esteban DO at 12/23/24 0459       Current Facility-Administered Medications   Medication Dose Route Frequency Provider Last Rate Last Admin    acetaminophen (TYLENOL) tablet 650 mg  650 mg Oral Q4H PRN Ricardo Esteban DO   650 mg at 12/23/24 1022    aspirin EC tablet 81 mg  81 mg Oral Daily Ricardo Esteban DO   81 mg at 12/24/24 0817    atorvastatin (LIPITOR) tablet 40 mg  40 mg Oral Daily Ricardo Esteban DO   40 mg at 12/24/24 0818    sennosides-docusate (PERICOLACE) 8.6-50 MG per tablet 2 tablet  2 tablet Oral BID PRN Ricardo Esteban DO        And    polyethylene glycol (MIRALAX) packet 17 g  17 g Oral Daily PRN Ricardo Esteban DO        And    bisacodyl (DULCOLAX) EC tablet 5 mg  5 mg Oral Daily PRN Ricardo Esteban DO        And    bisacodyl (DULCOLAX) suppository 10 mg  10 mg Rectal Daily PRN Ricardo Esteban DO        sennosides-docusate (PERICOLACE) 8.6-50 MG per tablet 2 tablet  2 tablet Oral BID Ricardo Esteban DO   2 tablet at 12/23/24 0833    And    polyethylene glycol (MIRALAX) packet 17 g  17 g Oral Daily PRN Ricardo Esteban DO        And    bisacodyl (DULCOLAX) EC tablet 5 mg  5 mg Oral Daily PRN Ricardo Esteban DO        And    bisacodyl (DULCOLAX) suppository 10 mg  10 mg Rectal Daily PRN Ricardo Esteban DO        calcium carbonate (TUMS) chewable tablet 500 mg (200 mg elemental)  2 tablet Oral BID PRN Ricardo Esteban, DO        Calcium Replacement - Follow Nurse / BPA Driven Protocol   Not Applicable PRN Ricardo Esteban DO        clopidogrel (PLAVIX) tablet 75 mg  75 mg Oral Daily Ricardo Esteban DO   75 mg at 12/24/24 0817    dexAMETHasone (DECADRON) tablet 6 mg  6 mg Oral Daily With Breakfast William Velázquez DO   6 mg at 12/24/24 0818    dextrose (D50W) (25 g/50 mL) IV injection 25 g  25 g Intravenous Q15 Min PRN Ricardo Esteban DO        dextrose (D50W) (25 g/50 mL) IV  injection 50 mL  50 mL Intravenous Q1H PRN Ricardo Esteban, DO   50 mL at 12/23/24 0300    dextrose (GLUTOSE) oral gel 15 g  15 g Oral Q15 Min PRN Ricardo Esteban DO        empagliflozin (JARDIANCE) tablet 10 mg  10 mg Oral Daily William Velázquez DO   10 mg at 12/24/24 0817    Enoxaparin Sodium (LOVENOX) syringe 30 mg  30 mg Subcutaneous Q24H Ricardo Esteban, DO   30 mg at 12/24/24 0532    glucagon (GLUCAGEN) injection 1 mg  1 mg Intramuscular Q15 Min PRN Ricardo Esteban DO        Insulin Lispro (humaLOG) injection 2-9 Units  2-9 Units Subcutaneous 4x Daily AC & at Bedtime Ricardo Esteban DO   2 Units at 12/24/24 0818    levothyroxine (SYNTHROID, LEVOTHROID) tablet 50 mcg  50 mcg Oral Daily Ricardo Esteban DO   50 mcg at 12/24/24 0818    Lidocaine 4 % 1 patch  1 patch Transdermal Q24H Ricardo Esteban DO   1 patch at 12/24/24 0531    Magnesium Standard Dose Replacement - Follow Nurse / BPA Driven Protocol   Not Applicable PRN Ricardo Esteban DO        melatonin tablet 5 mg  5 mg Oral Nightly Ricardo Esteban DO   5 mg at 12/23/24 2029    metoclopramide (REGLAN) tablet 5 mg  5 mg Oral TID AC Ricardo Esteban DO   5 mg at 12/24/24 1147    midodrine (PROAMATINE) tablet 2.5 mg  2.5 mg Oral TID AC William Velázquez DO   2.5 mg at 12/24/24 1146    mupirocin (BACTROBAN) 2 % nasal ointment 1 Application  1 Application Each Nare BID Ricardo Esteban DO   1 Application at 12/24/24 0818    nitroglycerin (NITROSTAT) SL tablet 0.4 mg  0.4 mg Sublingual Q5 Min PRN Ricardo Esteban DO        norepinephrine (LEVOPHED) 8 mg in 250mL D5W infusion  0.02-0.3 mcg/kg/min Intravenous Titrated Ricardo Esteban DO   Stopped at 12/24/24 0830    ondansetron ODT (ZOFRAN-ODT) disintegrating tablet 4 mg  4 mg Oral Q6H PRN Ricardo Esteban DO        Or    ondansetron (ZOFRAN) injection 4 mg  4 mg Intravenous Q6H PRN Ricardo Esteban,         ondansetron ODT (ZOFRAN-ODT)  disintegrating tablet 4 mg  4 mg Translingual Q8H PRN Ricardo Esteban, DO        pantoprazole (PROTONIX) EC tablet 40 mg  40 mg Oral Daily Ricardo Esteban, DO   40 mg at 24 0817    Pharmacy to Dose enoxaparin (LOVENOX)   Not Applicable Continuous PRN Ricardo Esteban, DO        Phosphorus Replacement - Follow Nurse / BPA Driven Protocol   Not Applicable PRN Ricardo Esteban, DO        sertraline (ZOLOFT) tablet 50 mg  50 mg Oral Daily Ricardo Esteban, DO   50 mg at 24 0818    sodium chloride 0.9 % flush 10 mL  10 mL Intravenous PRN Ricardo Esteban, DO   10 mL at 24 0833     Physician Progress Notes (most recent note)    No notes of this type exist for this encounter.          Physical Therapy Notes (most recent note)        William Landon, PT at 24 1129  Version 1 of 1         Patient Name: Viola Barlow  : 1955    MRN: [id number]                              Today's Date: 2024       Admit Date: 2024    Visit Dx:     ICD-10-CM ICD-9-CM   1. Hypoglycemia  E16.2 251.2   2. JOSE ALBERTO (acute kidney injury)  N17.9 584.9     Patient Active Problem List   Diagnosis    Age-related nuclear cataract of right eye    Hypoglycemia    Acute respiratory failure with hypoxia and hypercapnia    Type 2 diabetes mellitus with nephropathy    Obstructive sleep apnea    Essential hypertension    Chronic kidney disease, stage III (moderate)    Hyperkalemia    Acute kidney injury superimposed on chronic kidney disease    Acute kidney failure, unspecified    Hypotension    Gastroparesis     Past Medical History:   Diagnosis Date    Arthritis     Cataract     Depression     Diabetes mellitus     Dysphagia     Patient reported that intermittently food feels like it gets lodged     Elevated cholesterol     Full dentures     Instructed no adhesives the DOS    GERD (gastroesophageal reflux disease)     Headache     History of nuclear stress test     Patient reported this was done with  "Dr. Douglas around 2017 and that she was started on Metoprolol after testing.     Hypertension     Impaired mobility     Kidney disease     Patient reported \"I have chronic kidney disease and they say my kidney function is at 33%\" and that she has routine care with Dr. Calzada     Migraines     PVD (peripheral vascular disease)     Seasonal allergies     Spinal headache     Wears glasses      Past Surgical History:   Procedure Laterality Date    ABDOMINAL SURGERY  08/1984    Patient reported after complete hysterectomy, she had another procedure to remove tumors in the abdominal area    APPENDECTOMY      Reported removed when hysterectomy was done    CATARACT EXTRACTION W/ INTRAOCULAR LENS IMPLANT Right 6/21/2019    Procedure: CATARACT PHACO EXTRACTION WITH INTRAOCULAR LENS IMPLANT RIGHT;  Surgeon: Tee Enrique MD;  Location: [facility] OR;  Service: Ophthalmology    CATARACT EXTRACTION W/ INTRAOCULAR LENS IMPLANT Left 7/5/2019    Procedure: CATARACT PHACO EXTRACTION WITH INTRAOCULAR LENS IMPLANT LEFT;  Surgeon: Tee Enrique MD;  Location: [facility];  Service: Ophthalmology    COLONOSCOPY      ELBOW EPICONDYLECTOMY Left 09/1990    ENDOSCOPY      HYSTERECTOMY  1983    Partial    HYSTERECTOMY  1984    Complete     MOUTH SURGERY      Full mouth extraction    VASCULAR SURGERY Bilateral     For PAD - Reported the right leg was done first around 2014 and the left was done around 2015      General Information       Row Name 12/23/24 1117          Physical Therapy Time and Intention    Document Type evaluation  -     Mode of Treatment physical therapy  -       Row Name 12/23/24 1117          General Information    Patient Profile Reviewed yes  -MK     Prior Level of Function mod assist:;ADL's;all household mobility  -     Existing Precautions/Restrictions fall  -     Barriers to Rehab medically complex;previous functional deficit  -       Row Name 12/23/24 111          Living Environment    People in " Home other (see comments)  lives in duplex with family surrounding her  -       Row Name 12/23/24 1117          Home Main Entrance    Number of Stairs, Main Entrance none  -       Row Name 12/23/24 1117          Stairs Within Home, Primary    Number of Stairs, Within Home, Primary none  -       Row Name 12/23/24 1117          Cognition    Orientation Status (Cognition) oriented x 4  -       Row Name 12/23/24 1117          Safety Issues/Impairments Affecting Functional Mobility    Safety Issues Affecting Function (Mobility) awareness of need for assistance;insight into deficits/self-awareness;positioning of assistive device;safety precaution awareness;safety precautions follow-through/compliance;sequencing abilities  -     Impairments Affecting Function (Mobility) endurance/activity tolerance  -               User Key  (r) = Recorded By, (t) = Taken By, (c) = Cosigned By      Initials Name Provider Type    William Lock, PT Physical Therapist                   Mobility       Row Name 12/23/24 1119          Sit-Stand Transfer    Sit-Stand [place] (Transfers) contact guard  -     Assistive Device (Sit-Stand Transfers) walker, front-wheeled  -       Row Name 12/23/24 1119          Gait/Stairs (Locomotion)    [place] Level (Gait) contact guard  -     Assistive Device (Gait) walker, front-wheeled  -MK     Patient was able to Ambulate yes  -MK     Distance in Feet (Gait) 50  -MK     Deviations/Abnormal Patterns (Gait) festinating/shuffling;base of support, wide  -MK     Bilateral Gait Deviations forward flexed posture  -               User Key  (r) = Recorded By, (t) = Taken By, (c) = Cosigned By      Initials Name Provider Type    William Lock, PT Physical Therapist                   Obj/Interventions       Row Name 12/23/24 1119          Range of Motion Comprehensive    General Range of Motion no range of motion deficits identified  -       Row Name 12/23/24 1119          Strength  Comprehensive (MMT)    General Manual Muscle Testing (MMT) Assessment lower extremity strength deficits identified  -     Comment, General Manual Muscle Testing (MMT) Assessment B LE 4/5  -       Row Name 12/23/24 1119          Balance    Balance Assessment sitting static balance;sitting dynamic balance;standing static balance;standing dynamic balance  -     Static Sitting Balance modified independence  -MK     Dynamic Sitting Balance modified independence  -MK     Position, Sitting Balance supported;sitting in chair  -MK     Static Standing Balance contact guard  -     Dynamic Standing Balance contact guard  -     Position/Device Used, Standing Balance walker, front-wheeled  -     Balance Interventions sitting;standing;sit to stand  -               User Key  (r) = Recorded By, (t) = Taken By, (c) = Cosigned By      Initials Name Provider Type    William Lock, PT Physical Therapist                   Goals/Plan       Row Name 12/23/24 1126          Bed Mobility Goal 1 (PT)    Activity/Assistive Device (Bed Mobility Goal 1, PT) bed mobility activities, all  -MK     [place] Level/Cues Needed (Bed Mobility Goal 1, PT) independent  -     Time Frame (Bed Mobility Goal 1, PT) long term goal (LTG);10 days  -     Progress/Outcomes (Bed Mobility Goal 1, PT) new goal  -       Row Name 12/23/24 1126          Transfer Goal 1 (PT)    Activity/Assistive Device (Transfer Goal 1, PT) sit-to-stand/stand-to-sit  -     [place] Level/Cues Needed (Transfer Goal 1, PT) independent  -     Time Frame (Transfer Goal 1, PT) short term goal (STG);5 days  -     Progress/Outcome (Transfer Goal 1, PT) new goal  -       Row Name 12/23/24 1126          Balance Goal 1 (PT)    Activity/Assistive Device (Balance Goal) standing dynamic balance;with functional activities/occupations;walker, rolling  -MK     [place] Level/Cues Needed (Balance Goal 1, PT) supervision required  -     Time Frame (Balance Goal  1, PT) long-term goal (LTG);5-7 days  -     Progress/Outcomes (Balance Goal 1, PT) goal ongoing  -       Row Name 12/23/24 1126          Patient Education Goal (PT)    Activity (Patient Education Goal, PT) Pt to participate in B LE ther ex at least 3x per week  -     [place]/Cues/Accuracy (Memory Goal 2, PT) demonstrates adequately  -     Time Frame (Patient Education Goal, PT) long term goal (LTG);10 days  -     Progress/Outcome (Patient Education Goal, PT) new goal  -       Row Name 12/23/24 1126          Therapy Assessment/Plan (PT)    Planned Therapy Interventions (PT) balance training;bed mobility training;gait training;home exercise program;motor coordination training;neuromuscular re-education;patient/family education;strengthening;transfer training  -               User Key  (r) = Recorded By, (t) = Taken By, (c) = Cosigned By      Initials Name Provider Type    William Lock, PT Physical Therapist                   Clinical Impression       Row Name 12/23/24 1120          Pain    Pretreatment Pain Rating 0/10 - no pain  -     Posttreatment Pain Rating 0/10 - no pain  -       Row Name 12/23/24 1120          Plan of Care Review    Plan of Care Reviewed With patient  -     Progress no change  -     Outcome Evaluation Pt participated well in PT evaluation this date. Pt reports that she lives in a duplex with multiple family members living in the duplex with her. Pt reports that she has a rollator, BSC, shower chair and a cane. Pt does require assist when bathing from family members. Pt reports that she falls often with the most being 8x in one day. Pt completed STS from bedside chair CGA using RW. BP 86/57 once standing. Pt ambulated 50 ft x2 using RW CGA with cues for steering the RW. Pt returned to room and was seated in bedside chair, BP 73/58, RN notified. Pt left with all needs met. Pt is expected to benefit from skilled PT during this inpatient stay to maximize strength and  functional independence. Pt would further benefit from HH upon dc.  -       Row Name 12/23/24 1120          Therapy Assessment/Plan (PT)    Patient/Family Therapy Goals Statement (PT) to go home with family  -     Rehab Potential (PT) good  -     Criteria for Skilled Interventions Met (PT) yes;meets criteria  -     Therapy Frequency (PT) 5 times/wk  -     Predicted Duration of Therapy Intervention (PT) 2 weeks  -       Row Name 12/23/24 1120          Vital Signs    Intra Systolic BP Rehab 86  -MK     Intra Treatment Diastolic BP 57  -MK     Post Systolic BP Rehab 73  -MK     Post Treatment Diastolic BP 58  -MK     O2 Delivery Pre Treatment room air  -MK     O2 Delivery Intra Treatment room air  -MK     O2 Delivery Post Treatment room air  -MK     Pre Patient Position Sitting  -     Intra Patient Position Standing  -     Post Patient Position Sitting  -       Row Name 12/23/24 1120          Positioning and Restraints    Pre-Treatment Position sitting in chair/recliner  -     Post Treatment Position chair  -MK     In Chair notified nsg;reclined;call light within reach;encouraged to call for assist  -               User Key  (r) = Recorded By, (t) = Taken By, (c) = Cosigned By      Initials Name Provider Type    William Lock, PT Physical Therapist                   Outcome Measures       Row Name 12/23/24 1127 12/23/24 0800       How much help from another person do you currently need...    Turning from your back to your side while in flat bed without using bedrails? 3  - 3  -HS    Moving from lying on back to sitting on the side of a flat bed without bedrails? 3  - 3  -HS    Moving to and from a bed to a chair (including a wheelchair)? 3  -MK 3  -HS    Standing up from a chair using your arms (e.g., wheelchair, bedside chair)? 3  -MK 3  -HS    Climbing 3-5 steps with a railing? 3  -MK 3  -HS    To walk in hospital room? 3  -MK 3  -HS    AM-PAC 6 Clicks Score (PT) 18  -MK 18  -HS     Highest Level of Mobility Goal 6 --> Walk 10 steps or more  - 6 --> Walk 10 steps or more  -      Row Name 12/23/24 4735          How much help from another person do you currently need...    Turning from your back to your side while in flat bed without using bedrails? 3  -BS     Moving from lying on back to sitting on the side of a flat bed without bedrails? 3  -BS     Moving to and from a bed to a chair (including a wheelchair)? 3  -BS     Standing up from a chair using your arms (e.g., wheelchair, bedside chair)? 3  -BS     Climbing 3-5 steps with a railing? 3  -BS     To walk in hospital room? 3  -BS     AM-PAC 6 Clicks Score (PT) 18  -BS     Highest Level of Mobility Goal 6 --> Walk 10 steps or more  -BS       Row Name 12/23/24 1127          Functional Assessment    Outcome Measure Options AM-PAC 6 Clicks Basic Mobility (PT)  -               User Key  (r) = Recorded By, (t) = Taken By, (c) = Cosigned By      Initials Name Provider Type    Kami Lynn, RN Registered Nurse     Deepti Couch, RN Registered Nurse    William Lock, PT Physical Therapist                                 Physical Therapy Education       Title: PT OT SLP Therapies (In Progress)       Topic: Physical Therapy (In Progress)       Point: Mobility training (Done)       Learning Progress Summary            Patient Acceptance, E,D, DU,VU by  at 12/23/2024 1127                      Point: Home exercise program (Not Started)       Learner Progress:  Not documented in this visit.              Point: Body mechanics (Done)       Learning Progress Summary            Patient Acceptance, E,D, DU,VU by  at 12/23/2024 1127                      Point: Precautions (Not Started)       Learner Progress:  Not documented in this visit.                              User Key       Initials Effective Dates Name Provider Type Discipline     06/13/23 -  William Landon, PT Physical Therapist PT                  PT Recommendation and  Plan  Planned Therapy Interventions (PT): balance training, bed mobility training, gait training, home exercise program, motor coordination training, neuromuscular re-education, patient/family education, strengthening, transfer training  Progress: no change  Outcome Evaluation: Pt participated well in PT evaluation this date. Pt reports that she lives in a duplex with multiple family members living in the duplex with her. Pt reports that she has a rollator, BSC, shower chair and a cane. Pt does require assist when bathing from family members. Pt reports that she falls often with the most being 8x in one day. Pt completed STS from bedside chair CGA using RW. BP 86/57 once standing. Pt ambulated 50 ft x2 using RW CGA with cues for steering the RW. Pt returned to room and was seated in bedside chair, BP 73/58, RN notified. Pt left with all needs met. Pt is expected to benefit from skilled PT during this inpatient stay to maximize strength and functional independence. Pt would further benefit from HH upon dc.     Time Calculation:   PT Evaluation Complexity  History, PT Evaluation Complexity: 1-2 personal factors and/or comorbidities  Examination of Body Systems (PT Eval Complexity): total of 3 or more elements  Clinical Presentation (PT Evaluation Complexity): evolving  Clinical Decision Making (PT Evaluation Complexity): moderate complexity  Overall Complexity (PT Evaluation Complexity): moderate complexity     PT Charges       Row Name 12/23/24 1037             Time Calculation    Start Time 1037  -MK      PT Received On 12/23/24  -MK      PT Goal Re-Cert Due Date 01/02/25  -MK         Untimed Charges    PT Eval/Re-eval Minutes 45  -MK         Total Minutes    Untimed Charges Total Minutes 45  -MK       Total Minutes 45  -MK                User Key  (r) = Recorded By, (t) = Taken By, (c) = Cosigned By      Initials Name Provider Type    William Lock, PT Physical Therapist                  Therapy Charges for  "Today       Code Description Service Date Service Provider Modifiers Qty    [id number] HC PT EVAL MOD COMPLEXITY 3 2024 William Landon, PT GP 1            PT G-Codes  Outcome Measure Options: AM-PAC 6 Clicks Basic Mobility (PT)  AM-PAC 6 Clicks Score (PT): 18  PT Discharge Summary  Anticipated Discharge Disposition (PT): home with home health    William Landon, PT  2024      Electronically signed by William Landon, PT at 24 1129          Occupational Therapy Notes (most recent note)        Merly Reis, OT at 24 1331          Patient Name: Viola Barlow  : 1955    MRN: [id number]                              Today's Date: 2024       Admit Date: 2024    Visit Dx:     ICD-10-CM ICD-9-CM   1. Hypoglycemia  E16.2 251.2   2. JOSE ALBERTO (acute kidney injury)  N17.9 584.9     Patient Active Problem List   Diagnosis    Age-related nuclear cataract of right eye    Hypoglycemia    Acute respiratory failure with hypoxia and hypercapnia    Type 2 diabetes mellitus with nephropathy    Obstructive sleep apnea    Essential hypertension    Chronic kidney disease, stage III (moderate)    Hyperkalemia    Acute kidney injury superimposed on chronic kidney disease    Acute kidney failure, unspecified    Hypotension    Gastroparesis     Past Medical History:   Diagnosis Date    Arthritis     Cataract     Depression     Diabetes mellitus     Dysphagia     Patient reported that intermittently food feels like it gets lodged     Elevated cholesterol     Full dentures     Instructed no adhesives the DOS    GERD (gastroesophageal reflux disease)     Headache     History of nuclear stress test     Patient reported this was done with Dr. Douglas around 2017 and that she was started on Metoprolol after testing.     Hypertension     Impaired mobility     Kidney disease     Patient reported \"I have chronic kidney disease and they say my kidney function is at 33%\" and that she has routine care with  " Elsi     Migraines     PVD (peripheral vascular disease)     Seasonal allergies     Spinal headache     Wears glasses      Past Surgical History:   Procedure Laterality Date    ABDOMINAL SURGERY  08/1984    Patient reported after complete hysterectomy, she had another procedure to remove tumors in the abdominal area    APPENDECTOMY      Reported removed when hysterectomy was done    CATARACT EXTRACTION W/ INTRAOCULAR LENS IMPLANT Right 6/21/2019    Procedure: CATARACT PHACO EXTRACTION WITH INTRAOCULAR LENS IMPLANT RIGHT;  Surgeon: Tee Enrique MD;  Location: [facility] OR;  Service: Ophthalmology    CATARACT EXTRACTION W/ INTRAOCULAR LENS IMPLANT Left 7/5/2019    Procedure: CATARACT PHACO EXTRACTION WITH INTRAOCULAR LENS IMPLANT LEFT;  Surgeon: Tee Enrique MD;  Location: [facility] OR;  Service: Ophthalmology    COLONOSCOPY      ELBOW EPICONDYLECTOMY Left 09/1990    ENDOSCOPY      HYSTERECTOMY  1983    Partial    HYSTERECTOMY  1984    Complete     MOUTH SURGERY      Full mouth extraction    VASCULAR SURGERY Bilateral     For PAD - Reported the right leg was done first around 2014 and the left was done around 2015      General Information       Row Name 12/23/24 1313          OT Time and Intention    Subjective Information no complaints  -SD     Document Type evaluation  -SD     Mode of Treatment occupational therapy  -SD     Patient Effort good  -SD     Symptoms Noted During/After Treatment none  -SD       Row Name 12/23/24 1313          General Information    Patient Profile Reviewed yes  -SD     Prior Level of Function min assist:;ADL's  -SD     Existing Precautions/Restrictions fall  -SD     Barriers to Rehab medically complex;previous functional deficit  -SD       Row Name 12/23/24 1313          Living Environment    People in Home child(melissa), adult  -SD       Row Name 12/23/24 1313          Home Main Entrance    Number of Stairs, Main Entrance none  -SD       Row Name 12/23/24 1313          Stairs  Within Home, Primary    Number of Stairs, Within Home, Primary none  -SD       Row Name 12/23/24 1313          Cognition    Orientation Status (Cognition) oriented x 4  -SD       [facility] Name 12/23/24 1313          Safety Issues/Impairments Affecting Functional Mobility    Safety Issues Affecting Function (Mobility) safety precautions follow-through/compliance;safety precaution awareness  -SD     Impairments Affecting Function (Mobility) balance;endurance/activity tolerance;strength  -SD               User Key  (r) = Recorded By, (t) = Taken By, (c) = Cosigned By      Initials Name Provider Type    SD Merly Reis OT Occupational Therapist                     Mobility/ADL's       Row Name 12/23/24 1314          Bed Mobility    Comment, (Bed Mobility) in chair  -[facility] Name 12/23/24 1314          Transfers    Transfers sit-stand transfer  -SD       Row Name 12/23/24 1314          Sit-Stand Transfer    Sit-Stand [place] (Transfers) contact guard  -SD     Assistive Device (Sit-Stand Transfers) walker, front-wheeled  -[facility] Name 12/23/24 1314          Functional Mobility    Functional Mobility- Ind. Level contact guard assist  -SD     Functional Mobility- Device walker, front-wheeled  -SD     Functional Mobility-Distance (Feet) 50  x2  -SD     Functional Mobility- Safety Issues balance decreased during turns  -SD       Row Name 12/23/24 1314          Activities of Daily Living    BADL Assessment/Intervention bathing;upper body dressing;lower body dressing;grooming;feeding;toileting  -[facility] Name 12/23/24 1314          Bathing Assessment/Intervention    [place] Level (Bathing) minimum assist (75% patient effort);moderate assist (50% patient effort)  -SD       Row Name 12/23/24 1314          Upper Body Dressing Assessment/Training    [place] Level (Upper Body Dressing) standby assist  -SD       Row Name 12/23/24 1314          Lower Body Dressing Assessment/Training    [place] Level  (Lower Body Dressing) minimum assist (75% patient effort);moderate assist (50% patient effort)  -SD       [facility] Name 12/23/24 1314          Grooming Assessment/Training    [place] Level (Grooming) standby assist  -SD       Row Name 12/23/24 1314          Self-Feeding Assessment/Training    [place] Level (Feeding) supervision  -SD       Row Name 12/23/24 1314          Toileting Assessment/Training    [place] Level (Toileting) minimum assist (75% patient effort)  -SD               User Key  (r) = Recorded By, (t) = Taken By, (c) = Cosigned By      Initials Name Provider Type    Merly Brush OT Occupational Therapist                   Obj/Interventions       [facility] Name 12/23/24 1316          Sensory Assessment (Somatosensory)    Sensory Assessment (Somatosensory) sensation intact  -SD       Row Name 12/23/24 1316          Range of Motion Comprehensive    General Range of Motion bilateral upper extremity ROM WFL  -SD       Row Name 12/23/24 1316          Strength Comprehensive (MMT)    General Manual Muscle Testing (MMT) Assessment upper extremity strength deficits identified  -SD     Comment, General Manual Muscle Testing (MMT) Assessment UB 4-/5  -SD       Row Name 12/23/24 1316          Motor Skills    Motor Skills coordination  -SD     Coordination WFL  -SD               User Key  (r) = Recorded By, (t) = Taken By, (c) = Cosigned By      Initials Name Provider Type    Merly Brush OT Occupational Therapist                   Goals/Plan       Row Name 12/23/24 1326          Transfer Goal 1 (OT)    Activity/Assistive Device (Transfer Goal 1, OT) sit-to-stand/stand-to-sit;walker, rolling  -SD     [place] Level/Cues Needed (Transfer Goal 1, OT) modified independence  -SD     Time Frame (Transfer Goal 1, OT) long term goal (LTG)  -SD     Progress/Outcome (Transfer Goal 1, OT) goal ongoing  -SD       Row Name 12/23/24 1326          Dressing Goal 1 (OT)    Activity/Device (Dressing Goal 1,  OT) lower body dressing  -SD     [place]/Cues Needed (Dressing Goal 1, OT) contact guard required  -SD     Time Frame (Dressing Goal 1, OT) 2 weeks  -SD     Progress/Outcome (Dressing Goal 1, OT) goal ongoing  -SD       Row Name 12/23/24 1326          Toileting Goal 1 (OT)    Activity/Device (Toileting Goal 1, OT) toileting skills, all;commode, bedside without drop arms  -SD     [place] Level/Cues Needed (Toileting Goal 1, OT) contact guard required  -SD     Time Frame (Toileting Goal 1, OT) 2 weeks  -SD     Progress/Outcome (Toileting Goal 1, OT) goal ongoing  -SD       Row Name 12/23/24 1326          Strength Goal 1 (OT)    Strength Goal 1 (OT) Patient to perform U[place] ther ex as tolerated  -SD     Time Frame (Strength Goal 1, OT) long term goal (LTG)  -SD     Progress/Outcome (Strength Goal 1, OT) goal ongoing  -SD       Row Name 12/23/24 1326          Therapy Assessment/Plan (OT)    Planned Therapy Interventions (OT) activity tolerance training;adaptive equipment training;BADL retraining;patient/caregiver education/training;ROM/therapeutic exercise;strengthening exercise;transfer/mobility retraining  -SD               User Key  (r) = Recorded By, (t) = Taken By, (c) = Cosigned By      Initials Name Provider Type    Merly Brush OT Occupational Therapist                   Clinical Impression       Row Name 12/23/24 1316          Pain Assessment    Pretreatment Pain Rating 0/10 - no pain  -SD     Posttreatment Pain Rating 0/10 - no pain  -SD       Row Name 12/23/24 1316          Plan of Care Review    Plan of Care Reviewed With patient  -SD     Progress no change  -SD     Outcome Evaluation OT eval completed. Patient is sitting in chair and agrees to participate. Patient is Ox4, denies pain. Patient reports she lives with her daughter, walks with a rollator, also has a BSC, shower chair and SPC. Patient requires min A for ADLs. Patient reports increased falls at home. Patient's BP sitting in chair  is 107/79, performed sit to stand CGA and BP 86/57 with patient assymptomatic. Patient walked 50' x 2 with CGA using RW. BP following mobility is 73/58, RN notified. Requires min-mod A for bathing, dressing and toileting. Patient is expected to benefit from continued OT services prior to DC to address deficits in strength, endurance, balance, mobility and ADL performance. Patient to return home with family assistance and  services, patient is declining STR despite increased falls.  -SD       Row Name 12/23/24 1316          Therapy Assessment/Plan (OT)    Patient/Family Therapy Goal Statement (OT) home with family  -SD     Rehab Potential (OT) good  -SD     Criteria for Skilled Therapeutic Interventions Met (OT) skilled treatment is necessary  -SD     Therapy Frequency (OT) 5 times/wk  Mon-Fri  -SD       Row Name 12/23/24 1316          Therapy Plan Review/Discharge Plan (OT)    Anticipated Discharge Disposition (OT) home with home health;inpatient rehabilitation facility  -SD       Row Name 12/23/24 1316          Vital Signs    Pre Systolic BP Rehab 107  -SD     Pre Treatment Diastolic BP 79  -SD     Intra Systolic BP Rehab 86  -SD     Intra Treatment Diastolic BP 57  -SD     Post Systolic BP Rehab 73  -SD     Post Treatment Diastolic BP 58  -SD     O2 Delivery Pre Treatment room air  -SD     O2 Delivery Intra Treatment room air  -SD     O2 Delivery Post Treatment room air  -SD               User Key  (r) = Recorded By, (t) = Taken By, (c) = Cosigned By      Initials Name Provider Type    Merly Brush OT Occupational Therapist                   Outcome Measures       Row Name 12/23/24 5773          How much help from another is currently needed...    Putting on and taking off regular lower body clothing? 2  -SD     Bathing (including washing, rinsing, and drying) 2  -SD     Toileting (which includes using toilet bed pan or urinal) 3  -SD     Putting on and taking off regular upper body clothing 3  -SD      Taking care of personal grooming (such as brushing teeth) 3  -SD     Eating meals 4  -SD     AM-PAC 6 Clicks Score (OT) 17  -SD       Row Name 12/23/24 1127 12/23/24 0800       How much help from another person do you currently need...    Turning from your back to your side while in flat bed without using bedrails? 3  -MK 3  -HS    Moving from lying on back to sitting on the side of a flat bed without bedrails? 3  -MK 3  -HS    Moving to and from a bed to a chair (including a wheelchair)? 3  -MK 3  -HS    Standing up from a chair using your arms (e.g., wheelchair, bedside chair)? 3  -MK 3  -HS    Climbing 3-5 steps with a railing? 3  -MK 3  -HS    To walk in hospital room? 3  -MK 3  -HS    AM-PAC 6 Clicks Score (PT) 18  -MK 18  -HS    Highest Level of Mobility Goal 6 --> Walk 10 steps or more  -MK 6 --> Walk 10 steps or more  -HS      Row Name 12/23/24 0445          How much help from another person do you currently need...    Turning from your back to your side while in flat bed without using bedrails? 3  -BS     Moving from lying on back to sitting on the side of a flat bed without bedrails? 3  -BS     Moving to and from a bed to a chair (including a wheelchair)? 3  -BS     Standing up from a chair using your arms (e.g., wheelchair, bedside chair)? 3  -BS     Climbing 3-5 steps with a railing? 3  -BS     To walk in hospital room? 3  -BS     AM-PAC 6 Clicks Score (PT) 18  -BS     Highest Level of Mobility Goal 6 --> Walk 10 steps or more  -BS       Row Name 12/23/24 1327 12/23/24 1127       Functional Assessment    Outcome Measure Options AM-PAC 6 Clicks Daily Activity (OT)  -SD AM-PAC 6 Clicks Basic Mobility (PT)  -              User Key  (r) = Recorded By, (t) = Taken By, (c) = Cosigned By      Initials Name Provider Type    Kami Lynn RN Registered Nurse    Merly Brush, OT Occupational Therapist    Deepti Perez, RN Registered Nurse    William Lock, PT Physical Therapist                     Occupational Therapy Education       Title: PT OT SLP Therapies (In Progress)       Topic: Occupational Therapy (In Progress)       Point: ADL training (Done)       Description:   Instruct learner(s) on proper safety adaptation and remediation techniques during self care or transfers.   Instruct in proper use of assistive devices.                  Learning Progress Summary            Patient Acceptance, E,TB, VU by SD at 12/23/2024 5600    Comment: OT POC                      Point: Home exercise program (Not Started)       Description:   Instruct learner(s) on appropriate technique for monitoring, assisting and/or progressing therapeutic exercises/activities.                  Learner Progress:  Not documented in this visit.              Point: Precautions (Not Started)       Description:   Instruct learner(s) on prescribed precautions during self-care and functional transfers.                  Learner Progress:  Not documented in this visit.              Point: Body mechanics (Not Started)       Description:   Instruct learner(s) on proper positioning and spine alignment during self-care, functional mobility activities and/or exercises.                  Learner Progress:  Not documented in this visit.                              User Key       Initials Effective Dates Name Provider Type Discipline    SD 06/16/21 -  Merly Reis OT Occupational Therapist OT                  OT Recommendation and Plan  Planned Therapy Interventions (OT): activity tolerance training, adaptive equipment training, BADL retraining, patient/caregiver education/training, ROM/therapeutic exercise, strengthening exercise, transfer/mobility retraining  Therapy Frequency (OT): 5 times/wk (Mon-Fri)  Plan of Care Review  Plan of Care Reviewed With: patient  Progress: no change  Outcome Evaluation: OT eval completed. Patient is sitting in chair and agrees to participate. Patient is Ox4, denies pain. Patient reports she lives with her  daughter, walks with a rollator, also has a BSC, shower chair and SPC. Patient requires min A for ADLs. Patient reports increased falls at home. Patient's BP sitting in chair is 107/79, performed sit to stand CGA and BP 86/57 with patient assymptomatic. Patient walked 50' x 2 with CGA using RW. BP following mobility is 73/58, RN notified. Requires min-mod A for bathing, dressing and toileting. Patient is expected to benefit from continued OT services prior to DC to address deficits in strength, endurance, balance, mobility and ADL performance. Patient to return home with family assistance and  services, patient is declining STR despite increased falls.     Time Calculation:   Evaluation Complexity (OT)  Review Occupational Profile/Medical/Therapy History Complexity: expanded/moderate complexity  Assessment, Occupational Performance/Identification of Deficit Complexity: 3-5 performance deficits  Clinical Decision Making Complexity (OT): detailed assessment/moderate complexity  Overall Complexity of Evaluation (OT): moderate complexity     Time Calculation- OT       Row Name 12/23/24 1328             Time Calculation- OT    OT Start Time 1036  -SD      OT Received On 12/23/24  -SD      OT Goal Re-Cert Due Date 01/02/25  -SD         Untimed Charges    OT Eval/Re-eval Minutes 45  -SD         Total Minutes    Untimed Charges Total Minutes 45  -SD       Total Minutes 45  -SD                User Key  (r) = Recorded By, (t) = Taken By, (c) = Cosigned By      Initials Name Provider Type    Merly Brush OT Occupational Therapist                  Therapy Charges for Today       Code Description Service Date Service Provider Modifiers Qty    [id number]  OT EVAL MOD COMPLEXITY 3 12/23/2024 Merly Reis OT GO 1                 Merly Reis OT  12/23/2024    Electronically signed by Merly Reis OT at 12/23/24 4591

## 2024-12-24 NOTE — CASE MANAGEMENT/SOCIAL WORK
0935: CM at bedside. Pt alert and assisted up in wheel chair. Going down for MRI per nurse. DCP remains Home with home health.      1340: Faxed [facility] referral for Home Health. Also called [facility] to update her address to [address] [place] [address]. CM to follow.

## 2024-12-24 NOTE — DISCHARGE SUMMARY
[facility]      Name:  Viola Barlow   Age:  69 y.o.  Sex:  female  :  1955  MRN:  [id number]   Visit Number:  [id number]    Admission Date:  2024  Date of Discharge:  2024  Primary Care Physician:  Jyoti Tomlinson, BERTRAM      Discharge Diagnoses:     Hypotension due to hypovolemia  JOSE ALBERTO superimposed on CKD  Poor oral intake  COVID-19  Hypoglycemia  Type 2 diabetes  Gastroparesis    Problem List:     Active Hospital Problems    Diagnosis  POA    **Hypotension [I95.9]  Yes    Gastroparesis [K31.84]  Yes    Acute kidney injury superimposed on chronic kidney disease [N17.9, N18.9]  Yes    Type 2 diabetes mellitus with nephropathy [E11.21]  Yes    Hypoglycemia [E16.2]  Yes      Resolved Hospital Problems   No resolved problems to display.     Presenting Problem:    Chief Complaint   Patient presents with    Hypoglycemia      Consults:     Consulting Physician(s)         Provider   Role Specialty     Fan Irby MD      Consulting Physician Neurology          Procedures Performed:        History of presenting illness/Hospital Course:    69-year-old female past medical history CKD stage III, diabetes mellitus type 2, hypertension, peripheral vascular disease, nephrolithiasis, hypothyroidism, Chief complaint unresponsive. Had recently been admitted 2024 to 2024 at this facility with gastroenteritis, JOSE ALBERTO.  She was started on Reglan and her renal function and symptoms had improved.  Reports she was doing well to her knowledge at home but then one of her Family found her in the kitchen floor about 2100 hrs.  She had a fall from the couch to the floor without injury.  Was noted to be hypoglycemic blood sugar of 36.  Was given glucagon improved to 47.  IV could not be obtained IO accessed and D50 given.  Patient was hypotensive and hypothermic warming blanket received Levophed and was started on D10.  Full code.     Pertinent  findings: Highly sensitive troponin 10, blood glucose have been 47 and then improved to 210 and it trended back down to 57.  Lactic acid 1.5, potassium 3.1, creatinine 2.05 which appears slightly higher than baseline, CBC 7.13, blood cultures pending, chest x-ray showed bibasilar atelectasis without effusion, CT head shows moderate atrophy heterogeneous density in the left malissa similar to previous may reflect chronic infarct, low-density 5 mm area within the central medulla not identified previously could reflect acute or chronic ischemic change. EKG NSR no ischemic change.    Hypotension did not resolve with IV fluids.  Patient started on Levophed.  Titrated off.  Midodrine added.  It was also noted that patient was positive for COVID-19.  Fortunately remained stable on room air.  Decadron 6 mg daily for 5 days initiated.  Neurology consulted due to findings on CT scan suggesting medulla/brainstem abnormality.  MRI reviewed.  No intracranial process.  Very minimal white matter changes.  No abnormal MRI signal changes in the medulla oblongata or malissa identified.  Prior CT findings are presumably due to artifact.  Neurology signed off.  Patient discharged home with home health services and close outpatient follow-up.    Vital Signs:    Temp:  [98.5 °F (36.9 °C)-99 °F (37.2 °C)] 98.6 °F (37 °C)  Heart Rate:  [73-89] 80  Resp:  [18-20] 20  BP: ()/(44-93) 124/50    Physical Exam:    General Appearance:  Alert and cooperative.    Head:  Atraumatic and normocephalic.   Eyes: Conjunctivae and sclerae normal, no icterus. No pallor.   Ears:  Ears with no abnormalities noted.   Throat: No oral lesions, no thrush, oral mucosa moist.   Neck: Supple, trachea midline, no thyromegaly.   Back:   No kyphoscoliosis present. No tenderness to palpation.   Lungs:   Breath sounds heard bilaterally equally.  No crackles or wheezing. No Pleural rub or bronchial breathing.   Heart:  Normal S1 and S2, no murmur, no gallop, no rub. No  JVD.   Abdomen:   Normal bowel sounds, no masses, no organomegaly. Soft, nontender, nondistended, no rebound tenderness.   Extremities: Supple, no edema, no cyanosis, no clubbing.   Pulses: Pulses palpable bilaterally.   Skin: No bleeding or rash.   Neurologic: Alert and oriented x 3. No facial asymmetry. Moves all four limbs. No tremors.     Pertinent Lab Results:     Results from last 7 days   Lab Units 12/24/24  0453 12/23/24  2131 12/23/24  1113 12/23/24  0636 12/22/24  2333 12/19/24  0603 12/18/24  0539   SODIUM mmol/L 135*  --   --  134* 132*   < > 143   POTASSIUM mmol/L 5.3* 5.5* 4.2 3.6 3.1*   < > 3.8   CHLORIDE mmol/L 103  --   --  100 95*   < > 107   CO2 mmol/L 23.2  --   --  22.1 22.6   < > 22.0   BUN mg/dL 22  --   --  29* 38*   < > 37*   CREATININE mg/dL 1.27*  --   --  1.35* 2.05*   < > 1.46*   CALCIUM mg/dL 8.7  --   --  8.1* 9.1   < > 9.7   BILIRUBIN mg/dL  --   --   --  0.4 0.3  --  0.8   ALK PHOS U/L  --   --   --  96 100  --  131*   ALT (SGPT) U/L  --   --   --  12 12  --  10   AST (SGOT) U/L  --   --   --  23 25  --  19   GLUCOSE mg/dL 191*  --   --  222* 210*   < > 145*    < > = values in this interval not displayed.     Results from last 7 days   Lab Units 12/24/24  0453 12/23/24  0635 12/22/24  2333   WBC 10*3/mm3 9.50 12.50* 7.13   HEMOGLOBIN g/dL 13.2 14.2 13.9   HEMATOCRIT % 39.8 43.1 42.7   PLATELETS 10*3/mm3 142 141 109*         Results from last 7 days   Lab Units 12/23/24  0030 12/22/24  2333   HSTROP T ng/L 10 12                     Results from last 7 days   Lab Units 12/23/24  0038 12/23/24  0030   BLOODCX  No growth at 24 hours No growth at 24 hours       Pertinent Radiology Results:    Imaging Results (All)       Procedure Component Value Units Date/Time    MRI Brain Without Contrast [[id number]] Collected: 12/24/24 1008     Updated: 12/24/24 1022    Narrative:      MRI OF THE BRAIN      HISTORY: medulla abnormality; E16.2-Hypoglycemia, unspecified;  N17.9-Acute kidney failure,  unspecified Headache.     PROCEDURE: Multiplanar MR imaging of the brain was performed in multiple  MR sequences.     COMPARISON: CT head 12/22/2024, 11/18/2024, and 11/18/2022.     FINDINGS:   Very minimal amount of white matter changes in the subcortical and  periventricular deep white matter of the bilateral cerebral hemispheres,  which are nonspecific but often attributed to chronic microvascular  ischemic white matter disease. Otherwise the brain parenchyma displays  normal signal without evidence of mass, hemorrhage or edema.      Limited images the proximal cord are unremarkable. The ventricles are  mildly enlarged. There is no extra-axial fluid or midline shift.      Flow-voids are appropriate. Diffusion weighted images demonstrate no  evidence of acute CVA.      Limited images of the paranasal sinuses are unremarkable. Bilateral  globe lens surgery. Sellar and suprasellar structures are grossly  unremarkable.     No abnormal MRI signal changes in the medulla oblongata or malissa  identified.       Impression:      No acute intracranial process. Very minimal white matter changes,  nonspecific, most likely due to chronic microvascular ischemic white  matter disease.     No abnormal MRI signal changes in the medulla oblongata or malissa  identified. Prior CT findings are presumably due to artifactual.        Images personally reviewed, interpreted and dictated by CLAIRE Wilson.     This report was signed and finalized on 12/24/2024 10:20 AM by CLAIRE Wilson.       XR Chest 1 View [[id number]] Collected: 12/23/24 0744     Updated: 12/23/24 0749    Narrative:      CLINICAL INDICATION:    Encephalopathy     EXAMINATION TECHNIQUE:  XR CHEST 1 VW-     COMPARISON:  Radiographs 11/18/2024.     FINDINGS:  No focal airspace consolidation. Prior granulomatous disease. Chronic  appearing bibasilar minimal scarring/atelectasis. No pneumothorax or  large pleural effusion. Heart and mediastinal contours are  within normal  limits except aortic atherosclerosis and left subclavian arterial stent.  No acute osseous or soft tissue abnormality. No free air under  hemidiaphragms.       Impression:      No acute cardiopulmonary findings.     Images personally reviewed, interpreted and dictated by CLAIRE Wilson.              This report was signed and finalized on 12/23/2024 7:47 AM by CLAIRE Wilson.       XR Chest 1 View [[id number]] Collected: 12/23/24 0605     Updated: 12/23/24 0606    Narrative:      FINAL REPORT    TECHNIQUE:  null    CLINICAL HISTORY:  central line placement    COMPARISON:  null    FINDINGS:  Exam: 1V chest    Comparison: CR - XR RIBS LEFT W PA CHEST - 11/18/24 17:31 EST    Findings:    Right IJ line tip over the proximal SVC.    No cardiac silhouette enlargement.    No infiltrate or mass.    No pneumothorax or significant effusion.    No acute osseous abnormality.      Impression:      Impression:    Right IJ line tip over the proximal SVC.    No acute pathology.    Authenticated and Electronically Signed by Gray Sommer MD  on 12/23/2024 06:05:20 AM    CT Head Without Contrast [[id number]] Collected: 12/23/24 0053     Updated: 12/23/24 0111    Addenda:        ADDENDUM REPORT    ADDENDUM:  Receipt of this report by the clinical staff was confirmed with Baldo Annexon on Dec 23, 2024 01:09:00 EST.    Authenticated and Electronically Signed by Aleksandar العراقي MD on  12/23/2024 01:09:39 AM  Signed: 12/23/24 0109 by Aleksandar العراقي MD    Narrative:      FINAL REPORT    TECHNIQUE:  null    CLINICAL HISTORY:  unresponsive episode, low blood sugar, weakness    COMPARISON:  null    FINDINGS:  CT head without contrast    Comparison: CT/SR - CT HEAD WO CONTRAST - 11/18/2024 07:15 PM EST    Findings:    Moderate diffuse cerebral atrophy and nonspecific white matter hypodensity.    No intracranial mass, midline shift, hydrocephalus, or acute hemorrhage/infarct.    Possible heterogeneity of  the left aspect of the malissa and low-density centrally involving the medulla oblongata, series 2 image 11 and 7 respectively.    Moderate calcific ASVD of the carotid siphons.    Visualized paranasal sinuses are clear.    Mastoids are well developed and clear.    Orbits are unremarkable.    No skull fracture. No significant scalp soft tissue swelling.      Impression:      Impression:    1. Moderate atrophy.    2. Heterogeneous density of the left malissa, similar to previous. This may reflect chronic infarct.    3. Low-density 5 mm area within the central medulla oblongata, not identified previously. This could reflect acute or chronic ischemic change. Consider MRI if not contraindicated.    Authenticated and Electronically Signed by Aleksandar العراقي MD on  12/23/2024 12:53:16 AM    CT Cervical Spine Without Contrast [[id number]] Collected: 12/23/24 0052     Updated: 12/23/24 0053    Narrative:      FINAL REPORT    TECHNIQUE:  null    CLINICAL HISTORY:  unresponsive, low blood sugar, weakness    COMPARISON:  null    FINDINGS:  CT cervical spine without contrast:    Comparison: CT/SR - CT CERVICAL SPINE WO CONTRAST - 11/18/2024 07:15 PM EST    Findings:    No acute fracture or traumatic listhesis.    No significant prevertebral soft tissue swelling.    Flattening of cervical lordosis with mild apex left scoliosis.    Severe degenerative disc disease at C5-6. Moderate facet arthropathy on the right.    No significant spinal canal or neural foraminal stenosis.    Craniocervical junction and C1-C2 articulations are without abnormal alignment/asymmetry.    Dens is intact. Severe degenerative narrowing of the atlantodens interval.    Normal limited view of the intracranial contents.    Neck soft tissues are unremarkable for age.    Lung apices without consolidation, pneumothorax, or mass.      Impression:      Impression:    1. No acute fracture.    2. Multilevel degenerative changes.    Authenticated and Electronically Signed  by Aleksandar العراقي MD on  12/23/2024 12:52:23 AM            Echo:    Results for orders placed during the hospital encounter of 11/04/21    Adult Transthoracic Echo Complete W/ Cont if Necessary Per Protocol    Interpretation Summary  1.  Normal left ventricular size and systolic function, LVEF 55-60%.  2.  Mild concentric LVH.  3.  Normal LV diastolic filling pattern.  4.  Normal right ventricular size and systolic function.  5.  Normal left atrial volume index.  6.  No significant valvular abnormalities.    Condition on Discharge:      Stable.    Code status during the hospital stay:    Code Status and Medical Interventions: CPR (Attempt to Resuscitate); Full Support   Ordered at: 12/23/24 0347     Code Status (Patient has no pulse and is not breathing):    CPR (Attempt to Resuscitate)     Medical Interventions (Patient has pulse or is breathing):    Full Support     [place] [facility] [place] Medic[place]:       Discharge Medications        New Medications        Instructions Start Date   dexAMETHasone 6 MG tablet  Commonly known as: DECADRON   6 mg, Oral, Daily With Breakfast   Start Date: December 25, 2024     empagliflozin 10 MG tablet tablet  Commonly known as: JARDIANCE   10 mg, Oral, Daily   Start Date: December 25, 2024     midodrine 2.5 MG tablet  Commonly known as: PROAMATINE   2.5 mg, Oral, 3 Times Daily Before Meals             Changes to Medications        Instructions Start Date   Vitamin D-3 25 MCG (1000 UT) capsule  What changed: Another medication with the same name was removed. Continue taking this medication, and follow the directions you see here.   1,000 Units, Oral, Daily             Continue These Medications        Instructions Start Date   aspirin 81 MG EC tablet  Commonly known as: aspirin EC   81 mg, Oral, Daily      atorvastatin 40 MG tablet  Commonly known as: LIPITOR   40 mg, Daily      clopidogrel 75 MG tablet  Commonly known as: PLAVIX   75 mg, Daily       gabapentin 600 MG tablet  Commonly known as: NEURONTIN   600 mg, Oral, 3 Times Daily      insulin lispro protamine-insulin lispro (75-25) 100 UNIT/ML suspension injection  Commonly known as: humaLOG 75-25   60 Units, Subcutaneous, 2 Times Daily With Meals      levothyroxine 50 MCG tablet  Commonly known as: SYNTHROID, LEVOTHROID   50 mcg, Daily      lidocaine 5 %  Commonly known as: LIDODERM   1 patch, Transdermal, Every 24 Hours, Remove & Discard patch within 12 hours or as directed by MD      metFORMIN  MG 24 hr tablet  Commonly known as: GLUCOPHAGE-XR   750 mg, Oral, Daily With Breakfast      metoclopramide 5 MG tablet  Commonly known as: Reglan   5 mg, Oral, 3 Times Daily Before Meals      ondansetron ODT 4 MG disintegrating tablet  Commonly known as: ZOFRAN-ODT   4 mg, Translingual, Every 8 Hours PRN      pantoprazole 40 MG EC tablet  Commonly known as: PROTONIX   40 mg, Daily      sertraline 50 MG tablet  Commonly known as: ZOLOFT   50 mg, Daily      Trulicity 1.5 MG/0.5ML solution pen-injector  Generic drug: Dulaglutide   1 each, Subcutaneous, Weekly             Stop These Medications      amLODIPine 5 MG tablet  Commonly known as: NORVASC     HYDROcodone-acetaminophen 5-325 MG per tablet  Commonly known as: NORCO     metoprolol succinate XL 50 MG 24 hr tablet  Commonly known as: TOPROL-XL            Discharge Diet:     Diet Instructions       Diet: Diabetic Diets; Consistent Carbohydrate; Regular (IDDSI 7); Thin (IDDSI 0)      Discharge Diet: Diabetic Diets    Diabetic Diet: Consistent Carbohydrate    Texture: Regular (IDDSI 7)    Fluid Consistency: Thin (IDDSI 0)          Activity at Discharge:     Activity Instructions       Activity as Tolerated            Follow-up Appointments:    Additional Instructions for the Follow-ups that You Need to Schedule       Ambulatory Referral to Home Health   As directed      Face to Face Visit Date: 12/24/2024   Follow-up provider for Plan of Care?: I treated the  patient in an acute care facility and will not continue treatment after discharge.   Follow-up provider: YINA CORTEZ [[id number]]   Reason/Clinical Findings: debility, DM, covid   Describe mobility limitations that make leaving home difficult: debility, DM, covid   Nursing/Therapeutic Services Requested: Skilled Nursing Physical Therapy Occupational Therapy   Skilled nursing orders: Mini-nebs Medication education   PT orders: Total joint pathway Strengthening Electrical stimulation Therapeutic exercise Gait Training Home safety assessment Ultrasound Transfer training   Weight Bearing Status: As Tolerated   Occupational orders: Home safety assessment Activities of daily living Energy conservation Strengthening Cognition Fine motor   Frequency: 1 Week 1        Discharge Follow-up with PCP   As directed       Currently Documented PCP:    Yina Cortez APRN    PCP Phone Number:    [phone]     Follow Up Details: 1 week               Follow-up Information       [facility] Follow up in 4 week(s).    Why: Hospital follow-up for moderate grade stenosis of the left internal carotid artery  Contact information:  [address] [facility] [address]  [facility] [address] [phone]             Yina Cortez APRN .    Specialty: Family Medicine  Why: 1 week  Contact information:  [address] [place] [address]  [phone]                           No future appointments.  Test Results Pending at Discharge:    Pending Results       None                 William Velázquez DO  12/24/24  12:42 EST    Time: I spent >30 minutes on this discharge activity which included: face-to-face encounter with the patient, reviewing the data in the system, coordination of the care with the nursing staff as well as consultants, documentation, and entering orders.     Dictated utilizing Dragon dictation.

## 2024-12-24 NOTE — PLAN OF CARE
Goal Outcome Evaluation:  Plan of Care Reviewed With: patient, family           Outcome Evaluation: Patient to discharge home today. family to transport.

## 2024-12-24 NOTE — PLAN OF CARE
Goal Outcome Evaluation:           Progress: improving  Outcome Evaluation: Care plan discussed with pt.

## 2024-12-25 NOTE — OUTREACH NOTE
Prep Survey      Flowsheet Row Responses   [facility]   Is LACE score < 7 ? No   Eligibility Readm Mgmt   Discharge diagnosis Hypotension    Acute kidney injury superimposed on chronic kidney disease  -- Suspect hypotension and JOSE ALBERTO due to hypovolemia, certainly   Does the patient have one of the following disease processes/diagnoses(primary or secondary)? Other   Does the patient have Home health ordered? No   Is there a DME ordered? No   Prep survey completed? Yes            CAROL ANN KAT - Registered Nurse

## 2024-12-26 NOTE — CASE MANAGEMENT/SOCIAL WORK
Case Management Discharge Note      Final Note: DC to daughter's home at [address] [facility]. [facility] Home health to follow.         Selected Continued Care - Discharged on 12/24/2024 Admission date: 12/22/2024 - Discharge disposition: Home-Health Care [facility]      Destination    No services have been selected for the patient.                Durable Medical Equipment    No services have been selected for the patient.                Dialysis/Infusion    No services have been selected for the patient.                Home Medical Care Coordination complete.      Service Provider Services Address Phone Fax Patient Preferred    [facility], Home Nursing [address], [place] [address] [address] [phone] --              Therapy    No services have been selected for the patient.                Community Resources    No services have been selected for the patient.                Community & DME    No services have been selected for the patient.                    Selected Continued Care - Prior Encounters Includes continued care and service providers with selected services from prior encounters from 9/23/2024 to 12/24/2024      Discharged on 12/19/2024 Admission date: 12/16/2024 - Discharge disposition: Home or Self Care      Durable Medical Equipment       Service Provider Services Address Phone Fax Patient Preferred    [facility] Durable Medical Equipment [address] [place] [address] [phone] --       Internal Comment last updated by Brayden Miller RN 12/19/2024 1041    [facility]                                     Transportation Services  Private: Car    Final Discharge Disposition Code: 06 - home with home health care

## 2024-12-28 LAB
BACTERIA SPEC AEROBE CULT: NORMAL
BACTERIA SPEC AEROBE CULT: NORMAL

## 2024-12-30 NOTE — CASE MANAGEMENT/SOCIAL WORK
Case Management Discharge Note      Final Note: disp code changed- not seen within 4 days         Selected Continued Care - Discharged on 12/24/2024 Admission date: 12/22/2024 - Discharge disposition: Home-Health Care [facility]      Destination    No services have been selected for the patient.                Durable Medical Equipment    No services have been selected for the patient.                Dialysis/Infusion    No services have been selected for the patient.                Home Medical Care Coordination complete.      Service Provider Services Address Phone Fax Patient Preferred    [facility], Home Nursing [address], [facility] [address] [address] [phone] --              Therapy    No services have been selected for the patient.                Community Resources    No services have been selected for the patient.                Community & DME    No services have been selected for the patient.                    Selected Continued Care - Prior Encounters Includes continued care and service providers with selected services from prior encounters from 9/23/2024 to 12/24/2024      Discharged on 12/19/2024 Admission date: 12/16/2024 - Discharge disposition: Home or Self Care      Durable Medical Equipment       Service Provider Services Address Phone Fax Patient Preferred    [facility] Durable Medical [facility] [address] [address] [phone] --       Internal Comment last updated by Brayden Miller RN 12/19/2024 1041    [facility] delivered                                     Transportation Services  Private: Car    Final Discharge Disposition Code: 01 - home or self-care

## 2024-12-31 ENCOUNTER — READMISSION MANAGEMENT (OUTPATIENT)
Dept: CALL CENTER | Facility: HOSPITAL | Age: 69
End: 2024-12-31
Payer: MEDICARE

## 2024-12-31 NOTE — OUTREACH NOTE
Medical Week 1 Survey      Flowsheet Row Responses   [facility] patient discharged from? [facility]   Does the patient have one of the following disease processes/diagnoses(primary or secondary)? Other   Week 1 attempt successful? Yes   Call start time 1024   Call end time 1033   Discharge diagnosis Hypotension    Acute kidney injury superimposed on chronic kidney disease  -- Suspect hypotension and JOSE ALBERTO due to hypovolemia, certainly   Person spoke with today (if not patient) and relationship pt   Meds reviewed with patient/caregiver? Yes   Is the patient having any side effects they believe may be caused by any medication additions or changes? No   Does the patient have all medications ordered at discharge? Yes   Is the patient taking all medications as directed (includes completed medication regime)? Yes   Does the patient have a primary care provider?  Yes   Does the patient have an appointment with their PCP within 7 days of discharge? Yes   Has the patient kept scheduled appointments due by today? N/A   Psychosocial issues? No   Did the patient receive a copy of their discharge instructions? Yes   Nursing interventions Reviewed instructions with patient   What is the patient's perception of their health status since discharge? Improving   Is the patient/caregiver able to teach back signs and symptoms related to disease process for when to call PCP? Yes   Is the patient/caregiver able to teach back signs and symptoms related to disease process for when to call 911? Yes   Is the patient/caregiver able to teach back the hierarchy of who to call/visit for symptoms/problems? PCP, Specialist, Home health nurse, Urgent Care, ED, 911 Yes   If the patient is a current smoker, are they able to teach back resources for cessation? Not a smoker   Week 1 call completed? Yes   Would this patient benefit from a Referral to Amb Social Work? No   Is the patient interested in additional calls from an ambulatory ?  No   Wrap up additional comments Reviewed meds with pt. Pt verified PCP fu appt.   Call end time 1033            Zuleima COBURN - Registered Nurse

## 2025-01-10 ENCOUNTER — READMISSION MANAGEMENT (OUTPATIENT)
Dept: CALL CENTER | Facility: HOSPITAL | Age: 70
End: 2025-01-10
Payer: MEDICARE

## 2025-01-10 NOTE — OUTREACH NOTE
Medical Week 2 Survey      Flowsheet Row Responses   [facility] facility patient discharged from? [facility]   Does the patient have one of the following disease processes/diagnoses(primary or secondary)? Other   Week 2 attempt successful? No   Unsuccessful attempts Attempt 1            Yadira CYR - Registered Nurse

## 2025-01-15 ENCOUNTER — READMISSION MANAGEMENT (OUTPATIENT)
Dept: CALL CENTER | Facility: HOSPITAL | Age: 70
End: 2025-01-15
Payer: MEDICARE

## 2025-01-15 NOTE — OUTREACH NOTE
Medical Week 2 Survey      Flowsheet Row Responses   [facility] patient discharged from? [facility]   Does the patient have one of the following disease processes/diagnoses(primary or secondary)? Other   Week 2 attempt successful? Yes   Call start time 1608   Discharge diagnosis Hypotension    Acute kidney injury superimposed on chronic kidney disease  -- Suspect hypotension and JOSE ALBERTO due to hypovolemia, certainly   Call end time 1612   Person spoke with today (if not patient) and relationship pt   Meds reviewed with patient/caregiver? Yes   Does the patient have all medications ordered at discharge? Yes   Is the patient taking all medications as directed (includes completed medication regime)? Yes   Comments regarding appointments Pt saw nephrology yesterday and BP was ok at that time.   Does the patient have a primary care provider?  Yes   Does the patient have an appointment with their PCP within 7 days of discharge? Yes   Comments regarding PCP Pt will see Jyoti Tomlinson on friday   Has the patient kept scheduled appointments due by today? Yes   Psychosocial issues? No   Did the patient receive a copy of their discharge instructions? Yes   Nursing interventions Reviewed instructions with patient   What is the patient's perception of their health status since discharge? Improving   If the patient is a current smoker, are they able to teach back resources for cessation? Not a smoker   Additional teach back comments Pt is having occasional dizzy spells.  Lef tfoot is also swelling, nephrology has scheduled u/s on leg   Week 2 Call Completed? Yes   Is the patient interested in additional calls from an ambulatory ? No   Would this patient benefit from a Referral to [facility] Social Work? No   Wrap up additional comments Pt states she is doing well.  She is having swelling of one extremity and will have u/s scheduled.  She will see PCP on friday.  Denies needs at this time.   Call end time 1612            ANA ROSA HAAS  - Registered Nurse

## 2025-01-24 ENCOUNTER — READMISSION MANAGEMENT (OUTPATIENT)
Dept: CALL CENTER | Facility: HOSPITAL | Age: 70
End: 2025-01-24
Payer: MEDICARE

## 2025-01-24 NOTE — OUTREACH NOTE
Medical Week 3 Survey      Flowsheet Row Responses   [facility] [place] patient discharged from? [facility]   Does the patient have one of the following disease processes/diagnoses(primary or secondary)? Other   Week 3 attempt successful? Yes   Call start time 1453   Call end time 1459   Discharge diagnosis Hypotension    Acute kidney injury superimposed on chronic kidney disease  -- Suspect hypotension and JOSE ALBERTO due to hypovolemia, certainly   Meds reviewed with patient/caregiver? Yes   Is the patient taking all medications as directed (includes completed medication regime)? Yes   Medication comments Pt plans to address med questions with her PCP   Does the patient have a primary care provider?  Yes   Has the patient kept scheduled appointments due by today? Yes  [PCP apt since hosp dc]   What is the patient's perception of their health status since discharge? Improving   Week 3 Call Completed? Yes   Graduated Yes   Graduated/Revoked comments Pt was instructed to speak to her PCP re: medication question   Call end time 1459            Verenice NUNEZ - Registered Nurse

## 2025-01-28 ENCOUNTER — TRANSCRIBE ORDERS (OUTPATIENT)
Dept: ADMINISTRATIVE | Facility: HOSPITAL | Age: 70
End: 2025-01-28
Payer: MEDICARE

## 2025-01-28 DIAGNOSIS — R60.0 EDEMA OF LEFT LOWER EXTREMITY: Primary | ICD-10-CM

## 2025-01-31 ENCOUNTER — TRANSCRIBE ORDERS (OUTPATIENT)
Dept: LAB | Facility: HOSPITAL | Age: 70
End: 2025-01-31
Payer: MEDICARE

## 2025-01-31 DIAGNOSIS — I73.9 PERIPHERAL VASCULAR DISEASE, UNSPECIFIED: ICD-10-CM

## 2025-01-31 DIAGNOSIS — N28.9 URETERAL SLUDGE: ICD-10-CM

## 2025-01-31 DIAGNOSIS — R60.0 LEG EDEMA: ICD-10-CM

## 2025-01-31 DIAGNOSIS — E79.0 URICACIDEMIA: ICD-10-CM

## 2025-01-31 DIAGNOSIS — N18.32 CHRONIC KIDNEY DISEASE (CKD) STAGE G3B/A1, MODERATELY DECREASED GLOMERULAR FILTRATION RATE (GFR) BETWEEN 30-44 ML/MIN/1.73 SQUARE METER AND ALBUMINURIA CREATININE RATIO LESS THAN 30 MG/G (CMS/H*: Primary | ICD-10-CM

## 2025-01-31 DIAGNOSIS — E21.1 SECONDARY HYPERPARATHYROIDISM, NON-RENAL: ICD-10-CM

## 2025-01-31 DIAGNOSIS — I25.10 DISEASE OF CARDIOVASCULAR SYSTEM: ICD-10-CM

## 2025-01-31 DIAGNOSIS — R80.9 PROTEINURIA, UNSPECIFIED TYPE: ICD-10-CM

## 2025-01-31 DIAGNOSIS — R60.9 EDEMA, UNSPECIFIED TYPE: ICD-10-CM

## 2025-01-31 DIAGNOSIS — E78.5 HYPERLIPIDEMIA, UNSPECIFIED HYPERLIPIDEMIA TYPE: ICD-10-CM

## 2025-01-31 DIAGNOSIS — I12.9 HYPERTENSIVE NEPHROPATHY: ICD-10-CM

## 2025-01-31 DIAGNOSIS — E11.22 TYPE 2 DIABETES MELLITUS WITH DIABETIC CHRONIC KIDNEY DISEASE, UNSPECIFIED CKD STAGE, UNSPECIFIED WHETHER LONG TERM INSULIN USE: ICD-10-CM

## 2025-01-31 DIAGNOSIS — E87.5 HYPERPOTASSEMIA: ICD-10-CM

## 2025-01-31 DIAGNOSIS — E11.40 DIABETIC PSEUDOTABES: ICD-10-CM

## 2025-01-31 DIAGNOSIS — E55.9 VITAMIN D DEFICIENCY, UNSPECIFIED: ICD-10-CM

## 2025-01-31 DIAGNOSIS — N18.9 CHRONIC KIDNEY DISEASE, UNSPECIFIED CKD STAGE: ICD-10-CM

## 2025-01-31 DIAGNOSIS — G47.33 OBSTRUCTIVE SLEEP APNEA (ADULT) (PEDIATRIC): ICD-10-CM

## 2025-01-31 DIAGNOSIS — D63.1 ERYTHROPOIETIN DEFICIENCY ANEMIA: ICD-10-CM

## 2025-02-03 ENCOUNTER — TRANSCRIBE ORDERS (OUTPATIENT)
Dept: LAB | Facility: HOSPITAL | Age: 70
End: 2025-02-03
Payer: MEDICARE

## 2025-02-03 ENCOUNTER — LAB (OUTPATIENT)
Dept: LAB | Facility: HOSPITAL | Age: 70
End: 2025-02-03
Payer: MEDICARE

## 2025-02-03 ENCOUNTER — HOSPITAL ENCOUNTER (OUTPATIENT)
Dept: ULTRASOUND IMAGING | Facility: HOSPITAL | Age: 70
Discharge: HOME | End: 2025-02-03
Payer: MEDICARE

## 2025-02-03 DIAGNOSIS — N28.9 KIDNEY DISORDER: ICD-10-CM

## 2025-02-03 DIAGNOSIS — N18.6 ANEMIA IN END-STAGE RENAL DISEASE: ICD-10-CM

## 2025-02-03 DIAGNOSIS — D63.1 ANEMIA IN END-STAGE RENAL DISEASE: ICD-10-CM

## 2025-02-03 DIAGNOSIS — I12.9 HYPERTENSIVE NEPHROPATHY: ICD-10-CM

## 2025-02-03 DIAGNOSIS — N18.32 CHRONIC KIDNEY DISEASE (CKD) STAGE G3B/A1, MODERATELY DECREASED GLOMERULAR FILTRATION RATE (GFR) BETWEEN 30-44 ML/MIN/1.73 SQUARE METER AND ALBUMINURIA CREATININE RATIO LESS THAN 30 MG/G (CMS/H*: ICD-10-CM

## 2025-02-03 DIAGNOSIS — E21.1 SECONDARY HYPERPARATHYROIDISM, NON-RENAL: ICD-10-CM

## 2025-02-03 DIAGNOSIS — E55.9 VITAMIN D DEFICIENCY, UNSPECIFIED: ICD-10-CM

## 2025-02-03 DIAGNOSIS — E78.5 HYPERLIPIDEMIA, UNSPECIFIED HYPERLIPIDEMIA TYPE: ICD-10-CM

## 2025-02-03 DIAGNOSIS — R60.0 LOCALIZED EDEMA: ICD-10-CM

## 2025-02-03 DIAGNOSIS — G47.33 OBSTRUCTIVE SLEEP APNEA (ADULT) (PEDIATRIC): ICD-10-CM

## 2025-02-03 DIAGNOSIS — R80.9 PROTEINURIA, UNSPECIFIED TYPE: ICD-10-CM

## 2025-02-03 DIAGNOSIS — I73.9 PERIPHERAL VASCULAR DISEASE, UNSPECIFIED: ICD-10-CM

## 2025-02-03 DIAGNOSIS — R80.1 PERSISTENT PROTEINURIA: ICD-10-CM

## 2025-02-03 DIAGNOSIS — R60.0 EDEMA OF LEFT LOWER EXTREMITY: ICD-10-CM

## 2025-02-03 DIAGNOSIS — E55.9 AVITAMINOSIS D: ICD-10-CM

## 2025-02-03 DIAGNOSIS — E11.22 TYPE 2 DIABETES MELLITUS WITH DIABETIC CHRONIC KIDNEY DISEASE, UNSPECIFIED CKD STAGE, UNSPECIFIED WHETHER LONG TERM INSULIN USE: ICD-10-CM

## 2025-02-03 DIAGNOSIS — R60.0 LEG EDEMA: ICD-10-CM

## 2025-02-03 DIAGNOSIS — D63.1 ERYTHROPOIETIN DEFICIENCY ANEMIA: ICD-10-CM

## 2025-02-03 DIAGNOSIS — E87.5 HYPERPOTASSEMIA: ICD-10-CM

## 2025-02-03 DIAGNOSIS — E11.40 DIABETIC NEUROPATHY, PAINFUL: ICD-10-CM

## 2025-02-03 DIAGNOSIS — R60.9 EDEMA, UNSPECIFIED TYPE: ICD-10-CM

## 2025-02-03 DIAGNOSIS — N18.32 CHRONIC KIDNEY DISEASE (CKD) STAGE G3B/A1, MODERATELY DECREASED GLOMERULAR FILTRATION RATE (GFR) BETWEEN 30-44 ML/MIN/1.73 SQUARE METER AND ALBUMINURIA CREATININE RATIO LESS THAN 30 MG/G (CMS/H*: Primary | ICD-10-CM

## 2025-02-03 DIAGNOSIS — E79.0 URICACIDEMIA: ICD-10-CM

## 2025-02-03 DIAGNOSIS — I25.10 DISEASE OF CARDIOVASCULAR SYSTEM: ICD-10-CM

## 2025-02-03 DIAGNOSIS — N28.9 URETERAL SLUDGE: ICD-10-CM

## 2025-02-03 DIAGNOSIS — N18.9 CHRONIC KIDNEY DISEASE, UNSPECIFIED CKD STAGE: ICD-10-CM

## 2025-02-03 DIAGNOSIS — E11.40 DIABETIC PSEUDOTABES: ICD-10-CM

## 2025-02-03 LAB
BACTERIA UR QL AUTO: ABNORMAL /HPF
BILIRUB UR QL STRIP: NEGATIVE
CLARITY UR: CLEAR
COLOR UR: YELLOW
CREAT UR-MCNC: 32.3 MG/DL
GLUCOSE UR STRIP-MCNC: ABNORMAL MG/DL
HGB UR QL STRIP.AUTO: NEGATIVE
HOLD SPECIMEN: NORMAL
HYALINE CASTS UR QL AUTO: ABNORMAL /LPF
KETONES UR QL STRIP: NEGATIVE
LEUKOCYTE ESTERASE UR QL STRIP.AUTO: ABNORMAL
NITRITE UR QL STRIP: NEGATIVE
PH UR STRIP.AUTO: 6 [PH] (ref 5–8)
PROT ?TM UR-MCNC: 19.1 MG/DL
PROT UR QL STRIP: ABNORMAL
PROT/CREAT UR: 591.3 MG/G CREA (ref 0–200)
RBC # UR STRIP: ABNORMAL /HPF
REF LAB TEST METHOD: ABNORMAL
SP GR UR STRIP: 1.02 (ref 1–1.03)
SQUAMOUS #/AREA URNS HPF: ABNORMAL /HPF
UROBILINOGEN UR QL STRIP: ABNORMAL
WBC # UR STRIP: ABNORMAL /HPF

## 2025-02-03 PROCEDURE — 87077 CULTURE AEROBIC IDENTIFY: CPT

## 2025-02-03 PROCEDURE — 81001 URINALYSIS AUTO W/SCOPE: CPT

## 2025-02-03 PROCEDURE — 84156 ASSAY OF PROTEIN URINE: CPT

## 2025-02-03 PROCEDURE — 36415 COLL VENOUS BLD VENIPUNCTURE: CPT

## 2025-02-03 PROCEDURE — 82570 ASSAY OF URINE CREATININE: CPT

## 2025-02-03 PROCEDURE — 87086 URINE CULTURE/COLONY COUNT: CPT

## 2025-02-03 PROCEDURE — 93971 EXTREMITY STUDY: CPT

## 2025-02-03 PROCEDURE — 80069 RENAL FUNCTION PANEL: CPT

## 2025-02-03 PROCEDURE — 87186 SC STD MICRODIL/AGAR DIL: CPT

## 2025-02-04 LAB
ALBUMIN SERPL-MCNC: 4.1 G/DL (ref 3.5–5.2)
ANION GAP SERPL CALCULATED.3IONS-SCNC: 8 MMOL/L (ref 5–15)
BUN SERPL-MCNC: 14 MG/DL (ref 8–23)
BUN/CREAT SERPL: 11.6 (ref 7–25)
CALCIUM SPEC-SCNC: 9.9 MG/DL (ref 8.6–10.5)
CHLORIDE SERPL-SCNC: 103 MMOL/L (ref 98–107)
CO2 SERPL-SCNC: 29 MMOL/L (ref 22–29)
CREAT SERPL-MCNC: 1.21 MG/DL (ref 0.57–1)
EGFRCR SERPLBLD CKD-EPI 2021: 49.2 ML/MIN/1.73
GLUCOSE SERPL-MCNC: 175 MG/DL (ref 65–99)
PHOSPHATE SERPL-MCNC: 2.8 MG/DL (ref 2.5–4.5)
POTASSIUM SERPL-SCNC: 5.4 MMOL/L (ref 3.5–5.2)
SODIUM SERPL-SCNC: 140 MMOL/L (ref 136–145)

## 2025-02-06 LAB — BACTERIA SPEC AEROBE CULT: ABNORMAL

## 2025-07-07 NOTE — ED NOTES
Problem: Alcohol Withdrawal Management  Goal: # No alcohol-related delirium or seizures  Outcome: Monitoring/Evaluating progress  Note: Pt working through life choices with daughter at bedside~     Problem: Skin Integrity Alteration  Goal: Skin remains intact with no new/deterioration of wound or pressure injury  Outcome: Monitoring/Evaluating progress  Note: Pt repositions self frequently~     Problem: At Risk for Falls  Goal: Patient does not fall  Outcome: Monitoring/Evaluating progress  Note: Rounding with purpose, bed alarm in use~      Per House Supervisor a nurse has to be called in at this time.      Keaton Diaz  11/04/21 2003

## (undated) DEVICE — CLEARCUT® HP2 SLIT KNIFE INTREPID MICRO-COAXIAL SYSTEM 2.4 DB: Brand: CLEARCUT®; INTREPID

## (undated) DEVICE — GLV SURG SENSICARE LT W/ALOE PF LF 8 STRL

## (undated) DEVICE — GOWN,PREVENTION PLUS,XLARGE,STERILE: Brand: MEDLINE

## (undated) DEVICE — SYR LUERLOK 5CC

## (undated) DEVICE — POST OP EYE CARE KIT: Brand: MEDLINE

## (undated) DEVICE — SOL IRRIG H2O 1000ML STRL

## (undated) DEVICE — Device

## (undated) DEVICE — CLEAR CORNEAL KNIFE 2.4MM ANG: Brand: SHARPOINT

## (undated) DEVICE — DRSNG SURESITE123 2.4X2.8IN

## (undated) DEVICE — EYE PAK* 1033 NON-WOVEN OPHTHALMIC DRAPE APERTURE POUCHES: Brand: ALCON EYE-PAK

## (undated) DEVICE — 15 DEG. MICROKNIFE - 3MM: Brand: SHARPOINT

## (undated) DEVICE — HP CONCL INTREPID COAX I/A CRV .3MM

## (undated) DEVICE — CANN IRR/INJ AIR ANT CHAMBER 6MM BEND 27G

## (undated) DEVICE — GLV SURG SENSICARE W/ALOE PF LF 8 STRL